# Patient Record
Sex: MALE | Race: BLACK OR AFRICAN AMERICAN | NOT HISPANIC OR LATINO | Employment: OTHER | URBAN - METROPOLITAN AREA
[De-identification: names, ages, dates, MRNs, and addresses within clinical notes are randomized per-mention and may not be internally consistent; named-entity substitution may affect disease eponyms.]

---

## 2023-07-17 ENCOUNTER — HOSPITAL ENCOUNTER (OUTPATIENT)
Dept: DATA CONVERSION | Facility: HOSPITAL | Age: 58
End: 2023-07-17
Attending: PODIATRIST | Admitting: PODIATRIST
Payer: COMMERCIAL

## 2023-07-17 DIAGNOSIS — M89.372 HYPERTROPHY OF BONE, LEFT ANKLE AND FOOT: ICD-10-CM

## 2023-07-17 DIAGNOSIS — E87.1 HYPO-OSMOLALITY AND HYPONATREMIA: ICD-10-CM

## 2023-07-17 DIAGNOSIS — F17.210 NICOTINE DEPENDENCE, CIGARETTES, UNCOMPLICATED: ICD-10-CM

## 2023-07-17 DIAGNOSIS — M20.42 OTHER HAMMER TOE(S) (ACQUIRED), LEFT FOOT: ICD-10-CM

## 2023-07-17 DIAGNOSIS — J43.9 EMPHYSEMA, UNSPECIFIED (MULTI): ICD-10-CM

## 2023-07-17 DIAGNOSIS — M20.5X2 OTHER DEFORMITIES OF TOE(S) (ACQUIRED), LEFT FOOT: ICD-10-CM

## 2023-09-17 PROBLEM — K09.1: Status: ACTIVE | Noted: 2023-09-17

## 2023-09-17 PROBLEM — Z04.9 CONDITION NOT FOUND: Status: ACTIVE | Noted: 2023-09-17

## 2023-09-17 PROBLEM — J98.01 BRONCHOSPASM, ACUTE: Status: ACTIVE | Noted: 2023-09-17

## 2023-09-17 PROBLEM — L02.215 ABSCESS OF PERINEUM: Status: ACTIVE | Noted: 2023-09-17

## 2023-09-17 PROBLEM — K60.3 ANAL FISTULA: Status: ACTIVE | Noted: 2023-09-17

## 2023-09-17 PROBLEM — M54.2 CHRONIC NECK PAIN: Status: ACTIVE | Noted: 2023-09-17

## 2023-09-17 PROBLEM — H04.123 DRY EYE SYNDROME OF BOTH LACRIMAL GLANDS: Status: ACTIVE | Noted: 2023-09-17

## 2023-09-17 PROBLEM — H52.13 MYOPIA OF BOTH EYES: Status: ACTIVE | Noted: 2023-09-17

## 2023-09-17 PROBLEM — N49.2 SCROTAL ABSCESS: Status: ACTIVE | Noted: 2023-09-17

## 2023-09-17 PROBLEM — F05 DELIRIUM OF MIXED ORIGIN: Status: ACTIVE | Noted: 2023-09-17

## 2023-09-17 PROBLEM — R06.2 WHEEZING: Status: ACTIVE | Noted: 2023-09-17

## 2023-09-17 PROBLEM — E87.20 METABOLIC ACIDOSIS: Status: ACTIVE | Noted: 2023-09-17

## 2023-09-17 PROBLEM — F41.9 ANXIETY: Status: ACTIVE | Noted: 2023-09-17

## 2023-09-17 PROBLEM — R07.9 CHEST PAIN: Status: ACTIVE | Noted: 2023-09-17

## 2023-09-17 PROBLEM — R33.9 RETENTION OF URINE: Status: ACTIVE | Noted: 2023-09-17

## 2023-09-17 PROBLEM — R11.10 REGURGITATION OF FOOD: Status: ACTIVE | Noted: 2023-09-17

## 2023-09-17 PROBLEM — R06.02 SHORTNESS OF BREATH: Status: ACTIVE | Noted: 2019-11-06

## 2023-09-17 PROBLEM — Z97.8 ENDOTRACHEAL TUBE PRESENT: Status: ACTIVE | Noted: 2023-09-17

## 2023-09-17 PROBLEM — F17.200 CHAIN SMOKER: Status: ACTIVE | Noted: 2019-11-06

## 2023-09-17 PROBLEM — N17.9 ACUTE KIDNEY INJURY (CMS-HCC): Status: ACTIVE | Noted: 2023-09-17

## 2023-09-17 PROBLEM — J18.9 CAP (COMMUNITY ACQUIRED PNEUMONIA): Status: ACTIVE | Noted: 2023-09-17

## 2023-09-17 PROBLEM — T80.1XXA IV INFILTRATE: Status: ACTIVE | Noted: 2023-09-17

## 2023-09-17 PROBLEM — R12 HEART BURN: Status: ACTIVE | Noted: 2023-09-17

## 2023-09-17 PROBLEM — J45.909 ASTHMA (HHS-HCC): Status: ACTIVE | Noted: 2023-09-17

## 2023-09-17 PROBLEM — K61.1 PERIRECTAL ABSCESS: Status: ACTIVE | Noted: 2023-09-17

## 2023-09-17 PROBLEM — T50.901A POISONING BY DRUG OR MEDICINAL SUBSTANCE: Status: ACTIVE | Noted: 2023-09-17

## 2023-09-17 PROBLEM — K60.30 ANAL FISTULA: Status: ACTIVE | Noted: 2023-09-17

## 2023-09-17 PROBLEM — J47.9 BRONCHIECTASIS (MULTI): Status: ACTIVE | Noted: 2023-09-17

## 2023-09-17 PROBLEM — G89.29 CHRONIC NECK PAIN: Status: ACTIVE | Noted: 2023-09-17

## 2023-09-17 PROBLEM — J44.1 COPD EXACERBATION (MULTI): Status: ACTIVE | Noted: 2023-09-17

## 2023-09-17 PROBLEM — D3A.8 NEUROENDOCRINE TUMOR (CMS-HCC): Status: ACTIVE | Noted: 2023-09-17

## 2023-09-17 PROBLEM — H52.4 MYOPIA OF BOTH EYES WITH REGULAR ASTIGMATISM AND PRESBYOPIA: Status: ACTIVE | Noted: 2023-09-17

## 2023-09-17 PROBLEM — H04.209 EYE TEARING: Status: ACTIVE | Noted: 2023-09-17

## 2023-09-17 PROBLEM — H52.4 BILATERAL PRESBYOPIA: Status: ACTIVE | Noted: 2023-09-17

## 2023-09-17 PROBLEM — A41.9 SEPSIS (MULTI): Status: ACTIVE | Noted: 2023-09-17

## 2023-09-17 PROBLEM — H52.223 MYOPIA OF BOTH EYES WITH REGULAR ASTIGMATISM AND PRESBYOPIA: Status: ACTIVE | Noted: 2023-09-17

## 2023-09-17 PROBLEM — A04.8 HELICOBACTER PYLORI (H. PYLORI) INFECTION: Status: ACTIVE | Noted: 2023-09-17

## 2023-09-17 PROBLEM — G93.41 ACUTE METABOLIC ENCEPHALOPATHY: Status: ACTIVE | Noted: 2023-09-17

## 2023-09-17 PROBLEM — N52.9 ERECTILE DYSFUNCTION: Status: ACTIVE | Noted: 2023-09-17

## 2023-09-17 PROBLEM — H43.399 VITREOUS FLOATERS: Status: ACTIVE | Noted: 2023-09-17

## 2023-09-17 PROBLEM — M27.40 CYST OF JAW: Status: ACTIVE | Noted: 2023-09-17

## 2023-09-17 PROBLEM — R56.9 SEIZURES (MULTI): Status: ACTIVE | Noted: 2023-09-17

## 2023-09-17 PROBLEM — J18.9 CAP (COMMUNITY ACQUIRED PNEUMONIA): Status: RESOLVED | Noted: 2023-09-17 | Resolved: 2023-09-17

## 2023-09-17 PROBLEM — J96.01 ACUTE HYPOXEMIC RESPIRATORY FAILURE (MULTI): Status: ACTIVE | Noted: 2023-09-17

## 2023-09-17 PROBLEM — H02.889 MEIBOMIAN GLAND DYSFUNCTION (MGD): Status: ACTIVE | Noted: 2023-09-17
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
## 2023-09-17 PROBLEM — R93.89 ABNORMAL CT OF THE CHEST: Status: ACTIVE | Noted: 2023-09-17

## 2023-09-17 PROBLEM — R07.89 CHEST TIGHTNESS: Status: ACTIVE | Noted: 2023-09-17

## 2023-09-17 RX ORDER — LIDOCAINE 50 MG/G
OINTMENT TOPICAL 4 TIMES DAILY
Status: ON HOLD | COMMUNITY
End: 2023-10-06 | Stop reason: ALTCHOICE

## 2023-09-17 RX ORDER — FERROUS SULFATE 325(65) MG
65 TABLET ORAL
COMMUNITY
End: 2023-10-07 | Stop reason: HOSPADM

## 2023-09-17 RX ORDER — MONTELUKAST SODIUM 10 MG/1
10 TABLET ORAL DAILY
Status: ON HOLD | COMMUNITY
End: 2023-10-07 | Stop reason: SDUPTHER

## 2023-09-17 RX ORDER — CLINDAMYCIN PHOSPHATE 10 UG/ML
LOTION TOPICAL 2 TIMES DAILY
COMMUNITY
End: 2023-10-07 | Stop reason: HOSPADM

## 2023-09-17 RX ORDER — DIVALPROEX SODIUM 250 MG/1
1 TABLET, DELAYED RELEASE ORAL 2 TIMES DAILY
COMMUNITY
End: 2023-10-07 | Stop reason: HOSPADM

## 2023-09-17 RX ORDER — HYDROXYZINE HYDROCHLORIDE 25 MG/1
1 TABLET, FILM COATED ORAL NIGHTLY
Status: ON HOLD | COMMUNITY
End: 2023-10-06 | Stop reason: ALTCHOICE

## 2023-09-17 RX ORDER — UMECLIDINIUM 62.5 UG/1
1 AEROSOL, POWDER ORAL DAILY
Status: ON HOLD | COMMUNITY
Start: 2020-12-09 | End: 2023-10-07 | Stop reason: SDUPTHER

## 2023-09-17 RX ORDER — SILDENAFIL 50 MG/1
50 TABLET, FILM COATED ORAL
Status: ON HOLD | COMMUNITY
End: 2023-10-06 | Stop reason: ALTCHOICE

## 2023-09-17 RX ORDER — ALBUTEROL SULFATE 0.83 MG/ML
SOLUTION RESPIRATORY (INHALATION) 4 TIMES DAILY
Status: ON HOLD | COMMUNITY
End: 2023-10-07 | Stop reason: SDUPTHER

## 2023-09-17 RX ORDER — FLUTICASONE PROPIONATE AND SALMETEROL 500; 50 UG/1; UG/1
2 POWDER RESPIRATORY (INHALATION) 2 TIMES DAILY
Status: ON HOLD | COMMUNITY
Start: 2023-06-27 | End: 2023-10-06 | Stop reason: ALTCHOICE

## 2023-09-17 RX ORDER — EPINEPHRINE 0.3 MG/.3ML
0.3 INJECTION SUBCUTANEOUS
COMMUNITY
End: 2024-01-22

## 2023-09-17 RX ORDER — ATORVASTATIN CALCIUM 10 MG/1
10 TABLET, FILM COATED ORAL DAILY
Status: ON HOLD | COMMUNITY
End: 2023-10-07 | Stop reason: SDUPTHER

## 2023-09-17 RX ORDER — PANTOPRAZOLE SODIUM 40 MG/1
40 TABLET, DELAYED RELEASE ORAL DAILY
Status: ON HOLD | COMMUNITY
End: 2023-10-07 | Stop reason: SDUPTHER

## 2023-09-17 RX ORDER — BENZOYL PEROXIDE 100 MG/ML
LIQUID TOPICAL
Status: ON HOLD | COMMUNITY
End: 2023-10-07 | Stop reason: SDUPTHER

## 2023-09-17 RX ORDER — TRIAMCINOLONE ACETONIDE 1 MG/G
CREAM TOPICAL DAILY
COMMUNITY
Start: 2023-06-27 | End: 2023-10-07 | Stop reason: HOSPADM

## 2023-09-17 RX ORDER — TOPIRAMATE 25 MG/1
50 TABLET ORAL DAILY
COMMUNITY
End: 2023-10-07 | Stop reason: HOSPADM

## 2023-09-17 RX ORDER — AZITHROMYCIN 250 MG/1
250 TABLET, FILM COATED ORAL
Status: ON HOLD | COMMUNITY
Start: 2023-05-11 | End: 2023-10-05 | Stop reason: ALTCHOICE

## 2023-09-17 RX ORDER — FEEDER CONTAINER WITH PUMP SET
EACH MISCELLANEOUS DAILY
Status: ON HOLD | COMMUNITY
End: 2023-10-07 | Stop reason: SDUPTHER

## 2023-09-17 RX ORDER — TAMSULOSIN HYDROCHLORIDE 0.4 MG/1
0.4 CAPSULE ORAL DAILY
Status: ON HOLD | COMMUNITY
End: 2023-10-07 | Stop reason: SDUPTHER

## 2023-09-17 RX ORDER — CHOLESTYRAMINE 4 G/9G
POWDER, FOR SUSPENSION ORAL 2 TIMES DAILY
Status: ON HOLD | COMMUNITY
Start: 2021-01-13 | End: 2023-10-06 | Stop reason: ALTCHOICE

## 2023-09-17 RX ORDER — FINASTERIDE 5 MG/1
1 TABLET, FILM COATED ORAL DAILY
Status: ON HOLD | COMMUNITY
End: 2023-10-06 | Stop reason: ALTCHOICE

## 2023-09-17 RX ORDER — ROPINIROLE 0.5 MG/1
0.5 TABLET, FILM COATED ORAL DAILY
Status: ON HOLD | COMMUNITY
End: 2023-10-07 | Stop reason: SDUPTHER

## 2023-09-17 RX ORDER — MIRTAZAPINE 15 MG/1
15 TABLET, FILM COATED ORAL NIGHTLY
COMMUNITY
End: 2023-10-07 | Stop reason: HOSPADM

## 2023-09-17 RX ORDER — SODIUM CHLORIDE FOR INHALATION 3 %
4 VIAL, NEBULIZER (ML) INHALATION 2 TIMES DAILY
Status: ON HOLD | COMMUNITY
End: 2023-10-07 | Stop reason: SDUPTHER

## 2023-09-17 RX ORDER — CYCLOBENZAPRINE HCL 10 MG
10 TABLET ORAL 2 TIMES DAILY PRN
Status: ON HOLD | COMMUNITY
End: 2023-10-07 | Stop reason: SDUPTHER

## 2023-09-17 RX ORDER — LOPERAMIDE HYDROCHLORIDE 2 MG/1
2 CAPSULE ORAL 4 TIMES DAILY PRN
Status: ON HOLD | COMMUNITY
End: 2023-10-07 | Stop reason: SDUPTHER

## 2023-09-17 RX ORDER — ASPIRIN 81 MG
TABLET, DELAYED RELEASE (ENTERIC COATED) ORAL
Status: ON HOLD | COMMUNITY
Start: 2021-10-25 | End: 2023-10-07 | Stop reason: SDUPTHER

## 2023-09-17 RX ORDER — FLUTICASONE PROPIONATE AND SALMETEROL 250; 50 UG/1; UG/1
1 POWDER RESPIRATORY (INHALATION) EVERY 12 HOURS
COMMUNITY
Start: 2021-10-08 | End: 2023-10-07 | Stop reason: HOSPADM

## 2023-09-17 RX ORDER — GUAIFENESIN AND PSEUDOEPHEDRINE HYDROCHLORIDE 600; 60 MG/1; MG/1
TABLET, EXTENDED RELEASE ORAL
COMMUNITY
Start: 2023-05-11 | End: 2023-10-07 | Stop reason: HOSPADM

## 2023-09-17 RX ORDER — IPRATROPIUM BROMIDE AND ALBUTEROL SULFATE 2.5; .5 MG/3ML; MG/3ML
3 SOLUTION RESPIRATORY (INHALATION) EVERY 4 HOURS PRN
Status: ON HOLD | COMMUNITY
Start: 2020-12-09 | End: 2023-10-06 | Stop reason: ALTCHOICE

## 2023-09-17 RX ORDER — DOCUSATE SODIUM 100 MG/1
100 CAPSULE, LIQUID FILLED ORAL
COMMUNITY
End: 2023-10-07 | Stop reason: HOSPADM

## 2023-09-17 RX ORDER — AMOXICILLIN AND CLAVULANATE POTASSIUM 875; 125 MG/1; MG/1
1 TABLET, FILM COATED ORAL 2 TIMES DAILY
Status: ON HOLD | COMMUNITY
Start: 2023-05-11 | End: 2023-10-06 | Stop reason: ALTCHOICE

## 2023-09-17 RX ORDER — LEVETIRACETAM 1000 MG/1
1000 TABLET ORAL
Status: ON HOLD | COMMUNITY
End: 2023-10-07 | Stop reason: SDUPTHER

## 2023-09-17 RX ORDER — TADALAFIL 20 MG/1
20 TABLET ORAL DAILY PRN
Status: ON HOLD | COMMUNITY
End: 2023-10-06 | Stop reason: ALTCHOICE

## 2023-09-17 RX ORDER — PREDNISONE 20 MG/1
40 TABLET ORAL DAILY
Status: ON HOLD | COMMUNITY
Start: 2023-05-11 | End: 2023-10-06 | Stop reason: ALTCHOICE

## 2023-09-17 RX ORDER — DICLOFENAC SODIUM 10 MG/G
GEL TOPICAL 4 TIMES DAILY
Status: ON HOLD | COMMUNITY
Start: 2023-06-21 | End: 2023-10-07 | Stop reason: SDUPTHER

## 2023-09-17 RX ORDER — MULTIVIT-MIN/IRON FUM/FOLIC AC 7.5 MG-4
1 TABLET ORAL
Status: ON HOLD | COMMUNITY
Start: 2023-06-27 | End: 2023-10-06 | Stop reason: SDUPTHER

## 2023-09-17 RX ORDER — ASCORBIC ACID 500 MG
500 TABLET ORAL
Status: ON HOLD | COMMUNITY
Start: 2022-04-29 | End: 2023-10-07 | Stop reason: SDUPTHER

## 2023-09-17 RX ORDER — ALBUTEROL SULFATE 90 UG/1
2 AEROSOL, METERED RESPIRATORY (INHALATION) EVERY 6 HOURS PRN
COMMUNITY
End: 2023-10-07 | Stop reason: HOSPADM

## 2023-09-17 RX ORDER — GABAPENTIN 600 MG/1
600 TABLET ORAL 3 TIMES DAILY
Status: ON HOLD | COMMUNITY
End: 2023-10-07 | Stop reason: SDUPTHER

## 2023-09-30 NOTE — H&P
History & Physical Reviewed:   I have reviewed the History and Physical dated:  17-Jul-2023   History and Physical reviewed and relevant findings noted. Patient examined to review pertinent physical  findings.: No significant changes   Home Medications Reviewed: no changes noted   Allergies Reviewed: no changes noted       ERAS (Enhanced Recovery After Surgery):  ·  ERAS Patient: no     Consent:   COVID-19 Consent:  ·  COVID-19 Risk Consent Surgeon has reviewed key risks related to the risk of macy COVID-19 and if they contract COVID-19 what the risks are.     Attestation:   Note Completion:  I am a:  Resident/Fellow   Attending Attestation I saw and evaluated the patient.  I personally obtained the key and critical portions of the history and physical exam or was physically present for key and  critical portions performed by the resident/fellow. I reviewed the resident/fellow?s documentation and discussed the patient with the resident/fellow.  I agree with the resident/fellow?s medical decision making as documented in the note.     I personally evaluated the patient on 17-Jul-2023         Electronic Signatures:  Ami Ham (RENEE (Resident))  (Signed 17-Jul-2023 07:21)   Authored: History & Physical Reviewed, ERAS, Consent,  Note Completion  Ehredt, Duane (DP)  (Signed 17-Jul-2023 08:31)   Authored: Note Completion   Co-Signer: History & Physical Reviewed, ERAS, Consent, Note Completion      Last Updated: 17-Jul-2023 08:31 by Ehredt, Duane (ROULA)

## 2023-10-02 NOTE — OP NOTE
PROCEDURE DETAILS    Preoperative Diagnosis:  Hypertrophy of bone, left ankle and foot, M89.372  Other hammer toe(s) (acquired), left foot, M20.42    Postoperative Diagnosis:  Hypertrophy of bone, left ankle and foot, M89.372  Other hammer toe(s) (acquired), left foot, M20.42    Surgeon: Ehredt, Duane  Resident/Fellow/Other Assistant: Ami Ham    Procedure:  1.  Ostectomy of left 5th metatarsal  2. LEFT 5th TOE DEROTATIONAL ARTHROPLASTY/ LEFT 5th METATARSAL PHALANGEAL JOINT OSTECTOMY WITH MINI C-ARM    Anesthesia: No anesthesiologist associated with this case  Estimated Blood Loss: 5cc  Findings: Consistent with diagnosis.  Hypertrophic bone noted to the left fifth metatarsal head as well as an adductovarus fifth digit left foot.  Specimens(s) Collected: no,     Implants: None  Tourniquet Times: 225 mmHg for 25 minutes.  It should be noted deflated prior to wound closure and intraoperative hemostasis was achieved.  Patient Returned To/Condition: PACU in stable condition.        Operative Report:   Indications: Mr. Leigh is a pleasant 58-year-old male well-known to my practice for painful left fifth toe deformity and hammertoe deformity.  He has actually  had surgery in the past for this including arthroplasty and osteotomy of the left fifth metatarsal.  However since then he has developed significant hypertrophic bone formation about the osteotomy site resulting in secondary deformity and pain to the  plantar left foot.  Also has residual adductovarus position of the left fifth toe and has failed multiple conservative therapies for this.  Underwent full surgery consultation as an outpatient well aware of the risks of possible loss of limb or life elected  to proceed with the case without any outside influence.    Procedure: Patient brought the operating room placed in the operating table supine position left lower extremity was scrubbed prepped draped usual sterile fashion prophylactic antibiotics  were administered.  Was directed to the left foot fifth metatarsal  head area where a made an incision laterally at the junction of the dorsal plantar skin the hypertrophic bone was palpated in this area incision was approximately 5 cm in length taken down and full-thickness in nature to the periosteum.  Subperiosteal  subcapsular dissection was performed was clearly able to see the bony prominence and used a power saw to resect the plantar prominence completely.  This was then extirpated from the foot used a egg bur to smooth remodeled the cortical surface at the lateral  aspect of the fifth metatarsal.  Multiple radiographic images confirmed adequate resection of the area.  I then flushed out to smooth course amounts normal sterile saline the deep fascial periosteal layer was closed over the bone with 2-0 Vicryl running  locking fashion.  Subcutaneous tissue was closed with 2-0 Vicryl simple buried fashion skin was closed with 3-0 Prolene simple interrupted fashion.    Next attention was directed to the left fifth toe where an adductovarus digit was noted.  I drew out a 3 1 elliptical incision and edema to the  rotational position's was taken down full-thickness the wedge of skin was excised deepened the incision to the periosteum transected extensor tendon performed a full subperiosteal subcapsular dissection exposing the head of the proximal phalanx.  We then  used a power saw to resect the head of the proximal phalanx.  Foot this was removed and the deformity was reduced noted adequate closure tension.  Then flushed out with copious amounts normal sterile saline and closed the extensor tendons and periosteal  layer with 2-0 Vicryl in a simple buried fashion.  The skin was then closed in a derotational attitude with 3-0 Prolene in a simple operative fashion.  The foot was then cleansed and patted dry bacitracin ointment and Adaptic were placed on incision sites  with dry sterile dressing Coban and Ace wrap for  edema management.  He tolerated procedure and anesthesia well apparent satisfactory condition was understood the PACU with vital signs stable and vas status intact all 5 digits of left foot monitor prior  to discharge.  Note Recipients:   Ehredt, Duane, DPM - [phone] [preferred]  Lizy Arango MD - [phone] [Preferred]                          Electronic Signatures:  Ehredt, Duane (RENEE)  (Signed 17-Jul-2023 10:11)   Authored: Post-Operative Note, Chart Review, Note Completion      Last Updated: 17-Jul-2023 10:11 by Ehredt, Duane (RENEE)

## 2023-10-04 NOTE — H&P (VIEW-ONLY)
Diagnoses/Problems  Assessed    · Anal fistula (565.1) (K60.3)    Patient Discussion/Summary    History of complex and recurrent perianal fistulae.  Presents today with complaints of anal pain and drainage. Also with fecal incontinence.  Will schedule for EUA as first step.      History of Present Illness  Servando Leigh is a 58M with history of recurrent perianal abscess and fistula s/p multiple procedures. Last evaluated by SUMAYA Fisher on 6/12/22 with mild perianal excoriation. Here today with perianal discomfort and drainage. Also complains of fecal incontinence.    Procedure History:   4/20/21- I&D   11/2/2020- EUA with fistulotomy   8/12/2020- EUA and right-rided fistulotomy  5/21/2020 - EUA and seton placement  1/10/2020 - EUA, I & D       Active Problems  Problems    · Abnormal CT of the chest (793.2) (R93.89)   · Anal fistula (565.1) (K60.3)   · Anxiety (300.00) (F41.9)   · Bilateral presbyopia (367.4) (H52.4)   · Bronchiectasis (494.0) (J47.9)   · CAP (community acquired pneumonia) (486) (J18.9)   · Chest tightness (786.59) (R07.89)   · Chronic neck pain (723.1,338.29) (M54.2,G89.29)   · COPD (chronic obstructive pulmonary disease) (496) (J44.9)   · Cough (786.2) (R05.9)   · Diarrhea (787.91) (R19.7)   · Dry eye syndrome of both lacrimal glands (375.15) (H04.123)   · Encounter for incision and drainage procedure (V72.85) (Z76.89)   · Erectile dysfunction (607.84) (N52.9)   · Eye tearing (375.20) (H04.209)   · Heart burn (787.1) (R12)   · Helicobacter pylori (H. pylori) infection (041.86) (A04.8)   · Meibomian gland dysfunction (MGD) (374.89) (H02.889)   · Myopia of both eyes with astigmatism and presbyopia (367.1,367.20,367.4)  (H52.13,H52.203,H52.4)   · Myopia of both eyes with regular astigmatism (367.1,367.21) (H52.13,H52.223)   · Myopia, bilateral (367.1) (H52.13)   · Nausea with vomiting (787.01) (R11.2)   · Neuroendocrine tumor (209.60) (D3A.8)   · Pain at surgical incision (782.0) (L76.82)    · Perirectal abscess (566) (K61.1)   · Post-op pain (338.18) (G89.18)   · Prostate cancer screening (V76.44) (Z12.5)   · Regurgitation of food (787.03) (R11.10)   · Scrotal abscess (608.4) (N49.2)   · Seizures (780.39) (R56.9)   · Vitreous floaters (379.24) (H43.399)   · Vomiting (787.03) (R11.10)   · Wheezing (786.07) (R06.2)    Past Medical History  Problems    · History of depression (V11.8) (Z86.59)   · History of esophageal reflux (V12.79) (Z87.19)   · History of pulmonary emphysema (492.8) (J43.9)   · History of seizure disorder (V12.49) (Z86.69)   · 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 (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,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.4)  (I73.9)    Surgical History  Problems    · History of Neck Surgery   · History of Perirectal abscess incision and drainage   · History of Shoulder replacement    Family History  Son    · Family history of diabetes mellitus (V18.0) (Z83.3)  Grandfather    · Family history of glaucoma (V19.11) (Z83.511)    Social History  Problems    · Current every day smoker (305.1) (F17.200)   · 4 cig/day , smoking for over 40 yrs   · Marijuana   · smokes once a month   · No caffeine use   · Occasional alcohol use   · 2 days/week ( 6 pack of beer each time)    Allergies  Medication    · Coconut Flavor LIQD   Recorded By: Saúl Oneill; 1/28/2020 9:48:54 AM   · Shellfish-derived Products   Recorded By: Saúl Oneill; 1/28/2020 9:48:54 AM  NonMedication    · Shellfish   Anaphylaxis;; Updated By: Lisa Craven; 6/14/2022 1:46:05 PM   · Coconut   Swelling; Updated By: Lisa Craven; 6/14/2022 1:46:02 PM    Current Meds    Medication Name Instruction   Albuterol Sulfate (2.5 MG/3ML) 0.083% Inhalation Nebulization Solution USE 1 UNIT DOSE IN NEBULIZER 4 TIMES DAILY   Albuterol Sulfate  (90 Base) MCG/ACT Inhalation Aerosol Solution Inhale two (2) puffs by mouth every 4-6 hours as needed   Atorvastatin Calcium 10 MG Oral Tablet 1 tablet daily   Benzoyl Peroxide Wash 10 % External Liquid  USE WASH ONCE DAILY AS DIRECTED.   Cholestyramine 4 GM/DOSE Oral Powder MIX 1 SCOOP IN LIQUID AND DRINK TWICE DAILY.   Clindamycin Phosphate 1 % External Lotion APPLY SPARINGLY TO AFFECTED AREA(S) TWICE DAILY   Colace 100 MG Oral Capsule TAKE 1 CAPSULE Daily prn   Diclofenac Sodium 1 % External Gel    Ensure Oral Liquid 2 - 3 CANS DAILY   EPINEPHrine 0.3 MG/0.3ML Injection Solution Auto-injector INJECT 0.3ML INTRAMUSCULARLY AS DIRECTED.   Fiber Therapy 500 MG Oral Tablet Take 2 tablets with full glass of water daily.   Flexeril 10 MG TABS    Fluticasone-Salmeterol 250-50 MCG/ACT Inhalation Aerosol Powder Breath Activated INHALE 1 BY MOUTH BY MOUTH EVERY 12 HOURS --RINSE AND SPIT AFTER EACH USE---   Gabapentin 600 MG Oral Tablet 1 tablet 3 times daily.   Incruse Ellipta 62.5 MCG/ACT Inhalation Aerosol Powder Breath Activated INHALE 1 PUFF DAILY.   Lidocaine 5 % External Ointment Apply to affected area up to four times daily.   Loperamide HCl - 2 MG Oral Capsule TAKE 2 CAPSULES AT 1ST DIARRHEAL BOWEL MOVEMENT, THEN TAKE 1 CAPSULE AT EACH ADDITIONAL BOWEL MOVEMENT.  MAXIMUM 8 CAPSULES IN 24 HOURS.   Mirtazapine 15 MG Oral Tablet Disintegrating    Montelukast Sodium 10 MG Oral Tablet TAKE 1 TABLET AT BEDTIME.   Mucinex D  MG Oral Tablet Extended Release 12 Hour    Multi Vitamin Daily TABS    Nebusal 3 % Inhalation Nebulization Solution USE AS DIRECTED.   Pantoprazole Sodium 40 MG Oral Tablet Delayed Release TAKE 1 TABLET BY MOUTH EACH DAY 30 MINUTES BEFORE BREAKFAST   rOPINIRole HCl - 0.5 MG Oral Tablet Take 1 tablet daily   Sodium Chloride 3 % Inhalation Nebulization Solution INHALE 1 UNIT DOSE VIA NEBULIZER TWICE DAILY *USE ALBUTEROL NEBULIZER BEFORE*   Tadalafil 20 MG Oral Tablet TAKE 1 TABLET 1 HOUR BEFORE ACTIVITY AS NEEDED.   Tamsulosin HCl - 0.4 MG Oral Capsule 1 capsule at bedtime   Viagra 50 MG Oral Tablet    Vitamin C 500 MG Oral Tablet TAKE 1 TABLET DAILY.     Vitals  Vital Signs    Recorded: [id number]  01:21PM   Heart Rate 102   Systolic 100   Diastolic 65   Height 5 ft 8 in   Weight 125 lb 9 oz   BMI Calculated 19.09 kg/m2   BSA Calculated 1.68     Physical Exam     Constitutional - General appearance: In no acute distress, well appearing and well nourished.   Eyes Sclerae: Anicteric   Neck - Exam: Appearance of the neck was normal. No neck masses observed. Thyroid Examination: Not enlarged and there were no palpable nodules.   Pulmonary - Respiratory effort: Normal respiration. Auscultation of lungs: Clear to auscultation.   Cardiovascular - Auscultation of heart: Normal rate and rhythm, no murmurs. Carotid arteries exam: Pulse normal with no bruits. Examination of Abdominal Aorta: abdominal aorta was normal. Examination of Pedal Pulses: pedal pulses were normal. Examination of extremities for edema and/or varicosities: No peripheral edema.   Abdomen and Pelvis - Abdomen: Non-tender, no abdominal masses. Liver and spleen: No hepatosplenomegaly. Examination for hernias: Hernial orifices intact. perineum: no obvious abscess or draining fistula. perianal skin is excoriated.   Lymphatic - No lymphadenopathy .   Neurologic - Cranial Nerve Exam: Non-focal. Grossly intact. Reflexes: Normal.   Psychiatric - Orientation to person, place, and time: Normal. Mood and affect: Normal.      Signatures   Electronically signed by : Cesar Brooks MD; Sep 26 2023  4:17PM EST (Author)

## 2023-10-05 ENCOUNTER — HOSPITAL ENCOUNTER (INPATIENT)
Facility: HOSPITAL | Age: 58
LOS: 2 days | Discharge: HOME | DRG: 178 | End: 2023-10-07
Attending: COLON & RECTAL SURGERY | Admitting: STUDENT IN AN ORGANIZED HEALTH CARE EDUCATION/TRAINING PROGRAM
Payer: COMMERCIAL

## 2023-10-05 ENCOUNTER — APPOINTMENT (OUTPATIENT)
Dept: RADIOLOGY | Facility: HOSPITAL | Age: 58
DRG: 178 | End: 2023-10-05
Payer: COMMERCIAL

## 2023-10-05 ENCOUNTER — ANESTHESIA (OUTPATIENT)
Dept: OPERATING ROOM | Facility: HOSPITAL | Age: 58
DRG: 178 | End: 2023-10-05
Payer: COMMERCIAL

## 2023-10-05 ENCOUNTER — ANESTHESIA EVENT (OUTPATIENT)
Dept: OPERATING ROOM | Facility: HOSPITAL | Age: 58
DRG: 178 | End: 2023-10-05
Payer: COMMERCIAL

## 2023-10-05 DIAGNOSIS — M54.2 CHRONIC NECK PAIN: ICD-10-CM

## 2023-10-05 DIAGNOSIS — R11.10 REGURGITATION OF FOOD: ICD-10-CM

## 2023-10-05 DIAGNOSIS — R29.6 RECURRENT FALLS: Chronic | ICD-10-CM

## 2023-10-05 DIAGNOSIS — F05 DELIRIUM OF MIXED ORIGIN: ICD-10-CM

## 2023-10-05 DIAGNOSIS — K61.1 PERIRECTAL ABSCESS: ICD-10-CM

## 2023-10-05 DIAGNOSIS — R41.0 CONFUSION WITH NON-FOCAL NEURO EXAM: Chronic | ICD-10-CM

## 2023-10-05 DIAGNOSIS — N52.9 ERECTILE DYSFUNCTION, UNSPECIFIED ERECTILE DYSFUNCTION TYPE: ICD-10-CM

## 2023-10-05 DIAGNOSIS — K60.3 ANAL FISTULA: Primary | ICD-10-CM

## 2023-10-05 DIAGNOSIS — J69.0 ASPIRATION PNEUMONITIS (MULTI): ICD-10-CM

## 2023-10-05 DIAGNOSIS — G89.29 CHRONIC NECK PAIN: ICD-10-CM

## 2023-10-05 DIAGNOSIS — R07.89 CHEST TIGHTNESS: ICD-10-CM

## 2023-10-05 DIAGNOSIS — R56.9 SEIZURES (MULTI): ICD-10-CM

## 2023-10-05 LAB
ALBUMIN SERPL BCP-MCNC: 3.8 G/DL (ref 3.4–5)
ALP SERPL-CCNC: 108 U/L (ref 33–120)
ALT SERPL W P-5'-P-CCNC: 10 U/L (ref 10–52)
ANION GAP SERPL CALC-SCNC: 18 MMOL/L (ref 10–20)
APTT PPP: 34 SECONDS (ref 27–38)
AST SERPL W P-5'-P-CCNC: 25 U/L (ref 9–39)
BASOPHILS # BLD MANUAL: 0 X10*3/UL (ref 0–0.1)
BASOPHILS NFR BLD MANUAL: 0 %
BILIRUB SERPL-MCNC: 0.2 MG/DL (ref 0–1.2)
BUN SERPL-MCNC: 10 MG/DL (ref 6–23)
CALCIUM SERPL-MCNC: 8.7 MG/DL (ref 8.6–10.6)
CHLORIDE SERPL-SCNC: 102 MMOL/L (ref 98–107)
CO2 SERPL-SCNC: 23 MMOL/L (ref 21–32)
CREAT SERPL-MCNC: 0.92 MG/DL (ref 0.5–1.3)
EOSINOPHIL # BLD MANUAL: 0 X10*3/UL (ref 0–0.7)
EOSINOPHIL NFR BLD MANUAL: 0 %
ERYTHROCYTE [DISTWIDTH] IN BLOOD BY AUTOMATED COUNT: 14.3 % (ref 11.5–14.5)
ETHANOL SERPL-MCNC: 180 MG/DL
GFR SERPL CREATININE-BSD FRML MDRD: >90 ML/MIN/1.73M*2
GLUCOSE SERPL-MCNC: 58 MG/DL (ref 74–99)
HCT VFR BLD AUTO: 42 % (ref 41–52)
HGB BLD-MCNC: 13.9 G/DL (ref 13.5–17.5)
IMM GRANULOCYTES # BLD AUTO: 0.05 X10*3/UL (ref 0–0.7)
IMM GRANULOCYTES NFR BLD AUTO: 0.6 % (ref 0–0.9)
INR PPP: 1 (ref 0.9–1.1)
LYMPHOCYTES # BLD MANUAL: 0.69 X10*3/UL (ref 1.2–4.8)
LYMPHOCYTES NFR BLD MANUAL: 7.8 %
MAGNESIUM SERPL-MCNC: 1.86 MG/DL (ref 1.6–2.4)
MCH RBC QN AUTO: 30.2 PG (ref 26–34)
MCHC RBC AUTO-ENTMCNC: 33.1 G/DL (ref 32–36)
MCV RBC AUTO: 91 FL (ref 80–100)
MONOCYTES # BLD MANUAL: 0 X10*3/UL (ref 0.1–1)
MONOCYTES NFR BLD MANUAL: 0 %
NEUTS SEG # BLD MANUAL: 8.11 X10*3/UL (ref 1.2–7)
NEUTS SEG NFR BLD MANUAL: 92.2 %
NRBC BLD-RTO: 0 /100 WBCS (ref 0–0)
PLATELET # BLD AUTO: 561 X10*3/UL (ref 150–450)
PMV BLD AUTO: 8.7 FL (ref 7.5–11.5)
POTASSIUM SERPL-SCNC: 4.6 MMOL/L (ref 3.5–5.3)
PROT SERPL-MCNC: 7.8 G/DL (ref 6.4–8.2)
PROTHROMBIN TIME: 10.7 SECONDS (ref 9.8–12.8)
RBC # BLD AUTO: 4.6 X10*6/UL (ref 4.5–5.9)
RBC MORPH BLD: ABNORMAL
SODIUM SERPL-SCNC: 138 MMOL/L (ref 136–145)
TOTAL CELLS COUNTED BLD: 115
WBC # BLD AUTO: 8.8 X10*3/UL (ref 4.4–11.3)

## 2023-10-05 PROCEDURE — 2500000004 HC RX 250 GENERAL PHARMACY W/ HCPCS (ALT 636 FOR OP/ED): Performed by: COLON & RECTAL SURGERY

## 2023-10-05 PROCEDURE — 7100000002 HC RECOVERY ROOM TIME - EACH INCREMENTAL 1 MINUTE: Performed by: COLON & RECTAL SURGERY

## 2023-10-05 PROCEDURE — 82077 ASSAY SPEC XCP UR&BREATH IA: CPT

## 2023-10-05 PROCEDURE — 1100000001 HC PRIVATE ROOM DAILY

## 2023-10-05 PROCEDURE — 2500000005 HC RX 250 GENERAL PHARMACY W/O HCPCS: Performed by: ANESTHESIOLOGY

## 2023-10-05 PROCEDURE — 2500000005 HC RX 250 GENERAL PHARMACY W/O HCPCS

## 2023-10-05 PROCEDURE — 3600000008 HC OR TIME - EACH INCREMENTAL 1 MINUTE - PROCEDURE LEVEL THREE: Performed by: COLON & RECTAL SURGERY

## 2023-10-05 PROCEDURE — 3600000003 HC OR TIME - INITIAL BASE CHARGE - PROCEDURE LEVEL THREE: Performed by: COLON & RECTAL SURGERY

## 2023-10-05 PROCEDURE — 2500000002 HC RX 250 W HCPCS SELF ADMINISTERED DRUGS (ALT 637 FOR MEDICARE OP, ALT 636 FOR OP/ED)

## 2023-10-05 PROCEDURE — 84439 ASSAY OF FREE THYROXINE: CPT

## 2023-10-05 PROCEDURE — 3700000002 HC GENERAL ANESTHESIA TIME - EACH INCREMENTAL 1 MINUTE: Performed by: COLON & RECTAL SURGERY

## 2023-10-05 PROCEDURE — 2720000007 HC OR 272 NO HCPCS: Performed by: COLON & RECTAL SURGERY

## 2023-10-05 PROCEDURE — 71045 X-RAY EXAM CHEST 1 VIEW: CPT | Mod: FY

## 2023-10-05 PROCEDURE — 85027 COMPLETE CBC AUTOMATED: CPT | Performed by: STUDENT IN AN ORGANIZED HEALTH CARE EDUCATION/TRAINING PROGRAM

## 2023-10-05 PROCEDURE — 99222 1ST HOSP IP/OBS MODERATE 55: CPT

## 2023-10-05 PROCEDURE — S4991 NICOTINE PATCH NONLEGEND: HCPCS

## 2023-10-05 PROCEDURE — 71045 X-RAY EXAM CHEST 1 VIEW: CPT | Mod: FOREIGN READ | Performed by: RADIOLOGY

## 2023-10-05 PROCEDURE — 85730 THROMBOPLASTIN TIME PARTIAL: CPT | Performed by: STUDENT IN AN ORGANIZED HEALTH CARE EDUCATION/TRAINING PROGRAM

## 2023-10-05 PROCEDURE — 80053 COMPREHEN METABOLIC PANEL: CPT | Performed by: STUDENT IN AN ORGANIZED HEALTH CARE EDUCATION/TRAINING PROGRAM

## 2023-10-05 PROCEDURE — 2500000001 HC RX 250 WO HCPCS SELF ADMINISTERED DRUGS (ALT 637 FOR MEDICARE OP)

## 2023-10-05 PROCEDURE — 2500000004 HC RX 250 GENERAL PHARMACY W/ HCPCS (ALT 636 FOR OP/ED): Performed by: ANESTHESIOLOGY

## 2023-10-05 PROCEDURE — 0DJD8ZZ INSPECTION OF LOWER INTESTINAL TRACT, VIA NATURAL OR ARTIFICIAL OPENING ENDOSCOPIC: ICD-10-PCS | Performed by: COLON & RECTAL SURGERY

## 2023-10-05 PROCEDURE — A4217 STERILE WATER/SALINE, 500 ML: HCPCS | Performed by: COLON & RECTAL SURGERY

## 2023-10-05 PROCEDURE — A45990 PR SURG DIAGNOSTIC EXAM, ANORECTAL: Performed by: ANESTHESIOLOGY

## 2023-10-05 PROCEDURE — 7100000001 HC RECOVERY ROOM TIME - INITIAL BASE CHARGE: Performed by: COLON & RECTAL SURGERY

## 2023-10-05 PROCEDURE — 36415 COLL VENOUS BLD VENIPUNCTURE: CPT | Performed by: STUDENT IN AN ORGANIZED HEALTH CARE EDUCATION/TRAINING PROGRAM

## 2023-10-05 PROCEDURE — 2500000002 HC RX 250 W HCPCS SELF ADMINISTERED DRUGS (ALT 637 FOR MEDICARE OP, ALT 636 FOR OP/ED): Performed by: ANESTHESIOLOGY

## 2023-10-05 PROCEDURE — 94640 AIRWAY INHALATION TREATMENT: CPT

## 2023-10-05 PROCEDURE — 87205 SMEAR GRAM STAIN: CPT | Performed by: STUDENT IN AN ORGANIZED HEALTH CARE EDUCATION/TRAINING PROGRAM

## 2023-10-05 PROCEDURE — 83735 ASSAY OF MAGNESIUM: CPT

## 2023-10-05 PROCEDURE — 82746 ASSAY OF FOLIC ACID SERUM: CPT

## 2023-10-05 PROCEDURE — 3700000001 HC GENERAL ANESTHESIA TIME - INITIAL BASE CHARGE: Performed by: COLON & RECTAL SURGERY

## 2023-10-05 PROCEDURE — 82607 VITAMIN B-12: CPT

## 2023-10-05 PROCEDURE — 2500000004 HC RX 250 GENERAL PHARMACY W/ HCPCS (ALT 636 FOR OP/ED)

## 2023-10-05 PROCEDURE — 2500000005 HC RX 250 GENERAL PHARMACY W/O HCPCS: Performed by: NURSE ANESTHETIST, CERTIFIED REGISTERED

## 2023-10-05 PROCEDURE — 82306 VITAMIN D 25 HYDROXY: CPT

## 2023-10-05 PROCEDURE — 2500000004 HC RX 250 GENERAL PHARMACY W/ HCPCS (ALT 636 FOR OP/ED): Performed by: NURSE ANESTHETIST, CERTIFIED REGISTERED

## 2023-10-05 PROCEDURE — 45990 SURG DX EXAM ANORECTAL: CPT | Performed by: COLON & RECTAL SURGERY

## 2023-10-05 PROCEDURE — 85007 BL SMEAR W/DIFF WBC COUNT: CPT | Performed by: STUDENT IN AN ORGANIZED HEALTH CARE EDUCATION/TRAINING PROGRAM

## 2023-10-05 PROCEDURE — 2580000001 HC RX 258 IV SOLUTIONS: Performed by: NURSE ANESTHETIST, CERTIFIED REGISTERED

## 2023-10-05 PROCEDURE — 84443 ASSAY THYROID STIM HORMONE: CPT

## 2023-10-05 PROCEDURE — A45990 PR SURG DIAGNOSTIC EXAM, ANORECTAL: Performed by: NURSE ANESTHETIST, CERTIFIED REGISTERED

## 2023-10-05 RX ORDER — IBUPROFEN 200 MG
1 TABLET ORAL DAILY
Status: DISCONTINUED | OUTPATIENT
Start: 2023-10-05 | End: 2023-10-07 | Stop reason: HOSPADM

## 2023-10-05 RX ORDER — LORAZEPAM 0.5 MG/1
0.5 TABLET ORAL EVERY 2 HOUR PRN
Status: DISCONTINUED | OUTPATIENT
Start: 2023-10-05 | End: 2023-10-07 | Stop reason: HOSPADM

## 2023-10-05 RX ORDER — ATORVASTATIN CALCIUM 10 MG/1
10 TABLET, FILM COATED ORAL DAILY
Status: DISCONTINUED | OUTPATIENT
Start: 2023-10-05 | End: 2023-10-07 | Stop reason: HOSPADM

## 2023-10-05 RX ORDER — FOLIC ACID 0.4 MG
0.4 TABLET ORAL DAILY
Status: DISCONTINUED | OUTPATIENT
Start: 2023-10-05 | End: 2023-10-07 | Stop reason: HOSPADM

## 2023-10-05 RX ORDER — LOPERAMIDE HYDROCHLORIDE 2 MG/1
2 CAPSULE ORAL 4 TIMES DAILY PRN
Status: DISCONTINUED | OUTPATIENT
Start: 2023-10-05 | End: 2023-10-07 | Stop reason: HOSPADM

## 2023-10-05 RX ORDER — ROPINIROLE 0.5 MG/1
0.5 TABLET, FILM COATED ORAL DAILY
Status: DISCONTINUED | OUTPATIENT
Start: 2023-10-05 | End: 2023-10-07 | Stop reason: HOSPADM

## 2023-10-05 RX ORDER — PHENYLEPHRINE HCL IN 0.9% NACL 0.4MG/10ML
SYRINGE (ML) INTRAVENOUS AS NEEDED
Status: DISCONTINUED | OUTPATIENT
Start: 2023-10-05 | End: 2023-10-05

## 2023-10-05 RX ORDER — TADALAFIL 20 MG/1
20 TABLET ORAL DAILY PRN
Status: DISCONTINUED | OUTPATIENT
Start: 2023-10-05 | End: 2023-10-05

## 2023-10-05 RX ORDER — POLYETHYLENE GLYCOL 3350 17 G/17G
17 POWDER, FOR SOLUTION ORAL DAILY
Status: DISCONTINUED | OUTPATIENT
Start: 2023-10-05 | End: 2023-10-07 | Stop reason: HOSPADM

## 2023-10-05 RX ORDER — ACETAMINOPHEN 325 MG/1
650 TABLET ORAL EVERY 4 HOURS PRN
Status: DISCONTINUED | OUTPATIENT
Start: 2023-10-05 | End: 2023-10-05 | Stop reason: HOSPADM

## 2023-10-05 RX ORDER — GABAPENTIN 300 MG/1
600 CAPSULE ORAL 3 TIMES DAILY
Status: DISCONTINUED | OUTPATIENT
Start: 2023-10-05 | End: 2023-10-07 | Stop reason: HOSPADM

## 2023-10-05 RX ORDER — PREDNISONE 10 MG/1
40 TABLET ORAL DAILY
Status: DISCONTINUED | OUTPATIENT
Start: 2023-10-05 | End: 2023-10-07 | Stop reason: HOSPADM

## 2023-10-05 RX ORDER — MONTELUKAST SODIUM 10 MG/1
10 TABLET ORAL DAILY
Status: DISCONTINUED | OUTPATIENT
Start: 2023-10-05 | End: 2023-10-07 | Stop reason: HOSPADM

## 2023-10-05 RX ORDER — HYDROMORPHONE HYDROCHLORIDE 1 MG/ML
0.2 INJECTION, SOLUTION INTRAMUSCULAR; INTRAVENOUS; SUBCUTANEOUS EVERY 5 MIN PRN
Status: DISCONTINUED | OUTPATIENT
Start: 2023-10-05 | End: 2023-10-05 | Stop reason: HOSPADM

## 2023-10-05 RX ORDER — BUPIVACAINE HYDROCHLORIDE AND EPINEPHRINE 2.5; 5 MG/ML; UG/ML
INJECTION, SOLUTION EPIDURAL; INFILTRATION; INTRACAUDAL; PERINEURAL
Status: DISPENSED
Start: 2023-10-05 | End: 2023-10-05

## 2023-10-05 RX ORDER — LIDOCAINE HYDROCHLORIDE 10 MG/ML
0.1 INJECTION, SOLUTION EPIDURAL; INFILTRATION; INTRACAUDAL; PERINEURAL ONCE
Status: DISCONTINUED | OUTPATIENT
Start: 2023-10-05 | End: 2023-10-05 | Stop reason: HOSPADM

## 2023-10-05 RX ORDER — SODIUM CHLORIDE FOR INHALATION 3 %
4 VIAL, NEBULIZER (ML) INHALATION 2 TIMES DAILY
Status: DISCONTINUED | OUTPATIENT
Start: 2023-10-05 | End: 2023-10-07 | Stop reason: HOSPADM

## 2023-10-05 RX ORDER — LANOLIN ALCOHOL/MO/W.PET/CERES
100 CREAM (GRAM) TOPICAL DAILY
Status: DISCONTINUED | OUTPATIENT
Start: 2023-10-05 | End: 2023-10-07 | Stop reason: HOSPADM

## 2023-10-05 RX ORDER — ACETAMINOPHEN 325 MG/1
650 TABLET ORAL EVERY 6 HOURS PRN
Status: DISCONTINUED | OUTPATIENT
Start: 2023-10-05 | End: 2023-10-07 | Stop reason: HOSPADM

## 2023-10-05 RX ORDER — FINASTERIDE 5 MG/1
5 TABLET, FILM COATED ORAL DAILY
Status: DISCONTINUED | OUTPATIENT
Start: 2023-10-05 | End: 2023-10-07 | Stop reason: HOSPADM

## 2023-10-05 RX ORDER — PANTOPRAZOLE SODIUM 40 MG/1
40 TABLET, DELAYED RELEASE ORAL DAILY
Status: DISCONTINUED | OUTPATIENT
Start: 2023-10-05 | End: 2023-10-07 | Stop reason: HOSPADM

## 2023-10-05 RX ORDER — MAGNESIUM SULFATE HEPTAHYDRATE 40 MG/ML
2 INJECTION, SOLUTION INTRAVENOUS ONCE
Status: COMPLETED | OUTPATIENT
Start: 2023-10-05 | End: 2023-10-06

## 2023-10-05 RX ORDER — MIRTAZAPINE 15 MG/1
15 TABLET, FILM COATED ORAL NIGHTLY
Status: DISCONTINUED | OUTPATIENT
Start: 2023-10-05 | End: 2023-10-07 | Stop reason: HOSPADM

## 2023-10-05 RX ORDER — ONDANSETRON HYDROCHLORIDE 2 MG/ML
4 INJECTION, SOLUTION INTRAVENOUS ONCE AS NEEDED
Status: COMPLETED | OUTPATIENT
Start: 2023-10-05 | End: 2023-10-05

## 2023-10-05 RX ORDER — ESMOLOL HYDROCHLORIDE 10 MG/ML
INJECTION INTRAVENOUS AS NEEDED
Status: DISCONTINUED | OUTPATIENT
Start: 2023-10-05 | End: 2023-10-05

## 2023-10-05 RX ORDER — LORAZEPAM 1 MG/1
1 TABLET ORAL EVERY 2 HOUR PRN
Status: DISCONTINUED | OUTPATIENT
Start: 2023-10-05 | End: 2023-10-07 | Stop reason: HOSPADM

## 2023-10-05 RX ORDER — LEVETIRACETAM 500 MG/1
1000 TABLET ORAL DAILY
Status: DISCONTINUED | OUTPATIENT
Start: 2023-10-05 | End: 2023-10-05

## 2023-10-05 RX ORDER — ENOXAPARIN SODIUM 100 MG/ML
40 INJECTION SUBCUTANEOUS ONCE
Status: COMPLETED | OUTPATIENT
Start: 2023-10-05 | End: 2023-10-06

## 2023-10-05 RX ORDER — SUCCINYLCHOLINE CHLORIDE 20 MG/ML
INJECTION INTRAMUSCULAR; INTRAVENOUS AS NEEDED
Status: DISCONTINUED | OUTPATIENT
Start: 2023-10-05 | End: 2023-10-05

## 2023-10-05 RX ORDER — SODIUM CHLORIDE 0.9 G/100ML
IRRIGANT IRRIGATION AS NEEDED
Status: DISCONTINUED | OUTPATIENT
Start: 2023-10-05 | End: 2023-10-05 | Stop reason: HOSPADM

## 2023-10-05 RX ORDER — SODIUM CHLORIDE 9 MG/ML
10 INJECTION, SOLUTION INTRAVENOUS CONTINUOUS
Status: CANCELLED | OUTPATIENT
Start: 2023-10-05

## 2023-10-05 RX ORDER — FENTANYL CITRATE 50 UG/ML
INJECTION, SOLUTION INTRAMUSCULAR; INTRAVENOUS AS NEEDED
Status: DISCONTINUED | OUTPATIENT
Start: 2023-10-05 | End: 2023-10-05

## 2023-10-05 RX ORDER — MIDAZOLAM HYDROCHLORIDE 1 MG/ML
INJECTION, SOLUTION INTRAMUSCULAR; INTRAVENOUS AS NEEDED
Status: DISCONTINUED | OUTPATIENT
Start: 2023-10-05 | End: 2023-10-05

## 2023-10-05 RX ORDER — SODIUM CHLORIDE, SODIUM LACTATE, POTASSIUM CHLORIDE, CALCIUM CHLORIDE 600; 310; 30; 20 MG/100ML; MG/100ML; MG/100ML; MG/100ML
100 INJECTION, SOLUTION INTRAVENOUS CONTINUOUS
Status: DISCONTINUED | OUTPATIENT
Start: 2023-10-05 | End: 2023-10-05 | Stop reason: HOSPADM

## 2023-10-05 RX ORDER — IPRATROPIUM BROMIDE AND ALBUTEROL SULFATE 2.5; .5 MG/3ML; MG/3ML
3 SOLUTION RESPIRATORY (INHALATION) EVERY 4 HOURS PRN
Status: DISCONTINUED | OUTPATIENT
Start: 2023-10-05 | End: 2023-10-06

## 2023-10-05 RX ORDER — WATER 1 ML/ML
IRRIGANT IRRIGATION AS NEEDED
Status: DISCONTINUED | OUTPATIENT
Start: 2023-10-05 | End: 2023-10-05 | Stop reason: HOSPADM

## 2023-10-05 RX ORDER — OXYCODONE HYDROCHLORIDE 5 MG/1
5 TABLET ORAL EVERY 4 HOURS PRN
Status: DISCONTINUED | OUTPATIENT
Start: 2023-10-05 | End: 2023-10-05 | Stop reason: HOSPADM

## 2023-10-05 RX ORDER — ALBUTEROL SULFATE 0.83 MG/ML
2.5 SOLUTION RESPIRATORY (INHALATION) EVERY 6 HOURS PRN
Status: DISCONTINUED | OUTPATIENT
Start: 2023-10-05 | End: 2023-10-07 | Stop reason: HOSPADM

## 2023-10-05 RX ORDER — CYCLOBENZAPRINE HCL 10 MG
10 TABLET ORAL 2 TIMES DAILY PRN
Status: DISCONTINUED | OUTPATIENT
Start: 2023-10-05 | End: 2023-10-07 | Stop reason: HOSPADM

## 2023-10-05 RX ORDER — LIDOCAINE HYDROCHLORIDE 20 MG/ML
INJECTION, SOLUTION INFILTRATION; PERINEURAL AS NEEDED
Status: DISCONTINUED | OUTPATIENT
Start: 2023-10-05 | End: 2023-10-05

## 2023-10-05 RX ORDER — PROPOFOL 10 MG/ML
INJECTION, EMULSION INTRAVENOUS AS NEEDED
Status: DISCONTINUED | OUTPATIENT
Start: 2023-10-05 | End: 2023-10-05

## 2023-10-05 RX ORDER — TAMSULOSIN HYDROCHLORIDE 0.4 MG/1
0.4 CAPSULE ORAL DAILY
Status: DISCONTINUED | OUTPATIENT
Start: 2023-10-05 | End: 2023-10-07 | Stop reason: HOSPADM

## 2023-10-05 RX ORDER — HYDROXYZINE HYDROCHLORIDE 25 MG/1
25 TABLET, FILM COATED ORAL NIGHTLY
Status: DISCONTINUED | OUTPATIENT
Start: 2023-10-05 | End: 2023-10-07 | Stop reason: HOSPADM

## 2023-10-05 RX ORDER — ESMOLOL HYDROCHLORIDE 10 MG/ML
INJECTION, SOLUTION INTRAVENOUS CONTINUOUS PRN
Status: DISCONTINUED | OUTPATIENT
Start: 2023-10-05 | End: 2023-10-05

## 2023-10-05 RX ORDER — FLUTICASONE FUROATE AND VILANTEROL 100; 25 UG/1; UG/1
1 POWDER RESPIRATORY (INHALATION)
Status: DISCONTINUED | OUTPATIENT
Start: 2023-10-05 | End: 2023-10-07 | Stop reason: HOSPADM

## 2023-10-05 RX ORDER — HYDROGEN PEROXIDE 3 %
SOLUTION, NON-ORAL MISCELLANEOUS
Status: DISPENSED
Start: 2023-10-05 | End: 2023-10-05

## 2023-10-05 RX ORDER — ONDANSETRON HYDROCHLORIDE 2 MG/ML
INJECTION, SOLUTION INTRAVENOUS AS NEEDED
Status: DISCONTINUED | OUTPATIENT
Start: 2023-10-05 | End: 2023-10-05

## 2023-10-05 RX ORDER — LEVOFLOXACIN 5 MG/ML
750 INJECTION, SOLUTION INTRAVENOUS EVERY 24 HOURS
Status: DISCONTINUED | OUTPATIENT
Start: 2023-10-05 | End: 2023-10-06

## 2023-10-05 RX ORDER — ALBUTEROL SULFATE 0.83 MG/ML
2.5 SOLUTION RESPIRATORY (INHALATION) ONCE AS NEEDED
Status: COMPLETED | OUTPATIENT
Start: 2023-10-05 | End: 2023-10-05

## 2023-10-05 RX ORDER — TRIAMCINOLONE ACETONIDE 1 MG/G
CREAM TOPICAL DAILY
Status: DISCONTINUED | OUTPATIENT
Start: 2023-10-05 | End: 2023-10-07 | Stop reason: HOSPADM

## 2023-10-05 RX ADMIN — SODIUM CHLORIDE 30 MG/ML INHALATION SOLUTION 4 ML: 30 SOLUTION INHALANT at 20:13

## 2023-10-05 RX ADMIN — Medication: at 10:31

## 2023-10-05 RX ADMIN — ACETAMINOPHEN 650 MG: 325 TABLET, FILM COATED ORAL at 20:39

## 2023-10-05 RX ADMIN — HYDROXYZINE HYDROCHLORIDE 25 MG: 25 TABLET, FILM COATED ORAL at 20:40

## 2023-10-05 RX ADMIN — DEXAMETHASONE SODIUM PHOSPHATE 8 MG: 4 INJECTION, SOLUTION INTRAMUSCULAR; INTRAVENOUS at 09:32

## 2023-10-05 RX ADMIN — GABAPENTIN 600 MG: 300 CAPSULE ORAL at 17:35

## 2023-10-05 RX ADMIN — SODIUM CHLORIDE, SODIUM LACTATE, POTASSIUM CHLORIDE, AND CALCIUM CHLORIDE: 600; 310; 30; 20 INJECTION, SOLUTION INTRAVENOUS at 09:21

## 2023-10-05 RX ADMIN — SUCCINYLCHOLINE CHLORIDE 70 MG: 20 INJECTION, SOLUTION INTRAMUSCULAR; INTRAVENOUS at 09:29

## 2023-10-05 RX ADMIN — THIAMINE HCL TAB 100 MG 100 MG: 100 TAB at 17:37

## 2023-10-05 RX ADMIN — TAMSULOSIN HYDROCHLORIDE 0.4 MG: 0.4 CAPSULE ORAL at 17:36

## 2023-10-05 RX ADMIN — LIDOCAINE HYDROCHLORIDE 100 MG: 20 INJECTION, SOLUTION INFILTRATION; PERINEURAL at 09:29

## 2023-10-05 RX ADMIN — PREDNISONE 40 MG: 20 TABLET ORAL at 17:35

## 2023-10-05 RX ADMIN — Medication 80 MCG: at 09:44

## 2023-10-05 RX ADMIN — PROPOFOL 200 MG: 10 INJECTION, EMULSION INTRAVENOUS at 09:29

## 2023-10-05 RX ADMIN — ROPINIROLE 0.5 MG: 0.5 TABLET, FILM COATED ORAL at 20:43

## 2023-10-05 RX ADMIN — FENTANYL CITRATE 100 MCG: 50 INJECTION, SOLUTION INTRAMUSCULAR; INTRAVENOUS at 09:29

## 2023-10-05 RX ADMIN — LEVETIRACETAM 1000 MG: 500 TABLET, FILM COATED ORAL at 17:35

## 2023-10-05 RX ADMIN — MONTELUKAST 10 MG: 10 TABLET, FILM COATED ORAL at 17:37

## 2023-10-05 RX ADMIN — Medication: at 10:00

## 2023-10-05 RX ADMIN — Medication 1 PATCH: at 17:35

## 2023-10-05 RX ADMIN — ALBUTEROL SULFATE 2.5 MG: 2.5 SOLUTION RESPIRATORY (INHALATION) at 11:00

## 2023-10-05 RX ADMIN — ESMOLOL HYDROCHLORIDE 10 MG: 10 INJECTION, SOLUTION INTRAVENOUS at 09:34

## 2023-10-05 RX ADMIN — LEVOFLOXACIN 750 MG: 750 INJECTION, SOLUTION INTRAVENOUS at 18:44

## 2023-10-05 RX ADMIN — FINASTERIDE 5 MG: 5 TABLET, FILM COATED ORAL at 17:37

## 2023-10-05 RX ADMIN — MIDAZOLAM 1 MG: 1 INJECTION INTRAMUSCULAR; INTRAVENOUS at 09:18

## 2023-10-05 RX ADMIN — ATORVASTATIN CALCIUM 10 MG: 10 TABLET, FILM COATED ORAL at 20:43

## 2023-10-05 RX ADMIN — ONDANSETRON 4 MG: 2 INJECTION INTRAMUSCULAR; INTRAVENOUS at 14:37

## 2023-10-05 RX ADMIN — CYCLOBENZAPRINE 10 MG: 10 TABLET, FILM COATED ORAL at 20:34

## 2023-10-05 RX ADMIN — PANTOPRAZOLE SODIUM 40 MG: 40 TABLET, DELAYED RELEASE ORAL at 17:37

## 2023-10-05 RX ADMIN — MIRTAZAPINE 15 MG: 15 TABLET, FILM COATED ORAL at 20:39

## 2023-10-05 RX ADMIN — ONDANSETRON 4 MG: 2 INJECTION INTRAMUSCULAR; INTRAVENOUS at 09:32

## 2023-10-05 RX ADMIN — GABAPENTIN 600 MG: 300 CAPSULE ORAL at 20:39

## 2023-10-05 SDOH — SOCIAL STABILITY: SOCIAL INSECURITY: WERE YOU ABLE TO COMPLETE ALL THE BEHAVIORAL HEALTH SCREENINGS?: YES

## 2023-10-05 SDOH — SOCIAL STABILITY: SOCIAL INSECURITY: HAS ANYONE EVER THREATENED TO HURT YOUR FAMILY OR YOUR PETS?: NO

## 2023-10-05 SDOH — SOCIAL STABILITY: SOCIAL INSECURITY: HAVE YOU HAD THOUGHTS OF HARMING ANYONE ELSE?: NO

## 2023-10-05 SDOH — SOCIAL STABILITY: SOCIAL INSECURITY: DOES ANYONE TRY TO KEEP YOU FROM HAVING/CONTACTING OTHER FRIENDS OR DOING THINGS OUTSIDE YOUR HOME?: NO

## 2023-10-05 SDOH — SOCIAL STABILITY: SOCIAL INSECURITY: ARE THERE ANY APPARENT SIGNS OF INJURIES/BEHAVIORS THAT COULD BE RELATED TO ABUSE/NEGLECT?: NO

## 2023-10-05 SDOH — SOCIAL STABILITY: SOCIAL INSECURITY: DO YOU FEEL ANYONE HAS EXPLOITED OR TAKEN ADVANTAGE OF YOU FINANCIALLY OR OF YOUR PERSONAL PROPERTY?: NO

## 2023-10-05 SDOH — SOCIAL STABILITY: SOCIAL INSECURITY: ARE YOU OR HAVE YOU BEEN THREATENED OR ABUSED PHYSICALLY, EMOTIONALLY, OR SEXUALLY BY ANYONE?: NO

## 2023-10-05 SDOH — HEALTH STABILITY: MENTAL HEALTH: CURRENT SMOKER: 1

## 2023-10-05 SDOH — SOCIAL STABILITY: SOCIAL INSECURITY: ABUSE: ADULT

## 2023-10-05 SDOH — SOCIAL STABILITY: SOCIAL INSECURITY: DO YOU FEEL UNSAFE GOING BACK TO THE PLACE WHERE YOU ARE LIVING?: NO

## 2023-10-05 ASSESSMENT — PAIN SCALES - GENERAL
PAINLEVEL_OUTOF10: 0 - NO PAIN
PAINLEVEL_OUTOF10: 0 - NO PAIN
PAINLEVEL_OUTOF10: 6
PAINLEVEL_OUTOF10: 0 - NO PAIN
PAINLEVEL_OUTOF10: 5 - MODERATE PAIN
PAINLEVEL_OUTOF10: 0 - NO PAIN

## 2023-10-05 ASSESSMENT — PAIN - FUNCTIONAL ASSESSMENT
PAIN_FUNCTIONAL_ASSESSMENT: 0-10

## 2023-10-05 ASSESSMENT — ENCOUNTER SYMPTOMS
DIARRHEA: 1
FEVER: 0
MUSCULOSKELETAL NEGATIVE: 1
CHILLS: 0
SHORTNESS OF BREATH: 1
PSYCHIATRIC NEGATIVE: 1
CARDIOVASCULAR NEGATIVE: 1
RECTAL PAIN: 1
EYES NEGATIVE: 1
LIGHT-HEADEDNESS: 1
RHINORRHEA: 1
COUGH: 1
DIZZINESS: 1
NAUSEA: 0

## 2023-10-05 ASSESSMENT — COGNITIVE AND FUNCTIONAL STATUS - GENERAL
MOBILITY SCORE: 22
WALKING IN HOSPITAL ROOM: A LITTLE
CLIMB 3 TO 5 STEPS WITH RAILING: A LITTLE
PATIENT BASELINE BEDBOUND: NO
DAILY ACTIVITIY SCORE: 24

## 2023-10-05 ASSESSMENT — LIFESTYLE VARIABLES
ORIENTATION AND CLOUDING OF SENSORIUM: ORIENTED AND CAN DO SERIAL ADDITIONS
ANXIETY: NO ANXIETY, AT EASE
TOTAL SCORE: 4
HOW OFTEN DURING THE LAST YEAR HAVE YOU BEEN UNABLE TO REMEMBER WHAT HAPPENED THE NIGHT BEFORE BECAUSE YOU HAD BEEN DRINKING: NEVER
TOTAL SCORE: 1
HOW OFTEN DO YOU HAVE 6 OR MORE DRINKS ON ONE OCCASION: WEEKLY
NAUSEA AND VOMITING: NO NAUSEA AND NO VOMITING
PAROXYSMAL SWEATS: NO SWEAT VISIBLE
HEADACHE, FULLNESS IN HEAD: NOT PRESENT
HOW OFTEN DO YOU HAVE A DRINK CONTAINING ALCOHOL: 4 OR MORE TIMES A WEEK
TREMOR: 2
TACTILE DISTURBANCES: VERY MILD ITCHING, PINS AND NEEDLES, BURNING OR NUMBNESS
AGITATION: NORMAL ACTIVITY
ANXIETY: NO ANXIETY, AT EASE
HOW OFTEN DURING THE LAST YEAR HAVE YOU NEEDED AN ALCOHOLIC DRINK FIRST THING IN THE MORNING TO GET YOURSELF GOING AFTER A NIGHT OF HEAVY DRINKING: LESS THAN MONTHLY
HOW MANY STANDARD DRINKS CONTAINING ALCOHOL DO YOU HAVE ON A TYPICAL DAY: 3 OR 4
AUDIT-C TOTAL SCORE: 8
AUDIT TOTAL SCORE: 12
SKIP TO QUESTIONS 9-10: 0
AUDITORY DISTURBANCES: NOT PRESENT
HAVE YOU OR SOMEONE ELSE BEEN INJURED AS A RESULT OF YOUR DRINKING: NO
AGITATION: NORMAL ACTIVITY
TREMOR: NO TREMOR
VISUAL DISTURBANCES: NOT PRESENT
HOW OFTEN DURING THE LAST YEAR HAVE YOU FOUND THAT YOU WERE NOT ABLE TO STOP DRINKING ONCE YOU HAD STARTED: WEEKLY
HOW OFTEN DURING THE LAST YEAR HAVE YOU FAILED TO DO WHAT WAS NORMALLY EXPECTED FROM YOU BECAUSE OF DRINKING: NEVER
SUBSTANCE_ABUSE_PAST_12_MONTHS: YES
PAROXYSMAL SWEATS: NO SWEAT VISIBLE
TACTILE DISTURBANCES: VERY MILD ITCHING, PINS AND NEEDLES, BURNING OR NUMBNESS
VISUAL DISTURBANCES: NOT PRESENT
HOW OFTEN DURING THE LAST YEAR HAVE YOU HAD A FEELING OF GUILT OR REMORSE AFTER DRINKING: NEVER
PRESCIPTION_ABUSE_PAST_12_MONTHS: NO
AUDIT-C TOTAL SCORE: 8
AUDITORY DISTURBANCES: NOT PRESENT
NAUSEA AND VOMITING: NO NAUSEA AND NO VOMITING
ORIENTATION AND CLOUDING OF SENSORIUM: ORIENTED AND CAN DO SERIAL ADDITIONS
HAS A RELATIVE, FRIEND, DOCTOR, OR ANOTHER HEALTH PROFESSIONAL EXPRESSED CONCERN ABOUT YOUR DRINKING OR SUGGESTED YOU CUT DOWN: NO
HEADACHE, FULLNESS IN HEAD: VERY MILD

## 2023-10-05 ASSESSMENT — ACTIVITIES OF DAILY LIVING (ADL)
HEARING - RIGHT EAR: FUNCTIONAL
WALKS IN HOME: INDEPENDENT
ASSISTIVE_DEVICE: WALKER;CANE;SHOWER CHAIR
HEARING - LEFT EAR: FUNCTIONAL
JUDGMENT_ADEQUATE_SAFELY_COMPLETE_DAILY_ACTIVITIES: YES
TOILETING: INDEPENDENT
GROOMING: INDEPENDENT
ADEQUATE_TO_COMPLETE_ADL: YES
FEEDING YOURSELF: INDEPENDENT
DRESSING YOURSELF: INDEPENDENT
PATIENT'S MEMORY ADEQUATE TO SAFELY COMPLETE DAILY ACTIVITIES?: YES
BATHING: INDEPENDENT

## 2023-10-05 ASSESSMENT — COLUMBIA-SUICIDE SEVERITY RATING SCALE - C-SSRS
6. HAVE YOU EVER DONE ANYTHING, STARTED TO DO ANYTHING, OR PREPARED TO DO ANYTHING TO END YOUR LIFE?: NO
2. HAVE YOU ACTUALLY HAD ANY THOUGHTS OF KILLING YOURSELF?: NO
1. IN THE PAST MONTH, HAVE YOU WISHED YOU WERE DEAD OR WISHED YOU COULD GO TO SLEEP AND NOT WAKE UP?: NO

## 2023-10-05 ASSESSMENT — PATIENT HEALTH QUESTIONNAIRE - PHQ9
2. FEELING DOWN, DEPRESSED OR HOPELESS: NEARLY EVERY DAY
SUM OF ALL RESPONSES TO PHQ9 QUESTIONS 1 & 2: 4
1. LITTLE INTEREST OR PLEASURE IN DOING THINGS: SEVERAL DAYS

## 2023-10-05 NOTE — CONSULTS
"Reason For Consult  Hypoxemia, overnight observation    History Of Present Illness  Servando Leigh is a 58 y.o. 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 He presented for outpatient EUA with colorectal surgery for evaluation of anal fistula and possible seton placement. Upon extubation, patient developed worsening cough, wheezing, and shortness of breath. Received albuterol treatment and placed on 2 L nasal cannula, satting 90-95%. Anesthesia concerned about patient's respiratory status and requesting overnight observation.     Medicine consulted for evaluation of hypoxemia and possible inpatient admission to medicine.     Patient seen and evaluated in PACU. To me, patient reports that he smoked 4 cigarettes and drank 3 beers prior to his procedure today. He has a history of chest congestion/tightness and chronic cough productive of yellow sputum that worsens as the weather gets colder. Has DICKINSON and cannot walk more than 20 feet without becoming short of breath. Reports compliance with his inhalers and using his percussion vest twice a day (morning and evening). Does not feel his cough or shortness of breath has gotten worse recently. Reports checking his oxygen levels at home every day with home SpO2 readings of 87-98%. States he is unable to lie flat to sleep and sleeps almost sitting upright. Also reporting fatigue, dizziness and lightheadedness upon standing, has to sit on the edge of his bed for a while prior to standing.     As patient just came out of anesthesia, called patient's wife for collateral history. She reports patient \"has had a cold for the past month\". Has been using his as needed albuterol inhaler more frequently (3-4x " daily). She thought he might have gotten COVID, but is unsure as patient never got tested. Son was also recently sick with a cold. Patient does endorse subjective fever and chills. Wife also reports that patient has noticed some blood in his stool for the past week. No prior hx of bloody stools, though per pulmonology note from 8/28, patient states he has dark stools. However, does have hx of a rectal polyp (positive for neuroendocrine tumor, grade 1) seen on 8/2020 colonoscopy. Flex sig from Feb 2021 did not show any reoccurrence of NE lesions. Work up for carcinoid lesions and MRE 6/2021 were all normal.     Last seen in pulmonology clinic on 8/28/2023. At that time, noted to have significant wheezing/rhonchi on exam, with pulse ox of 88% on room air at rest (80% when he first sat down). Patient noted to be ill appearing. Symptoms of nausea and lightheadedness, dark stools and vomiting. Patient encouraged to go to the emergency room and transport was called. However, patient never presented to the ED.    Past Medical History  COPD  Bronchiectasis   Epilepsy on depakote  Alcoholic hepatitis steatosis   CAD   GERD  Chronic nausea/vomiting (per patient, every morning)  Chronic diarrhea, suspect IBS-D  Rectal polyp (positive for neuroendocrine tumor, grade 1)   Recurrent perianal abscesses and fistulas s/p multiple procedure.     Surgical History  Neck surgery (01/25/2017); Other surgical history (07/13/2020); Other surgical history (01/07/2020); Total shoulder arthroplasty (Right); XR foot; and Cervical fusion.     Social History  Tobacco use : up to 1/2 pack per day   ETOH use: ~4 beers daily  Drug use: marijuana daily    Family History  Family History   Problem Relation Name Age of Onset    Diabetes Son      Glaucoma Other Grandfather         Allergies  Shellfish containing products, Coconut, Penicillin g, and Shellfish derived    Review of Systems  As per HPI    Current Outpatient Medications   Medication  Instructions    albuterol 2.5 mg /3 mL (0.083 %) nebulizer solution nebulization, 4 times daily    albuterol 90 mcg/actuation inhaler 2 puffs, inhalation, Every 6 hours PRN    amoxicillin-pot clavulanate (Augmentin) 875-125 mg tablet 1 tablet, oral, 2 times daily    ascorbic acid (VITAMIN C) 500 mg, oral    atorvastatin (LIPITOR) 10 mg, oral, Daily    benzoyl peroxide (Benzac AC) 10 % external wash Topical, Daily RT    cholestyramine (Questran) 4 gram packet oral, 2 times daily    clindamycin (Cleocin T) 1 % lotion Topical, 2 times daily    cyclobenzaprine (FLEXERIL) 10 mg, oral, 2 times daily PRN    diclofenac sodium (Voltaren) 1 % gel gel Topical, 4 times daily    divalproex (Depakote) 250 mg EC tablet 1 tablet, oral, 2 times daily    docusate sodium (COLACE) 100 mg, oral    EPINEPHrine (EPIPEN) 0.3 mg, intramuscular, As Directed    ferrous sulfate 325 (65 Fe) MG tablet 65 mg of iron, oral    finasteride (Proscar) 5 mg tablet 1 tablet, oral, Daily    fluticasone propion-salmeteroL (Advair Diskus) 250-50 mcg/dose diskus inhaler 1 puff, inhalation, Every 12 hours    fluticasone propion-salmeteroL (Advair Diskus) 500-50 mcg/dose diskus inhaler 2 puffs, inhalation, 2 times daily    food supplemt, lactose-reduced (Ensure Original) 0.04-1.05 gram-kcal/mL liquid oral, Daily, 2-3 cans    gabapentin (NEURONTIN) 600 mg, oral, 3 times daily    hydrOXYzine HCL (Atarax) 25 mg tablet 1 tablet, oral, Nightly    ipratropium-albuteroL (Duo-Neb) 0.5-2.5 mg/3 mL nebulizer solution 3 mL, nebulization, Every 4 hours PRN    levETIRAcetam (KEPPRA) 1,000 mg, oral    lidocaine (Xylocaine) 5 % ointment Topical, 4 times daily    loperamide (IMODIUM) 2 mg, oral, 4 times daily PRN    methylcellulose, laxative, 500 mg tablet 2 tablets, oral, Daily    mirtazapine (REMERON) 15 mg, oral, Nightly    montelukast (SINGULAIR) 10 mg, oral, Daily    Mucinex D  mg 12 hr tablet oral    multivit-min-iron fum-folic ac 7.5 mg iron-400 mcg tablet 1  "tablet, oral    multivitamin with minerals (Thera-M) tablet oral    pantoprazole (PROTONIX) 40 mg, oral, Daily    predniSONE (DELTASONE) 40 mg, oral, Daily    rOPINIRole (REQUIP) 0.5 mg, oral, Daily    sildenafil (VIAGRA) 50 mg, oral    sodium chloride 3 % nebulizer solution 4 mL, nebulization, 2 times daily    tadalafil (CIALIS) 20 mg, oral, Daily PRN    tamsulosin (FLOMAX) 0.4 mg, oral, Daily    topiramate (TOPAMAX) 50 mg, oral, Daily    triamcinolone (Kenalog) 0.1 % cream Topical, Daily, Apply to affected area once daily    umeclidinium (Incruse Ellipta) 62.5 mcg/actuation inhalation 1 puff, inhalation, Daily        Physical Exam  Constitutional: Awake and alert.  No acute distress. Patient appears stated age. Patient appears thin.   Head and face:  Normocephalic, atraumatic. Hearing is grossly intact. Moist mucus membranes.   Eyes: Sclera non-icteric and eye lids are without obvious rash or drooping. EOMI  Neck:  Tracheal position is midline.   Pulmonary: Diffuse rhonchi bilaterally. No gasping or shortness of breath noted. Normal respiratory movements without use of accessory muscles.   Cardiovascular: RRR. Normal S1 and S2. No peripheral edema present.   Skin: No rashes or open lesions/ulcers identified on skin.  Psychiatric: Mood and affect are normal.    Last Recorded Vitals  Blood pressure 118/79, pulse 82, temperature 36.4 °C (97.5 °F), temperature source Temporal, resp. rate 10, height 1.727 m (5' 8\"), weight 52.8 kg (116 lb 6.5 oz), SpO2 95 %.  On 1.5-2L nasal cannula.     Relevant Results  CXR 10/5/2023 with new R upper lobe infiltrate compared to prior from 7/11/2023.     Prior testing   PFTs: 1/13/21: FEV1/FVC 0.51/ FEV1 1.38 (45%)/ FVC 3.13 (82%)/ TLC 8.19 (139%)/ DLCO 60% - RV/%    Echo: 3/18/21: The left ventricular systolic function is normal with a 60% estimated ejection fraction. RVSP within normal limits. Small patent foramen ovale. Atrial septal aneurysm present. RA/ RV normal size and " function      CT chest 5/11/2023, compare to 1/13/2021  - diffuse bronchial wall thickening and multifocal bronchiectatic changes.   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 Exam neg for anal fistulas or fluctuance. Upon extubation, patient developed worsening wheezing, SOB, cough, and hypoxemia with new oxygen requirement. Placed on 2 L N/C satting 90-95%. Anesthesia with concern regarding patient's respiratory status and requesting observation overnight. CXR obtained showing new R upper lobe infiltrate compared to prior from 7/11/2023.    Medicine consulted for evaluation of hypoxemia and possible inpatient admission to medicine.     #Right upper lobe infiltrate  #hx of COPD and bronchiectasis   New right upper lobe infiltrate seen on CXR, not visualized in most recent CXR from 7/11/2023.   With recent sick contacts (son) and cold symptoms for the past month, strong suspicion for pneumonia. Prior sputum cx in 1/2021 was positive for pseudomonas. Also consider COPD exacerbation iso respiratory infection and recent anesthesia or possible underlying malignant process given patient's extensive smoking hx. Will initiate work up with basic labs.  - CBC w/ diff  - RFP + Mg   - LFTs   - Sputum culture  - Blood cultures     #dark stools  #hx of chronic diarrhea  #hx of anemia, suspect of chronic  disease  Per wife, patient noticed blood in his stool for the past week. No prior hx of melena or hematochezia. Patient with history rectal polyp (positive for neuroendocrine tumor, grade 1) found on 8/2020 colonoscopy. Flex sig on Feb 2021 without reoccurrence of NE lesions. Last seen in GI clinic 12/12/2022. Per note, has has extensive workup to assess for organic cause of diarrhea, which has been unrevealing. Suspect diarrhea 2/2 IBS-D and placed on imodium. Patient on iron supplementation for hx of anemia which can also cause dark stools.  - CBC as above.  - CTM     #Lightheadedness/dizziness   Patient reports symptoms of orthostatic hypotension along with frequent falls/near falls. Also with hx of seizures on Depakote.   - consider orthostatic vitals and checking depakote levels.     #chronic alcohol use   To me, patient reports drinking 3 beers today.   - monitor for symptoms of withdrawal   - consider need for CIWAs   - thiamine, folate, multivitamin supplementation     Plan to admit to [facility] for further evaluation.   Discussed with attending physician Dr. Coleman.     Sal Garber MD  Anesthesiology, PGY1   Medicine Consults pager: [phone]

## 2023-10-05 NOTE — BRIEF OP NOTE
dDate: 10/5/2023  OR Location: [facility] OR    Name: Servando Leigh : 1965, Age: 58 y.o., MRN: [id number], Sex: male    Diagnosis  Pre-op Diagnosis     * Anal fistula [K60.3] Post-op Diagnosis     * Anal fistula [K60.3]     Procedures  EUA    Surgeons      * Cesar Brooks - Primary    Resident/Fellow/Other Assistant:  Remington Peraza    Procedure Summary  Anesthesia: General  ASA: III  Anesthesia Staff: Anesthesiologist: Belkis Flood MD  CRNA: RYAN Marie-CRNA  Estimated Blood Loss: 0mL  Intra-op Medications:   Medication Name Total Dose   sodium chloride 0.9 % irrigation solution 1,000 mL   sterile water irrigation solution 500 mL              Anesthesia Record               Intraprocedure I/O Totals          Intake    Esmolol Drip 0.00 mL    The total shown is the total volume documented since Anesthesia Start was filed.    Total Intake 0 mL          Specimen: No specimens collected     Staff:   Circulator: Veronica Salgado RN; Anastasiya Lino RN  Scrub Person: Pineda Mehta; Dione Robbins RN          Findings: No identifiable fistulas or fluctuance to drain     Complications:  None; patient tolerated the procedure well.     Disposition: PACU - hemodynamically stable.  Condition: stable  Specimens Collected: No specimens collected  Attending Attestation: I was present and scrubbed for the entire procedure.    Cesar Brooks  Phone Number: [phone]

## 2023-10-05 NOTE — ANESTHESIA PROCEDURE NOTES
Airway  Date/Time: 10/5/2023 9:33 AM  Urgency: elective    Difficult airway    Staffing  Performed: SRNA   Authorized by: Belkis Flood MD    Performed by: RYAN Marie-CRNA  Patient location during procedure: OR    Indications and Patient Condition  Indications for airway management: anesthesia  Sedation level: deep  Preoxygenated: yes  Patient position: sniffing  Mask difficulty assessment: 0 - not attempted    Final Airway Details  Final airway type: endotracheal airway      Successful airway: ETT  Cuffed: yes   Successful intubation technique: video laryngoscopy  Facilitating devices/methods: cricoid pressure  Endotracheal tube insertion site: oral  ETT size (mm): 7.0  Cormack-Lehane Classification: grade I - full view of glottis  Placement verified by: chest auscultation and capnometry   Measured from: lips  ETT to lips (cm): 23  Number of attempts at approach: 1

## 2023-10-05 NOTE — ANESTHESIA POSTPROCEDURE EVALUATION
Patient: Servando Leigh    Procedure Summary       Date: 10/05/23 Room / Location: [facility] OR 06 / [facility] OR    Anesthesia Start: 0836 Anesthesia Stop: 1041    Procedure: EUA Diagnosis:       Anal fistula      (Anal fistula [K60.3])    Surgeons: Cesar Brooks MD Responsible Provider: Belkis Flood MD    Anesthesia Type: general ASA Status: 3            Anesthesia Type: general    Vitals Value Taken Time   /72 10/05/23 1500   Temp 36.7 °C (98.1 °F) 10/05/23 1500   Pulse 86 10/05/23 1530   Resp 12 10/05/23 1530   SpO2 97 % 10/05/23 1530   Vitals shown include unvalidated device data.    Anesthesia Post Evaluation    Patient location during evaluation: PACU  Patient participation: complete - patient participated  Level of consciousness: awake and alert  Pain management: adequate  Cardiovascular status: acceptable  Respiratory status: nasal cannula  Hydration status: acceptable  Comments: Patient h/o COPD with wheezing preop improved with albuterol.  Postop- b/l rhales noted. CXR notable for infiltrates. Internal medicine has been consulted for further evaluation and care postop.  Patient required GA and at risk for respiratory issues given underlying disease. VSS.        No notable events documented.

## 2023-10-05 NOTE — HOSPITAL COURSE
Servando Leigh is a 58 y.o. year-old male who presented to [facility] on 10/5/2023 for elective exam under anesthesia  with Dr. Brooks . Patient tolerated procedure appropriately, but had difficulty being weaned to extubate following procedure. Patient was successfully extubated in OR and transferred to PACU for treatment. Internal medicine service was subsequently consulted for evaluation and management of his respiratory status. Postoperative course was complicated by respiratory distress, requiring prolonged intubation and supplemental O2 . He was admitted to general internal medicine. By 10/7 his respiratory status had improved to his baseline. He was discharged home with treatment for possible aspiration pneumonia with clindamycin (due to penicillin allergy) and short course of steroids. On 10/7, patient was found to be tolerating baseline of poor PO, ambulating near baseline with adequate pain control, voiding spontaneously, and with appropriate bowel function. Patient was subsequently deemed appropriate for discharge to home.

## 2023-10-05 NOTE — H&P
History Of Present Illness  Servando Leigh is a 58 y.o. male with PMHx of COPD (GOLD stage 3 on triple therapy), current tobacco use, asthma, bronchiectasis, erectile dysfunction, chronic neck pain, GERD, PVD, seizure d/o, chronic diarrhea, and recurrent perirectal abscess who was scheduled to undergo outpatient EUA on 10/5 for recurrence of anal pain and fecal incontinence. Per discussion with colorectal surgery, prior to procedure, was reported to have some wheezing, which resolved with albuterol. Patient was successfully intubated and surgical exam was completed (no fistula noted, no intervention performed). Patient was noted to have difficulty weaning from the vent and was again wheezing upon extubation. Given additional albuterol treatment. Stat CXR ordered showing patchy infiltrate in RUL concerning for possible aspiration pneumonia.     On examination, patient endorses baseline SOB but denies any recent worsening of breathing or cough. O2 saturation of 99% on 2L. Endorsing some post-op pain and mild nausea. Denies fever/chills, chest pain, abdominal pain, and nausea.     Patient unable to provide much of medical history, seems to change answers when asked the same questions by different providers. Appears to have been receiving care for multiple different providers previously ([facility], [facility], [facility]).     Patient does admit to daily alcohol use and states that his last drink was last night. Current smoker with 22 pack year history, currently smoking about 4 cigarettes a day. States that he has been taking seizure medication, however, review of outpatient notes with most updated med list shows now AEDs listed.    Patient reports having had significant respiratory problems in the past. Notes significant SOB at baseline, which is most notable on exertion. Endorses history of chronic cough, as well as dizziness/lightheadedness upon standing up. Reports that he has been using his albuterol inhaler 4 times a day.  Currently following with pulmonary medicine (APRN: Adenike Upton), was last seen in clinic 8/28/23. Most recent PFTs on record from 1/2021 showing FEV/FVC1 of 45%. Per review of outpatient pulmonology note, patient is currently taking incruse 1 inhalation daily, fluticasone/salmeterol (advair) 250/50 1 inhalation twice daily, duonebs (albuterol/ ipratropium) nebulizers q4-6h PRN, and montelukast 10 mg daily.     Per further review of outpatient records, patient has prior history of Pseuodomonas pneumonia, for which he was treated with a 7 day course of ciprofloxacin in 2/2021. Additional prior concern for possible MAC infection, though AFBs negative x6 (12/2021). Most recent CT chest on record from May 2023 showing “diffuse bronchial wall thickening and multifocal bronchiectatic changes changes through bilateral lungs,” as well as upper lung predominant emphysema. Patient appears to have had immunoglobulin levels checked in 2021, which were elevated, but no additional bronchiectasis work-up on file.    Per discussion with patient's wife, patient has seemed to have had a cold over the past month, which started when their son came home with a cold. She reports that he tends to get “pneumonia” about 3 times a year. She reports that he normally drinks about 3 beers a day, and had his last drink yesterday evening.      Past Medical History  He has a past medical history of Brain tumor (benign) (CMS/HCC), CKD (chronic kidney disease), Dermatitis, GERD (gastroesophageal reflux disease), Incontinence of feces, Myalgia, Peripheral vascular disease, unspecified (CMS/HCC), Personal history of other diseases of the digestive system, Personal history of other diseases of the nervous system and sense organs, Personal history of other diseases of the respiratory system, Personal history of other mental and behavioral disorders, Seizure (CMS/HCC), Unsteady gait when walking, and Vertigo.    Surgical History  He has a past  surgical history that includes Neck surgery (01/25/2017); Other surgical history (07/13/2020); Other surgical history (01/07/2020); Total shoulder arthroplasty (Right); XR foot; and Cervical fusion.     Social History  -Current every day smoker- 4 cig/day , smoking for over 40 yrs  -Marijuana: smokes once a month  -Alcohol: 3 beers/day per wife  -Not currently working, lives with wife and son      Family History  Family History   Problem Relation Name Age of Onset    Diabetes Son      Glaucoma Other Grandfather         Allergies  Shellfish containing products, Coconut, Penicillin g, and Shellfish derived    Review of Systems   Constitutional:  Negative for chills and fever.   HENT:  Positive for congestion and rhinorrhea.    Eyes: Negative.    Respiratory:  Positive for cough and shortness of breath.    Cardiovascular: Negative.    Gastrointestinal:  Positive for diarrhea and rectal pain. Negative for nausea.   Genitourinary: Negative.    Musculoskeletal: Negative.    Skin: Negative.    Neurological:  Positive for dizziness and light-headedness.   Psychiatric/Behavioral: Negative.          Physical Exam  Constitutional:       General: He is not in acute distress.     Appearance: Normal appearance.      Comments: Thin-appearing   HENT:      Head: Normocephalic and atraumatic.      Nose: Nose normal.      Mouth/Throat:      Mouth: Mucous membranes are moist.      Pharynx: No posterior oropharyngeal erythema.   Eyes:      General: No scleral icterus.     Extraocular Movements: Extraocular movements intact.      Conjunctiva/sclera: Conjunctivae normal.      Pupils: Pupils are equal, round, and reactive to light.   Cardiovascular:      Rate and Rhythm: Normal rate and regular rhythm.      Pulses: Normal pulses.      Heart sounds: Normal heart sounds.   Pulmonary:      Effort: Pulmonary effort is normal.      Breath sounds: Wheezing present.      Comments: Significant b/l rhonchi appreciated  Abdominal:      General:  "Abdomen is flat. There is no distension.      Palpations: Abdomen is soft.      Tenderness: There is no abdominal tenderness.   Musculoskeletal:         General: Normal range of motion.      Cervical back: Normal range of motion and neck supple.      Right lower leg: No edema.      Left lower leg: No edema.   Skin:     General: Skin is warm and dry.      Findings: No rash.      Comments: Small, scabbed, non-erythematous wound noticed on lateral left foot.    Neurological:      General: No focal deficit present.      Mental Status: He is alert and oriented to person, place, and time.   Psychiatric:         Mood and Affect: Mood normal.     Last Recorded Vitals  Blood pressure 142/90, pulse 91, temperature 36.4 °C (97.5 °F), resp. rate 18, height 1.727 m (5' 8\"), weight 52.8 kg (116 lb 6.5 oz), SpO2 99 %.    Relevant Results  Results for orders placed or performed during the hospital encounter of 10/05/23 (from the past 24 hour(s))   CBC and Auto Differential   Result Value Ref Range    WBC 8.8 4.4 - 11.3 x10*3/uL    nRBC 0.0 0.0 - 0.0 /100 WBCs    RBC 4.60 4.50 - 5.90 x10*6/uL    Hemoglobin 13.9 13.5 - 17.5 g/dL    Hematocrit 42.0 41.0 - 52.0 %    MCV 91 80 - 100 fL    MCH 30.2 26.0 - 34.0 pg    MCHC 33.1 32.0 - 36.0 g/dL    RDW 14.3 11.5 - 14.5 %    Platelets 561 (H) 150 - 450 x10*3/uL    MPV 8.7 7.5 - 11.5 fL    Neutrophils %      Immature Granulocytes %, Automated      Lymphocytes %      Monocytes %      Eosinophils %      Basophils %      Neutrophils Absolute      Lymphocytes Absolute      Monocytes Absolute      Eosinophils Absolute      Basophils Absolute     Comprehensive metabolic panel   Result Value Ref Range    Glucose 58 (L) 74 - 99 mg/dL    Sodium 138 136 - 145 mmol/L    Potassium 4.6 3.5 - 5.3 mmol/L    Chloride 102 98 - 107 mmol/L    Bicarbonate 23 21 - 32 mmol/L    Anion Gap 18 10 - 20 mmol/L    Urea Nitrogen 10 6 - 23 mg/dL    Creatinine 0.92 0.50 - 1.30 mg/dL    eGFR >90 >60 mL/min/1.73m*2    " Calcium 8.7 8.6 - 10.6 mg/dL    Albumin 3.8 3.4 - 5.0 g/dL    Alkaline Phosphatase 108 33 - 120 U/L    Total Protein 7.8 6.4 - 8.2 g/dL    AST 25 9 - 39 U/L    Bilirubin, Total 0.2 0.0 - 1.2 mg/dL    ALT 10 10 - 52 U/L   Coagulation Screen   Result Value Ref Range    Protime 10.7 9.8 - 12.8 seconds    INR 1.0 0.9 - 1.1    aPTT 34 27 - 38 seconds     Imaging:   I reviewed the following imaging and agree with the formal interpretation below:    XR Chest 1 View  Result Date: 10/5/2023  STUDY: Chest Radiograph;  10/05/2023, 11:05AM INDICATION: COPD, bilateral rhonchi and wheezing. COMPARISON: 07/11/2023, 05/11/2023 XR chest ACCESSION NUMBER(S): [id number] ORDERING CLINICIAN: MADDY HEARD TECHNIQUE:  Frontal chest was obtained at 11:05 hours. FINDINGS: CARDIOMEDIASTINAL SILHOUETTE: Cardiomediastinal silhouette is normal in size and configuration.  LUNGS: There is a patchy infiltrate in the right upper lobe.  ABDOMEN: No remarkable upper abdominal findings.  BONES: No acute osseous changes.    IMPRESSION: Right upper lobe infiltrate. Signed by Dakota Whalen MD     Assessment/Plan   Principal Problem:    Karina Leigh is a 59 yo M with a PMHx COPD (GOLD stage 3 on triple therapy), current tobacco use, asthma, bronchiectasis, prior Pseudomonas pneumonia, erectile dysfunction, chronic neck pain, GERD, PVD, seizure d/o, chronic diarrhea, and recurrent perirectal abscess admitted 10/5 following difficulty weaning from intubation during outpatient procedure. CXR obtained showing new RUL infiltrate concerning for possible aspiration pneumonia vs CAP.    Challenging to obtain full history as patient poor historian, however, seems SOB and cough are close to baseline. CXR concerning for aspiration in the context of recent intubation and patient's reported alcohol use.    #Pneumonia, suspected aspiration vs. CAP  #New RUL infiltrate  #History of prior Pseudomonas pneumonia (1/2021)  :: Sputum culture history:  1/29/21 - candida parapsilosis, AFB negative x 3, + pseudomonas; 4/16/21 - AFB negative x 3, fungal culture negative, respiratory culture negative    Plan:  -Will obtain sputum cultures  -Per discussion with pharmacy, will start levofloxacin for combined CAP/anaerobe/Pseudomonas coverage (EKG 10/5 with Qtc of 480)    #COPD GOLD stage III  #Asthma  #Wheezing  #Bronchiectasis   ::Most recent PFTs 1/2021: FEV1/FVC 45%  -Continue home triple therapy and montelukast  -Will start 5 day course of prednisone 40 mg for wheezing  -Patient seems to have some component of underlying pulmonary disease that seems out of proportion with smoking history alone. Previously shown to have normal immunoglobulins (2021), but no documentation of alpha 1 anti-trypsin testing or additional bronchiectasis work-up. May consider consulting pulmonary team at some point during this admission    #Heavy alcohol use  -Reportedly drinking 3 beers per day  -Last drink yesterday evening  -No current symptoms of withdrawal. Will order CIWA protocol     #Reported seizure history  -Patient notes prior history of seizure and states that he has been on AEDs; however, review of most recent outpatient notes shows no AEDs listed on the patient's home medication list  -Mixed history regarding patient's recent seizure activity-- wife reports possible brief episode about a month ago, which the patient did not have worked up  -Seems to have most recently been taking depakoate; will obtain valproic acid levels  -Will consult neuro in the morning for further recs    #GERD  -Continue home pantoprazole    #Tobacco use  -Nicotine patches ordered    #Chronic diarrhea  -Continue home loperamide    #Recurrent perirectal abscess   -Tylenol PRN for pain    F: PRN  E: PRN  N: regular  GI: home pantoprazole  DVT ppx: pLov    Abx: levofloxacin  Lines/access: pIV     Code status: FULL (confirmed on admission)  NOK: Wife Lisha ([phone])           Tanisha Woo,  MD
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
## 2023-10-05 NOTE — OP NOTE
EUA Operative Note     Date: 10/5/2023  OR Location: [facility] OR    Name: Servando Leigh, : 1965, Age: 58 y.o., MRN: [id number], Sex: male    Diagnosis  Pre-op Diagnosis     * Anal fistula [K60.3] Post-op Diagnosis     * Anal fistula [K60.3]     Procedures  EUA  77721 - [place] PLACEMENT SETON      Surgeons      * Cesar Brooks - Primary    Resident/Fellow/Other Assistant:  No surgical staff documented.    Procedure Summary  Anesthesia: General  ASA: III  Anesthesia Staff: Anesthesiologist: Belkis Flood MD  CRNA: RYAN Marie-CRNA  Estimated Blood Loss: 0 mL  Intra-op Medications:   Medication Name Total Dose   sodium chloride 0.9 % irrigation solution 1,000 mL   sterile water irrigation solution 500 mL              Anesthesia Record               Intraprocedure I/O Totals          Intake    Esmolol Drip 0.00 mL    The total shown is the total volume documented since Anesthesia Start was filed.    Total Intake 0 mL          Specimen: No specimens collected     Staff:   Circulator: Veronica Salgado RN; Anastasiya Lino RN  Scrub Person: Pineda Mehta; Dione Robbins RN         Drains and/or Catheters: * None in log *    Tourniquet Times:         Implants:     Findings: fistulotomy scar in right posterior position, no fistula or abscess found    Indications: Servando Leigh is an 58 y.o. male who is having surgery for Anal fistula [K60.3]. History of prior perianal fistulas and fistulotomy. Presented to clinic with complaint of anal drainage and pain. Could not tolerated exam in office.    The patient was seen in the preoperative area. The risks, benefits, complications, treatment options, non-operative alternatives, expected recovery and outcomes were discussed with the patient. The possibilities of reaction to medication, pulmonary aspiration, injury to surrounding structures, bleeding, recurrent infection, the need for additional procedures, failure to diagnose a condition, and creating a complication  requiring transfusion or operation were discussed with the patient. The patient concurred with the proposed plan, giving informed consent.  The site of surgery was properly noted/marked if necessary per policy. The patient has been actively warmed in preoperative area. Preoperative antibiotics are not indicated. Venous thrombosis prophylaxis are not indicated.    Procedure Details: Patient was brought to the operating room and placed on the operating table in the supine position.  After the induction of general endotracheal anesthesia the skin of the perineum was prepped and draped in usual sterile fashion.  Surgical timeout was performed.  The perineum was inspected.  There was a fistulotomy scar in the right posterior position.  There were no external fistulas identified and no evidence of abscess presenting on the perineum.  Digital examination of the anal canal was normal.  [place]-F[place]ar retractors were used to perform a anoscopy.  There was no internal fistula identified and no evidence of abscess.  The patient tolerated by procedure was taken to recovery room in stable condition.  Complications:  None; patient tolerated the procedure well.    Disposition: PACU - hemodynamically stable.  Condition: stable         Additional Details: none    Attending Attestation:     Cesar Brooks  Phone Number: [phone]

## 2023-10-05 NOTE — ANESTHESIA PROCEDURE NOTES
Peripheral IV  Date/Time: 10/5/2023 9:15 AM      Placement  Needle size: 20 G  Laterality: left  Location: hand  Local anesthetic: injectable  Site prep: alcohol  Technique: anatomical landmarks  Attempts: 1

## 2023-10-06 ENCOUNTER — APPOINTMENT (OUTPATIENT)
Dept: RADIOLOGY | Facility: HOSPITAL | Age: 58
DRG: 178 | End: 2023-10-06
Payer: COMMERCIAL

## 2023-10-06 PROBLEM — R29.6 RECURRENT FALLS: Chronic | Status: ACTIVE | Noted: 2023-10-06

## 2023-10-06 PROBLEM — J69.0 ASPIRATION PNEUMONITIS (MULTI): Status: ACTIVE | Noted: 2023-10-06

## 2023-10-06 PROBLEM — R41.0 CONFUSION WITH NON-FOCAL NEURO EXAM: Chronic | Status: ACTIVE | Noted: 2023-10-06

## 2023-10-06 LAB
25(OH)D3 SERPL-MCNC: 28 NG/ML (ref 30–100)
AMPHETAMINES UR QL SCN: ABNORMAL
BARBITURATES UR QL SCN: ABNORMAL
BASOPHILS # BLD AUTO: 0.03 X10*3/UL (ref 0–0.1)
BASOPHILS NFR BLD AUTO: 0.2 %
BENZODIAZ UR QL SCN: ABNORMAL
BZE UR QL SCN: ABNORMAL
CANNABINOIDS UR QL SCN: ABNORMAL
EOSINOPHIL # BLD AUTO: 0 X10*3/UL (ref 0–0.7)
EOSINOPHIL NFR BLD AUTO: 0 %
ERYTHROCYTE [DISTWIDTH] IN BLOOD BY AUTOMATED COUNT: 13.9 % (ref 11.5–14.5)
FENTANYL+NORFENTANYL UR QL SCN: ABNORMAL
FOLATE SERPL-MCNC: 21.5 NG/ML
HCT VFR BLD AUTO: 39.2 % (ref 41–52)
HGB BLD-MCNC: 12.9 G/DL (ref 13.5–17.5)
IMM GRANULOCYTES # BLD AUTO: 0.07 X10*3/UL (ref 0–0.7)
IMM GRANULOCYTES NFR BLD AUTO: 0.5 % (ref 0–0.9)
LYMPHOCYTES # BLD AUTO: 1.63 X10*3/UL (ref 1.2–4.8)
LYMPHOCYTES NFR BLD AUTO: 11.4 %
MCH RBC QN AUTO: 30.3 PG (ref 26–34)
MCHC RBC AUTO-ENTMCNC: 32.9 G/DL (ref 32–36)
MCV RBC AUTO: 92 FL (ref 80–100)
MONOCYTES # BLD AUTO: 1.31 X10*3/UL (ref 0.1–1)
MONOCYTES NFR BLD AUTO: 9.1 %
NEUTROPHILS # BLD AUTO: 11.29 X10*3/UL (ref 1.2–7.7)
NEUTROPHILS NFR BLD AUTO: 78.8 %
NRBC BLD-RTO: 0 /100 WBCS (ref 0–0)
OPIATES UR QL SCN: ABNORMAL
OXYCODONE+OXYMORPHONE UR QL SCN: ABNORMAL
PCP UR QL SCN: ABNORMAL
PLATELET # BLD AUTO: 475 X10*3/UL (ref 150–450)
PMV BLD AUTO: 9 FL (ref 7.5–11.5)
RBC # BLD AUTO: 4.26 X10*6/UL (ref 4.5–5.9)
T PALLIDUM AB SER QL: NONREACTIVE
T4 FREE SERPL-MCNC: 0.73 NG/DL (ref 0.78–1.48)
TSH SERPL-ACNC: 0.27 MIU/L (ref 0.44–3.98)
VIT B12 SERPL-MCNC: 1041 PG/ML (ref 211–911)
WBC # BLD AUTO: 14.3 X10*3/UL (ref 4.4–11.3)

## 2023-10-06 PROCEDURE — S4991 NICOTINE PATCH NONLEGEND: HCPCS

## 2023-10-06 PROCEDURE — 96372 THER/PROPH/DIAG INJ SC/IM: CPT

## 2023-10-06 PROCEDURE — 36415 COLL VENOUS BLD VENIPUNCTURE: CPT

## 2023-10-06 PROCEDURE — 85025 COMPLETE CBC W/AUTO DIFF WBC: CPT

## 2023-10-06 PROCEDURE — 94760 N-INVAS EAR/PLS OXIMETRY 1: CPT

## 2023-10-06 PROCEDURE — 70450 CT HEAD/BRAIN W/O DYE: CPT | Mod: MG

## 2023-10-06 PROCEDURE — 2500000004 HC RX 250 GENERAL PHARMACY W/ HCPCS (ALT 636 FOR OP/ED)

## 2023-10-06 PROCEDURE — 2500000002 HC RX 250 W HCPCS SELF ADMINISTERED DRUGS (ALT 637 FOR MEDICARE OP, ALT 636 FOR OP/ED)

## 2023-10-06 PROCEDURE — 97161 PT EVAL LOW COMPLEX 20 MIN: CPT | Mod: GP

## 2023-10-06 PROCEDURE — 94640 AIRWAY INHALATION TREATMENT: CPT

## 2023-10-06 PROCEDURE — 97165 OT EVAL LOW COMPLEX 30 MIN: CPT | Mod: GO

## 2023-10-06 PROCEDURE — 2500000001 HC RX 250 WO HCPCS SELF ADMINISTERED DRUGS (ALT 637 FOR MEDICARE OP)

## 2023-10-06 PROCEDURE — 99254 IP/OBS CNSLTJ NEW/EST MOD 60: CPT | Performed by: CASE MANAGER/CARE COORDINATOR

## 2023-10-06 PROCEDURE — 80307 DRUG TEST PRSMV CHEM ANLYZR: CPT

## 2023-10-06 PROCEDURE — G1004 CDSM NDSC: HCPCS

## 2023-10-06 PROCEDURE — 1100000001 HC PRIVATE ROOM DAILY

## 2023-10-06 PROCEDURE — 99232 SBSQ HOSP IP/OBS MODERATE 35: CPT

## 2023-10-06 PROCEDURE — 86780 TREPONEMA PALLIDUM: CPT

## 2023-10-06 PROCEDURE — 2500000005 HC RX 250 GENERAL PHARMACY W/O HCPCS

## 2023-10-06 PROCEDURE — 70450 CT HEAD/BRAIN W/O DYE: CPT | Performed by: RADIOLOGY

## 2023-10-06 RX ORDER — DICLOFENAC SODIUM 10 MG/G
4 GEL TOPICAL 4 TIMES DAILY
Status: DISCONTINUED | OUTPATIENT
Start: 2023-10-06 | End: 2023-10-07 | Stop reason: HOSPADM

## 2023-10-06 RX ORDER — CHOLECALCIFEROL (VITAMIN D3) 25 MCG
25 TABLET ORAL DAILY
Status: DISCONTINUED | OUTPATIENT
Start: 2023-10-06 | End: 2023-10-07 | Stop reason: HOSPADM

## 2023-10-06 RX ORDER — MULTIVIT-MIN/IRON FUM/FOLIC AC 7.5 MG-4
1 TABLET ORAL DAILY
Status: DISCONTINUED | OUTPATIENT
Start: 2023-10-06 | End: 2023-10-07 | Stop reason: HOSPADM

## 2023-10-06 RX ORDER — ENOXAPARIN SODIUM 100 MG/ML
40 INJECTION SUBCUTANEOUS DAILY
Status: DISCONTINUED | OUTPATIENT
Start: 2023-10-06 | End: 2023-10-07 | Stop reason: HOSPADM

## 2023-10-06 RX ORDER — LEVETIRACETAM 500 MG/1
500 TABLET ORAL 2 TIMES DAILY
Status: DISCONTINUED | OUTPATIENT
Start: 2023-10-06 | End: 2023-10-07 | Stop reason: HOSPADM

## 2023-10-06 RX ORDER — GUAIFENESIN, PSEUDOEPHEDRINE HYDROCHLORIDE 600; 60 MG/1; MG/1
1 TABLET, EXTENDED RELEASE ORAL 2 TIMES DAILY
Status: DISCONTINUED | OUTPATIENT
Start: 2023-10-06 | End: 2023-10-07

## 2023-10-06 RX ORDER — IPRATROPIUM BROMIDE AND ALBUTEROL SULFATE 2.5; .5 MG/3ML; MG/3ML
3 SOLUTION RESPIRATORY (INHALATION)
Status: DISCONTINUED | OUTPATIENT
Start: 2023-10-06 | End: 2023-10-07 | Stop reason: HOSPADM

## 2023-10-06 RX ADMIN — CYCLOBENZAPRINE 10 MG: 10 TABLET, FILM COATED ORAL at 20:39

## 2023-10-06 RX ADMIN — SODIUM CHLORIDE 30 MG/ML INHALATION SOLUTION 4 ML: 30 SOLUTION INHALANT at 21:10

## 2023-10-06 RX ADMIN — HYDROXYZINE HYDROCHLORIDE 25 MG: 25 TABLET, FILM COATED ORAL at 20:38

## 2023-10-06 RX ADMIN — GABAPENTIN 600 MG: 300 CAPSULE ORAL at 20:38

## 2023-10-06 RX ADMIN — PANTOPRAZOLE SODIUM 40 MG: 40 TABLET, DELAYED RELEASE ORAL at 08:37

## 2023-10-06 RX ADMIN — Medication 25 MCG: at 14:58

## 2023-10-06 RX ADMIN — ACETAMINOPHEN 650 MG: 325 TABLET, FILM COATED ORAL at 20:39

## 2023-10-06 RX ADMIN — MAGNESIUM SULFATE HEPTAHYDRATE 2 G: 40 INJECTION, SOLUTION INTRAVENOUS at 01:32

## 2023-10-06 RX ADMIN — TAMSULOSIN HYDROCHLORIDE 0.4 MG: 0.4 CAPSULE ORAL at 08:37

## 2023-10-06 RX ADMIN — FINASTERIDE 5 MG: 5 TABLET, FILM COATED ORAL at 08:39

## 2023-10-06 RX ADMIN — IPRATROPIUM BROMIDE AND ALBUTEROL SULFATE 3 ML: .5; 3 SOLUTION RESPIRATORY (INHALATION) at 21:10

## 2023-10-06 RX ADMIN — FOLIC ACID TAB 400 MCG 0.4 MG: 400 TAB at 08:39

## 2023-10-06 RX ADMIN — PREDNISONE 40 MG: 20 TABLET ORAL at 10:35

## 2023-10-06 RX ADMIN — TIOTROPIUM BROMIDE INHALATION SPRAY 2 PUFF: 3.12 SPRAY, METERED RESPIRATORY (INHALATION) at 09:26

## 2023-10-06 RX ADMIN — GABAPENTIN 600 MG: 300 CAPSULE ORAL at 08:36

## 2023-10-06 RX ADMIN — FLUTICASONE FUROATE AND VILANTEROL TRIFENATATE 1 PUFF: 100; 25 POWDER RESPIRATORY (INHALATION) at 09:26

## 2023-10-06 RX ADMIN — POLYETHYLENE GLYCOL 3350 17 G: 17 POWDER, FOR SOLUTION ORAL at 08:37

## 2023-10-06 RX ADMIN — ROPINIROLE 0.5 MG: 0.5 TABLET, FILM COATED ORAL at 08:45

## 2023-10-06 RX ADMIN — SODIUM CHLORIDE 30 MG/ML INHALATION SOLUTION 4 ML: 30 SOLUTION INHALANT at 09:28

## 2023-10-06 RX ADMIN — ATORVASTATIN CALCIUM 10 MG: 10 TABLET, FILM COATED ORAL at 08:40

## 2023-10-06 RX ADMIN — LEVETIRACETAM 500 MG: 500 TABLET, FILM COATED ORAL at 20:37

## 2023-10-06 RX ADMIN — DICLOFENAC SODIUM TOPICAL GEL, 1% 1 APPLICATION: 10 GEL TOPICAL at 20:41

## 2023-10-06 RX ADMIN — IPRATROPIUM BROMIDE AND ALBUTEROL SULFATE 3 ML: .5; 3 SOLUTION RESPIRATORY (INHALATION) at 16:10

## 2023-10-06 RX ADMIN — IPRATROPIUM BROMIDE AND ALBUTEROL SULFATE 3 ML: .5; 3 SOLUTION RESPIRATORY (INHALATION) at 13:36

## 2023-10-06 RX ADMIN — MONTELUKAST 10 MG: 10 TABLET, FILM COATED ORAL at 08:36

## 2023-10-06 RX ADMIN — ENOXAPARIN SODIUM 40 MG: 40 INJECTION SUBCUTANEOUS at 01:31

## 2023-10-06 RX ADMIN — TRIAMCINOLONE ACETONIDE: 1 CREAM TOPICAL at 08:46

## 2023-10-06 RX ADMIN — GABAPENTIN 600 MG: 300 CAPSULE ORAL at 14:58

## 2023-10-06 RX ADMIN — Medication 1 TABLET: at 14:58

## 2023-10-06 RX ADMIN — MIRTAZAPINE 15 MG: 15 TABLET, FILM COATED ORAL at 20:38

## 2023-10-06 RX ADMIN — Medication 1 PATCH: at 08:38

## 2023-10-06 RX ADMIN — GUAIFENESIN, PSEUDOEPHEDRINE HYDROCHLORIDE 1 TABLET: 600; 60 TABLET ORAL at 21:00

## 2023-10-06 RX ADMIN — ENOXAPARIN SODIUM 40 MG: 40 INJECTION SUBCUTANEOUS at 20:37

## 2023-10-06 RX ADMIN — THIAMINE HCL TAB 100 MG 100 MG: 100 TAB at 08:36

## 2023-10-06 ASSESSMENT — PAIN - FUNCTIONAL ASSESSMENT
PAIN_FUNCTIONAL_ASSESSMENT: 0-10

## 2023-10-06 ASSESSMENT — LIFESTYLE VARIABLES
PAROXYSMAL SWEATS: NO SWEAT VISIBLE
AUDITORY DISTURBANCES: NOT PRESENT
ANXIETY: NO ANXIETY, AT EASE
NAUSEA AND VOMITING: NO NAUSEA AND NO VOMITING
PAROXYSMAL SWEATS: NO SWEAT VISIBLE
TOTAL SCORE: 0
TOTAL SCORE: 0
ORIENTATION AND CLOUDING OF SENSORIUM: ORIENTED AND CAN DO SERIAL ADDITIONS
ANXIETY: NO ANXIETY, AT EASE
AGITATION: NORMAL ACTIVITY
ANXIETY: NO ANXIETY, AT EASE
TOTAL SCORE: 0
TREMOR: NO TREMOR
HEADACHE, FULLNESS IN HEAD: NOT PRESENT
ANXIETY: NO ANXIETY, AT EASE
PAROXYSMAL SWEATS: NO SWEAT VISIBLE
ORIENTATION AND CLOUDING OF SENSORIUM: ORIENTED AND CAN DO SERIAL ADDITIONS
NAUSEA AND VOMITING: NO NAUSEA AND NO VOMITING
AGITATION: NORMAL ACTIVITY
NAUSEA AND VOMITING: NO NAUSEA AND NO VOMITING
ORIENTATION AND CLOUDING OF SENSORIUM: ORIENTED AND CAN DO SERIAL ADDITIONS
VISUAL DISTURBANCES: NOT PRESENT
PAROXYSMAL SWEATS: NO SWEAT VISIBLE
AGITATION: NORMAL ACTIVITY
PAROXYSMAL SWEATS: NO SWEAT VISIBLE
ANXIETY: NO ANXIETY, AT EASE
VISUAL DISTURBANCES: NOT PRESENT
AUDITORY DISTURBANCES: NOT PRESENT
NAUSEA AND VOMITING: NO NAUSEA AND NO VOMITING
ORIENTATION AND CLOUDING OF SENSORIUM: ORIENTED AND CAN DO SERIAL ADDITIONS
TOTAL SCORE: 0
ORIENTATION AND CLOUDING OF SENSORIUM: ORIENTED AND CAN DO SERIAL ADDITIONS
TREMOR: NO TREMOR
HEADACHE, FULLNESS IN HEAD: NOT PRESENT
HEADACHE, FULLNESS IN HEAD: NOT PRESENT
AUDITORY DISTURBANCES: NOT PRESENT
AUDITORY DISTURBANCES: NOT PRESENT
NAUSEA AND VOMITING: NO NAUSEA AND NO VOMITING
AUDITORY DISTURBANCES: NOT PRESENT
HEADACHE, FULLNESS IN HEAD: NOT PRESENT
NAUSEA AND VOMITING: NO NAUSEA AND NO VOMITING
VISUAL DISTURBANCES: NOT PRESENT
AUDITORY DISTURBANCES: NOT PRESENT
VISUAL DISTURBANCES: NOT PRESENT
ANXIETY: NO ANXIETY, AT EASE
ORIENTATION AND CLOUDING OF SENSORIUM: ORIENTED AND CAN DO SERIAL ADDITIONS
PAROXYSMAL SWEATS: NO SWEAT VISIBLE
ORIENTATION AND CLOUDING OF SENSORIUM: ORIENTED AND CAN DO SERIAL ADDITIONS
AUDITORY DISTURBANCES: NOT PRESENT
TOTAL SCORE: 0
AGITATION: NORMAL ACTIVITY
HEADACHE, FULLNESS IN HEAD: NOT PRESENT
NAUSEA AND VOMITING: NO NAUSEA AND NO VOMITING
TOTAL SCORE: 0
HEADACHE, FULLNESS IN HEAD: NOT PRESENT
TREMOR: NOT VISIBLE, BUT CAN BE FELT FINGERTIP TO FINGERTIP
ORIENTATION AND CLOUDING OF SENSORIUM: ORIENTED AND CAN DO SERIAL ADDITIONS
VISUAL DISTURBANCES: NOT PRESENT
AUDITORY DISTURBANCES: NOT PRESENT
TREMOR: NO TREMOR
NAUSEA AND VOMITING: NO NAUSEA AND NO VOMITING
TREMOR: NO TREMOR
HEADACHE, FULLNESS IN HEAD: NOT PRESENT
HEADACHE, FULLNESS IN HEAD: NOT PRESENT
TOTAL SCORE: 0
TREMOR: NO TREMOR
PAROXYSMAL SWEATS: NO SWEAT VISIBLE
VISUAL DISTURBANCES: NOT PRESENT
AGITATION: NORMAL ACTIVITY
TREMOR: NO TREMOR
TREMOR: NO TREMOR
TOTAL SCORE: 1
VISUAL DISTURBANCES: NOT PRESENT
PAROXYSMAL SWEATS: NO SWEAT VISIBLE
VISUAL DISTURBANCES: NOT PRESENT
ANXIETY: NO ANXIETY, AT EASE
ANXIETY: NO ANXIETY, AT EASE
AGITATION: NORMAL ACTIVITY

## 2023-10-06 ASSESSMENT — COGNITIVE AND FUNCTIONAL STATUS - GENERAL
CLIMB 3 TO 5 STEPS WITH RAILING: A LITTLE
DAILY ACTIVITIY SCORE: 24
TOILETING: A LITTLE
MOVING TO AND FROM BED TO CHAIR: A LITTLE
MOBILITY SCORE: 20
WALKING IN HOSPITAL ROOM: A LITTLE
MOBILITY SCORE: 24
DAILY ACTIVITIY SCORE: 23
STANDING UP FROM CHAIR USING ARMS: A LITTLE

## 2023-10-06 ASSESSMENT — PAIN SCALES - GENERAL
PAINLEVEL_OUTOF10: 7
PAINLEVEL_OUTOF10: 8
PAINLEVEL_OUTOF10: 0 - NO PAIN

## 2023-10-06 ASSESSMENT — PAIN DESCRIPTION - DESCRIPTORS: DESCRIPTORS: NAGGING;PINS AND NEEDLES

## 2023-10-06 ASSESSMENT — ACTIVITIES OF DAILY LIVING (ADL)
BATHING_ASSISTANCE: MODIFIED INDEPENDENT (DEVICE)
ADL_ASSISTANCE: INDEPENDENT

## 2023-10-06 NOTE — PROGRESS NOTES
10/06/23 1135   Discharge Planning   Living Arrangements Spouse/significant other;Children  (Home with wife and 7 yo son.)   Support Systems Spouse/significant other   Assistance Needed Pt requested prescription for Boost or Ensure.   Type of Residence Private residence   Who is requesting discharge planning? Patient   Home or Post Acute Services None   Patient expects to be discharged to: Home   Does the patient need discharge transport arranged? No  (Pt's wife will provide.)     Assessment Note:  Met with pt and introduced myself as care coordinator and member of the Care Transitions team for discharge planning.  Pt's primary support person is his wife Lisha. Pt feels safe at home.  Pt ambulates with a cane, wife assists as needed.  Pt currently sleeps in a chair d/t previous neck surgeries.  Pt's [facility].  Pt's address, phone number and contact information was verified.  Pt requested a prescription for Boost or Ensure (had in the past), [facility] team resident notified. Pt does not have any other questions/concerns at this time.     Previous Home Care: None  DME: Cane, neck brace, left boot.  Pharmacy: [facility] at [address]    Transitional Care Coordination Progress Note:  Patient was discussed during interdisciplinary rounds.  Team members present: medical team, and TCC.  Plan per medical team: Pt admitted with anal pain and fecal incontinence, and difficulty arousing after a procedure.  Plan to consult neurology.    Payer: [facility]  Status: Inpatient  Discharge disposition: Pt will discharge home with family. Pt requested prescription for Boost or Ensure, team aware.  Potential barriers: None  ADOD: 10/8  Care coordinator will continue to follow for discharge planning needs.  Arabella Carmona MSN, RN-BC  Transitional Care Coordinator (TCC)  [phone]

## 2023-10-06 NOTE — CARE PLAN
The patient's goals for the shift include  PT WILL REMAIN SAFE AND FREE OF ALL INJURY       The clinical goals for the shift include                                          Pt will decrease oxygen use  Pt will pain management

## 2023-10-06 NOTE — CARE PLAN
Problem: Balance  Goal: Tinetti score > 24 to indicate low falls risk   Outcome: Progressing     Problem: Mobility  Goal: STG - Patient will ambulate 200' modif indep with LRAD  Outcome: Progressing     Problem: Transfers  Goal: STG - Patient will perform bed mobility indep  Outcome: Progressing  Goal: STG - Patient will transfer sit to and from stand indep with LRAD  Outcome: Progressing
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
## 2023-10-06 NOTE — CONSULTS
"History Of Present Illness  Mr. Servando Leigh is a 58-year-old man with history of COPD (GOLD stage 3 on triple therapy), current tobacco use, asthma, bronchiectasis, prior Pseudomonas pneumonia, erectile dysfunction, chronic neck pain, GERD, PVD, reported seizure d/o, chronic diarrhea, and recurrent perirectal abscess  presenting after difficulty of extubation after outpatient procedure and new found right upper lobe aspiration pneumonitis. There's limited neurologic history for Mr. Leigh documented in our seeable electronic medical record and per my search cannot find history of being seen by epilepsy or neurology in the past however does have an upcoming appointment with neuromuscular neurology on October 18, 2023. However, at baseline appears to have heavy alcohol use, and , some degree of memory impairment with recurrent falls. Primary team obtained CT head which showed No acute intracranial hemorrhage or mass effect.     Epilepsy Risk Factors:   Previous EEG results:   11/12/2019: This vEEG is consistent with a moderate to severe diffuse encephalopathy. No epileptiform discharges or lateralizing signs were seen.    Per discussion with patient:   He states he has had \"seizures for a long time\" and his in unable to tell me what they look like or how often they occur and encourages me to call his wife Lisha at [phone] for more information. He says he used to be treated by a Dr. Blum at [place] with Keppra however has not been seen in several years nor has he taken Keppra in several years.     Per discussion with Wife:  He has his seizures approx 1x/1-2 months during which he tells her he feels light-headed and that he might pass out then lays down on the floor in preparation for a seizure. He they has \"whole body shaking\" (no additional semiology could be described by wife) for approx 2-10 minutes, resolving spontaneously. Afterward he remains confused for several minutes and has no memory of the " event. OF NOTE: ALL OF THESE ARE EVENTS OCCUR WHEN HE TRIES TO STOP DRINKING OR HAS NOT HAD ANYTHING TO DRINK (ALCOHOL). She says she also occasionally sees him staring off or wondering around the house/yard confused and acting oddly.     Past Medical History  He has a past medical history of Brain tumor (benign) (CMS/HCC), CKD (chronic kidney disease), Dermatitis, GERD (gastroesophageal reflux disease), Incontinence of feces, Myalgia, Peripheral vascular disease, unspecified (CMS/HCC), Personal history of other diseases of the digestive system, Personal history of other diseases of the nervous system and sense organs, Personal history of other diseases of the respiratory system, Personal history of other mental and behavioral disorders, Seizure (CMS/HCC), Unsteady gait when walking, and Vertigo.     Surgical History  He has a past surgical history that includes Neck surgery (01/25/2017); Other surgical history (07/13/2020); Other surgical history (01/07/2020); Total shoulder arthroplasty (Right); XR foot; and Cervical fusion.     Social History  -Current every day smoker- 4 cig/day , smoking for over 40 yrs  -Marijuana: smokes once a month  -Alcohol: at least 6 beers/day per wife (3x 24 oz)   -Not currently working, lives with wife and son        Family History  Family History          Family History   Problem Relation Name Age of Onset    Diabetes Son        Glaucoma Other Grandfather              Allergies  Shellfish containing products, Coconut, Penicillin g, and Shellfish derived    Past Medical History  Past Medical History:   Diagnosis Date    Brain tumor (benign) (CMS/HCC)     CKD (chronic kidney disease)     Dermatitis     GERD (gastroesophageal reflux disease)     Incontinence of feces     Myalgia     Peripheral vascular disease, unspecified (CMS/HCC)     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 stopped his depakote. no script. new md per patient    Unsteady gait when walking     Vertigo      Surgical History  Past Surgical History:   Procedure Laterality Date    CERVICAL FUSION      FOOT      NECK SURGERY  01/25/2017    Neck Surgery    OTHER SURGICAL HISTORY  07/13/2020    Perirectal abscess incision and drainage    OTHER SURGICAL HISTORY  01/07/2020    Shoulder replacement    TOTAL SHOULDER ARTHROPLASTY Right     right     Social History  Social History     Tobacco Use    Smoking status: Every Day     Packs/day: .25     Types: Cigarettes    Smokeless tobacco: Never   Substance Use Topics    Alcohol use: Not Currently    Drug use: Not Currently     Allergies  Shellfish containing products, Coconut, Penicillin g, and Shellfish derived  Medications Prior to Admission   Medication Sig Dispense Refill Last Dose    albuterol 2.5 mg /3 mL (0.083 %) nebulizer solution Take by nebulization 4 times a day.   10/4/2023    albuterol 90 mcg/actuation inhaler Inhale 2 puffs every 6 hours if needed.   10/4/2023    atorvastatin (Lipitor) 10 mg tablet Take 1 tablet (10 mg) by mouth once daily.   10/5/2023    benzoyl peroxide (Benzac AC) 10 % external wash Apply topically once daily.   10/4/2023    clindamycin (Cleocin T) 1 % lotion Apply topically 2 times a day.   10/4/2023    cyclobenzaprine (Flexeril) 10 mg tablet Take 1 tablet (10 mg) by mouth 2 times a day as needed (pain).   10/4/2023    diclofenac sodium (Voltaren) 1 % gel gel Apply topically 4 times a day.   Past Week    divalproex (Depakote) 250 mg EC tablet Take 1 tablet (250 mg) by mouth 2 times a day.   Past Week     docusate sodium (Colace) 100 mg capsule Take 1 capsule (100 mg) by mouth.   10/4/2023    ferrous sulfate 325 (65 Fe) MG tablet Take 1 tablet (65 mg of iron) by mouth.   10/4/2023    fluticasone propion-salmeteroL (Advair Diskus) 250-50 mcg/dose diskus inhaler Inhale 1 puff every 12 hours.   10/5/2023    food supplemt, lactose-reduced (Ensure Original) 0.04-1.05 gram-kcal/mL liquid Take by mouth once daily. 2-3 cans   10/4/2023    gabapentin (Neurontin) 600 mg tablet Take 1 tablet (600 mg) by mouth 3 times a day.   10/4/2023    levETIRAcetam (Keppra) 1,000 mg tablet Take 1 tablet (1,000 mg) by mouth.   10/4/2023 at 2000    loperamide (Imodium) 2 mg capsule Take 1 capsule (2 mg) by mouth 4 times a day as needed.   10/4/2023    mirtazapine (Remeron) 15 mg tablet Take 1 tablet (15 mg) by mouth once daily at bedtime.   10/4/2023    montelukast (Singulair) 10 mg tablet Take 1 tablet (10 mg) by mouth once daily.   10/4/2023    Mucinex D  mg 12 hr tablet Take by mouth.   10/5/2023    multivitamin with minerals (Thera-M) tablet Take by mouth.   10/4/2023    pantoprazole (ProtoNix) 40 mg EC tablet Take 1 tablet (40 mg) by mouth once daily.   10/4/2023    rOPINIRole (Requip) 0.5 mg tablet Take 1 tablet (0.5 mg) by mouth once daily.   10/5/2023    tamsulosin (Flomax) 0.4 mg 24 hr capsule Take 1 capsule (0.4 mg) by mouth once daily.   10/4/2023    triamcinolone (Kenalog) 0.1 % cream Apply topically once daily. Apply to affected area once daily   10/4/2023    umeclidinium (Incruse Ellipta) 62.5 mcg/actuation inhalation Inhale 1 puff (62.5 mcg) once daily.   10/5/2023    [DISCONTINUED] amoxicillin-pot clavulanate (Augmentin) 875-125 mg tablet Take 1 tablet (875 mg) by mouth 2 times a day.   10/4/2023    ascorbic acid (Vitamin C) 500 mg tablet Take 1 tablet (500 mg) by mouth.   Not Taking    EPINEPHrine 0.3 mg/0.3 mL injection syringe Inject 0.3 mL (0.3 mg) into the shoulder, thigh, or buttocks. As Directed   Unknown     "sodium chloride 3 % nebulizer solution Take 4 mL by nebulization 2 times a day.       topiramate (Topamax) 25 mg tablet Take 2 tablets (50 mg) by mouth once daily.   Not Taking       Review of Systems  Neurological Exam  Physical Exam  Neurological Exam:  MENTAL STATUS:  General appearance:  Orientation:  Language: Expression, repetition, naming, comprehension intact  Follows complex commands across midline  Thought processes: Logical, organized    CRANIAL NERVES:  - II:  Visual fields intact to confrontation bilaterally tested individually and together  - III, IV, VI: ALVARO, EOMI to pursuit without nystagmus  - V: V1-V3 sensation intact bilaterally but with slow saccades  - VII: Face muscles symmetric with smile and eye closure  - VIII: Intact to finger rub bilaterally  - IX, X: Palate elevated symmetrically bilaterally, no hoarseness  - XI: 5/5 strength on shoulder shrugging bilaterally  - XII: Tongue midline without atrophy or fasciculation    MOTOR: Tone and bulk normal in all extremities    STRENGTH: R L  Deltoid  4 4  Biceps  4 4  Triceps  4 4    4 4  Hip flexion      4 4  Quadriceps 4 4  Hamstrings 4 4  DorsiFlex 4          4  PlantarFlex 4 54    REFLEXES:       R  L  Biceps   2+ 2+  Triceps   2+ 2+    Patellar   3+ 3+  Achilles  2+ 2+  Plantar  Down Down    COORDINATION: Intact on finger to nose bl, intact on heel to shin bl, JENNY intact bl    SENSORY: States he has decreased sensation in left arm/forearm circumferencially with relative sparing of the ulnar distribution. Also circumferential left leg compared to right. (However difficult to determine given mental status)    PROPRIOCEPTION:  Intact in toes and fingers bilaterally  GAIT: not attempted given significant generalized weakness.    Last Recorded Vitals  Blood pressure 123/80, pulse 92, temperature 37.4 °C (99.3 °F), resp. rate 16, height 1.727 m (5' 8\"), weight 52.8 kg (116 lb 6.5 oz), SpO2 98 %.    Relevant Results       Scheduled " medications  atorvastatin, 10 mg, oral, Daily  benzoyl peroxide, 1 Application, Topical, Nightly  cholecalciferol, 25 mcg, oral, Daily  diclofenac sodium, 4 g, Topical, 4x daily  enoxaparin, 40 mg, subcutaneous, Daily  finasteride, 5 mg, oral, Daily  influenza, 0.5 mL, intramuscular, During hospitalization  fluticasone furoate-vilanteroL, 1 puff, inhalation, Daily  folic acid, 0.4 mg, oral, Daily  gabapentin, 600 mg, oral, TID  hydrOXYzine HCL, 25 mg, oral, Nightly  ipratropium-albuteroL, 3 mL, nebulization, q4h  mirtazapine, 15 mg, oral, Nightly  montelukast, 10 mg, oral, Daily  multivitamin with minerals, 1 tablet, oral, Daily  nicotine, 1 patch, transdermal, Daily  pantoprazole, 40 mg, oral, Daily  pneumococcal conjugate, 0.5 mL, intramuscular, During hospitalization  polyethylene glycol, 17 g, oral, Daily  predniSONE, 40 mg, oral, Daily  pseudoephedrine-guaifenesin, 1 tablet, oral, BID  rOPINIRole, 0.5 mg, oral, Daily  sodium chloride, 4 mL, nebulization, BID  tamsulosin, 0.4 mg, oral, Daily  thiamine, 100 mg, oral, Daily  tiotropium, 2 puff, inhalation, Daily  triamcinolone, , Topical, Daily      Continuous medications     PRN medications  PRN medications: acetaminophen, albuterol, cyclobenzaprine, loperamide, LORazepam **OR** LORazepam **OR** LORazepam    Results for orders placed or performed during the hospital encounter of 10/05/23 (from the past 24 hour(s))   Respiratory Culture/Smear    Specimen: SPUTUM; Fluid   Result Value Ref Range    Respiratory Culture/Smear Culture in progress     Gram Stain       Gram stain indicates specimen consists of lower respiratory tract secretions.    Gram Stain No predominant organism    CBC and Auto Differential   Result Value Ref Range    WBC 14.3 (H) 4.4 - 11.3 x10*3/uL    nRBC 0.0 0.0 - 0.0 /100 WBCs    RBC 4.26 (L) 4.50 - 5.90 x10*6/uL    Hemoglobin 12.9 (L) 13.5 - 17.5 g/dL    Hematocrit 39.2 (L) 41.0 - 52.0 %    MCV 92 80 - 100 fL    MCH 30.3 26.0 - 34.0 pg    MCHC  32.9 32.0 - 36.0 g/dL    RDW 13.9 11.5 - 14.5 %    Platelets 475 (H) 150 - 450 x10*3/uL    MPV 9.0 7.5 - 11.5 fL    Neutrophils % 78.8 40.0 - 80.0 %    Immature Granulocytes %, Automated 0.5 0.0 - 0.9 %    Lymphocytes % 11.4 13.0 - 44.0 %    Monocytes % 9.1 2.0 - 10.0 %    Eosinophils % 0.0 0.0 - 6.0 %    Basophils % 0.2 0.0 - 2.0 %    Neutrophils Absolute 11.29 (H) 1.20 - 7.70 x10*3/uL    Immature Granulocytes Absolute, Automated 0.07 0.00 - 0.70 x10*3/uL    Lymphocytes Absolute 1.63 1.20 - 4.80 x10*3/uL    Monocytes Absolute 1.31 (H) 0.10 - 1.00 x10*3/uL    Eosinophils Absolute 0.00 0.00 - 0.70 x10*3/uL    Basophils Absolute 0.03 0.00 - 0.10 x10*3/uL       I have personally reviewed the following imaging results CT head wo IV contrast    \Assessment/Plan     Mr. Servando Leigh is a 58-year-old man with history of COPD (GOLD stage 3 on triple therapy), with significant alcohol use disorder and history of apparent alcohol withdrawal seizures. Epilepsy team is consulted for c/f seizures and possible need to treat. Primary team obtained CT head which showed No acute intracranial hemorrhage or mass effect. Per history obtained from wife, pt appears to be having near monthly acute symptomatic seizures in the setting of alcohol withdrawal as well as likely episodes of delirium.     Impression:   Acute symptomatic generalized seizures in the setting of alcohol withdrawal.     Classification of the Paroxysmal Episodes: Epileptic  Episodes semiology: Aura (lightheadedness)->Generalized convulsions  Frequency: 1x/1-2 months  History of Status Epilepticus: unknown  Localization: Generalized  Etiology: alcohol withdrawal  Co-morbidities: alcoholism    Recommendations:   1) Start keppra 500mg BID (reportedly well-tolerated)  2) Recommend outpatient follow-up with neurology (pt appears to have appt with neurology upcoming 10/18)   3) Recommend active tx with ETOH cessation and thiamine.     Will see with epilepsy team tomorrow  AM    Keith Ovalle DO     Seth Hudson, DO

## 2023-10-06 NOTE — PROGRESS NOTES
"Physical Therapy    Physical Therapy Evaluation    Patient Name: Servando Leigh  MRN: [id number]  Today's Date: 10/6/2023   Time Calculation  Start Time: 1116  Stop Time: 1126  Time Calculation (min): 10 min    Assessment/Plan   PT Assessment  PT Assessment Results: Decreased strength, Decreased endurance, Impaired balance, Decreased mobility  Rehab Prognosis: Excellent  Medical Staff Made Aware: Yes  End of Session Communication: Bedside nurse  End of Session Patient Position: Bed, 3 rail up  IP OR SWING BED PT PLAN  Inpatient or Swing Bed: Inpatient  PT Plan  Treatment/Interventions: Bed mobility, Transfer training, Gait training, Stair training, Balance training, Strengthening, Endurance training, Range of motion, Therapeutic exercise, Therapeutic activity  PT Plan: Skilled PT  PT Frequency: 3 times per week  PT Discharge Recommendations: Low intensity level of continued care  Equipment Recommended upon Discharge:  (pt requesting a FWW)      Subjective   General Visit Information:  General  Reason for Referral: SOB  Past Medical History Relevant to Rehab: COPD (FEV1/FVC of 45% 1/2021), bronchiectasis, epilepsy on depakote, chronic daily alcohol use, hepatitis steatosis, CAD, rectal polyp (positive for neuroendocrine tumor, grade 1), GERD, chronic n/v/diarrhea, 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  Prior to Session Communication: Bedside nurse  Patient Position Received: Bed, 3 rail up  Home Living:  Home Living  Type of Home: House  Lives With: Spouse  Home Adaptive Equipment: Cane (reports it is \"not a good cane\")  Home Layout: One level  Home Access: Level entry, No concerns  Prior Level of Function:  Prior Function Per Pt/Caregiver Report  Level of [place]: Independent with homemaking with ambulation, Independent with ADLs and functional transfers  Receives Help From:  (wife able to assist if needed, does work)  ADL Assistance: Independent  Homemaking Assistance: " Independent  Ambulatory Assistance: Independent (pt reports using cane)  Precautions:  Precautions  Medical Precautions: Fall precautions  Precautions Comment:  (pt reports he has a neck brace at home from past neck surgeries but not with him, he reports his kid was playing with it and dog chewed it and requesting a new one.. instructed to contact )  Vital Signs:  Vital Signs  Heart Rate: 92  SpO2: 94 % (after ambulation)    Objective   Pain:  Pain Assessment  Pain Assessment: 0-10  Pain Score: 7  Pain Type: Chronic pain  Pain Location:  (L foot)  Cognition:  Cognition  Overall Cognitive Status: Within Functional Limits  Orientation Level: Oriented X4    General Assessments:      Sensation  Light Touch:  (pt reports neuropathy in bilateral feet and hands)    Static Sitting Balance  Static Sitting-Level of Assistance: Independent    Static Standing Balance  Static Standing-Level of Assistance: Close supervision  Functional Assessments:  Bed Mobility  Bed Mobility: Yes  Bed Mobility 1  Bed Mobility 1: Supine to sitting, Sitting to supine  Level of Assistance 1: Distant supervision    Transfers  Transfer: Yes  Transfer 1  Transfer From 1: Sit to  Transfer to 1: Stand  Transfer Device 1:  (no AD)  Transfer Level of Assistance 1: Contact guard    Ambulation/Gait Training  Ambulation/Gait Training Performed: Yes  Ambulation/Gait Training 1  Surface 1: Level tile  Device 1: No device  Assistance 1: Contact guard  Quality of Gait 1:  (slightly narrow MAI, decreased malachi)  Comments/Distance (ft) 1: 75'  Extremity/Trunk Assessments:  RLE   RLE : Within Functional Limits  LLE   LLE : Within Functional Limits  Outcome Measures:  [facility] Basic Mobility  Turning from your back to your side while in a flat bed without using bedrails: None  Moving from lying on your back to sitting on the side of a flat bed without using bedrails: None  Moving to and from bed to chair (including a wheelchair): A little  Standing up from a chair  using your arms (e.g. wheelchair or bedside chair): A little  To walk in hospital room: A little  Climbing 3-5 steps with railing: A little  Basic Mobility - Total Score: 20    Encounter Problems       Encounter Problems (Active)       Balance       Tinetti score > 24 to indicate low falls risk  (Progressing)       Start:  10/06/23    Expected End:  10/20/23               Mobility       STG - Patient will ambulate 200' modif indep with LRAD (Progressing)       Start:  10/06/23    Expected End:  10/20/23               Transfers       STG - Patient will perform bed mobility indep (Progressing)       Start:  10/06/23    Expected End:  10/20/23            STG - Patient will transfer sit to and from stand indep with LRAD (Progressing)       Start:  10/06/23    Expected End:  10/20/23                   Education Documentation  Precautions, taught by Heydi Orellana PT at 10/6/2023 11:43 AM.  Learner: Patient  Readiness: Acceptance  Method: Explanation  Response: Verbalizes Understanding  Comment: [facility] pt on safety, role of PT in acute setting, dc planning    Mobility Training, taught by Heydi Orellana PT at 10/6/2023 11:43 AM.  Learner: Patient  Readiness: Acceptance  Method: Explanation  Response: Verbalizes Understanding  Comment: [facility] pt on safety, role of PT in acute setting, dc planning    Education Comments  No comments found.

## 2023-10-06 NOTE — PROGRESS NOTES
Servando Leigh is a 58 y.o. male on day 1 of admission presenting with Aspiration pneumonitis (CMS/HCC).    Subjective   NAEO. Patient reports that his breathing at baseline, now on RA.     Endorses feeling confused this morning, states that he does not remember anything from the day before. On further discussion, notes that he has had ongoing memory problems for the past year and has had multiple falls at home recently.     Denies current fever/chills, nausea/vomiting, or change in cough, however, patient changes answers multiple times with different providers.     Objective     Physical Exam  Constitutional:       General: He is not in acute distress.  HENT:      Head: Normocephalic and atraumatic.      Nose: Nose normal.      Mouth/Throat:      Mouth: Mucous membranes are moist.      Pharynx: No posterior oropharyngeal erythema.   Eyes:      General: No scleral icterus.     Extraocular Movements: Extraocular movements intact.      Conjunctiva/sclera: Conjunctivae normal.   Cardiovascular:      Rate and Rhythm: Normal rate and regular rhythm.      Pulses: Normal pulses.      Heart sounds: Normal heart sounds.   Pulmonary:      Effort: No respiratory distress.      Comments: B/l wheezing appreciated. Interval improvement in airflow and b/l rhonchi compared to yesterday's exam  Abdominal:      General: Abdomen is flat. There is no distension.      Palpations: Abdomen is soft.      Comments: Mild epigastric tenderness   Musculoskeletal:         General: Normal range of motion.      Cervical back: Normal range of motion and neck supple.      Right lower leg: No edema.      Left lower leg: No edema.   Skin:     General: Skin is warm and dry.      Findings: No rash.   Neurological:      Mental Status: He is alert.      Comments: Slightly decreased sensation noted in left foot, reportedly chronic issue associated with prior surgery in July of this year. No focal deficit appreciated otherwise.     Oriented to year and  "self. Cognition and memory appear to be impaired, patient less lucid this morning compared to yesterday evening.    Psychiatric:         Cognition and Memory: Cognition is impaired. Memory is impaired.     Scheduled medications  atorvastatin, 10 mg, oral, Daily  benzoyl peroxide, 1 Application, Topical, Nightly  enoxaparin, 40 mg, subcutaneous, Daily  finasteride, 5 mg, oral, Daily  influenza, 0.5 mL, intramuscular, During hospitalization  fluticasone furoate-vilanteroL, 1 puff, inhalation, Daily  folic acid, 0.4 mg, oral, Daily  gabapentin, 600 mg, oral, TID  hydrOXYzine HCL, 25 mg, oral, Nightly  ipratropium-albuteroL, 3 mL, nebulization, q4h  mirtazapine, 15 mg, oral, Nightly  montelukast, 10 mg, oral, Daily  nicotine, 1 patch, transdermal, Daily  pantoprazole, 40 mg, oral, Daily  pneumococcal conjugate, 0.5 mL, intramuscular, During hospitalization  polyethylene glycol, 17 g, oral, Daily  predniSONE, 40 mg, oral, Daily  rOPINIRole, 0.5 mg, oral, Daily  sodium chloride, 4 mL, nebulization, BID  tamsulosin, 0.4 mg, oral, Daily  thiamine, 100 mg, oral, Daily  tiotropium, 2 puff, inhalation, Daily  triamcinolone, , Topical, Daily         PRN medications  PRN medications: acetaminophen, albuterol, cyclobenzaprine, loperamide, LORazepam **OR** LORazepam **OR** LORazepam    Last Recorded Vitals  Blood pressure 123/88, pulse 93, temperature 37.4 °C (99.3 °F), resp. rate 16, height 1.727 m (5' 8\"), weight 52.8 kg (116 lb 6.5 oz), SpO2 100 %.  Intake/Output last 3 Shifts:  I/O last 3 completed shifts:  In: 150 (2.8 mL/kg) [IV Piggyback:150]  Out: 450 (8.5 mL/kg) [Urine:450 (0.2 mL/kg/hr)]  Weight: 52.8 kg     Relevant Results  Results for orders placed or performed during the hospital encounter of 10/05/23 (from the past 24 hour(s))   CBC and Auto Differential   Result Value Ref Range    WBC 8.8 4.4 - 11.3 x10*3/uL    nRBC 0.0 0.0 - 0.0 /100 WBCs    RBC 4.60 4.50 - 5.90 x10*6/uL    Hemoglobin 13.9 13.5 - 17.5 g/dL    " Hematocrit 42.0 41.0 - 52.0 %    MCV 91 80 - 100 fL    MCH 30.2 26.0 - 34.0 pg    MCHC 33.1 32.0 - 36.0 g/dL    RDW 14.3 11.5 - 14.5 %    Platelets 561 (H) 150 - 450 x10*3/uL    MPV 8.7 7.5 - 11.5 fL    Immature Granulocytes %, Automated 0.6 0.0 - 0.9 %    Immature Granulocytes Absolute, Automated 0.05 0.00 - 0.70 x10*3/uL   Comprehensive metabolic panel   Result Value Ref Range    Glucose 58 (L) 74 - 99 mg/dL    Sodium 138 136 - 145 mmol/L    Potassium 4.6 3.5 - 5.3 mmol/L    Chloride 102 98 - 107 mmol/L    Bicarbonate 23 21 - 32 mmol/L    Anion Gap 18 10 - 20 mmol/L    Urea Nitrogen 10 6 - 23 mg/dL    Creatinine 0.92 0.50 - 1.30 mg/dL    eGFR >90 >60 mL/min/1.73m*2    Calcium 8.7 8.6 - 10.6 mg/dL    Albumin 3.8 3.4 - 5.0 g/dL    Alkaline Phosphatase 108 33 - 120 U/L    Total Protein 7.8 6.4 - 8.2 g/dL    AST 25 9 - 39 U/L    Bilirubin, Total 0.2 0.0 - 1.2 mg/dL    ALT 10 10 - 52 U/L   Coagulation Screen   Result Value Ref Range    Protime 10.7 9.8 - 12.8 seconds    INR 1.0 0.9 - 1.1    aPTT 34 27 - 38 seconds   Ethanol   Result Value Ref Range    Alcohol 180 (H) <=10 mg/dL   Magnesium   Result Value Ref Range    Magnesium 1.86 1.60 - 2.40 mg/dL   Manual Differential   Result Value Ref Range    Neutrophils %, Manual 92.2 40.0 - 80.0 %    Lymphocytes %, Manual 7.8 13.0 - 44.0 %    Monocytes %, Manual 0.0 2.0 - 10.0 %    Eosinophils %, Manual 0.0 0.0 - 6.0 %    Basophils %, Manual 0.0 0.0 - 2.0 %    Seg Neutrophils Absolute, Manual 8.11 (H) 1.20 - 7.00 x10*3/uL    Lymphocytes Absolute, Manual 0.69 (L) 1.20 - 4.80 x10*3/uL    Monocytes Absolute, Manual 0.00 (L) 0.10 - 1.00 x10*3/uL    Eosinophils Absolute, Manual 0.00 0.00 - 0.70 x10*3/uL    Basophils Absolute, Manual 0.00 0.00 - 0.10 x10*3/uL    Total Cells Counted 115     RBC Morphology No significant RBC morphology present    Respiratory Culture/Smear    Specimen: SPUTUM; Fluid   Result Value Ref Range    Respiratory Culture/Smear Culture in progress     Gram Stain        Gram stain indicates specimen consists of lower respiratory tract secretions.    Gram Stain No predominant organism      No new imaging to review        Assessment/Plan   Principal Problem:    Aspiration pneumonitis (CMS/HCC)  Active Problems:    Anal fistula    Confusion with non-focal neuro exam    Recurrent falls    Servando Leigh is a 57 yo M with a PMHx COPD (GOLD stage 3 on triple therapy), current tobacco use, asthma, bronchiectasis, prior Pseudomonas pneumonia, erectile dysfunction, chronic neck pain, GERD, PVD, reported seizure d/o, chronic diarrhea, and recurrent perirectal abscess admitted 10/5 following difficulty weaning from intubation during outpatient procedure. CXR obtained showing new RUL infiltrate concerning for possible aspiration pneumonitis.    Updates 10/6:  -Patient's afebrile and WBC count WNL. Challenging to obtain accurate ROS as patient changes answers with different providers, however, seems to be at respiratory baseline.   -At this point, overall low suspicion for infectious process, instead suspect aspiration pneumonitis in context of recent intubation and heavy alcohol use. Will discontinue Abx at this time, low threshold to resume if new signs of infection  -On RA, remains wheezy on exam. Will order pulmonary toilet and RT consult and continue scheduled Duonebs  -PT/OT consulted given reported history of recent falls  -Regarding patient's memory complaints, concern for possible Wernicke encephalopathy in context of heavy alcohol use. Blood alcohol levels 180 post-op yesterday, suggesting patient likely consumed alcohol morning of procedure  -Will obtain CT head non-con, urine tox, and encephalopathy labs   -Unable to verify patient's reported anti-epileptic use. Most recent outpatient notes do not include any AEDs.  Attempted to call pharmacy that patient and wife named, but patient had only ever filled ibuprofen and oxycodone at that location. Wife unsure of patient's med  list.  -Given questionable seizure history and memory problems, will likely consult neurology pending CT head results      #New RUL infiltrate, likely 2/2 aspiration pneumonitis  #History of prior Pseudomonas pneumonia (1/2021)  #COPD GOLD stage III, possible exacerbation in setting of pneumonitis  #Asthma  :: Sputum culture history: 1/29/21 - candida parapsilosis, AFB negative x 3, + pseudomonas; 4/16/21 - AFB negative x 3, fungal culture negative, respiratory culture negative  -WBC 8.8, patient afebrile, appears to be at baseline in terms of respiratory status  -Overall low suspicion for infectious process, will discontinue Abx at this time and continue to monitor  -Continue home triple therapy, montelukast  -Incentive spirometry, aggressive pulmonary toilette, RT consult ordered  -Continue 5 day course prednisone (10/5-10/9)    #Altered mental status, concern for possible Wernicke encephalopathy  #Memory impairment  #Acute alcohol intoxication  #Heavy alcohol use  #Recurrent falls  -Blood alcohol level 180 post-op 10/5  -Concern for possible Wernicke encephalopathy vs subdural hemorrhage in context of recurrent falls and memory issues  -Will obtain CT head non-con  -Continue CIWA protocol, fall precautions  -B12, folate, RPR, TSH/T4, and vitamin D levels  -Urine tox  -Will consult neuro pending CT head results    #Reported seizure history  -Patient notes prior history of seizure and states that he has been on AEDs; however, review of most recent outpatient notes shows no AEDs listed on the patient's home medication list  -Mixed history regarding patient's recent seizure activity-- wife reports possible brief episode about a month ago, which the patient did not have worked up  -Review of outside recs from [facility] shows that patient had EEG in 2019 for similar complaints, with no evidence of epileptic activity at that time  -Unable to verify meds with pharmacy listed by both patient and wife-- patient had  only ever filled ibuprofen and oxycodone at that location  -Patient has recently transitioned care from [facility] to . Appears to have new patient outpatient with  neurology on 10/18  -Will consult neuro for further recs     #GERD  -Continue home pantoprazole     #Tobacco use  -Nicotine patches ordered     #Chronic diarrhea  -Continue home loperamide     #Recurrent perirectal abscess   -Tylenol PRN for pain     F: PRN  E: PRN  N: regular  GI: home pantoprazole  DVT ppx: pLov     Abx: levofloxacin  Lines/access: pIV     Dispo: pending PT/OT recs, safe home-going plan     Code status: FULL (confirmed on admission)  NOK: Wife Lisha ([phone])           Tanisha Woo MD

## 2023-10-06 NOTE — PROGRESS NOTES
Pharmacy Medication History Review    Servando Leigh is a 58 y.o. male admitted for Aspiration pneumonitis (CMS/formerly [facility]). Pharmacy reviewed the patient's [id number] medications and allergies for accuracy.    The list below reflectives the updated PTA list. Please review each medication in order reconciliation for additional clarification and justification.  Medications Prior to Admission   Medication Sig Dispense Refill Last Dose    albuterol 2.5 mg /3 mL (0.083 %) nebulizer solution Take by nebulization 4 times a day.   10/4/2023    albuterol 90 mcg/actuation inhaler Inhale 2 puffs every 6 hours if needed.   10/4/2023    atorvastatin (Lipitor) 10 mg tablet Take 1 tablet (10 mg) by mouth once daily.   10/5/2023    benzoyl peroxide (Benzac AC) 10 % external wash Apply topically once daily.   10/4/2023    clindamycin (Cleocin T) 1 % lotion Apply topically 2 times a day.   10/4/2023    cyclobenzaprine (Flexeril) 10 mg tablet Take 1 tablet (10 mg) by mouth 2 times a day as needed (pain).   10/4/2023    diclofenac sodium (Voltaren) 1 % gel gel Apply topically 4 times a day.   Past Week    divalproex (Depakote) 250 mg EC tablet Take 1 tablet (250 mg) by mouth 2 times a day.   Past Week    docusate sodium (Colace) 100 mg capsule Take 1 capsule (100 mg) by mouth.   10/4/2023    ferrous sulfate 325 (65 Fe) MG tablet Take 1 tablet (65 mg of iron) by mouth.   10/4/2023    fluticasone propion-salmeteroL (Advair Diskus) 250-50 mcg/dose diskus inhaler Inhale 1 puff every 12 hours.   10/5/2023    food supplemt, lactose-reduced (Ensure Original) 0.04-1.05 gram-kcal/mL liquid Take by mouth once daily. 2-3 cans   10/4/2023    gabapentin (Neurontin) 600 mg tablet Take 1 tablet (600 mg) by mouth 3 times a day.   10/4/2023    levETIRAcetam (Keppra) 1,000 mg tablet Take 1 tablet (1,000 mg) by mouth.   10/4/2023 at 2000    loperamide (Imodium) 2 mg capsule Take 1 capsule (2 mg) by mouth 4 times a day as needed.   10/4/2023     mirtazapine (Remeron) 15 mg tablet Take 1 tablet (15 mg) by mouth once daily at bedtime.   10/4/2023    montelukast (Singulair) 10 mg tablet Take 1 tablet (10 mg) by mouth once daily.   10/4/2023    Mucinex D  mg 12 hr tablet Take by mouth.   10/5/2023    multivitamin with minerals (Thera-M) tablet Take by mouth.   10/4/2023    pantoprazole (ProtoNix) 40 mg EC tablet Take 1 tablet (40 mg) by mouth once daily.   10/4/2023    rOPINIRole (Requip) 0.5 mg tablet Take 1 tablet (0.5 mg) by mouth once daily.   10/5/2023    tamsulosin (Flomax) 0.4 mg 24 hr capsule Take 1 capsule (0.4 mg) by mouth once daily.   10/4/2023    triamcinolone (Kenalog) 0.1 % cream Apply topically once daily. Apply to affected area once daily   10/4/2023    umeclidinium (Incruse Ellipta) 62.5 mcg/actuation inhalation Inhale 1 puff (62.5 mcg) once daily.   10/5/2023    [DISCONTINUED] amoxicillin-pot clavulanate (Augmentin) 875-125 mg tablet Take 1 tablet (875 mg) by mouth 2 times a day.   10/4/2023    ascorbic acid (Vitamin C) 500 mg tablet Take 1 tablet (500 mg) by mouth.   Not Taking    EPINEPHrine 0.3 mg/0.3 mL injection syringe Inject 0.3 mL (0.3 mg) into the shoulder, thigh, or buttocks. As Directed   Unknown    sodium chloride 3 % nebulizer solution Take 4 mL by nebulization 2 times a day.       topiramate (Topamax) 25 mg tablet Take 2 tablets (50 mg) by mouth once daily.   Not Taking        The list below reflectives the updated allergy list. Please review each documented allergy for additional clarification and justification.  Allergies  Reviewed by Demetrice Lofton RN on 10/5/2023        Severity Reactions Comments    Shellfish Containing Products High Anaphylaxis, Unknown     Coconut Not Specified Swelling     Penicillin G Not Specified Swelling     Shellfish Derived Not Specified Unknown             Below are additional concerns with the patient's PTA list.  Review of out patient fill history, OARRS, and patient interview. Patient is a  modest historian, had a list of prescription medications at bedside including name and strength-frequency obtained from out patient fill history  Patient reports no recent use of antiepileptic drugs, had care at [facility] before they closed    Olaf Balderrama, YaaD

## 2023-10-07 VITALS
SYSTOLIC BLOOD PRESSURE: 117 MMHG | RESPIRATION RATE: 17 BRPM | OXYGEN SATURATION: 100 % | HEART RATE: 98 BPM | HEIGHT: 68 IN | BODY MASS INDEX: 17.64 KG/M2 | TEMPERATURE: 99 F | WEIGHT: 116.4 LBS | DIASTOLIC BLOOD PRESSURE: 76 MMHG

## 2023-10-07 LAB
ALBUMIN SERPL BCP-MCNC: 3.5 G/DL (ref 3.4–5)
ANION GAP SERPL CALC-SCNC: 14 MMOL/L (ref 10–20)
BACTERIA SPEC RESP CULT: NORMAL
BASOPHILS # BLD AUTO: 0.04 X10*3/UL (ref 0–0.1)
BASOPHILS NFR BLD AUTO: 0.4 %
BUN SERPL-MCNC: 7 MG/DL (ref 6–23)
CALCIUM SERPL-MCNC: 9 MG/DL (ref 8.6–10.6)
CHLORIDE SERPL-SCNC: 105 MMOL/L (ref 98–107)
CO2 SERPL-SCNC: 25 MMOL/L (ref 21–32)
CREAT SERPL-MCNC: 0.89 MG/DL (ref 0.5–1.3)
EOSINOPHIL # BLD AUTO: 0.01 X10*3/UL (ref 0–0.7)
EOSINOPHIL NFR BLD AUTO: 0.1 %
ERYTHROCYTE [DISTWIDTH] IN BLOOD BY AUTOMATED COUNT: 14 % (ref 11.5–14.5)
GFR SERPL CREATININE-BSD FRML MDRD: >90 ML/MIN/1.73M*2
GLUCOSE SERPL-MCNC: 101 MG/DL (ref 74–99)
GRAM STN SPEC: NORMAL
GRAM STN SPEC: NORMAL
HCT VFR BLD AUTO: 37.3 % (ref 41–52)
HGB BLD-MCNC: 12.1 G/DL (ref 13.5–17.5)
IMM GRANULOCYTES # BLD AUTO: 0.04 X10*3/UL (ref 0–0.7)
IMM GRANULOCYTES NFR BLD AUTO: 0.4 % (ref 0–0.9)
LYMPHOCYTES # BLD AUTO: 2.66 X10*3/UL (ref 1.2–4.8)
LYMPHOCYTES NFR BLD AUTO: 24.3 %
MAGNESIUM SERPL-MCNC: 1.71 MG/DL (ref 1.6–2.4)
MCH RBC QN AUTO: 29.9 PG (ref 26–34)
MCHC RBC AUTO-ENTMCNC: 32.4 G/DL (ref 32–36)
MCV RBC AUTO: 92 FL (ref 80–100)
MONOCYTES # BLD AUTO: 0.76 X10*3/UL (ref 0.1–1)
MONOCYTES NFR BLD AUTO: 6.9 %
NEUTROPHILS # BLD AUTO: 7.45 X10*3/UL (ref 1.2–7.7)
NEUTROPHILS NFR BLD AUTO: 67.9 %
NRBC BLD-RTO: 0 /100 WBCS (ref 0–0)
PHOSPHATE SERPL-MCNC: 3 MG/DL (ref 2.5–4.9)
PLATELET # BLD AUTO: 383 X10*3/UL (ref 150–450)
PMV BLD AUTO: 9.1 FL (ref 7.5–11.5)
POTASSIUM SERPL-SCNC: 3.3 MMOL/L (ref 3.5–5.3)
RBC # BLD AUTO: 4.05 X10*6/UL (ref 4.5–5.9)
SODIUM SERPL-SCNC: 141 MMOL/L (ref 136–145)
WBC # BLD AUTO: 11 X10*3/UL (ref 4.4–11.3)

## 2023-10-07 PROCEDURE — 83735 ASSAY OF MAGNESIUM: CPT

## 2023-10-07 PROCEDURE — 2500000001 HC RX 250 WO HCPCS SELF ADMINISTERED DRUGS (ALT 637 FOR MEDICARE OP)

## 2023-10-07 PROCEDURE — 94640 AIRWAY INHALATION TREATMENT: CPT

## 2023-10-07 PROCEDURE — 85025 COMPLETE CBC W/AUTO DIFF WBC: CPT

## 2023-10-07 PROCEDURE — 36415 COLL VENOUS BLD VENIPUNCTURE: CPT

## 2023-10-07 PROCEDURE — 2500000004 HC RX 250 GENERAL PHARMACY W/ HCPCS (ALT 636 FOR OP/ED)

## 2023-10-07 PROCEDURE — 80069 RENAL FUNCTION PANEL: CPT

## 2023-10-07 PROCEDURE — S4991 NICOTINE PATCH NONLEGEND: HCPCS

## 2023-10-07 PROCEDURE — 2500000005 HC RX 250 GENERAL PHARMACY W/O HCPCS

## 2023-10-07 PROCEDURE — 2500000002 HC RX 250 W HCPCS SELF ADMINISTERED DRUGS (ALT 637 FOR MEDICARE OP, ALT 636 FOR OP/ED)

## 2023-10-07 RX ORDER — IBUPROFEN 200 MG
1 TABLET ORAL DAILY
Qty: 30 PATCH | Refills: 0 | Status: SHIPPED | OUTPATIENT
Start: 2023-10-08 | End: 2024-02-27 | Stop reason: SDUPTHER

## 2023-10-07 RX ORDER — CYCLOBENZAPRINE HCL 10 MG
10 TABLET ORAL 2 TIMES DAILY PRN
Qty: 60 TABLET | Refills: 0 | Status: SHIPPED | OUTPATIENT
Start: 2023-10-07 | End: 2024-02-06 | Stop reason: WASHOUT

## 2023-10-07 RX ORDER — GABAPENTIN 600 MG/1
600 TABLET ORAL 3 TIMES DAILY
Qty: 90 TABLET | Refills: 0 | Status: SHIPPED | OUTPATIENT
Start: 2023-10-07 | End: 2024-01-22

## 2023-10-07 RX ORDER — SODIUM CHLORIDE FOR INHALATION 3 %
4 VIAL, NEBULIZER (ML) INHALATION 2 TIMES DAILY
Qty: 240 ML | Refills: 0 | Status: SHIPPED | OUTPATIENT
Start: 2023-10-07 | End: 2023-11-06

## 2023-10-07 RX ORDER — UMECLIDINIUM 62.5 UG/1
1 AEROSOL, POWDER ORAL DAILY
Qty: 7 EACH | Refills: 0 | Status: SHIPPED | OUTPATIENT
Start: 2023-10-07 | End: 2023-10-23 | Stop reason: ALTCHOICE

## 2023-10-07 RX ORDER — ASCORBIC ACID 500 MG
500 TABLET ORAL DAILY
Qty: 30 TABLET | Refills: 0 | Status: SHIPPED | OUTPATIENT
Start: 2023-10-07 | End: 2023-11-06

## 2023-10-07 RX ORDER — ATORVASTATIN CALCIUM 10 MG/1
20 TABLET, FILM COATED ORAL DAILY
Qty: 60 TABLET | Refills: 0 | Status: SHIPPED | OUTPATIENT
Start: 2023-10-07 | End: 2024-01-22

## 2023-10-07 RX ORDER — ROPINIROLE 0.5 MG/1
0.5 TABLET, FILM COATED ORAL DAILY
Qty: 30 TABLET | Refills: 0 | Status: SHIPPED | OUTPATIENT
Start: 2023-10-07 | End: 2024-01-22

## 2023-10-07 RX ORDER — FEEDER CONTAINER WITH PUMP SET
3 EACH MISCELLANEOUS DAILY
Qty: 21330 ML | Refills: 0 | Status: SHIPPED | OUTPATIENT
Start: 2023-10-07 | End: 2023-11-06 | Stop reason: SDUPTHER

## 2023-10-07 RX ORDER — BENZOYL PEROXIDE 100 MG/ML
LIQUID TOPICAL
Qty: 142 G | Refills: 0 | Status: SHIPPED | OUTPATIENT
Start: 2023-10-07 | End: 2023-11-06

## 2023-10-07 RX ORDER — CLINDAMYCIN HYDROCHLORIDE 300 MG/1
300 CAPSULE ORAL 3 TIMES DAILY
Status: DISCONTINUED | OUTPATIENT
Start: 2023-10-07 | End: 2023-10-07 | Stop reason: HOSPADM

## 2023-10-07 RX ORDER — LOPERAMIDE HYDROCHLORIDE 2 MG/1
2 CAPSULE ORAL 4 TIMES DAILY PRN
Qty: 30 CAPSULE | Refills: 0 | Status: SHIPPED | OUTPATIENT
Start: 2023-10-07 | End: 2024-02-27 | Stop reason: SDUPTHER

## 2023-10-07 RX ORDER — PSEUDOEPHEDRINE HCL 30 MG
60 TABLET ORAL 2 TIMES DAILY
Status: DISCONTINUED | OUTPATIENT
Start: 2023-10-07 | End: 2023-10-07 | Stop reason: HOSPADM

## 2023-10-07 RX ORDER — ACETAMINOPHEN 325 MG/1
650 TABLET ORAL EVERY 6 HOURS PRN
Qty: 30 TABLET | Refills: 0 | Status: SHIPPED | OUTPATIENT
Start: 2023-10-07 | End: 2023-11-06

## 2023-10-07 RX ORDER — CHOLECALCIFEROL (VITAMIN D3) 25 MCG
25 TABLET ORAL DAILY
Qty: 30 TABLET | Refills: 0 | Status: SHIPPED | OUTPATIENT
Start: 2023-10-08 | End: 2023-11-07

## 2023-10-07 RX ORDER — LANOLIN ALCOHOL/MO/W.PET/CERES
100 CREAM (GRAM) TOPICAL DAILY
Qty: 30 TABLET | Refills: 0 | Status: SHIPPED | OUTPATIENT
Start: 2023-10-08 | End: 2023-11-07

## 2023-10-07 RX ORDER — CLINDAMYCIN HYDROCHLORIDE 300 MG/1
300 CAPSULE ORAL 3 TIMES DAILY
Qty: 21 CAPSULE | Refills: 0 | Status: SHIPPED | OUTPATIENT
Start: 2023-10-07 | End: 2023-10-18 | Stop reason: ALTCHOICE

## 2023-10-07 RX ORDER — MONTELUKAST SODIUM 10 MG/1
10 TABLET ORAL DAILY
Qty: 30 TABLET | Refills: 0 | Status: SHIPPED | OUTPATIENT
Start: 2023-10-07 | End: 2023-10-23 | Stop reason: SDUPTHER

## 2023-10-07 RX ORDER — ASPIRIN 81 MG
1 TABLET, DELAYED RELEASE (ENTERIC COATED) ORAL DAILY
Qty: 30 TABLET | Refills: 0 | Status: SHIPPED | OUTPATIENT
Start: 2023-10-07 | End: 2023-11-06

## 2023-10-07 RX ORDER — ALBUTEROL SULFATE 0.83 MG/ML
2.5 SOLUTION RESPIRATORY (INHALATION) EVERY 4 HOURS PRN
Qty: 180 ML | Refills: 0 | Status: SHIPPED | OUTPATIENT
Start: 2023-10-07 | End: 2023-10-23 | Stop reason: SDUPTHER

## 2023-10-07 RX ORDER — PREDNISONE 20 MG/1
40 TABLET ORAL DAILY
Qty: 4 TABLET | Refills: 0 | Status: SHIPPED | OUTPATIENT
Start: 2023-10-08 | End: 2023-10-10

## 2023-10-07 RX ORDER — PANTOPRAZOLE SODIUM 40 MG/1
40 TABLET, DELAYED RELEASE ORAL DAILY
Qty: 30 TABLET | Refills: 0 | Status: SHIPPED | OUTPATIENT
Start: 2023-10-07 | End: 2024-02-06 | Stop reason: WASHOUT

## 2023-10-07 RX ORDER — GUAIFENESIN 600 MG/1
600 TABLET, EXTENDED RELEASE ORAL 2 TIMES DAILY
Status: DISCONTINUED | OUTPATIENT
Start: 2023-10-07 | End: 2023-10-07 | Stop reason: HOSPADM

## 2023-10-07 RX ORDER — FLUTICASONE FUROATE AND VILANTEROL 100; 25 UG/1; UG/1
1 POWDER RESPIRATORY (INHALATION)
Qty: 28 EACH | Refills: 0 | Status: SHIPPED | OUTPATIENT
Start: 2023-10-08 | End: 2023-10-23 | Stop reason: ALTCHOICE

## 2023-10-07 RX ORDER — TAMSULOSIN HYDROCHLORIDE 0.4 MG/1
0.4 CAPSULE ORAL DAILY
Qty: 30 CAPSULE | Refills: 0 | Status: SHIPPED | OUTPATIENT
Start: 2023-10-07 | End: 2023-11-06

## 2023-10-07 RX ORDER — FOLIC ACID 0.4 MG
0.4 TABLET ORAL DAILY
Qty: 30 TABLET | Refills: 0 | Status: SHIPPED | OUTPATIENT
Start: 2023-10-08 | End: 2023-11-07

## 2023-10-07 RX ORDER — DICLOFENAC SODIUM 10 MG/G
4 GEL TOPICAL 4 TIMES DAILY PRN
Qty: 150 G | Refills: 0 | Status: SHIPPED | OUTPATIENT
Start: 2023-10-07 | End: 2024-02-27 | Stop reason: SDUPTHER

## 2023-10-07 RX ORDER — LEVETIRACETAM 500 MG/1
500 TABLET ORAL 2 TIMES DAILY
Qty: 60 TABLET | Refills: 0 | Status: SHIPPED | OUTPATIENT
Start: 2023-10-07 | End: 2024-01-22

## 2023-10-07 RX ADMIN — IPRATROPIUM BROMIDE AND ALBUTEROL SULFATE 3 ML: .5; 3 SOLUTION RESPIRATORY (INHALATION) at 00:26

## 2023-10-07 RX ADMIN — GUAIFENESIN 600 MG: 600 TABLET ORAL at 08:23

## 2023-10-07 RX ADMIN — IPRATROPIUM BROMIDE AND ALBUTEROL SULFATE 3 ML: .5; 3 SOLUTION RESPIRATORY (INHALATION) at 00:27

## 2023-10-07 RX ADMIN — FINASTERIDE 5 MG: 5 TABLET, FILM COATED ORAL at 08:24

## 2023-10-07 RX ADMIN — IPRATROPIUM BROMIDE AND ALBUTEROL SULFATE 3 ML: .5; 3 SOLUTION RESPIRATORY (INHALATION) at 15:25

## 2023-10-07 RX ADMIN — DICLOFENAC SODIUM TOPICAL GEL, 1% 1 APPLICATION: 10 GEL TOPICAL at 08:23

## 2023-10-07 RX ADMIN — TRIAMCINOLONE ACETONIDE 1 APPLICATION: 1 CREAM TOPICAL at 08:27

## 2023-10-07 RX ADMIN — Medication 1 PATCH: at 08:23

## 2023-10-07 RX ADMIN — POLYETHYLENE GLYCOL 3350 17 G: 17 POWDER, FOR SOLUTION ORAL at 08:23

## 2023-10-07 RX ADMIN — Medication 1 TABLET: at 08:24

## 2023-10-07 RX ADMIN — FLUTICASONE FUROATE AND VILANTEROL TRIFENATATE 1 PUFF: 100; 25 POWDER RESPIRATORY (INHALATION) at 07:48

## 2023-10-07 RX ADMIN — ATORVASTATIN CALCIUM 10 MG: 10 TABLET, FILM COATED ORAL at 08:26

## 2023-10-07 RX ADMIN — Medication 25 MCG: at 08:23

## 2023-10-07 RX ADMIN — THIAMINE HCL TAB 100 MG 100 MG: 100 TAB at 08:24

## 2023-10-07 RX ADMIN — CLINDAMYCIN HYDROCHLORIDE 300 MG: 300 CAPSULE ORAL at 15:59

## 2023-10-07 RX ADMIN — SODIUM CHLORIDE 30 MG/ML INHALATION SOLUTION 15 ML: 30 SOLUTION INHALANT at 07:48

## 2023-10-07 RX ADMIN — FOLIC ACID TAB 400 MCG 0.4 MG: 400 TAB at 08:25

## 2023-10-07 RX ADMIN — GABAPENTIN 600 MG: 300 CAPSULE ORAL at 08:23

## 2023-10-07 RX ADMIN — PREDNISONE 40 MG: 20 TABLET ORAL at 08:24

## 2023-10-07 RX ADMIN — ACETAMINOPHEN 650 MG: 325 TABLET, FILM COATED ORAL at 10:20

## 2023-10-07 RX ADMIN — MONTELUKAST 10 MG: 10 TABLET, FILM COATED ORAL at 08:24

## 2023-10-07 RX ADMIN — DICLOFENAC SODIUM TOPICAL GEL, 1% 1 APPLICATION: 10 GEL TOPICAL at 14:30

## 2023-10-07 RX ADMIN — LEVETIRACETAM 500 MG: 500 TABLET, FILM COATED ORAL at 08:24

## 2023-10-07 RX ADMIN — TAMSULOSIN HYDROCHLORIDE 0.4 MG: 0.4 CAPSULE ORAL at 08:24

## 2023-10-07 RX ADMIN — TIOTROPIUM BROMIDE INHALATION SPRAY 2 PUFF: 3.12 SPRAY, METERED RESPIRATORY (INHALATION) at 07:45

## 2023-10-07 RX ADMIN — DICLOFENAC SODIUM TOPICAL GEL, 1% 1 APPLICATION: 10 GEL TOPICAL at 16:00

## 2023-10-07 RX ADMIN — IPRATROPIUM BROMIDE AND ALBUTEROL SULFATE 3 ML: .5; 3 SOLUTION RESPIRATORY (INHALATION) at 07:45

## 2023-10-07 RX ADMIN — CYCLOBENZAPRINE 10 MG: 10 TABLET, FILM COATED ORAL at 14:31

## 2023-10-07 RX ADMIN — IPRATROPIUM BROMIDE AND ALBUTEROL SULFATE 3 ML: .5; 3 SOLUTION RESPIRATORY (INHALATION) at 12:15

## 2023-10-07 RX ADMIN — GABAPENTIN 600 MG: 300 CAPSULE ORAL at 14:30

## 2023-10-07 RX ADMIN — ROPINIROLE 0.5 MG: 0.5 TABLET, FILM COATED ORAL at 08:26

## 2023-10-07 RX ADMIN — PANTOPRAZOLE SODIUM 40 MG: 40 TABLET, DELAYED RELEASE ORAL at 08:24

## 2023-10-07 ASSESSMENT — LIFESTYLE VARIABLES
NAUSEA AND VOMITING: NO NAUSEA AND NO VOMITING
NAUSEA AND VOMITING: NO NAUSEA AND NO VOMITING
PAROXYSMAL SWEATS: NO SWEAT VISIBLE
HEADACHE, FULLNESS IN HEAD: NOT PRESENT
TREMOR: NO TREMOR
AUDITORY DISTURBANCES: NOT PRESENT
AGITATION: NORMAL ACTIVITY
ORIENTATION AND CLOUDING OF SENSORIUM: ORIENTED AND CAN DO SERIAL ADDITIONS
HEADACHE, FULLNESS IN HEAD: NOT PRESENT
ANXIETY: NO ANXIETY, AT EASE
VISUAL DISTURBANCES: NOT PRESENT
PAROXYSMAL SWEATS: NO SWEAT VISIBLE
VISUAL DISTURBANCES: NOT PRESENT
TREMOR: NO TREMOR
AGITATION: NORMAL ACTIVITY
ORIENTATION AND CLOUDING OF SENSORIUM: ORIENTED AND CAN DO SERIAL ADDITIONS
ANXIETY: NO ANXIETY, AT EASE
TOTAL SCORE: 0
ANXIETY: NO ANXIETY, AT EASE
ORIENTATION AND CLOUDING OF SENSORIUM: ORIENTED AND CAN DO SERIAL ADDITIONS
AUDITORY DISTURBANCES: NOT PRESENT
VISUAL DISTURBANCES: NOT PRESENT
TOTAL SCORE: 0
AUDITORY DISTURBANCES: NOT PRESENT
VISUAL DISTURBANCES: NOT PRESENT
TREMOR: NO TREMOR
NAUSEA AND VOMITING: NO NAUSEA AND NO VOMITING
TOTAL SCORE: 0
NAUSEA AND VOMITING: NO NAUSEA AND NO VOMITING
HEADACHE, FULLNESS IN HEAD: NOT PRESENT
ORIENTATION AND CLOUDING OF SENSORIUM: ORIENTED AND CAN DO SERIAL ADDITIONS
ANXIETY: NO ANXIETY, AT EASE
TREMOR: NO TREMOR
TOTAL SCORE: 0
AGITATION: NORMAL ACTIVITY
PAROXYSMAL SWEATS: NO SWEAT VISIBLE
AUDITORY DISTURBANCES: NOT PRESENT
AGITATION: NORMAL ACTIVITY
HEADACHE, FULLNESS IN HEAD: NOT PRESENT
PAROXYSMAL SWEATS: NO SWEAT VISIBLE

## 2023-10-07 NOTE — DISCHARGE SUMMARY
Discharge Diagnosis  Aspiration pneumonitis (CMS/HCC)    Issues Requiring Follow-Up  - Establish primary care with  (previously was at [facility])  - Concerns for erectile dysfunction, encouraged to discuss outpatient  - Follow up with neurology for memory decline, started on folic acid and thiamine. Also with history of seizures, discharged on Keppra.   - Patient endorses 30 pound wt loss, no clear malignancy, has markedly decreased appetite. Discharged on ensures     Test Results Pending At Discharge  Pending Labs       Order Current Status    Magnesium Collected (10/06/23 0200)    Renal function panel Collected (10/06/23 0200)    Valproic acid level, total Collected (10/06/23 0200)    Extra Tubes In process    SST TOP In process            Hospital Course  Servando Leigh is a 58 y.o. year-old male who presented to [facility] on [facility]/5/[facility] for elective exam under anesthesia  with Dr. Brooks. Patient tolerated procedure appropriately, but had difficulty being weaned to extubate following procedure. Patient was successfully extubated in OR and transferred to PACU for treatment. Internal medicine service was subsequently consulted for evaluation and management of his respiratory status. Postoperative course was complicated by respiratory distress, requiring prolonged intubation and supplemental O2. He was admitted to general internal medicine. By 10/7 his respiratory status had improved to his baseline. He was discharged home with treatment for possible aspiration pneumonia with clindamycin (due to penicillin allergy) and short course of steroids. On 10/7, patient was found to be tolerating baseline of poor PO, ambulating near baseline with adequate pain control, voiding spontaneously, and with appropriate bowel function. Patient was subsequently deemed appropriate for discharge to home. His mental status gradually improved, was found to have low Vitamin D, TSH of 0.27 with free T4 of 0.73, + for alcohol, cannabis  on drug screen. CT head was normal.     Pertinent Physical Exam At Time of Discharge  Physical Exam  General: awake, alert, sitting upright in bed  CV: RRR, no murmurs  Lungs: diffuse coarse breath sounds bilaterally  Abd: Soft, nontender, nondistended  Ext: warm, well perfused  Neuro: answering questions appropriately, keeps track of appointments and medications in notebook at bedside. Patient has concerns about his own memory    Home Medications     Medication List      START taking these medications     acetaminophen 325 mg tablet; Commonly known as: Tylenol; Take 2 tablets   (650 mg) by mouth every 6 hours if needed for mild pain (1 - 3).   cholecalciferol 25 MCG (1000 UT) tablet; Commonly known as: Vitamin D-3;   Take 1 tablet (25 mcg) by mouth once daily. Do not start before October 8, 2023.; Start taking on: October 8, 2023   clindamycin 300 mg capsule; Commonly known as: Cleocin; Take 1 capsule   (300 mg) by mouth 3 times a day for 21 doses.   fluticasone furoate-vilanteroL 100-25 mcg/dose inhaler; Commonly known   as: Breo Ellipta; Inhale 1 puff once daily. Do not start before October 8, 2023.; Start taking on: October 8, 2023; Replaces: fluticasone   propion-salmeteroL 500-50 mcg/dose diskus inhaler   folic acid 400 mcg tablet; Commonly known as: Folvite; Take 1 tablet   (0.4 mg) by mouth once daily. Do not start before October 8, 2023.; Start   taking on: October 8, 2023   nicotine 14 mg/24 hr patch; Commonly known as: Nicoderm CQ; Place 1   patch over 24 hours on the skin once daily. Do not start before October 8, 2023.; Start taking on: October 8, 2023   predniSONE 20 mg tablet; Commonly known as: Deltasone; Take 2 tablets   (40 mg) by mouth once daily for 2 doses. Do not start before October 8, 2023.; Start taking on: October 8, 2023   thiamine 100 mg tablet; Commonly known as: Vitamin B-1; Take 1 tablet   (100 mg) by mouth once daily. Do not start before October 8, 2023.; Start   taking on:  October 8, 2023     CHANGE how you take these medications     albuterol 2.5 mg /3 mL (0.083 %) nebulizer solution; Take 3 mL (2.5 mg)   by nebulization every 4 hours if needed for wheezing.; What changed: how   much to take, when to take this, reasons to take this, Another medication   with the same name was removed. Continue taking this medication, and   follow the directions you see here.   ascorbic acid 500 mg tablet; Commonly known as: Vitamin C; Take 1 tablet   (500 mg) by mouth once daily.; What changed: when to take this   atorvastatin 10 mg tablet; Commonly known as: Lipitor; Take 2 tablets   (20 mg) by mouth once daily.; What changed: how much to take   diclofenac sodium 1 % gel gel; Commonly known as: Voltaren; Apply 1   Application topically 4 times a day as needed (joint pain).; What changed:   how much to take, when to take this, reasons to take this   Ensure Original 0.04-1.05 gram-kcal/mL liquid; Generic drug: food   supplemt, lactose-reduced; Take 3 bottles by mouth once daily. 2-3 cans;   What changed: how much to take   levETIRAcetam 500 mg tablet; Commonly known as: Keppra; Take 1 tablet   (500 mg) by mouth 2 times a day.; What changed: medication strength, how   much to take, when to take this   loperamide 2 mg capsule; Commonly known as: Imodium; Take 1 capsule (2   mg) by mouth 4 times a day as needed for diarrhea.; What changed: reasons   to take this   Thera-M tablet; Generic drug: multivitamin with minerals; Take 1 tablet   by mouth once daily.; What changed: how much to take, when to take this     CONTINUE taking these medications     benzoyl peroxide 10 % external wash; Commonly known as: Benzac AC; Apply   topically once daily.   cyclobenzaprine 10 mg tablet; Commonly known as: Flexeril; Take 1 tablet   (10 mg) by mouth 2 times a day as needed (pain).   EPINEPHrine 0.3 mg/0.3 mL injection syringe; Commonly known as: Epipen   gabapentin 600 mg tablet; Commonly known as: Neurontin; Take 1  tablet   (600 mg) by mouth 3 times a day.   Incruse Ellipta 62.5 mcg/actuation inhalation; Generic drug:   umeclidinium; Inhale 1 puff (62.5 mcg) once daily.   montelukast 10 mg tablet; Commonly known as: Singulair; Take 1 tablet   (10 mg) by mouth once daily.   pantoprazole 40 mg EC tablet; Commonly known as: ProtoNix; Take 1 tablet   (40 mg) by mouth once daily.   rOPINIRole 0.5 mg tablet; Commonly known as: Requip; Take 1 tablet (0.5   mg) by mouth once daily.   sodium chloride 3 % nebulizer solution; Take 4 mL by nebulization 2   times a day.   tamsulosin 0.4 mg 24 hr capsule; Commonly known as: Flomax; Take 1   capsule (0.4 mg) by mouth once daily.     STOP taking these medications     amoxicillin-pot clavulanate 875-125 mg tablet; Commonly known as:   Augmentin   clindamycin 1 % lotion; Commonly known as: Cleocin T   Colace 100 mg capsule; Generic drug: docusate sodium   divalproex 250 mg EC tablet; Commonly known as: Depakote   ferrous sulfate 325 (65 Fe) MG tablet   fluticasone propion-salmeteroL 250-50 mcg/dose diskus inhaler; Commonly   known as: Advair Diskus   fluticasone propion-salmeteroL 500-50 mcg/dose diskus inhaler; Commonly   known as: Advair Diskus; Replaced by: fluticasone furoate-vilanteroL   100-25 mcg/dose inhaler   mirtazapine 15 mg tablet; Commonly known as: Remeron   Mucinex D  mg 12 hr tablet; Generic drug:   pseudoephedrine-guaifenesin   topiramate 25 mg tablet; Commonly known as: Topamax   triamcinolone 0.1 % cream; Commonly known as: Kenalog       Outpatient Follow-Up  Future Appointments   Date Time Provider Department [place]   10/18/2023  8:00 AM Federico Rosas MD [id number] Academic   10/18/2023 10:20 AM Dayton Wells MD [id number] Academic   10/23/2023  3:40 PM Adenike Upton APRN-CNP [id number] Academic   10/27/2023 11:30 AM RYAN Hoyt-CNP [id number] Academic   10/31/2023  2:40 PM Cesar Brooks MD [id number] Academic   11/6/2023  3:20 PM  Jay Simms MD [id number] Academic     Patient was seen and discussed with attending Dr. Dedrick Solomon MD

## 2023-10-07 NOTE — SIGNIFICANT EVENT
Updated Post-Rounds Final Recommendations:    Impression:   Acute symptomatic generalized seizures in the setting of alcohol withdrawal.      Classification of the Paroxysmal Episodes: Epileptic  Episodes semiology: Aura (lightheadedness)->Generalized convulsions  Frequency: 1x/1-2 months  History of Status Epilepticus: unknown  Localization: Generalized  Etiology: alcohol withdrawal  Co-morbidities: alcoholism     Recommendations:   1) Start keppra 500mg BID (reportedly well-tolerated) however can make 1000mg extended release daily if possible.  2) Recommend outpatient follow-up with neurology (pt appears to have appt with neurology upcoming 10/18)   3) Recommend active tx with ETOH cessation and thiamine.   4) Patient was instructed to not to drive, not to use power tools or operate heavy machinery, and should not be on ladders.  The patient should use the shower and not the bath. Likewise, they should refrain from any activity which could result in injury to themselves or others if they had a seizure or lost consciousness. These restrictions should continue until instructed by a doctor to do otherwise. The patient was informed that these restrictions would be documented in the medical record.   5) Epilepsy will signoff.      Keith Ovalle DO   PGY-4 Epilepsy Team Sr.

## 2023-10-07 NOTE — CARE PLAN
The patient's goals for the shift include      The clinical goals for the shift include pain management

## 2023-10-07 NOTE — CARE PLAN
Problem: Pain  Goal: My pain/discomfort is manageable  10/7/2023 0227 by Gill Gruber, RN  Outcome: Progressing  10/7/2023 0226 by Gill Gruber, RN  Outcome: Progressing   The patient's goals for the shift include      The clinical goals for the shift include pain management

## 2023-10-07 NOTE — DISCHARGE INSTRUCTIONS
Dear Mr. Leigh,    You were admitted to  because you had difficulty breathing after your exam under anesthesia for your perirectal fistula. We are treating you for pneumonia and COPD exacerbation, giving you a new seizure medicine, and giving you new vitamins for your memory.    It is important you follow up with your Primary Care Doctor in 1 week; we've placed a request for this appointment. If you don't get a call about this, you should call the office and ask for a follow up. Please bring your discharge instructions to your appointment.  You have an appointment for a neurologist on October 18.    We made the following changes to your medications:   Increase atorvastatin to 2 tablets a day  Start taking Keppra (leviteracetam) 500mg twice a day for seizures  Clindamycin 3 times a day for a week for pneumonia  Prednisone for 2 more days  Vitamins for memory: Folic Acid, thiamine    It was a pleasure caring for you,     Care Team

## 2023-10-07 NOTE — NURSING NOTE
DC order placed by MD. RN to discharge pt to home. Pt denies CP/SOB/distress. RN discontinued pt Ivs. Pt wife anticipating pt at front of hospital to take pt to home. Transport called.

## 2023-10-12 ENCOUNTER — TELEPHONE (OUTPATIENT)
Dept: PRIMARY CARE | Facility: CLINIC | Age: 58
End: 2023-10-12
Payer: COMMERCIAL

## 2023-10-12 NOTE — TELEPHONE ENCOUNTER
Copied from CRM #[id number]. Topic: Information Request - Trying to reach PCP  >> Oct 11, 2023 10:40 AM Neetu MARCIAL wrote:  Pt trying to contact PCP regarding his medicine, he can't get access to it until he speaks with Dr. Thompson Said please call back at [phone] asap

## 2023-10-18 ENCOUNTER — OFFICE VISIT (OUTPATIENT)
Dept: GASTROENTEROLOGY | Facility: HOSPITAL | Age: 58
End: 2023-10-18
Payer: COMMERCIAL

## 2023-10-18 ENCOUNTER — LAB (OUTPATIENT)
Dept: LAB | Facility: LAB | Age: 58
End: 2023-10-18
Payer: COMMERCIAL

## 2023-10-18 ENCOUNTER — OFFICE VISIT (OUTPATIENT)
Dept: NEUROLOGY | Facility: HOSPITAL | Age: 58
End: 2023-10-18
Payer: COMMERCIAL

## 2023-10-18 VITALS
HEIGHT: 68 IN | TEMPERATURE: 97.7 F | RESPIRATION RATE: 18 BRPM | HEART RATE: 83 BPM | DIASTOLIC BLOOD PRESSURE: 81 MMHG | WEIGHT: 128 LBS | SYSTOLIC BLOOD PRESSURE: 111 MMHG | BODY MASS INDEX: 19.4 KG/M2

## 2023-10-18 VITALS
SYSTOLIC BLOOD PRESSURE: 126 MMHG | RESPIRATION RATE: 18 BRPM | HEART RATE: 76 BPM | OXYGEN SATURATION: 100 % | HEIGHT: 68 IN | TEMPERATURE: 96.5 F | BODY MASS INDEX: 19.4 KG/M2 | DIASTOLIC BLOOD PRESSURE: 83 MMHG | WEIGHT: 128 LBS

## 2023-10-18 DIAGNOSIS — K58.0 IRRITABLE BOWEL SYNDROME WITH DIARRHEA: ICD-10-CM

## 2023-10-18 DIAGNOSIS — R56.9 SEIZURES (MULTI): ICD-10-CM

## 2023-10-18 DIAGNOSIS — E11.40 NEUROPATHY DUE TO TYPE 2 DIABETES MELLITUS (MULTI): ICD-10-CM

## 2023-10-18 DIAGNOSIS — E46 MALNUTRITION COMPROMISING BODILY FUNCTION (MULTI): Primary | ICD-10-CM

## 2023-10-18 DIAGNOSIS — R20.2 PARESTHESIAS: Primary | ICD-10-CM

## 2023-10-18 DIAGNOSIS — R11.2 NAUSEA AND VOMITING, UNSPECIFIED VOMITING TYPE: ICD-10-CM

## 2023-10-18 DIAGNOSIS — R20.2 PARESTHESIAS: ICD-10-CM

## 2023-10-18 LAB
EST. AVERAGE GLUCOSE BLD GHB EST-MCNC: 103 MG/DL
HBA1C MFR BLD: 5.2 %
PROT SERPL-MCNC: 6.7 G/DL (ref 6.4–8.2)

## 2023-10-18 PROCEDURE — 83036 HEMOGLOBIN GLYCOSYLATED A1C: CPT

## 2023-10-18 PROCEDURE — 99215 OFFICE O/P EST HI 40 MIN: CPT | Mod: GC,25 | Performed by: PSYCHIATRY & NEUROLOGY

## 2023-10-18 PROCEDURE — 3079F DIAST BP 80-89 MM HG: CPT | Performed by: PSYCHIATRY & NEUROLOGY

## 2023-10-18 PROCEDURE — 3044F HG A1C LEVEL LT 7.0%: CPT | Performed by: PSYCHIATRY & NEUROLOGY

## 2023-10-18 PROCEDURE — 36415 COLL VENOUS BLD VENIPUNCTURE: CPT

## 2023-10-18 PROCEDURE — 84207 ASSAY OF VITAMIN B-6: CPT

## 2023-10-18 PROCEDURE — 99214 OFFICE O/P EST MOD 30 MIN: CPT | Mod: 25 | Performed by: INTERNAL MEDICINE

## 2023-10-18 PROCEDURE — 86334 IMMUNOFIX E-PHORESIS SERUM: CPT

## 2023-10-18 PROCEDURE — 83921 ORGANIC ACID SINGLE QUANT: CPT

## 2023-10-18 PROCEDURE — 3074F SYST BP LT 130 MM HG: CPT | Performed by: PSYCHIATRY & NEUROLOGY

## 2023-10-18 PROCEDURE — 84165 PROTEIN E-PHORESIS SERUM: CPT | Performed by: PSYCHIATRY & NEUROLOGY

## 2023-10-18 PROCEDURE — 86320 SERUM IMMUNOELECTROPHORESIS: CPT | Performed by: PSYCHIATRY & NEUROLOGY

## 2023-10-18 PROCEDURE — 84155 ASSAY OF PROTEIN SERUM: CPT

## 2023-10-18 PROCEDURE — 99215 OFFICE O/P EST HI 40 MIN: CPT | Performed by: PSYCHIATRY & NEUROLOGY

## 2023-10-18 PROCEDURE — 99214 OFFICE O/P EST MOD 30 MIN: CPT | Performed by: INTERNAL MEDICINE

## 2023-10-18 PROCEDURE — 84165 PROTEIN E-PHORESIS SERUM: CPT

## 2023-10-18 RX ORDER — FLUTICASONE PROPIONATE AND SALMETEROL 500; 50 UG/1; UG/1
POWDER RESPIRATORY (INHALATION)
COMMUNITY
End: 2023-10-23 | Stop reason: SDUPTHER

## 2023-10-18 RX ORDER — BISMUTH SUBSALICYLATE 262 MG/1
524 TABLET ORAL
COMMUNITY
Start: 2019-08-15 | End: 2024-01-22 | Stop reason: WASHOUT

## 2023-10-18 RX ORDER — AMMONIUM LACTATE 12 G/100G
CREAM TOPICAL
COMMUNITY
Start: 2023-10-12

## 2023-10-18 RX ORDER — IBUPROFEN 800 MG/1
TABLET ORAL
COMMUNITY
Start: 2023-07-27 | End: 2024-05-17 | Stop reason: WASHOUT

## 2023-10-18 RX ORDER — OXYCODONE AND ACETAMINOPHEN 5; 325 MG/1; MG/1
1 TABLET ORAL EVERY 6 HOURS
COMMUNITY
Start: 2023-07-17 | End: 2024-01-22 | Stop reason: WASHOUT

## 2023-10-18 SDOH — ECONOMIC STABILITY: FOOD INSECURITY: WITHIN THE PAST 12 MONTHS, THE FOOD YOU BOUGHT JUST DIDN'T LAST AND YOU DIDN'T HAVE MONEY TO GET MORE.: NEVER TRUE

## 2023-10-18 SDOH — ECONOMIC STABILITY: FOOD INSECURITY: WITHIN THE PAST 12 MONTHS, YOU WORRIED THAT YOUR FOOD WOULD RUN OUT BEFORE YOU GOT MONEY TO BUY MORE.: NEVER TRUE

## 2023-10-18 ASSESSMENT — ENCOUNTER SYMPTOMS
ROS GI COMMENTS: AS ABOVE
MUSCULOSKELETAL NEGATIVE: 1
ALLERGIC/IMMUNOLOGIC NEGATIVE: 1
HEMATOLOGIC/LYMPHATIC NEGATIVE: 1
EYES NEGATIVE: 1
ENDOCRINE NEGATIVE: 1
DEPRESSION: 0
COUGH: 1
APPETITE CHANGE: 1
LOSS OF SENSATION IN FEET: 1
PSYCHIATRIC NEGATIVE: 1
SHORTNESS OF BREATH: 1
LOSS OF SENSATION IN FEET: 0
CARDIOVASCULAR NEGATIVE: 1
OCCASIONAL FEELINGS OF UNSTEADINESS: 1
NEUROLOGICAL NEGATIVE: 1
CHOKING: 1
OCCASIONAL FEELINGS OF UNSTEADINESS: 0

## 2023-10-18 ASSESSMENT — PATIENT HEALTH QUESTIONNAIRE - PHQ9
1. LITTLE INTEREST OR PLEASURE IN DOING THINGS: NOT AT ALL
SUM OF ALL RESPONSES TO PHQ9 QUESTIONS 1 AND 2: 0
1. LITTLE INTEREST OR PLEASURE IN DOING THINGS: SEVERAL DAYS
2. FEELING DOWN, DEPRESSED OR HOPELESS: NOT AT ALL
SUM OF ALL RESPONSES TO PHQ9 QUESTIONS 1 AND 2: 2
10. IF YOU CHECKED OFF ANY PROBLEMS, HOW DIFFICULT HAVE THESE PROBLEMS MADE IT FOR YOU TO DO YOUR WORK, TAKE CARE OF THINGS AT HOME, OR GET ALONG WITH OTHER PEOPLE: VERY DIFFICULT
2. FEELING DOWN, DEPRESSED OR HOPELESS: SEVERAL DAYS

## 2023-10-18 ASSESSMENT — PAIN SCALES - GENERAL
PAINLEVEL: 7
PAINLEVEL: 0-NO PAIN

## 2023-10-18 ASSESSMENT — COLUMBIA-SUICIDE SEVERITY RATING SCALE - C-SSRS
1. IN THE PAST MONTH, HAVE YOU WISHED YOU WERE DEAD OR WISHED YOU COULD GO TO SLEEP AND NOT WAKE UP?: NO
6. HAVE YOU EVER DONE ANYTHING, STARTED TO DO ANYTHING, OR PREPARED TO DO ANYTHING TO END YOUR LIFE?: NO
6. HAVE YOU EVER DONE ANYTHING, STARTED TO DO ANYTHING, OR PREPARED TO DO ANYTHING TO END YOUR LIFE?: NO
2. HAVE YOU ACTUALLY HAD ANY THOUGHTS OF KILLING YOURSELF?: NO
2. HAVE YOU ACTUALLY HAD ANY THOUGHTS OF KILLING YOURSELF?: NO
1. IN THE PAST MONTH, HAVE YOU WISHED YOU WERE DEAD OR WISHED YOU COULD GO TO SLEEP AND NOT WAKE UP?: NO

## 2023-10-18 NOTE — PATIENT INSTRUCTIONS
Thank you for coming in today.  I would like you to increase your immodium to 2 pills twice daily.  You should continue to take the ensure.  I would continue to eat small frequent meals.  I will arrange follow up with Gayle Palomares in 4 months        Ways to Help Prevent Falls at Home    Quick Tips   ? Ask for help if you need it. Most people want to help!   ? Get up slowly after sitting or laying down   ? Wear a medical alert device or keep cell phone in your pocket   ? Use night lights, especially areas near a bathroom   ? Keep the items you use often within reach on a small stool or end table   ? Use an assistive device such as walker or cane, as directed by provider/physical therapy   ? Use a non-slip mat and grab bars in your bathroom. Look for home health sections for best options     Other Areas to Focus On   ? Exercise and nutrition: Regular exercise or taking a falls prevention class are great ways improve strength and balance. Don’t forget to stay hydrated and bring a snack!   ? Medicine side effects: Some medicines can make you sleepy or dizzy, which could cause a fall. Ask your healthcare provider about the side effects your medicines could cause. Be sure to let them know if you take any vitamins or supplements as well.   ? Tripping hazards: Remove items you could trip on, such as loose mats, rugs, cords, and clutter. Wear closed toe shoes with rubber soles.   ? Health and wellness: Get regular checkups with your healthcare provider, plus routine vision and hearing screenings. Talk with your healthcare provider about:   o Your medicines and the possible side effects - bring them in a bag if that is easier!   o Problems with balance or feeling dizzy   o Ways to promote bone health, such as Vitamin D and calcium supplements   o Questions or concerns about falling     *Ask your healthcare team if you have questions     [facility], 2022
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
## 2023-10-18 NOTE — PROGRESS NOTES
"Gastroenterology Clinic Consult Note    Reason For Consult  Diarrhea     History Of Present Illness  Servando Leigh is a 58 y.o. male with a past medical history of COPD, seizures, chronic perianal and scrotal abscesses here for follow up of diarrhea and nausea/vomiting.    The patient has been followed by Gayle Palomares physicians assistant in the GI department.  She has been seeing him over the past 3 to 4 years.  Prior to that he was seen at [facility].  He has had chronic symptoms of diarrhea and has had quite an extensive evaluation for this.  This has included multiple CT scans colonoscopies as well as an MR enterography which did not reveal any evidence of inflammation or inflammatory bowel disease.  Stool has been sent for fecal elastase and blood for gastrin level and VIP level.  These have been normal.  He notes chronic nausea/vomiting and describes \"gagging\" with coughing eating.  He has undergone upper endoscopy multiple times without any etiology seen in this also had a normal gastric emptying scan.  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  He has had a history of H. pylori and has been treated as well.  He has been managed on antidiarrheal agents with Imodium and has been taking cholestyramine as needed.  He has been on high doses of proton pump inhibitors as well.  He was last seen over a year ago and his symptoms were stable at that time.  He has had some recurrence of perianal drainage and pain and has followed up with colorectal surgery.  His last colonoscopy was in 2020 with a follow up flex sig in 2021 and a 5 year follow up was recommended.      The patient notes he was recently evaluated by colorectal surgery and has followup scheduled.  He notes multiple bowel movements daily up to 4-5 with semi formed stools.  He is not taking the cholestyramine due to insurance reasons.  He is taking " the immodium only once daily and notes prior to recent admission he was having multiple loose stools.  His weight has remained stable mainly due to taking ensure with meals as her struggles to eat due to a decreased appettite.  He continues to smoke.       Past Medical History  He has a past medical history of Brain tumor (benign) (CMS/[facility]), CKD (chronic kidney disease), Dermatitis, GERD (gastroesophageal reflux disease), Incontinence of feces, Myalgia, Peripheral vascular disease, unspecified (CMS/[facility]), Personal history of other diseases of the digestive system, Personal history of other diseases of the nervous system and sense organs, Personal history of other diseases of the respiratory system, Personal history of other mental and behavioral disorders, Seizure (CMS/[facility]), Unsteady gait when walking, and Vertigo.    Surgical History  He has a past surgical history that includes Neck surgery (01/25/2017); Other surgical history (07/13/2020); Other surgical history (01/07/2020); Total shoulder arthroplasty (Right); XR foot; and Cervical fusion.     Social History  He reports that he has been smoking cigarettes. He has been smoking an average of .25 packs per day. He has never used smokeless tobacco. He reports that he does not currently use alcohol. He reports that he does not currently use drugs.    Family History  Family History   Problem Relation Name Age of Onset    Diabetes Son      Glaucoma Other Grandfather         Allergies  Shellfish containing products, Coconut, Penicillin g, and Shellfish derived    Home Medications  (Not in a hospital admission)      Review of Systems   Review of Systems   Constitutional:  Positive for appetite change.   HENT: Negative.     Eyes: Negative.    Respiratory:  Positive for cough, choking and shortness of breath.    Cardiovascular: Negative.    Gastrointestinal:         As above   Endocrine: Negative.    Genitourinary: Negative.    Musculoskeletal: Negative.    Skin: Negative.  "   Allergic/Immunologic: Negative.    Neurological: Negative.    Hematological: Negative.    Psychiatric/Behavioral: Negative.           Physical Exam  Physical Exam  Constitutional:       Comments: Thin and ill appearing   HENT:      Head: Normocephalic and atraumatic.   Eyes:      Extraocular Movements: Extraocular movements intact.      Conjunctiva/sclera: Conjunctivae normal.      Pupils: Pupils are equal, round, and reactive to light.   Cardiovascular:      Rate and Rhythm: Normal rate and regular rhythm.   Pulmonary:      Breath sounds: Wheezing and rhonchi present.   Abdominal:      General: Abdomen is flat. Bowel sounds are normal.      Palpations: Abdomen is soft.      Tenderness: There is abdominal tenderness.   Skin:     General: Skin is warm and dry.   Neurological:      General: No focal deficit present.      Mental Status: Mental status is at baseline.   Psychiatric:         Mood and Affect: Mood normal.           Last Recorded Vitals  There were no vitals taken for this visit.  Visit Vitals  /83   Pulse 76   Temp 35.8 °C (96.5 °F)   Resp 18   Ht 1.727 m (5' 8\")   Wt 58.1 kg (128 lb)   SpO2 100%   BMI 19.46 kg/m²   Smoking Status Every Day   BSA 1.67 m²        Relevant Results    Current Outpatient Medications:     acetaminophen (Tylenol) 325 mg tablet, Take 2 tablets (650 mg) by mouth every 6 hours if needed for mild pain (1 - 3)., Disp: 30 tablet, Rfl: 0    albuterol 2.5 mg /3 mL (0.083 %) nebulizer solution, Take 3 mL (2.5 mg) by nebulization every 4 hours if needed for wheezing., Disp: 180 mL, Rfl: 0    ascorbic acid (Vitamin C) 500 mg tablet, Take 1 tablet (500 mg) by mouth once daily., Disp: 30 tablet, Rfl: 0    atorvastatin (Lipitor) 10 mg tablet, Take 2 tablets (20 mg) by mouth once daily., Disp: 60 tablet, Rfl: 0    benzoyl peroxide (Benzac AC) 10 % external wash, Apply topically once daily., Disp: 142 g, Rfl: 0    cholecalciferol (Vitamin D-3) 25 MCG (1000 UT) tablet, Take 1 tablet (25 " mcg) by mouth once daily. Do not start before October 8, 2023., Disp: 30 tablet, Rfl: 0    cyclobenzaprine (Flexeril) 10 mg tablet, Take 1 tablet (10 mg) by mouth 2 times a day as needed (pain)., Disp: 60 tablet, Rfl: 0    diclofenac sodium (Voltaren) 1 % gel gel, Apply 1 Application topically 4 times a day as needed (joint pain)., Disp: 150 g, Rfl: 0    EPINEPHrine 0.3 mg/0.3 mL injection syringe, Inject 0.3 mL (0.3 mg) into the shoulder, thigh, or buttocks. As Directed, Disp: , Rfl:     fluticasone furoate-vilanteroL (Breo Ellipta) 100-25 mcg/dose inhaler, Inhale 1 puff once daily. Do not start before October 8, 2023., Disp: 28 each, Rfl: 0    folic acid (Folvite) 400 mcg tablet, Take 1 tablet (0.4 mg) by mouth once daily. Do not start before October 8, 2023., Disp: 30 tablet, Rfl: 0    food supplemt, lactose-reduced (Ensure Original) 0.04-1.05 gram-kcal/mL liquid, Take 3 bottles by mouth once daily. 2-3 cans, Disp: 95470 mL, Rfl: 0    gabapentin (Neurontin) 600 mg tablet, Take 1 tablet (600 mg) by mouth 3 times a day., Disp: 90 tablet, Rfl: 0    levETIRAcetam (Keppra) 500 mg tablet, Take 1 tablet (500 mg) by mouth 2 times a day., Disp: 60 tablet, Rfl: 0    loperamide (Imodium) 2 mg capsule, Take 1 capsule (2 mg) by mouth 4 times a day as needed for diarrhea., Disp: 30 capsule, Rfl: 0    montelukast (Singulair) 10 mg tablet, Take 1 tablet (10 mg) by mouth once daily., Disp: 30 tablet, Rfl: 0    multivitamin with minerals (Thera-M) tablet, Take 1 tablet by mouth once daily., Disp: 30 tablet, Rfl: 0    nicotine (Nicoderm CQ) 14 mg/24 hr patch, Place 1 patch over 24 hours on the skin once daily. Do not start before October 8, 2023., Disp: 30 patch, Rfl: 0    pantoprazole (ProtoNix) 40 mg EC tablet, Take 1 tablet (40 mg) by mouth once daily., Disp: 30 tablet, Rfl: 0    rOPINIRole (Requip) 0.5 mg tablet, Take 1 tablet (0.5 mg) by mouth once daily., Disp: 30 tablet, Rfl: 0    sodium chloride 3 % nebulizer solution, Take  4 mL by nebulization 2 times a day., Disp: 240 mL, Rfl: 0    tamsulosin (Flomax) 0.4 mg 24 hr capsule, Take 1 capsule (0.4 mg) by mouth once daily., Disp: 30 capsule, Rfl: 0    thiamine (Vitamin B-1) 100 mg tablet, Take 1 tablet (100 mg) by mouth once daily. Do not start before October 8, 2023., Disp: 30 tablet, Rfl: 0    umeclidinium (Incruse Ellipta) 62.5 mcg/actuation inhalation, Inhale 1 puff (62.5 mcg) once daily., Disp: 7 each, Rfl: 0     Lab Results   Component Value Date    WBC 11.0 10/07/2023    HGB 12.1 (L) 10/07/2023    HCT 37.3 (L) 10/07/2023    MCV 92 10/07/2023     10/07/2023     Lab Results   Component Value Date    GLUCOSE 101 (H) 10/07/2023    CALCIUM 9.0 10/07/2023     10/07/2023    K 3.3 (L) 10/07/2023    CO2 25 10/07/2023     10/07/2023    BUN 7 10/07/2023    CREATININE 0.89 10/07/2023     Lab Results   Component Value Date    ALT 10 10/05/2023    AST 25 10/05/2023    ALKPHOS 108 10/05/2023    BILITOT 0.2 10/05/2023       Imaging:          ASSESSMENT/PLAN:    Problem List Items Addressed This Visit    None  Visit Diagnoses       Malnutrition compromising bodily function (CMS/[facility])    -  Primary    Relevant Orders    Oral nutritional supplements    Follow Up In Gastroenterology    Irritable bowel syndrome with diarrhea        Nausea and vomiting, unspecified vomiting type              Chronic diarrhea. Extensive evaluation has been unremarkable.  Presently symptoms appear somewhat worse.  Will increase immodium to BID.  Can add cholestyramine if needed.  Anorexia/malnutrition.  Likely multifactorial.  Continue with ensure supplements. Weight is stable.  COPD and multiple medical problems    Will arrange follow up with Gayle Palomares in 4 months    I spent 35 minutes in the professional and overall care of this patient.    Dayton Wells MD

## 2023-10-22 LAB — METHYLMALONATE SERPL-SCNC: 0.15 UMOL/L (ref 0–0.4)

## 2023-10-23 ENCOUNTER — OFFICE VISIT (OUTPATIENT)
Dept: PULMONOLOGY | Facility: HOSPITAL | Age: 58
End: 2023-10-23
Payer: COMMERCIAL

## 2023-10-23 ENCOUNTER — APPOINTMENT (OUTPATIENT)
Dept: PULMONOLOGY | Facility: HOSPITAL | Age: 58
End: 2023-10-23
Payer: COMMERCIAL

## 2023-10-23 VITALS
BODY MASS INDEX: 19.16 KG/M2 | SYSTOLIC BLOOD PRESSURE: 97 MMHG | TEMPERATURE: 97.3 F | WEIGHT: 126 LBS | DIASTOLIC BLOOD PRESSURE: 60 MMHG | HEART RATE: 100 BPM

## 2023-10-23 DIAGNOSIS — J47.9 BRONCHIECTASIS WITHOUT COMPLICATION (MULTI): Primary | ICD-10-CM

## 2023-10-23 DIAGNOSIS — J45.909 ASTHMA, UNSPECIFIED ASTHMA SEVERITY, UNSPECIFIED WHETHER COMPLICATED, UNSPECIFIED WHETHER PERSISTENT (HHS-HCC): ICD-10-CM

## 2023-10-23 DIAGNOSIS — J69.0 ASPIRATION PNEUMONITIS (MULTI): ICD-10-CM

## 2023-10-23 LAB
ALBUMIN: 3.7 G/DL (ref 3.4–5)
ALPHA 1 GLOBULIN: 0.3 G/DL (ref 0.2–0.6)
ALPHA 2 GLOBULIN: 0.6 G/DL (ref 0.4–1.1)
BETA GLOBULIN: 0.9 G/DL (ref 0.5–1.2)
GAMMA GLOBULIN: 1.2 G/DL (ref 0.5–1.4)
IMMUNOFIXATION COMMENT: NORMAL
PATH REVIEW - SERUM IMMUNOFIXATION: NORMAL
PATH REVIEW-SERUM PROTEIN ELECTROPHORESIS: NORMAL
PROTEIN ELECTROPHORESIS COMMENT: NORMAL

## 2023-10-23 PROCEDURE — 99215 OFFICE O/P EST HI 40 MIN: CPT | Performed by: NURSE PRACTITIONER

## 2023-10-23 PROCEDURE — 87206 SMEAR FLUORESCENT/ACID STAI: CPT | Performed by: NURSE PRACTITIONER

## 2023-10-23 RX ORDER — FLUTICASONE PROPIONATE AND SALMETEROL 500; 50 UG/1; UG/1
1 POWDER RESPIRATORY (INHALATION)
Qty: 3 EACH | Refills: 3 | Status: SHIPPED | OUTPATIENT
Start: 2023-10-23 | End: 2023-11-01 | Stop reason: CLARIF

## 2023-10-23 RX ORDER — ALBUTEROL SULFATE 0.83 MG/ML
2.5 SOLUTION RESPIRATORY (INHALATION) EVERY 4 HOURS PRN
Qty: 180 ML | Refills: 0 | Status: SHIPPED | OUTPATIENT
Start: 2023-10-23 | End: 2023-10-29

## 2023-10-23 RX ORDER — SODIUM CHLORIDE FOR INHALATION 3 %
4 VIAL, NEBULIZER (ML) INHALATION 3 TIMES DAILY
COMMUNITY
End: 2024-02-27 | Stop reason: SDUPTHER

## 2023-10-23 RX ORDER — IBUPROFEN 200 MG
TABLET ORAL
Qty: 14 PATCH | Refills: 0 | Status: SHIPPED | OUTPATIENT
Start: 2023-10-23 | End: 2023-11-06 | Stop reason: SDUPTHER

## 2023-10-23 RX ORDER — ALBUTEROL SULFATE 90 UG/1
2 AEROSOL, METERED RESPIRATORY (INHALATION) EVERY 4 HOURS PRN
Qty: 8 G | Refills: 11 | Status: SHIPPED | OUTPATIENT
Start: 2023-10-23 | End: 2024-01-22

## 2023-10-23 RX ORDER — MONTELUKAST SODIUM 10 MG/1
10 TABLET ORAL DAILY
Qty: 90 TABLET | Refills: 3 | Status: SHIPPED | OUTPATIENT
Start: 2023-10-23 | End: 2023-11-06 | Stop reason: ALTCHOICE

## 2023-10-23 ASSESSMENT — PATIENT HEALTH QUESTIONNAIRE - PHQ9
1. LITTLE INTEREST OR PLEASURE IN DOING THINGS: NOT AT ALL
SUM OF ALL RESPONSES TO PHQ9 QUESTIONS 1 AND 2: 0
2. FEELING DOWN, DEPRESSED OR HOPELESS: NOT AT ALL

## 2023-10-23 ASSESSMENT — PAIN SCALES - GENERAL: PAINLEVEL: 7

## 2023-10-23 NOTE — PROGRESS NOTES
Patient: Servando Leigh    [id number]  : 1965 -- AGE 58 y.o.    Provider: RYAN Venegas-CNP     Location [facility]   Service Date: 10/23/2023              [facility]  Follow up visit note      HISTORY OF PRESENT ILLNESS     PCP: Dr. Simms - new PCP through here - has not seen yet   Neurology: Dr. Blum - [place]    Dermatology- Dr. Marcus Alvarado - [facility]   Colorectal surgery - Dr. Brooks -    GI: Gayle MÁRQUEZ -    Spine surgery: Keith Salmeron - [facility]       HISTORY OF PRESENT ILLNESS   Mr. Leigh is a 58 year old AAM (current smoker ~22 pack year) here for follow up for COPD/asthma, chest pain, allergic rhinitis. Last seen in clinic via virtual visit on 23.       Last visit I had recommended he go to ED, but he had transport take him to go home instead.     He was under anesthesia to evaluate his rectal abscess and he developed respiratory distress after. He then was admitted. He was discharged home on prednisone and antibiotics.  He states he has been feeling better. He has been using his  advair 500 twice daily. He stopped his incruse. He did have a care coordinator that I had arranged previously. He has been using his nebulizer twice daily - and vest at night.  He has nebusal that he does twice daily as well.  He states his cough is productive with - now yellow/ green (he had reported previously it was blue). He has been wheezing intermittently. He has DICKINSON with walking long distances. He feels his breathing is doing a bit better. He denies any SOB at rest. He has a little runny nose with the weather changing. He statess he has some chest pressure in the morning -- states he will cough up mucous. He states at times to bring up the mucous he is gagging. He feels his GERD is mostly under good control with his pantoprazole - depends on what he eats.  He has been smoking 7-10 cigarettes per day. He is motivated to try and  quit.     ACT today 13      8/28/23:  Since last visit he states he has not been doing well. He states today he does not feel well -- vomiting and light headed. He states he has had chills the last few days. He has been using his vest - once a week. He just recently had surgery on his foot. He has been vomiting a lot - he is not sure if he let GI know this. He states he vomits in the morning. He thinks he is still taking pantoprazole - states he gets confused on all his medications. He has a productive cough with ? blue sputum. He states he has been having black stools. He has been using his nebulizers twice daily - albuterol and then NaCl. He has been using his advair twice daily. He does not have incruse anymore. He has wheezing. He has DICKINSON with walking short distances. He has SOB at rest. He states his chest is tight. He has nasal congestion - no runny nose. He has been having fever/ chills - states frequently. He has to changes his T shirt every night from sweating.      12/13/21: Since last visit he feels his breathing has been better. He has been using his incruse and advair - states at times he has to take several puffs and ends up running out before the end of the month -- feeling better after resuming advair and after cipro course. He has coughing episode in the morning and is able to bring it up. He states he does not vomit, but at times he does gag. He has difficulty bringing it up at times. He has wheezing all the time. He has DICKINSON, but it is better than it was. he states he has to stop and catch his breath with walking. He has been using his nebulizer BID with airway clearance which helps him bring up mucous. He has been running out of his rescue inhaler - 4-5 x per day. He at times will have SOB at rest. He feels his allergies are doing well on montelukast. He was started on new medications for his diarrhea which has been helpful. He has tingling in his fingers/toes - he had an appt with his  neurologist, but missed it related to a miscommunication. he has been having less diarrhea with medications from GI.   CAT score 3, 5,5, 3, 3, 2, 5, 3 = 29     10/8/21: Since last visit he has not been doing well. He feels his breathing is not good - he throws up every morning   He has been wearing diaper related to loose stools and incontinence. He has had poor PO intake related to nausea and diarrhea. He has had worsening DICKINSON and feels he can barely walk. He has been using his incruse daily, albuterol HFA 4-5x per day, and nebulizer BID. He no longer has his advair -- had not been covered so dulera had been sent in, but he never received. He has been using his airway clearance a 1-2 x per day. He has had worsening weakness - likely related to his diarrhea. He has a productive cough in the morning and ends up gagging and vomiting. His sputum is yellow/clear and he feels there is more mucous. He has a sore throat he describes as a burning - this has been going on since his spine surgery earlier this year. He states he was going to do his laundry, but kept getting up and feeling lightheaded. He has had worsening wheezing and SOB at rest. He has been taking his montlukast daily, but has been noticing some worsening post nasal drip. He has noticed some post nasal drip. He had significant heartburn and hiccups a few days ago - he states he is taking his pantoprazole 40mg TID and checked his pill bottle to confirm this. He states he has an upcoming EGD - he thought this was a swallow test. He started to have left sided chest - states it comes and goes and rates it a 5/10. He describes it as pressure.      6/21/21: Since last visit he has been doing well. He has been using the flutter daily and it helps bring up mucous. He has not been throwing up too much, but he is still having diarrhea. He had his back surgery since out last visit. He was put on antibiotics and steroids with improvement in his breathing. He has been  using his nebulizers 3-4x per day. He has not been using his proair rescue as it has been helpful for him. He still has a productive cough with white/yellow sputum. He has wheezing at DICKINSON which are at his baseline. He has been using his incruse daily, but when we discussed he his advair he does not have it at home currently. He does not like the proair as much as he liked his ventolin - will try and reorder ventolin. He feels his allergies are under good control on singulair daily. He is happy he is no longer vomiting daily. He states his bowels have been irregular related to previous diarrhea and recent pain meds from surgery. He is only doing the cholestyramine daily - to increase to twice daily per GI. He has some MSK chest pain, but he feels this is related to his surgery   CAT today 29 5/21/21 : Since last visit he feels he has his good days and bad days. He has been using his incruse daily, advair twice daily. He was recently prescribed proair - he feels ventolin has worked better for him in the past. He has a productive cough with yellowish sputum. His wheezing and DICKINSON are at his baseline. He has been using his nebulizers BID and rescue inhaler 4x a day. He has been vomiting every morning despite taking pantoprazole daily. The chest pains resolved after treatment of his pseudomonas pneumonia. He is still smoking - he has patches and gum - he tried them and they weren't helpful. He has not used them both together. He is still having significant diarrhea - he has been drinking the cholestyramine as needed and it has been helpful He has significant back pain - has back surgery planned with Dr. Salmeron at [facility].   CAT today 29      3/30/21: Since last visit he is not feeling any better. He completed the 7 day course of cipro, but did not notice a significant improvement. He is using incruse daily, advair BID, and he was given an albuterol HFA (not proair or ventolin). He has not used it yet. He is  using his proair until he runs out - 5-6x a day. He has a device at home - unclear if airway clearance or incentive spirometer. He has not been using it because he was not told to. He does his nebulizers 2 in the morning and then 2 in the evening (warren). He still has a productive cough with clear/yellow sputum. He has intermittent wheezing, but its been worse recently with the weather changing. He continues to have DICKINSON which is at his baseline. He denies any SOB at rest. He feels his allergies under good control with montelukast. He is on pantoprazole for acid reflux, but has continued symptoms and has been taking his tums daily for this. He states he will have episodes of acid reflux significant enough the will be retching/vomiting most mornings . He is still having CP that is bothersome and he feels its getting worse. He states its intermittent and on the left side of his chest. He rates it as a 3 out of 10 and describes it as an ache. It is not reproducible and does not radiate. He does not feel it is correlated with his acid reflux. He has had neck pains and feels that the brace/rods in his neck may have shifted. He thinks his neck/arm is worse and he has an appointment upcoming with neurology later today. He has been having increased weakness, night sweats/chills. He has had difficulty with getting up and moving around related to significant fatigue in addition to weakness.He has diarrhea. He is not leaving the house much because he is concerned about having accidents. He has been wearing briefs when he leaves the house. He has been drinking a lot of water and a lot of ensures. He was actually told he is drinking too many ensures and they are concerned this is contributing to his lack of appetite.      See old notes for previous HPIs     He is up to date with his flu vaccine      Comorbidities:  - HTN   - seizures  - COPD / emphysema  - allergies - shellfish   - pneumonia - admitted to [facility] several  years ago   - Fatty liver   - GERD   - perianal abscess and fistula - s/p 3 procedures   - neck surgery - states titanium plate in place     SH:  smokin- current 6-10 cigarettes per day ~ 23 pack years  drinkin beers per week - previous heavy drinking - 1 gallon of liquor per day. Last heavy drinking in   illicit drug use: occasional marijuana      Occupation: He has been out of work since 2017 - now on disability. Previously worked in construction, demolition, farming, foundry work, and metals manufacturing      Family History:  Sister - asthma  Mother - asthma,emphysema, blood clots, COPD        Testing:      PFTs: 21: FEV1/FVC 0.51/ FEV1 1.38 (45%)/ FVC 3.13 (82%)/ TLC 8.19 (139%)/ DLCO 60% - RV/%      6MWT: 21 - 335m () 100 -> 98% on RA - SHELLEY 5      FENO 21 - 5ppb      Echo: 3/18/21: The left ventricular systolic function is normal with a 60% estimated ejection fraction. RVSP within normal limits. Small patent foramen ovale. Atrial septal aneurysm present. RA/ RV normal size and function      CT chest:   - 19 - Multifocal area of GGO bilaterally with interstitial interseptal thickening. There is associated bronchiectasis in the RLL. Paraseptal emphysematous changes predominantly affecting the bilateral upper lobes in subpleural location as well as right apical lower lobe. Small bilateral pleural effusions with compression atelectasis.   - 21 - Areas of cystic bronchiectasis, bronchial wall thickening and mucous plugging. Areas of scarring are also identified in bilateral upper lobes which might be sequela of prior infectious process. Upper lung predominant mild to moderate centrilobular and paraseptal emphysema. Subcentimeter pulmonary nodules in the right upper lobe measuring  up to 3 mm. Unchanged prominent subcentimeter mediastinal lymph nodes, which could possibly reactive in nature. Mild coronary artery calcifications.     EKG 20 - NSR     Sputum  cultures   - 1/29/21 - candida parapsilosis, AFB negative x 3, + pseudomonas   - 4/16/21 - AFB negative x 3, fungal culture negative, respiratory culture negative     ALLERGIES AND MEDICATIONS     ALLERGIES  Allergies   Allergen Reactions    Shellfish Containing Products Anaphylaxis and Unknown    Shellfish Derived Unknown and Anaphylaxis    Coconut Swelling    Other Other    Penicillin G Swelling    Cat Dander Other     itching    House Dust Other     Watery eyes.       MEDICATIONS  Current Outpatient Medications   Medication Sig Dispense Refill    acetaminophen (Tylenol) 325 mg tablet Take 2 tablets (650 mg) by mouth every 6 hours if needed for mild pain (1 - 3). 30 tablet 0    albuterol 2.5 mg /3 mL (0.083 %) nebulizer solution Take 3 mL (2.5 mg) by nebulization every 4 hours if needed for wheezing. 180 mL 0    ammonium lactate (Amlactin) 12 % cream Apply 1 application twice a day by topical route.      ascorbic acid (Vitamin C) 500 mg tablet Take 1 tablet (500 mg) by mouth once daily. 30 tablet 0    atorvastatin (Lipitor) 10 mg tablet Take 2 tablets (20 mg) by mouth once daily. 60 tablet 0    benzoyl peroxide (Benzac AC) 10 % external wash Apply topically once daily. 142 g 0    bismuth subsalicylate (Pepto Bismol) 262 mg chewable tablet Chew 2 tablets (524 mg).      cyclobenzaprine (Flexeril) 10 mg tablet Take 1 tablet (10 mg) by mouth 2 times a day as needed (pain). 60 tablet 0    diclofenac sodium (Voltaren) 1 % gel gel Apply 1 Application topically 4 times a day as needed (joint pain). 150 g 0    EPINEPHrine 0.3 mg/0.3 mL injection syringe Inject 0.3 mL (0.3 mg) into the muscle. As Directed      fluticasone propion-salmeteroL (Advair Diskus) 500-50 mcg/dose diskus inhaler Inhale 2 times a day.      folic acid (Folvite) 400 mcg tablet Take 1 tablet (0.4 mg) by mouth once daily. Do not start before October 8, 2023. 30 tablet 0    food supplemt, lactose-reduced (Ensure Original) 0.04-1.05 gram-kcal/mL liquid  Take 3 bottles by mouth once daily. 2-3 cans 67936 mL 0    gabapentin (Neurontin) 600 mg tablet Take 1 tablet (600 mg) by mouth 3 times a day. 90 tablet 0    levETIRAcetam (Keppra) 500 mg tablet Take 1 tablet (500 mg) by mouth 2 times a day. 60 tablet 0    montelukast (Singulair) 10 mg tablet Take 1 tablet (10 mg) by mouth once daily. 30 tablet 0    multivitamin with minerals (Thera-M) tablet Take 1 tablet by mouth once daily. 30 tablet 0    nicotine (Nicoderm CQ) 14 mg/24 hr patch Place 1 patch over 24 hours on the skin once daily. Do not start before October 8, 2023. 30 patch 0    pantoprazole (ProtoNix) 40 mg EC tablet Take 1 tablet (40 mg) by mouth once daily. 30 tablet 0    rOPINIRole (Requip) 0.5 mg tablet Take 1 tablet (0.5 mg) by mouth once daily. 30 tablet 0    sodium chloride 3 % nebulizer solution Take 4 mL by nebulization 2 times a day. 240 mL 0    tamsulosin (Flomax) 0.4 mg 24 hr capsule Take 1 capsule (0.4 mg) by mouth once daily. 30 capsule 0    umeclidinium (Incruse Ellipta) 62.5 mcg/actuation inhalation Inhale 1 puff (62.5 mcg) once daily. 7 each 0    cholecalciferol (Vitamin D-3) 25 MCG (1000 UT) tablet Take 1 tablet (25 mcg) by mouth once daily. Do not start before October 8, 2023. (Patient not taking: Reported on 10/23/2023) 30 tablet 0    fluticasone furoate-vilanteroL (Breo Ellipta) 100-25 mcg/dose inhaler Inhale 1 puff once daily. Do not start before October 8, 2023. (Patient not taking: Reported on 10/23/2023) 28 each 0    ibuprofen 800 mg tablet TAKE 1 TABLET 3 TIMES A DAY BY ORAL ROUTE.      loperamide (Imodium) 2 mg capsule Take 1 capsule (2 mg) by mouth 4 times a day as needed for diarrhea. (Patient not taking: Reported on 10/23/2023) 30 capsule 0    oxyCODONE-acetaminophen (Percocet) 5-325 mg tablet Take 1 tablet by mouth every 6 hours.      thiamine (Vitamin B-1) 100 mg tablet Take 1 tablet (100 mg) by mouth once daily. Do not start before October 8, 2023. (Patient not taking: Reported  on 10/23/2023) 30 tablet 0     No current facility-administered medications for this visit.         PAST HISTORY     PAST MEDICAL HISTORY  - HTN   - seizures  - COPD / emphysema  - allergies - shellfish   - pneumonia - admitted to [facility] several years ago   - Fatty liver   - GERD   - perianal abscess and fistula - s/p 3 procedures   - neck surgery - states titanium plate in place        PAST SURGICAL HISTORY  Past Surgical History:   Procedure Laterality Date    CERVICAL FUSION      FOOT      NECK SURGERY  2017    Neck Surgery    OTHER SURGICAL HISTORY  2020    Perirectal abscess incision and drainage    OTHER SURGICAL HISTORY  2020    Shoulder replacement    TOTAL SHOULDER ARTHROPLASTY Right     right       IMMUNIZATION HISTORY  Immunization History   Administered Date(s) Administered    Flu vaccine (IIV4), preservative free *Check age/dose* 10/14/2014, 2018, 2020    Influenza, seasonal, injectable 2015, 02/15/2017, 2017    Pfizer Purple Cap SARS-CoV-2 2021, 2021    Tdap vaccine, age 7 year and older (BOOSTRIX) 2015       SOCIAL HISTORY  smokin- current 6-10 cigarettes per day ~ 23 pack years  drinkin beers per week - previous heavy drinking - 1 gallon of liquor per day. Last heavy drinking in   illicit drug use: occasional marijuana        OCCUPATIONAL/ENVIRONMENTAL HISTORY  Occupation: He has been out of work since 2017 - now on disability. Previously worked in construction, demolition, farming, foundry work, and metals manufacturing        FAMILY HISTORY  Family History   Problem Relation Name Age of Onset    Diabetes Son      Glaucoma Other Grandfather          RESULTS/DATA     Pulmonary Function Test Results       PFTs: 21: FEV1/FVC 0.51/ FEV1 1.38 (45%)/ FVC 3.13 (82%)/ TLC 8.19 (139%)/ DLCO 60% - RV/%      6MWT: 21 - 335m () 100 -> 98% on RA - SHELLEY 5      FENO 21 - 5ppb     Chest Radiograph     XR chest  1 view 10/05/2023    Narrative  STUDY:  Chest Radiograph;  10/05/2023, 11:05AM  INDICATION:  COPD, bilateral rhonchi and wheezing.  COMPARISON:  07/11/2023, 05/11/2023 XR chest  ACCESSION NUMBER(S):  [id number]  ORDERING CLINICIAN:  MADDY HEARD  TECHNIQUE:  Frontal chest was obtained at 11:05 hours.  FINDINGS:  CARDIOMEDIASTINAL SILHOUETTE:  Cardiomediastinal silhouette is normal in size and configuration.    LUNGS:  There is a patchy infiltrate in the right upper lobe.    ABDOMEN:  No remarkable upper abdominal findings.    BONES:  No acute osseous changes.    Impression  Right upper lobe infiltrate.  Signed by Dakota Whalen MD      Chest CT Scan   CT chest:   - 11/7/19 - Multifocal area of GGO bilaterally with interstitial interseptal thickening. There is associated bronchiectasis in the RLL. Paraseptal emphysematous changes predominantly affecting the bilateral upper lobes in subpleural location as well as right apical lower lobe. Small bilateral pleural effusions with compression atelectasis.   - 1/13/21 - Areas of cystic bronchiectasis, bronchial wall thickening and mucous plugging. Areas of scarring are also identified in bilateral upper lobes which might be sequela of prior infectious process. Upper lung predominant mild to moderate centrilobular and paraseptal emphysema. Subcentimeter pulmonary nodules in the right upper lobe measuring  up to 3 mm. Unchanged prominent subcentimeter mediastinal lymph nodes, which could possibly reactive in nature. Mild coronary artery calcifications.    Echocardiogram     Echo: 3/18/21: The left ventricular systolic function is normal with a 60% estimated ejection fraction. RVSP within normal limits. Small patent foramen ovale. Atrial septal aneurysm present. RA/ RV normal size and function      EKG 12/9/20 - NSR     Sputum cultures   - 1/29/21 - candida parapsilosis, AFB negative x 3, + pseudomonas   - 4/16/21 - AFB negative x 3, fungal culture negative, respiratory  culture negative       REVIEW OF SYSTEMS     REVIEW OF SYSTEMS  Review of Systems   Constitutional:  Positive for chills and fatigue. Negative for fever.   HENT:  Negative for congestion, postnasal drip, rhinorrhea, sinus pressure and voice change.    Eyes:  Negative for pain and visual disturbance.   Respiratory:  Positive for cough and shortness of breath.    Cardiovascular:  Positive for chest pain and leg swelling. Negative for palpitations.   Gastrointestinal:  Positive for abdominal pain, diarrhea and nausea. Negative for vomiting.   Endocrine: Negative for cold intolerance and heat intolerance.   Musculoskeletal:  Positive for back pain. Negative for arthralgias, joint swelling and myalgias.   Skin:  Negative for rash.   Neurological:  Negative for dizziness, weakness, numbness and headaches.   Psychiatric/Behavioral:  Negative for agitation. The patient is nervous/anxious.         Depression         PHYSICAL EXAM     VITAL SIGNS: BP 97/60   Pulse 100   Temp 36.3 °C (97.3 °F)   Wt 57.2 kg (126 lb)   BMI 19.16 kg/m²      CURRENT WEIGHT: [unfilled]  BMI: [unfilled]  PREVIOUS WEIGHTS:  Wt Readings from Last 3 Encounters:   10/23/23 57.2 kg (126 lb)   10/18/23 58.1 kg (128 lb)   10/18/23 58.1 kg (128 lb)       Physical Exam  Vitals reviewed.   Constitutional:       General: He is not in acute distress.     Appearance: Normal appearance. He is not ill-appearing or toxic-appearing.   HENT:      Head: Normocephalic.      Nose: No rhinorrhea.   Cardiovascular:      Rate and Rhythm: Normal rate and regular rhythm.      Heart sounds: Normal heart sounds.   Pulmonary:      Effort: No respiratory distress.      Breath sounds: No stridor. Rhonchi present.   Abdominal:      General: Abdomen is flat.   Musculoskeletal:         General: No swelling. Normal range of motion.   Skin:     General: Skin is warm and dry.      Nails: There is no clubbing.   Neurological:      General: No focal deficit present.      Mental Status: He  is alert.   Psychiatric:         Mood and Affect: Mood normal.         Behavior: Behavior normal.         Judgment: Judgment normal.         ASSESSMENT/PLAN     1. COPD: states he was diagnosed 10+ years ago. Currently on triple inhaler therapy. Significant wheezing/rhonchi on exam today CT chest with concern for possible MAC infection - AFBs negative x 6. Sputum cultures positive for pseudomonas. Treated with 7-day Cipro course in 2/2021 with some improvement in sputum, but with resolution of CP. He has had worsening cough that causes him to vomit and CP restarted. Repeat cultures negative. Pt ill appearing in clinic last visit--> Sent down to the ED from clinic, but patient went home instead. Seems better today.   - continue fluticasone/salmeterol (advair) 500/50 1 inhalation twice daily - rinsing mouth out afterwards   - continue albuterol (ventolin) 2 puffs or albuterol nebulizers every 4-6 hours as needed--will send in ventolin since this works better for him than the proair   - continue montelukast (singulair) 10mg daily at bedtime   - airway clearance --> Use your albuterol --> then nebusal solution ->  then your vest 2-3 x per day      2. Chest pain: intermittent chest soreness   - will check for atypical infection -- had chest soreness in the past when he had pseudomonas infection      3. Smoking cessation:   - > 5 minutes smoking cessation counseling   - ordered nicotine patches -- [facility]     4. Vomiting/poor appetite/ bowel issues: has had poor appetite since Thanksgiving. Issues with constipation/diarrhea. He continues to have a poor appetite. Previous vomiting episodes had been resolving - unclear if related to productive cough/pseudomonas infection vs GERD. He is currently taking 40mg pantoprazole.   - continue drinking ensure supplements   - continue to follow with Gayle Palomares with GI      5. Health maintenance: multiple providers through [facility], , and [facility] -- discussed  consolidating this if possible.  - upcoming appt to establish care

## 2023-10-23 NOTE — PATIENT INSTRUCTIONS
1. COPD: states he was diagnosed 10+ years ago. Currently on triple inhaler therapy. Significant wheezing/rhonchi on exam today CT chest with concern for possible MAC infection - AFBs negative x 6. Sputum cultures positive for pseudomonas. Treated with 7-day Cipro course in 2/2021 with some improvement in sputum, but with resolution of CP. He has had worsening cough that causes him to vomit and CP restarted. Repeat cultures negative. Pt ill appearing in clinic today --> Sent down to the ED from clinic   - continue fluticasone/salmeterol (advair) 500/50 1 inhalation twice daily - rinsing mouth out afterwards   - continue albuterol (ventolin) 2 puffs or albuterol nebulizers every 4-6 hours as needed--will send in ventolin since this works better for him than the proair   - continue montelukast (singulair) 10mg daily at bedtime   - airway clearance --> Use your albuterol --> then nebusal solution ->  then your vest 2-3 x per day      2. Chest pain: intermittent chest soreness   - will check for atypical infection -- had chest soreness in the past when he had pseudomonas infection      3. Vomiting/poor appetite/ bowel issues: has had poor appetite since Thanksgiving. Issues with constipation/diarrhea. He continues to have a poor appetite. Previous vomiting episodes had been resolving - unclear if related to productive cough/pseudomonas infection vs GERD. He is currently taking 40mg pantoprazole.   - continue drinking ensure supplements   - continue to follow with Gayle Palomares with GI      5. Health maintenance: multiple providers through [place], , and [facility] -- discussed consolidating this if possible.  - upcoming appt to establish care      Thank you for visiting the Pulmonary clinic today!   Return to clinic 2 months or sooner if needed   Adenike Upton CNP  My office number is [phone]  Peyton is my  and Aleena is my nurse.   Radiology scheduling [phone]   Appointment scheduling [phone]

## 2023-10-24 ENCOUNTER — APPOINTMENT (OUTPATIENT)
Dept: SURGERY | Facility: CLINIC | Age: 58
End: 2023-10-24
Payer: COMMERCIAL

## 2023-10-24 LAB — PYRIDOXAL PHOS SERPL-SCNC: 56.5 NMOL/L (ref 20–125)

## 2023-10-25 DIAGNOSIS — J69.0 ASPIRATION PNEUMONITIS (MULTI): ICD-10-CM

## 2023-10-26 ENCOUNTER — TELEPHONE (OUTPATIENT)
Dept: GASTROENTEROLOGY | Facility: HOSPITAL | Age: 58
End: 2023-10-26

## 2023-10-26 DIAGNOSIS — J47.9 BRONCHIECTASIS, UNCOMPLICATED (MULTI): Primary | ICD-10-CM

## 2023-10-26 PROCEDURE — 87116 MYCOBACTERIA CULTURE: CPT | Performed by: NURSE PRACTITIONER

## 2023-10-26 PROCEDURE — 87102 FUNGUS ISOLATION CULTURE: CPT | Performed by: NURSE PRACTITIONER

## 2023-10-26 PROCEDURE — 87205 SMEAR GRAM STAIN: CPT | Performed by: NURSE PRACTITIONER

## 2023-10-27 ENCOUNTER — APPOINTMENT (OUTPATIENT)
Dept: UROLOGY | Facility: HOSPITAL | Age: 58
End: 2023-10-27
Payer: COMMERCIAL

## 2023-10-27 ENCOUNTER — LAB (OUTPATIENT)
Dept: LAB | Facility: LAB | Age: 58
End: 2023-10-27
Payer: COMMERCIAL

## 2023-10-29 RX ORDER — ALBUTEROL SULFATE 0.83 MG/ML
SOLUTION RESPIRATORY (INHALATION)
Qty: 180 ML | Refills: 10 | Status: SHIPPED | OUTPATIENT
Start: 2023-10-29 | End: 2024-01-22

## 2023-10-30 LAB
BACTERIA SPEC RESP CULT: NORMAL
GRAM STN SPEC: NORMAL
GRAM STN SPEC: NORMAL

## 2023-10-31 DIAGNOSIS — E46 MALNUTRITION COMPROMISING BODILY FUNCTION (MULTI): Primary | ICD-10-CM

## 2023-11-01 DIAGNOSIS — J44.1 COPD EXACERBATION (MULTI): Primary | ICD-10-CM

## 2023-11-02 RX ORDER — FLUTICASONE PROPIONATE AND SALMETEROL 50; 500 UG/1; UG/1
1 POWDER RESPIRATORY (INHALATION)
Qty: 1 EACH | Refills: 5 | Status: SHIPPED | OUTPATIENT
Start: 2023-11-02 | End: 2024-01-11 | Stop reason: CLARIF

## 2023-11-06 ENCOUNTER — OFFICE VISIT (OUTPATIENT)
Dept: PRIMARY CARE | Facility: CLINIC | Age: 58
End: 2023-11-06
Payer: COMMERCIAL

## 2023-11-06 ENCOUNTER — HOSPITAL ENCOUNTER (OUTPATIENT)
Dept: RADIOLOGY | Facility: HOSPITAL | Age: 58
Discharge: HOME | End: 2023-11-06
Payer: COMMERCIAL

## 2023-11-06 VITALS
WEIGHT: 118.2 LBS | DIASTOLIC BLOOD PRESSURE: 88 MMHG | BODY MASS INDEX: 17.91 KG/M2 | HEIGHT: 68 IN | OXYGEN SATURATION: 96 % | SYSTOLIC BLOOD PRESSURE: 136 MMHG | TEMPERATURE: 98 F | HEART RATE: 94 BPM

## 2023-11-06 DIAGNOSIS — F10.10 ALCOHOL ABUSE: Chronic | ICD-10-CM

## 2023-11-06 DIAGNOSIS — J45.909 ASTHMA, UNSPECIFIED ASTHMA SEVERITY, UNSPECIFIED WHETHER COMPLICATED, UNSPECIFIED WHETHER PERSISTENT (HHS-HCC): ICD-10-CM

## 2023-11-06 DIAGNOSIS — J47.9 BRONCHIECTASIS WITHOUT COMPLICATION (MULTI): ICD-10-CM

## 2023-11-06 DIAGNOSIS — R20.2 PARESTHESIAS: ICD-10-CM

## 2023-11-06 DIAGNOSIS — F05 DELIRIUM OF MIXED ORIGIN: ICD-10-CM

## 2023-11-06 DIAGNOSIS — F17.200 CHAIN SMOKER: Primary | ICD-10-CM

## 2023-11-06 PROCEDURE — G0009 ADMIN PNEUMOCOCCAL VACCINE: HCPCS | Performed by: FAMILY MEDICINE

## 2023-11-06 PROCEDURE — 72141 MRI NECK SPINE W/O DYE: CPT | Performed by: RADIOLOGY

## 2023-11-06 PROCEDURE — 99406 BEHAV CHNG SMOKING 3-10 MIN: CPT | Performed by: FAMILY MEDICINE

## 2023-11-06 PROCEDURE — 90677 PCV20 VACCINE IM: CPT | Performed by: FAMILY MEDICINE

## 2023-11-06 PROCEDURE — 72141 MRI NECK SPINE W/O DYE: CPT

## 2023-11-06 PROCEDURE — 90686 IIV4 VACC NO PRSV 0.5 ML IM: CPT | Performed by: FAMILY MEDICINE

## 2023-11-06 PROCEDURE — 99204 OFFICE O/P NEW MOD 45 MIN: CPT | Performed by: FAMILY MEDICINE

## 2023-11-06 PROCEDURE — G0008 ADMIN INFLUENZA VIRUS VAC: HCPCS | Performed by: FAMILY MEDICINE

## 2023-11-06 RX ORDER — IBUPROFEN 200 MG
TABLET ORAL
Qty: 14 PATCH | Refills: 0 | Status: SHIPPED | OUTPATIENT
Start: 2023-11-06 | End: 2023-11-30

## 2023-11-06 RX ORDER — FEEDER CONTAINER WITH PUMP SET
3 EACH MISCELLANEOUS DAILY
Qty: 21330 ML | Refills: 0 | Status: SHIPPED | OUTPATIENT
Start: 2023-11-06

## 2023-11-06 RX ORDER — TIOTROPIUM BROMIDE 18 UG/1
1 CAPSULE ORAL; RESPIRATORY (INHALATION)
Qty: 30 CAPSULE | Refills: 5 | Status: SHIPPED | OUTPATIENT
Start: 2023-11-06 | End: 2024-02-27 | Stop reason: SDUPTHER

## 2023-11-06 ASSESSMENT — ENCOUNTER SYMPTOMS: DEPRESSION: 1

## 2023-11-06 ASSESSMENT — PAIN SCALES - GENERAL: PAINLEVEL: 6

## 2023-11-06 NOTE — PROGRESS NOTES
"  Subjective   Chief complaint: Servando Leigh is a 58 y.o. male who presents for Establish Care and Med Refill.    HPI:  Here to establish care. Have multiple health issues. Diagnosed with COPD for which he is on inhalers and follows with Pulmonary.  Today he reports that he needs ensure as he is not eating enough and has been using ensure 3 times/day.  He reports chronic diarrhea and has difficulty with Incontinence, seen by GI and takes imodium regularly.  Currently smokes 4-5 cig/day. He drinks beer every weekend, 3 drinks at a time.      Ros:  Patient denies  Shortness of breath , chest pain  ++diarrhea  No fever or chills  No dysuria  No hearing or vision changes  No skin lesions.    Objective   /88 (BP Location: Left arm, Patient Position: Sitting)   Pulse 94   Temp 36.7 °C (98 °F) (Temporal)   Ht 1.727 m (5' 8\")   Wt 53.6 kg (118 lb 3.2 oz)   SpO2 96%   BMI 17.97 kg/m²       General appearance: alert and oriented, in no acute distress  Eyes: conjunctivae/corneas clear. PERRL,   Neck: no adenopathy, supple  Lungs:wheezing bilaterally  Heart: regular rate and rhythm, S1, S2 normal, no murmur, click, rub or gallop  Abdomen: soft, non-tender; bowel sounds normal; no masses, no organomegaly  Neurologic: Motor and sensory Grossly normal        I have reviewed and reconciled the medication list with the patient today.   Current Outpatient Medications:     acetaminophen (Tylenol) 325 mg tablet, Take 2 tablets (650 mg) by mouth every 6 hours if needed for mild pain (1 - 3)., Disp: 30 tablet, Rfl: 0    Advair Diskus 500-50 mcg/dose diskus inhaler, Inhale 1 puff 2 times a day. Rinse mouth with water after use to reduce aftertaste and incidence of candidiasis. Do not swallow., Disp: 1 each, Rfl: 5    albuterol 2.5 mg /3 mL (0.083 %) nebulizer solution, INHALE 1 VIAL VIA NEBULIZER EVERY 4 HOURS IF NEEDED FOR WHEEZING, Disp: 180 mL, Rfl: 10    ammonium lactate (Amlactin) 12 % cream, Apply 1 application twice a " day by topical route., Disp: , Rfl:     ascorbic acid (Vitamin C) 500 mg tablet, Take 1 tablet (500 mg) by mouth once daily., Disp: 30 tablet, Rfl: 0    atorvastatin (Lipitor) 10 mg tablet, Take 2 tablets (20 mg) by mouth once daily., Disp: 60 tablet, Rfl: 0    benzoyl peroxide (Benzac AC) 10 % external wash, Apply topically once daily., Disp: 142 g, Rfl: 0    bismuth subsalicylate (Pepto Bismol) 262 mg chewable tablet, Chew 2 tablets (524 mg)., Disp: , Rfl:     cholecalciferol (Vitamin D-3) 25 MCG (1000 UT) tablet, Take 1 tablet (25 mcg) by mouth once daily. Do not start before October 8, 2023. (Patient not taking: Reported on 10/23/2023), Disp: 30 tablet, Rfl: 0    cyclobenzaprine (Flexeril) 10 mg tablet, Take 1 tablet (10 mg) by mouth 2 times a day as needed (pain)., Disp: 60 tablet, Rfl: 0    diclofenac sodium (Voltaren) 1 % gel gel, Apply 1 Application topically 4 times a day as needed (joint pain)., Disp: 150 g, Rfl: 0    EPINEPHrine 0.3 mg/0.3 mL injection syringe, Inject 0.3 mL (0.3 mg) into the muscle. As Directed, Disp: , Rfl:     folic acid (Folvite) 400 mcg tablet, Take 1 tablet (0.4 mg) by mouth once daily. Do not start before October 8, 2023., Disp: 30 tablet, Rfl: 0    food supplemt, lactose-reduced (Ensure Original) 0.04-1.05 gram-kcal/mL liquid, Take 3 bottles by mouth once daily. 2-3 cans, Disp: 28567 mL, Rfl: 0    gabapentin (Neurontin) 600 mg tablet, Take 1 tablet (600 mg) by mouth 3 times a day., Disp: 90 tablet, Rfl: 0    ibuprofen 800 mg tablet, TAKE 1 TABLET 3 TIMES A DAY BY ORAL ROUTE., Disp: , Rfl:     levETIRAcetam (Keppra) 500 mg tablet, Take 1 tablet (500 mg) by mouth 2 times a day., Disp: 60 tablet, Rfl: 0    loperamide (Imodium) 2 mg capsule, Take 1 capsule (2 mg) by mouth 4 times a day as needed for diarrhea. (Patient not taking: Reported on 10/23/2023), Disp: 30 capsule, Rfl: 0    montelukast (Singulair) 10 mg tablet, Take 1 tablet (10 mg) by mouth once daily., Disp: 90 tablet, Rfl:  "3    multivitamin with minerals (Thera-M) tablet, Take 1 tablet by mouth once daily., Disp: 30 tablet, Rfl: 0    nicotine (Nicoderm CQ) 14 mg/24 hr patch, Place 1 patch over 24 hours on the skin once daily. Do not start before October 8, 2023., Disp: 30 patch, Rfl: 0    nicotine (Nicoderm CQ) 14 mg/24 hr patch, 1 patch daily for 2 weeks after completion of Step 1 (21 mg patches), Disp: 14 patch, Rfl: 0    oxyCODONE-acetaminophen (Percocet) 5-325 mg tablet, Take 1 tablet by mouth every 6 hours., Disp: , Rfl:     pantoprazole (ProtoNix) 40 mg EC tablet, Take 1 tablet (40 mg) by mouth once daily., Disp: 30 tablet, Rfl: 0    rOPINIRole (Requip) 0.5 mg tablet, Take 1 tablet (0.5 mg) by mouth once daily., Disp: 30 tablet, Rfl: 0    sodium chloride 3 % nebulizer solution, Take 4 mL by nebulization 2 times a day., Disp: 240 mL, Rfl: 0    sodium chloride 3 % nebulizer solution, Take 4 mL by nebulization 3 times a day., Disp: , Rfl:     tamsulosin (Flomax) 0.4 mg 24 hr capsule, Take 1 capsule (0.4 mg) by mouth once daily., Disp: 30 capsule, Rfl: 0    thiamine (Vitamin B-1) 100 mg tablet, Take 1 tablet (100 mg) by mouth once daily. Do not start before October 8, 2023. (Patient not taking: Reported on 10/23/2023), Disp: 30 tablet, Rfl: 0    Ventolin HFA 90 mcg/actuation inhaler, Inhale 2 puffs every 4 hours if needed for wheezing or shortness of breath., Disp: 8 g, Rfl: 11     Imaging:  No results found.     Labs reviewed:    Lab Results   Component Value Date    WBC 11.0 10/07/2023    HGB 12.1 (L) 10/07/2023    HCT 37.3 (L) 10/07/2023     10/07/2023    TRIG 155 (H) 11/12/2019    ALT 10 10/05/2023    AST 25 10/05/2023     10/07/2023    K 3.3 (L) 10/07/2023     10/07/2023    CREATININE 0.89 10/07/2023    BUN 7 10/07/2023    CO2 25 10/07/2023    TSH 0.27 (L) 10/05/2023    INR 1.0 10/05/2023    HGBA1C 5.2 10/18/2023     No results found for: \"CHOL\"  No results found for: \"HDL\"  No results found for: " "\"LDLCALC\"  Lab Results   Component Value Date    TRIG 155 (H) 11/12/2019     No components found for: \"CHOLHDL\"      Assessment/Plan   Diagnoses and all orders for this visit:  Chain smoker: counseling given  Bronchiectasis without complication (CMS/HCC)  -     nicotine (Nicoderm CQ) 14 mg/24 hr patch; 1 patch daily for 2 weeks after completion of Step 1 (21 mg patches)  -     tiotropium (Spiriva) 18 mcg inhalation capsule; Place 1 capsule (18 mcg) into inhaler and inhale once daily.  Asthma, unspecified asthma severity, unspecified whether complicated, unspecified whether persistent  -     nicotine (Nicoderm CQ) 14 mg/24 hr patch; 1 patch daily for 2 weeks after completion of Step 1 (21 mg patches)  Malnutrition  -     food supplemt, lactose-reduced (Ensure Original) 0.04-1.05 gram-kcal/mL liquid; Take 3 bottles by mouth once daily. 2-3 cans  Alcohol abuse  -     Referral to Psychology; Future  Other orders  -     Flu vaccine (IIV4) age 6 months and greater, preservative free  -     Pneumococcal conjugate vaccine, 20-valent (PREVNAR 20)    Diagnosis and Management discussed with the patient.  Patient agreeable with plan.  Patient advised to Return to clinic with new or unresolved symptoms.  Jay Said MD    This note was partially generated using the Dragon Voice Recognition System and there may be some incorrect words or wording, spelling and /or spelling errors, or punctuation errors that were not corrected prior to committing the note to the medical record.      "

## 2023-11-13 ASSESSMENT — ENCOUNTER SYMPTOMS
ABDOMINAL PAIN: 1
DIARRHEA: 1
WEAKNESS: 0
SINUS PRESSURE: 0
ARTHRALGIAS: 0
JOINT SWELLING: 0
EYE PAIN: 0
MYALGIAS: 0
COUGH: 1
BACK PAIN: 1
HEADACHES: 0
VOICE CHANGE: 0
PALPITATIONS: 0
FATIGUE: 1
RHINORRHEA: 0
NAUSEA: 1
DIZZINESS: 0
VOMITING: 0
NUMBNESS: 0
FEVER: 0
AGITATION: 0
CHILLS: 1
SHORTNESS OF BREATH: 1
NERVOUS/ANXIOUS: 1

## 2023-11-14 ENCOUNTER — TELEPHONE (OUTPATIENT)
Dept: PRIMARY CARE | Facility: CLINIC | Age: 58
End: 2023-11-14

## 2023-11-14 NOTE — TELEPHONE ENCOUNTER
Microbiology is calling to discuss some critical results, the sample was positive for acid bacilli.  Former PT of Dr. Simms

## 2023-11-15 ENCOUNTER — APPOINTMENT (OUTPATIENT)
Dept: PULMONOLOGY | Facility: HOSPITAL | Age: 58
End: 2023-11-15
Payer: COMMERCIAL

## 2023-11-22 LAB
FUNGUS SPEC CULT: ABNORMAL
FUNGUS SPEC CULT: ABNORMAL
FUNGUS SPEC FUNGUS STN: ABNORMAL
LABORATORY COMMENT REPORT: ABNORMAL

## 2023-11-24 ENCOUNTER — APPOINTMENT (OUTPATIENT)
Dept: UROLOGY | Facility: HOSPITAL | Age: 58
End: 2023-11-24
Payer: COMMERCIAL

## 2023-11-29 DIAGNOSIS — J47.9 BRONCHIECTASIS WITHOUT COMPLICATION (MULTI): ICD-10-CM

## 2023-11-29 DIAGNOSIS — J45.909 ASTHMA, UNSPECIFIED ASTHMA SEVERITY, UNSPECIFIED WHETHER COMPLICATED, UNSPECIFIED WHETHER PERSISTENT (HHS-HCC): ICD-10-CM

## 2023-11-30 RX ORDER — IBUPROFEN 200 MG
TABLET ORAL
Qty: 14 PATCH | Refills: 10 | Status: SHIPPED | OUTPATIENT
Start: 2023-11-30 | End: 2024-02-29 | Stop reason: WASHOUT

## 2023-12-07 LAB
ACID FAST STN SPEC: ABNORMAL
MYCOBACTERIUM SPEC CULT: ABNORMAL

## 2023-12-15 ENCOUNTER — APPOINTMENT (OUTPATIENT)
Dept: UROLOGY | Facility: HOSPITAL | Age: 58
End: 2023-12-15
Payer: COMMERCIAL

## 2024-01-05 ENCOUNTER — APPOINTMENT (OUTPATIENT)
Dept: UROLOGY | Facility: HOSPITAL | Age: 59
End: 2024-01-05
Payer: COMMERCIAL

## 2024-01-05 ENCOUNTER — APPOINTMENT (OUTPATIENT)
Dept: NEUROLOGY | Facility: HOSPITAL | Age: 59
End: 2024-01-05
Payer: COMMERCIAL

## 2024-01-11 DIAGNOSIS — J44.1 COPD EXACERBATION (MULTI): Primary | ICD-10-CM

## 2024-01-11 RX ORDER — FLUTICASONE PROPIONATE AND SALMETEROL 500; 50 UG/1; UG/1
1 POWDER RESPIRATORY (INHALATION)
Qty: 60 EACH | Refills: 5 | Status: SHIPPED | OUTPATIENT
Start: 2024-01-11 | End: 2024-01-22

## 2024-01-17 DIAGNOSIS — R41.0 CONFUSION WITH NON-FOCAL NEURO EXAM: Chronic | ICD-10-CM

## 2024-01-17 DIAGNOSIS — R33.9 RETENTION OF URINE: ICD-10-CM

## 2024-01-17 DIAGNOSIS — G89.29 CHRONIC NECK PAIN: ICD-10-CM

## 2024-01-17 DIAGNOSIS — J69.0 ASPIRATION PNEUMONITIS (MULTI): ICD-10-CM

## 2024-01-17 DIAGNOSIS — R07.89 CHEST TIGHTNESS: ICD-10-CM

## 2024-01-17 DIAGNOSIS — J44.1 COPD EXACERBATION (MULTI): ICD-10-CM

## 2024-01-17 DIAGNOSIS — J45.909 ASTHMA, UNSPECIFIED ASTHMA SEVERITY, UNSPECIFIED WHETHER COMPLICATED, UNSPECIFIED WHETHER PERSISTENT (HHS-HCC): Primary | ICD-10-CM

## 2024-01-17 DIAGNOSIS — M54.2 CHRONIC NECK PAIN: ICD-10-CM

## 2024-01-17 DIAGNOSIS — K61.1 PERIRECTAL ABSCESS: ICD-10-CM

## 2024-01-17 DIAGNOSIS — R56.9 SEIZURES (MULTI): ICD-10-CM

## 2024-01-22 ENCOUNTER — DOCUMENTATION (OUTPATIENT)
Dept: PRIMARY CARE | Facility: CLINIC | Age: 59
End: 2024-01-22
Payer: COMMERCIAL

## 2024-01-22 ENCOUNTER — OFFICE VISIT (OUTPATIENT)
Dept: GASTROENTEROLOGY | Facility: HOSPITAL | Age: 59
End: 2024-01-22
Payer: COMMERCIAL

## 2024-01-22 VITALS
SYSTOLIC BLOOD PRESSURE: 121 MMHG | WEIGHT: 111 LBS | DIASTOLIC BLOOD PRESSURE: 64 MMHG | RESPIRATION RATE: 18 BRPM | BODY MASS INDEX: 16.82 KG/M2 | OXYGEN SATURATION: 95 % | HEIGHT: 68 IN | TEMPERATURE: 97 F | HEART RATE: 109 BPM

## 2024-01-22 DIAGNOSIS — R11.2 NAUSEA AND VOMITING, UNSPECIFIED VOMITING TYPE: ICD-10-CM

## 2024-01-22 DIAGNOSIS — E46 MALNUTRITION COMPROMISING BODILY FUNCTION (MULTI): ICD-10-CM

## 2024-01-22 DIAGNOSIS — K58.0 IRRITABLE BOWEL SYNDROME WITH DIARRHEA: Primary | ICD-10-CM

## 2024-01-22 PROCEDURE — 99214 OFFICE O/P EST MOD 30 MIN: CPT | Performed by: PHYSICIAN ASSISTANT

## 2024-01-22 RX ORDER — MIRTAZAPINE 15 MG/1
TABLET, FILM COATED ORAL
Refills: 0 | OUTPATIENT
Start: 2024-01-22

## 2024-01-22 RX ORDER — ROPINIROLE 0.5 MG/1
0.5 TABLET, FILM COATED ORAL 3 TIMES DAILY
Qty: 90 TABLET | Refills: 3 | Status: SHIPPED | OUTPATIENT
Start: 2024-01-22 | End: 2024-02-27 | Stop reason: SDUPTHER

## 2024-01-22 RX ORDER — ALBUTEROL SULFATE 90 UG/1
2 AEROSOL, METERED RESPIRATORY (INHALATION) EVERY 4 HOURS PRN
Qty: 18 G | Refills: 1 | Status: SHIPPED | OUTPATIENT
Start: 2024-01-22 | End: 2024-02-27 | Stop reason: SDUPTHER

## 2024-01-22 RX ORDER — FLUTICASONE FUROATE AND VILANTEROL 100; 25 UG/1; UG/1
POWDER RESPIRATORY (INHALATION)
Refills: 0 | OUTPATIENT
Start: 2024-01-22

## 2024-01-22 RX ORDER — LOPERAMIDE HYDROCHLORIDE 2 MG/1
CAPSULE ORAL
Refills: 0 | OUTPATIENT
Start: 2024-01-22

## 2024-01-22 RX ORDER — EPINEPHRINE 0.3 MG/.3ML
1 INJECTION SUBCUTANEOUS ONCE AS NEEDED
Qty: 2 EACH | Refills: 0 | Status: SHIPPED | OUTPATIENT
Start: 2024-01-22 | End: 2024-02-27 | Stop reason: SDUPTHER

## 2024-01-22 RX ORDER — TRIAMCINOLONE ACETONIDE 1 MG/G
CREAM TOPICAL
Refills: 0 | OUTPATIENT
Start: 2024-01-22

## 2024-01-22 RX ORDER — ATORVASTATIN CALCIUM 10 MG/1
10 TABLET, FILM COATED ORAL NIGHTLY
Qty: 90 TABLET | Refills: 3 | Status: SHIPPED | OUTPATIENT
Start: 2024-01-22 | End: 2024-02-27 | Stop reason: SDUPTHER

## 2024-01-22 RX ORDER — MONTELUKAST SODIUM 10 MG/1
10 TABLET ORAL NIGHTLY
Qty: 30 TABLET | Refills: 3 | Status: SHIPPED | OUTPATIENT
Start: 2024-01-22 | End: 2024-02-27 | Stop reason: SDUPTHER

## 2024-01-22 RX ORDER — FLUTICASONE PROPIONATE AND SALMETEROL 500; 50 UG/1; UG/1
1 POWDER RESPIRATORY (INHALATION)
Qty: 60 EACH | Refills: 3 | Status: SHIPPED | OUTPATIENT
Start: 2024-01-22 | End: 2024-02-27 | Stop reason: SDUPTHER

## 2024-01-22 RX ORDER — UMECLIDINIUM 62.5 UG/1
AEROSOL, POWDER ORAL
Refills: 0 | OUTPATIENT
Start: 2024-01-22

## 2024-01-22 RX ORDER — LEVETIRACETAM 500 MG/1
500 TABLET ORAL 2 TIMES DAILY
Qty: 60 TABLET | Refills: 11 | Status: SHIPPED | OUTPATIENT
Start: 2024-01-22 | End: 2024-02-27 | Stop reason: SDUPTHER

## 2024-01-22 RX ORDER — FLUTICASONE PROPIONATE 50 MCG
SPRAY, SUSPENSION (ML) NASAL
Refills: 0 | OUTPATIENT
Start: 2024-01-22

## 2024-01-22 RX ORDER — TAMSULOSIN HYDROCHLORIDE 0.4 MG/1
0.4 CAPSULE ORAL DAILY
Qty: 90 CAPSULE | Refills: 3 | Status: SHIPPED | OUTPATIENT
Start: 2024-01-22 | End: 2024-02-27 | Stop reason: SDUPTHER

## 2024-01-22 RX ORDER — GABAPENTIN 600 MG/1
600 TABLET ORAL DAILY
Qty: 30 TABLET | Refills: 2 | Status: SHIPPED | OUTPATIENT
Start: 2024-01-22 | End: 2024-02-27 | Stop reason: SDUPTHER

## 2024-01-22 RX ORDER — ALBUTEROL SULFATE 0.83 MG/ML
2.5 SOLUTION RESPIRATORY (INHALATION) EVERY 6 HOURS PRN
Qty: 30 ML | Refills: 1 | Status: SHIPPED | OUTPATIENT
Start: 2024-01-22 | End: 2024-02-27 | Stop reason: SDUPTHER

## 2024-01-22 RX ORDER — IBUPROFEN 200 MG
TABLET ORAL
Refills: 0 | OUTPATIENT
Start: 2024-01-22

## 2024-01-22 RX ORDER — CYCLOBENZAPRINE HCL 10 MG
TABLET ORAL
Refills: 0 | OUTPATIENT
Start: 2024-01-22

## 2024-01-22 SDOH — ECONOMIC STABILITY: FOOD INSECURITY: WITHIN THE PAST 12 MONTHS, YOU WORRIED THAT YOUR FOOD WOULD RUN OUT BEFORE YOU GOT MONEY TO BUY MORE.: NEVER TRUE

## 2024-01-22 SDOH — ECONOMIC STABILITY: FOOD INSECURITY: WITHIN THE PAST 12 MONTHS, THE FOOD YOU BOUGHT JUST DIDN'T LAST AND YOU DIDN'T HAVE MONEY TO GET MORE.: NEVER TRUE

## 2024-01-22 ASSESSMENT — ENCOUNTER SYMPTOMS
DEPRESSION: 0
FATIGUE: 1
EYE REDNESS: 0
NAUSEA: 1
AGITATION: 0
JOINT SWELLING: 0
DYSPHORIC MOOD: 0
SORE THROAT: 0
OCCASIONAL FEELINGS OF UNSTEADINESS: 0
ABDOMINAL PAIN: 0
DIARRHEA: 1
WHEEZING: 1
DYSURIA: 0
BLOOD IN STOOL: 0
TROUBLE SWALLOWING: 0
LOSS OF SENSATION IN FEET: 0
CONSTIPATION: 0
DIZZINESS: 0
HEADACHES: 0
CONFUSION: 0
POLYPHAGIA: 0

## 2024-01-22 ASSESSMENT — COLUMBIA-SUICIDE SEVERITY RATING SCALE - C-SSRS
1. IN THE PAST MONTH, HAVE YOU WISHED YOU WERE DEAD OR WISHED YOU COULD GO TO SLEEP AND NOT WAKE UP?: NO
6. HAVE YOU EVER DONE ANYTHING, STARTED TO DO ANYTHING, OR PREPARED TO DO ANYTHING TO END YOUR LIFE?: NO
2. HAVE YOU ACTUALLY HAD ANY THOUGHTS OF KILLING YOURSELF?: NO

## 2024-01-22 ASSESSMENT — PATIENT HEALTH QUESTIONNAIRE - PHQ9
2. FEELING DOWN, DEPRESSED OR HOPELESS: NOT AT ALL
1. LITTLE INTEREST OR PLEASURE IN DOING THINGS: NOT AT ALL
SUM OF ALL RESPONSES TO PHQ9 QUESTIONS 1 AND 2: 0

## 2024-01-22 ASSESSMENT — PAIN SCALES - GENERAL: PAINLEVEL: 0-NO PAIN

## 2024-01-22 NOTE — PATIENT INSTRUCTIONS
Continue your Immodium dose.  Will restart Metamucil.  Take twice a day.  Schedule follow up with me in 2 months. At that visit,we can refer you to a Dietician

## 2024-01-22 NOTE — PROGRESS NOTES
"Subjective   Patient ID: Servando Leigh is a 58 y.o. male who presents for follow up. Last OV with me was back in Dec 2022.    HPI  Mr. Leigh is a 56 y/o AAM with h/o COPD, seizure d/o ( on Keppra - last seizure was back in 2019), h/o perirectal abscess s/p EUA with seton placed  by colorectal surgery, who was seen here initially on July 2020 secondary to chronic abd pain and diarrhea, with loss of appetite and gradual weight loss.      Pt states that he has chronic diarrhea and abd pain for up to 10 yrs, when he first saw me. He has underwent EGD and Colonoscopy several times at [facility]. Pt does not recall being diagnosed with colitis ( IBD or microscopic ). He has taken Immodium and Pepto bismol with some minimal relief.     He was having about 6-8 episodes of watery/loose/formed BMs. He has some periumbilical abd pain (cramping) with these episodes, and intermittent episodes of hematochezia, and nocturnal awakenings.   His appetite was low and his weight \"goes up and down\".     Pt has h/o scrotal and perirectal abscess that was diagnosed back in Nov 2019 after he \"passed out\" and ever since then, he has been having problems with this rectal abscess.     Pt tested for C.diff back in 12/2019 and it was negative.      We treated him for H.pylori infection with the triple therapy ( pt was positve for H.pylori from a 2017 EGD done at [facility], and he does not recall being treated ) and he completed them at end of July 2020.  His H.pylori breath test was negative on Oct 2020.     He underwent a colonoscopy on 8/27/20 and it revealed:  Impression: - A single yellow seton and evidence of a recent    fistulotomy, found on perianal exam.    - Moderate amount of semi-liquid stool in the entire    examined colon.    - The examined portion of the terminal ileum was normal.    - One diminutive polyp in the distal rectum, removed with    a cold biopsy forceps. Resected and retrieved.    - The colon " "examination was otherwise normal.    - Scar in the distal rectum.    - Moderate non-bleeding external and internal hemorrhoids.    - Random biopsies were taken with a cold forceps for    histology in the rectum, in the sigmoid colon, in the    descending colon, in the transverse colon, in the right    colon and in the terminal ileum.     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 All the random colon biopsies were negative for any pathology.     We then obtained a CT abd/p on Oct 2020 to assess for any lesions and it was negative for any lesions or mets.  He did have a rectal abscess that colorectal knew about. He did undergo another fistulotomy on Nov 2020.   Our plan was then to repeat a flex sigmoidoscopy in Feb 2021 to assess distal rectum for any re occurrence of NE tumor.      Back in 2023, he was still c/o persistent diarrhea \"most of the time\" and  vomiting episodes in the morning for the past few months.  Pt admits to vomiting up \"liquid\" emesis, not necessarily any food. He admits to smoking marijuana chronically (1-2 blunts/day) and that he does snack late at night. His appetite has decreased and he does drink Ensure or Boost to keep his nutrition maintained. His weight was stable at that time.      On a previous visit, we gave him cholestyramine which significantly improved his diarrhea.    He underwent a Flex sigmoid on 2/11/21 secondary to NE lesion in rectum.  Findings of this exam revealed decreased sphincter tone, scar in rectum at 10cm (biopsy pending), scars in distal rectum and moderate non bleeding internal and external hemorrhoids. He will due for repeat colonoscopy in 5 yrs (2025- since last full colonoscopy was on 8/2020).     Back in May 2021, pt stated that his diarrhea was worsening and therefore we did a carcinoid w/u including labs and MRE on June 2021 that all revealed normal studies.     Since he was c/o persistent n/v, pt did undergo an EGD on June 2021 (with  " "[facility]) and it was unrevealing for cause of sx.. He recently underwent another EGD on Nov 2021 (with Dr. Scott) and only a small HH was found and biopsies of stomach and duodenum were negative.      On a visit back in Dec 2021, pt informed me he was taking Pantoprazole 3x/day and the Cholestyramine daily (he forgot why he stopped taking it BID).  I informed him to only take the PPI daily, or he may experience side effects of this medication (such as worsen his diarrhea).      We did order a GES and it was completed on Jan 2022 and it was a normal study.     Back in  11/2022, he was seen as a telephone visit ( he was scheduled as a virtual appt, but is having technical difficulties with his phone). Pt just wanted to make sure he was on the \"proper meds\" for us. Since [facility] closed, he has to obtain a different PCP.  At that time, he felt that his diarrhea was stable and under control with use of immodium and cholestyramine (when needed). He still takes Pantoprazole 40 mg daily (for his chronic nausea). He also started wearing an AFFLO vest (which vibrates his chest and breaks down the mucus) for 30 minutes a day and he claims that this has helped with his nausea significantly.      His last OV with me was on 12/2022. He wanted advice on his bowel regimen. He takes Immodium (2 caps daily) secondary to his chronic diarrhea, but then he is noticing that it \"blocks him up\".   He also feels that his nausea has returned, but admits that he is \"bringing up mucus\". He is in the process of collecting sputum sample for pulmonary.    On his last visit with me, we then had him decrease his Immodium dose to 1 cap daily and added fiber supplementation to his daily routine. I also mentioned follow up with Colorectal surgery, since he had some intermittent incontinence.     Pt is back today for follow up. His diarrhea seems to have returned but he admits that he ran out of both fiber and cholestyramine.  He would like to " "restart fiber first to see if this would be enough to bulk up and slow down his BMs.   He is still taking his Pantoprazole 40 mg daily and his nausea is about the same.  He admits that he only wears his AFFLO vest weekly.  He does have a follow up with Pulmonary soon.        Pt has lost weight, approx 9-10 lbs in 1 year, but pt admits that he is not eating because of poor appetite and just drinks 2-3 Ensures a day.    Review of Systems   Constitutional:  Positive for fatigue (chronic).   HENT:  Negative for sore throat and trouble swallowing.    Eyes:  Negative for redness.   Respiratory:  Positive for wheezing (chronic).    Cardiovascular:  Negative for chest pain and leg swelling.   Gastrointestinal:  Positive for diarrhea (chronic,but also non compliant with certain meds) and nausea (chronic). Negative for abdominal pain, blood in stool and constipation.   Endocrine: Negative for polyphagia.   Genitourinary:  Negative for dysuria.   Musculoskeletal:  Negative for joint swelling.   Skin:  Negative for rash.   Neurological:  Negative for dizziness and headaches.   Psychiatric/Behavioral:  Negative for agitation, confusion and dysphoric mood.          Objective   Visit Vitals  /64   Pulse 109   Temp 36.1 °C (97 °F)   Resp 18   Ht 1.727 m (5' 8\")   Wt 50.3 kg (111 lb)   SpO2 95%   BMI 16.88 kg/m²   Smoking Status Every Day   BSA 1.55 m²        Physical Exam  Vitals reviewed.   Constitutional:       General: He is not in acute distress.     Appearance: He is not ill-appearing.   HENT:      Head: Normocephalic and atraumatic.   Eyes:      General: No scleral icterus.  Cardiovascular:      Rate and Rhythm: Normal rate and regular rhythm.      Heart sounds: Normal heart sounds.   Pulmonary:      Effort: Pulmonary effort is normal.      Breath sounds: Wheezing and rhonchi present.   Abdominal:      General: There is no distension.      Palpations: Abdomen is soft.      Tenderness: There is no abdominal tenderness. "   Musculoskeletal:         General: No swelling or deformity.      Cervical back: Neck supple.   Neurological:      General: No focal deficit present.      Mental Status: He is alert. Mental status is at baseline.   Psychiatric:         Mood and Affect: Mood normal.         Behavior: Behavior normal.           Assessment/Plan     1) Chronic diarrhea and h/o scrotal and perirectal abscess with h/o of EUA with seton placement, as well as multiple I and Ds. - Pt had extensive w/u to assess for organic cause of diarrhea but it has been unrevealing and it is likely he has IBS-D.  s/p Colonoscopy on 8/2020 revealing normal colon biopsies, but a small rectal polyp (positive for neuroendocrine, grade 1) was found. CT abd/p from Oct 2020 did not show any other concerning lesions. Flex sigmoid from Feb 2021 did not show any reoccurence of NE lesions.   We also worked him up for carcinoid lesion via labs ( including gastrin, urine 5HIAA) and MRE on June 2021 and all studies were normal. We also checked pancreatic elastase to r/o EPI and it was normal.     We will restart fiber supplementation to BID dosing. If no significant relief, pt to resume use of Cholestyramine as needed, with his Immodium.     Pt will be due for his next screening colonoscopy in 2025. Last full colonoscopy was on 2020.     2) Chronic n/v -   Pt does have COPD and his bronchiectasis may be contributing factor and may be the reason for his episodes.   Encouraged pt to use Afflovest more regularly and consistently. Pt to continue f/u with pulmonary medicine.  Pt had 2 EGDs back in 2021 that was unrevealing except for a small HH and some erythema in antrum. Pt can continue daily use of Pantoprazole 40 mg daily for now.      3) h/o H.pylori infection -  completed triple therapy regimen (Amoxil, clarithromycin, and PPI) at end of July 2020.  His H.pylori breath test was negative on Oct 2020.      4) Anorexia/malnutrition -  Likely multifactorial. Continue  with Ensure supplementation.  Consider dietician consult to ensure adequate caloric/protein diet.    Follow up in 2 months.

## 2024-01-22 NOTE — PROGRESS NOTES
Received pharmacy request for refill of 19 medications.  This patient was last seen in November 2023 to establish care with Dr. Thompson Said.  He has left the program.  I reviewed the primary care and pulmonology notes, and determined that half of these medications have either not been reconciled or no longer appropriate.  I refilled antiseizure medications, currently listed and verified asthma/COPD medications by pulmonology, as well as medications for his chronic issues.  Many of the other medications such as skin creams and muscle relaxers were not renewed.  He has a follow-up appointment scheduled with me on 2/6/2024.

## 2024-01-31 ENCOUNTER — APPOINTMENT (OUTPATIENT)
Dept: NEUROLOGY | Facility: HOSPITAL | Age: 59
End: 2024-01-31
Payer: COMMERCIAL

## 2024-02-02 ENCOUNTER — OFFICE VISIT (OUTPATIENT)
Dept: NEUROLOGY | Facility: HOSPITAL | Age: 59
End: 2024-02-02
Payer: COMMERCIAL

## 2024-02-02 ENCOUNTER — TELEMEDICINE (OUTPATIENT)
Dept: PULMONOLOGY | Facility: HOSPITAL | Age: 59
End: 2024-02-02
Payer: COMMERCIAL

## 2024-02-02 ENCOUNTER — APPOINTMENT (OUTPATIENT)
Dept: PRIMARY CARE | Facility: CLINIC | Age: 59
End: 2024-02-02
Payer: COMMERCIAL

## 2024-02-02 ENCOUNTER — HOSPITAL ENCOUNTER (OUTPATIENT)
Dept: NEUROLOGY | Facility: HOSPITAL | Age: 59
Discharge: HOME | End: 2024-02-02
Payer: COMMERCIAL

## 2024-02-02 DIAGNOSIS — J47.9 BRONCHIECTASIS WITHOUT COMPLICATION (MULTI): Primary | ICD-10-CM

## 2024-02-02 DIAGNOSIS — J45.909 ASTHMA, UNSPECIFIED ASTHMA SEVERITY, UNSPECIFIED WHETHER COMPLICATED, UNSPECIFIED WHETHER PERSISTENT (HHS-HCC): ICD-10-CM

## 2024-02-02 DIAGNOSIS — M47.12 CERVICAL SPONDYLOSIS WITH MYELOPATHY: ICD-10-CM

## 2024-02-02 DIAGNOSIS — G62.9 SMALL FIBER NEUROPATHY: Primary | ICD-10-CM

## 2024-02-02 DIAGNOSIS — J69.0 ASPIRATION PNEUMONITIS (MULTI): ICD-10-CM

## 2024-02-02 DIAGNOSIS — R20.2 PARESTHESIAS: ICD-10-CM

## 2024-02-02 PROCEDURE — 99214 OFFICE O/P EST MOD 30 MIN: CPT | Performed by: PSYCHIATRY & NEUROLOGY

## 2024-02-02 PROCEDURE — 99214 OFFICE O/P EST MOD 30 MIN: CPT | Mod: 27 | Performed by: PSYCHIATRY & NEUROLOGY

## 2024-02-02 PROCEDURE — 95923 AUTONOMIC NRV SYST FUNJ TEST: CPT | Performed by: PSYCHIATRY & NEUROLOGY

## 2024-02-02 PROCEDURE — 99215 OFFICE O/P EST HI 40 MIN: CPT | Performed by: NURSE PRACTITIONER

## 2024-02-02 PROCEDURE — 99215 OFFICE O/P EST HI 40 MIN: CPT | Mod: 95 | Performed by: NURSE PRACTITIONER

## 2024-02-02 ASSESSMENT — ENCOUNTER SYMPTOMS
ABDOMINAL PAIN: 0
NAUSEA: 0
RHINORRHEA: 0
MYALGIAS: 0
HEADACHES: 1
FATIGUE: 1
CHILLS: 1
BACK PAIN: 0
ARTHRALGIAS: 0
DIZZINESS: 0
JOINT SWELLING: 0
NUMBNESS: 0
AGITATION: 0
WEAKNESS: 0
VOICE CHANGE: 0
SINUS PRESSURE: 0
COUGH: 1
FEVER: 0
VOMITING: 0
DIARRHEA: 1
NERVOUS/ANXIOUS: 0
PALPITATIONS: 0
EYE PAIN: 0

## 2024-02-02 NOTE — PATIENT INSTRUCTIONS
1. COPD: states he was diagnosed 10+ years ago. Currently on triple inhaler therapy. Significant wheezing/rhonchi on exam today CT chest with concern for possible MAC infection - AFBs negative x 6. Sputum cultures positive for pseudomonas. Treated with 7-day Cipro course in 2/2021 with some improvement in sputum, but with resolution of CP. He has had worsening cough that causes him to vomit and CP restarted. Repeat cultures negative. Pt ill appearing in clinic last visit--> Sent down to the ED from clinic, but patient went home instead. Seems better today.   - continue fluticasone/salmeterol (advair) 500/50 1 inhalation twice daily - rinsing mouth out afterwards   - continue albuterol (ventolin) 2 puffs or albuterol nebulizers every 4-6 hours as needed--will send in ventolin since this works better for him than the proair   - continue montelukast (singulair) 10mg daily at bedtime   - airway clearance --> Use your albuterol --> then nebusal solution ->  then your vest 2-3 x per day   - will discuss if he would benefit from speaking to pharmacy to help with medications   - will repeat sputum cultures   - will get CT chest      2. Chest pain: intermittent chest soreness   - will check for atypical infection -- had chest soreness in the past when he had pseudomonas infection -- repeat sputum cultures      3. Smoking cessation:   - > 5 minutes smoking cessation counseling   - ordered nicotine patches -- [facility] to [facility]     4. Vomiting/poor appetite/ bowel issues: has had poor appetite since Thanksgiving. Issues with constipation/diarrhea. He continues to have a poor appetite. Previous vomiting episodes had been resolving - unclear if related to productive cough/pseudomonas infection vs GERD. He is currently taking 40mg pantoprazole.   - continue drinking ensure supplements   - continue to follow with Gayle Palomares with GI   - I am going to clarify how to take your GI meds and let you know      5. Health maintenance:  multiple providers through [facility], , and [facility] -- discussed consolidating this if possible.  - upcoming appt to establish care    Thank you for visiting the Pulmonary clinic today!   Return to clinic- will make follow up appt with Dr. Daija Upton CNP  My office -  [phone]- Peyton is my  and Aleena is my nurse.   Radiology scheduling [phone]   Appointment scheduling [phone]   Pulmonary function testing - [phone]

## 2024-02-02 NOTE — PROGRESS NOTES
Date of Service: 2/2/2024  Patient: Servando Leigh  MRN: [id number]      History of Present Illness:    Mr. Leigh is a 58 y.o. male who returns today for follow-up regarding numbness in the legs.  He was evaluated on 10/18/2023.    He has had no change in his symptoms.  He reports about a 20-year history of numbness in both feet and hands that did not respond to cervical spine fusion.  began in his feet approximately 20 years ago, and later on involved his bilateral hands.  They are moderately numb, and the paresthesias do bother him, particularly in his feet as the tingling is uncomfortable when he is walking.      He has a history of alcoholism for many years and epilepsy on multiple anticonvulsants.  He has been on gabapentin for pain control.  He also takes multivitamins.  He has no known history of diabetes. In addition, he has a 40-year smoking history and also uses marijuana.     He was started on gabapentin at some point 4 to 5 years ago, on 600 mg twice a day.  Not sure if it is truly helping, but he does take it.     Other relevant history includes longstanding history of alcohol use, he reports since age 10.  It is difficult for him to determine how much alcohol he uses on a daily basis, as he states it varies based on the time of day, his mood, other circumstances including holidays, the people around him.  He denies drinking any hard liquor, just beer.     Additional surgical history, includes surgery on the left fifth metatarsal for fracture, and right shoulder replacement.     He continues to take Keppra, Depakote and topiramate for his seizure disorder.  Otherwise, the past medical history, social history, and review of systems were reviewed. There are no significant changes.    Neuromuscular Exam:      His neuromuscular examination is pertinent for preserved and easily obtain reflexes throughout including the ankle jerks, loss of small fiber modalities including pinprick and touch but preservation of  proprioception vibration sense and normal gait.     Results:     I reviewed his blood work from last visit.  Hemoglobin A1c, methylmalonic acid, vitamin B6, and serum protein electrophoresis with immunofixation were normal.    I personally reviewed his quantitative sudomotor axonal reflex test done today.  This showed significant reduction of sweat output in the lower extremity consistent with a small fiber neuropathy.    I personally reviewed the images of his postoperative MRI of the cervical spine from 11/6/2023.  There is residual cervical spondylosis as well as myelomalacia involving the cervical spinal cord at C4-C5, more pronounced along the rightward aspect.  Postoperative change of C4-C6 anterior cervical discectomy and fusion with plate and screw components as well as anterior fusion at C3-C4.  There is disc bulge/endplate spurring at C3-C4.results in mild to moderate spinal canal stenosis with ventral cord flattening but no definite cord signal abnormality.    Diagnosis:     Problem List Items Addressed This Visit          Neurologic Problems    Small fiber neuropathy - Primary    Cervical spondylosis with myelopathy      Impression/Plan:     Servando Leigh is a 58 y.o. 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  His MRI of the cervical spine showed residual spondylosis as well as myelomalacia.  His quantitative sudomotor axonal reflex studies were abnormal and consistent with small fiber neuropathy.      I discussed this with him today in details and explained to him that he has evidence of both cervical spine abnormalities as well as small fiber neuropathy which could have resulted in his sensory disturbance.  I explained to him that the small fiber neuropathy is likely related to his alcohol abuse since there are no other causation detected.  I also suggested that he should follow-up with Dr. Joe in neurosurgery in regard to his residual cervical spondylosis.  He will  continue gabapentin 600 mg 3 times daily as well as a seizure medication.  I stressed to him the importance of following up with epilepsy clinic also.  He will return to see us as needed and will call for questions.      Federico Rosas M.D., F.A.C.P.   Director, [facility]   [facility]   [facility]   Professor of Neurology   [facility] [employer]       The total appointment time today was 30 minutes. Time included preparing to see the patient, obtaining the history, performing a medically necessary appropriate physical examination, counseling and educating the patient, referring and communicating with other providers, independently interpreting results  to the patient/family and documenting clinical information in the medical record.

## 2024-02-02 NOTE — PROGRESS NOTES
Patient: Servando Leigh    [id number]  : 1965 -- AGE 58 y.o.    Provider: RYAN Venegas-CNP     Location [facility]   Service Date: 2024              [facility]  Follow up visit note      HISTORY OF PRESENT ILLNESS     PCP: Dr. Simms - new PCP through here - has not seen yet   Neurology: Dr. Blum - [place]    Dermatology- Dr. Marcus Alvarado - [facility]   Colorectal surgery - Dr. Brooks -    GI: Gayle MÁRQUEZ -    Spine surgery: Keith Salmeron - [place]       HISTORY OF PRESENT ILLNESS   Mr. Leigh is a 58 year old AAM (current smoker ~22 pack year) here for follow up for COPD/asthma, chest pain, allergic rhinitis. Last seen in clinic via virtual visit on 10/23/23.     Since last visit he states he feels sick. He states he has had decreased appetite. He has shortness of breath. He states his other doctors have told him his breathing is not good when he sees them. He has been working on consolidating his medication list. I had referred him to a health coordinator - unclear why this was stopped. [facility] He states he uses his albuterol/ nebusal/ vest twice daily - sometimes in the middle of the day. He has advair 500 -but his insurance states it is not going to cover it anymore. There is a medicine coordinator through [facility] that can help with his medications. He has fluticasone/ vilanteraol (generic breo -100)- unclear if this is what is replacing his advair.     He has a productive cough with yellow mucous. He has a cold currently- having some slight chills related to that. He has noticed wheezing. He feels his  DICKINSON is worse. He is not able to walk up stairs. He feels sick - states he has autonomic testing today. He has a sore throat from sneezing and coughing. He states he feels he has been fighting a cold for a week. He feels overall he is feeling much better. He will notice SOB at rest. He  denies any CP. He has GERD symptoms - states he has been getting this a lot. His metamucil is not covered - he was going to pick it up OTC. He is taking his pantoprazole daily. He states he is taking imodium - 3 tabs 3x a day. He has not been taking the cholestyramine.      10/23/23 - Last visit I had recommended he go to ED, but he had transport take him to go home instead.   - He was under anesthesia to evaluate his rectal abscess and he developed respiratory distress after. He then was admitted. He was discharged home on prednisone and antibiotics.  He states he has been feeling better. He has been using his  advair 500 twice daily. He stopped his incruse. He did have a care coordinator that I had arranged previously. He has been using his nebulizer twice daily - and vest at night.  He has nebusal that he does twice daily as well.  He states his cough is productive with - now yellow/ green (he had reported previously it was blue). He has been wheezing intermittently. He has DICKINSON with walking long distances. He feels his breathing is doing a bit better. He denies any SOB at rest. He has a little runny nose with the weather changing. He statess he has some chest pressure in the morning -- states he will cough up mucous. He states at times to bring up the mucous he is gagging. He feels his GERD is mostly under good control with his pantoprazole - depends on what he eats.  He has been smoking 7-10 cigarettes per day. He is motivated to try and quit.   ACT today 13      8/28/23:  Since last visit he states he has not been doing well. He states today he does not feel well -- vomiting and light headed. He states he has had chills the last few days. He has been using his vest - once a week. He just recently had surgery on his foot. He has been vomiting a lot - he is not sure if he let GI know this. He states he vomits in the morning. He thinks he is still taking pantoprazole - states he gets confused on all his  medications. He has a productive cough with ? blue sputum. He states he has been having black stools. He has been using his nebulizers twice daily - albuterol and then NaCl. He has been using his advair twice daily. He does not have incruse anymore. He has wheezing. He has DICKINSON with walking short distances. He has SOB at rest. He states his chest is tight. He has nasal congestion - no runny nose. He has been having fever/ chills - states frequently. He has to changes his T shirt every night from sweating.      12/13/21: Since last visit he feels his breathing has been better. He has been using his incruse and advair - states at times he has to take several puffs and ends up running out before the end of the month -- feeling better after resuming advair and after cipro course. He has coughing episode in the morning and is able to bring it up. He states he does not vomit, but at times he does gag. He has difficulty bringing it up at times. He has wheezing all the time. He has DICKINSON, but it is better than it was. he states he has to stop and catch his breath with walking. He has been using his nebulizer BID with airway clearance which helps him bring up mucous. He has been running out of his rescue inhaler - 4-5 x per day. He at times will have SOB at rest. He feels his allergies are doing well on montelukast. He was started on new medications for his diarrhea which has been helpful. He has tingling in his fingers/toes - he had an appt with his neurologist, but missed it related to a miscommunication. he has been having less diarrhea with medications from GI.   CAT score 3, 5,5, 3, 3, 2, 5, 3 = 29     10/8/21: Since last visit he has not been doing well. He feels his breathing is not good - he throws up every morning   He has been wearing diaper related to loose stools and incontinence. He has had poor PO intake related to nausea and diarrhea. He has had worsening DICKINSON and feels he can barely walk. He has been using his  incruse daily, albuterol HFA 4-5x per day, and nebulizer BID. He no longer has his advair -- had not been covered so dulera had been sent in, but he never received. He has been using his airway clearance a 1-2 x per day. He has had worsening weakness - likely related to his diarrhea. He has a productive cough in the morning and ends up gagging and vomiting. His sputum is yellow/clear and he feels there is more mucous. He has a sore throat he describes as a burning - this has been going on since his spine surgery earlier this year. He states he was going to do his laundry, but kept getting up and feeling lightheaded. He has had worsening wheezing and SOB at rest. He has been taking his montlukast daily, but has been noticing some worsening post nasal drip. He has noticed some post nasal drip. He had significant heartburn and hiccups a few days ago - he states he is taking his pantoprazole 40mg TID and checked his pill bottle to confirm this. He states he has an upcoming EGD - he thought this was a swallow test. He started to have left sided chest - states it comes and goes and rates it a 5/10. He describes it as pressure.      6/21/21: Since last visit he has been doing well. He has been using the flutter daily and it helps bring up mucous. He has not been throwing up too much, but he is still having diarrhea. He had his back surgery since out last visit. He was put on antibiotics and steroids with improvement in his breathing. He has been using his nebulizers 3-4x per day. He has not been using his proair rescue as it has been helpful for him. He still has a productive cough with white/yellow sputum. He has wheezing at DICKINSON which are at his baseline. He has been using his incruse daily, but when we discussed he his advair he does not have it at home currently. He does not like the proair as much as he liked his ventolin - will try and reorder ventolin. He feels his allergies are under good control on singulair daily.  He is happy he is no longer vomiting daily. He states his bowels have been irregular related to previous diarrhea and recent pain meds from surgery. He is only doing the cholestyramine daily - to increase to twice daily per GI. He has some MSK chest pain, but he feels this is related to his surgery   CAT today 29 5/21/21 : Since last visit he feels he has his good days and bad days. He has been using his incruse daily, advair twice daily. He was recently prescribed proair - he feels ventolin has worked better for him in the past. He has a productive cough with yellowish sputum. His wheezing and DICKINSON are at his baseline. He has been using his nebulizers BID and rescue inhaler 4x a day. He has been vomiting every morning despite taking pantoprazole daily. The chest pains resolved after treatment of his pseudomonas pneumonia. He is still smoking - he has patches and gum - he tried them and they weren't helpful. He has not used them both together. He is still having significant diarrhea - he has been drinking the cholestyramine as needed and it has been helpful He has significant back pain - has back surgery planned with Dr. Salmeron at [facility].   CAT today 29      3/30/21: Since last visit he is not feeling any better. He completed the 7 day course of cipro, but did not notice a significant improvement. He is using incruse daily, advair BID, and he was given an albuterol HFA (not proair or ventolin). He has not used it yet. He is using his proair until he runs out - 5-6x a day. He has a device at home - unclear if airway clearance or incentive spirometer. He has not been using it because he was not told to. He does his nebulizers 2 in the morning and then 2 in the evening (warren). He still has a productive cough with clear/yellow sputum. He has intermittent wheezing, but its been worse recently with the weather changing. He continues to have DICKINSON which is at his baseline. He denies any SOB at rest. He feels  his allergies under good control with montelukast. He is on pantoprazole for acid reflux, but has continued symptoms and has been taking his tums daily for this. He states he will have episodes of acid reflux significant enough the will be retching/vomiting most mornings . He is still having CP that is bothersome and he feels its getting worse. He states its intermittent and on the left side of his chest. He rates it as a 3 out of 10 and describes it as an ache. It is not reproducible and does not radiate. He does not feel it is correlated with his acid reflux. He has had neck pains and feels that the brace/rods in his neck may have shifted. He thinks his neck/arm is worse and he has an appointment upcoming with neurology later today. He has been having increased weakness, night sweats/chills. He has had difficulty with getting up and moving around related to significant fatigue in addition to weakness.He has diarrhea. He is not leaving the house much because he is concerned about having accidents. He has been wearing briefs when he leaves the house. He has been drinking a lot of water and a lot of ensures. He was actually told he is drinking too many ensures and they are concerned this is contributing to his lack of appetite.      See old notes for previous HPIs            ALLERGIES AND MEDICATIONS     ALLERGIES  Allergies   Allergen Reactions    Shellfish Containing Products Anaphylaxis and Unknown    Shellfish Derived Unknown and Anaphylaxis    Coconut Swelling    Other Other    Penicillin G Swelling    Cat Dander Other     itching    House Dust Other     Watery eyes.       MEDICATIONS  Current Outpatient Medications   Medication Sig Dispense Refill    albuterol (Ventolin HFA) 90 mcg/actuation inhaler Inhale 2 puffs every 4 hours if needed for wheezing or shortness of breath. 18 g 1    albuterol 2.5 mg /3 mL (0.083 %) nebulizer solution Take 3 mL (2.5 mg) by nebulization every 6 hours if needed for wheezing. 30  mL 1    ammonium lactate (Amlactin) 12 % cream Apply 1 application twice a day by topical route.      atorvastatin (Lipitor) 10 mg tablet Take 1 tablet (10 mg) by mouth once daily at bedtime. 90 tablet 3    cyclobenzaprine (Flexeril) 10 mg tablet Take 1 tablet (10 mg) by mouth 2 times a day as needed (pain). 60 tablet 0    diclofenac sodium (Voltaren) 1 % gel gel Apply 1 Application topically 4 times a day as needed (joint pain). 150 g 0    EPINEPHrine 0.3 mg/0.3 mL injection syringe Inject 0.3 mL (0.3 mg) into the muscle 1 time if needed for anaphylaxis for up to 2 doses. 2 each 0    fluticasone propion-salmeteroL (Advair Diskus) 500-50 mcg/dose diskus inhaler Inhale 1 puff 2 times a day. 60 each 3    food supplemt, lactose-reduced (Ensure Original) 0.04-1.05 gram-kcal/mL liquid Take 3 bottles by mouth once daily. 2-3 cans 61785 mL 0    gabapentin (Neurontin) 600 mg tablet Take 1 tablet (600 mg) by mouth once daily. 30 tablet 2    ibuprofen 800 mg tablet TAKE 1 TABLET 3 TIMES A DAY BY ORAL ROUTE.      levETIRAcetam (Keppra) 500 mg tablet Take 1 tablet (500 mg) by mouth 2 times a day. 60 tablet 11    loperamide (Imodium) 2 mg capsule Take 1 capsule (2 mg) by mouth 4 times a day as needed for diarrhea. 30 capsule 0    montelukast (Singulair) 10 mg tablet Take 1 tablet (10 mg) by mouth once daily at bedtime. 30 tablet 3    nicotine (Nicoderm CQ) 14 mg/24 hr patch Place 1 patch over 24 hours on the skin once daily. Do not start before October 8, 2023. 30 patch 0    nicotine (Nicoderm CQ) 14 mg/24 hr patch APPLY 1 PATCH TO SKIN DAILY FOR 2 WEEKS AFTER COMPLETION OF STEP 1 (21MG PATCHES) 14 patch 10    pantoprazole (ProtoNix) 40 mg EC tablet Take 1 tablet (40 mg) by mouth once daily. 30 tablet 0    psyllium (Metamucil) powder Take 1 Dose (5.8 g) by mouth 2 times a day. 348 g 3    rOPINIRole (Requip) 0.5 mg tablet Take 1 tablet (0.5 mg) by mouth 3 times a day. 90 tablet 3    sodium chloride 3 % nebulizer solution Take 4  mL by nebulization 3 times a day.      tamsulosin (Flomax) 0.4 mg 24 hr capsule Take 1 capsule (0.4 mg) by mouth once daily. 90 capsule 3    tiotropium (Spiriva) 18 mcg inhalation capsule Place 1 capsule (18 mcg) into inhaler and inhale once daily. 30 capsule 5     No current facility-administered medications for this visit.         PAST HISTORY     PAST MEDICAL HISTORY  - HTN   - seizures  - COPD / emphysema/ bronchiectasis   - allergies - shellfish   - pneumonia - admitted to [facility] several years ago   - Fatty liver   - GERD   - perianal abscess and fistula - s/p 3 procedures   - neck surgery - states titanium plate in place        PAST SURGICAL HISTORY  Past Surgical History:   Procedure Laterality Date    CERVICAL FUSION      FOOT      NECK SURGERY  2017    Neck Surgery    OTHER SURGICAL HISTORY  2020    Perirectal abscess incision and drainage    OTHER SURGICAL HISTORY  2020    Shoulder replacement    TOTAL SHOULDER ARTHROPLASTY Right     right       IMMUNIZATION HISTORY  Immunization History   Administered Date(s) Administered    Flu vaccine (IIV4), preservative free *Check age/dose* 10/14/2014, 2018, 2020, 2023    Influenza, seasonal, injectable 2015, 02/15/2017, 2017    Pfizer Purple Cap SARS-CoV-2 2021, 2021    Pneumococcal conjugate vaccine, 20-valent (PREVNAR 20) 2023    Tdap vaccine, age 7 year and older (BOOSTRIX, ADACEL) 2015       SOCIAL HISTORY  smokin- current 6-10 cigarettes per day ~ 23 pack years  drinkin beers per week - previous heavy drinking - 1 gallon of liquor per day. Last heavy drinking in   illicit drug use: occasional marijuana        OCCUPATIONAL/ENVIRONMENTAL HISTORY     Occupation: He has been out of work since 2017 - now on disability. Previously worked in construction, demolition, farming, foundry work, and metals manufacturing       FAMILY HISTORY  Family History   Problem Relation Name Age of  Onset    Diabetes Son      Glaucoma Other Grandfather      Family History:  Sister - asthma  Mother - asthma,emphysema, blood clots, COPD    RESULTS/DATA     Pulmonary Function Test Results       PFTs: 1/13/21: FEV1/FVC 0.51/ FEV1 1.38 (45%)/ FVC 3.13 (82%)/ TLC 8.19 (139%)/ DLCO 60% - RV/%      6MWT: 1/13/21 - 335m () 100 -> 98% on RA - SHELLEY 5      FENO 1/13/21 - 5ppb     Chest Radiograph     XR chest 1 view 10/05/2023    Impression  Right upper lobe infiltrate.  Signed by Dakota Whalen MD      Chest CT Scan   CT chest:   - 11/7/19 - Multifocal area of GGO bilaterally with interstitial interseptal thickening. There is associated bronchiectasis in the RLL. Paraseptal emphysematous changes predominantly affecting the bilateral upper lobes in subpleural location as well as right apical lower lobe. Small bilateral pleural effusions with compression atelectasis.   - 1/13/21 - Areas of cystic bronchiectasis, bronchial wall thickening and mucous plugging. Areas of scarring are also identified in bilateral upper lobes which might be sequela of prior infectious process. Upper lung predominant mild to moderate centrilobular and paraseptal emphysema. Subcentimeter pulmonary nodules in the right upper lobe measuring  up to 3 mm. Unchanged prominent subcentimeter mediastinal lymph nodes, which could possibly reactive in nature. Mild coronary artery calcifications.  - 5/11/23 - Similar diffuse bronchial wall thickening and multifocal  bronchiectatic changes throughout bilateral lungs, when compared to  CT scan from 01/13/2021. Overall the degree of mucous plugging is  slightly improved from prior study.  2. No new focal airspace consolidation, pleural effusion or  pneumothorax. No new suspicious pulmonary nodules.  3. Similar background upper lung predominant emphysema.  4. Redemonstrated mild to moderate coronary artery calcifications.  Please note that, the present study is not tailored for evaluation of  coronary  arteries.    Echocardiogram     Echo: 3/18/21: The left ventricular systolic function is normal with a 60% estimated ejection fraction. RVSP within normal limits. Small patent foramen ovale. Atrial septal aneurysm present. RA/ RV normal size and function      EKG 12/9/20 - NSR     Sputum cultures   - 1/29/21 - candida parapsilosis, AFB negative x 3, + pseudomonas   - 4/16/21 - AFB negative x 3, fungal culture negative, respiratory culture negative       REVIEW OF SYSTEMS     REVIEW OF SYSTEMS  Review of Systems   Constitutional:  Positive for chills and fatigue. Negative for fever.   HENT:  Negative for congestion, postnasal drip, rhinorrhea, sinus pressure and voice change.    Eyes:  Negative for pain and visual disturbance.   Respiratory:  Positive for cough.    Cardiovascular:  Negative for chest pain, palpitations and leg swelling.   Gastrointestinal:  Positive for diarrhea. Negative for abdominal pain, nausea and vomiting.   Endocrine: Negative for cold intolerance and heat intolerance.   Musculoskeletal:  Negative for arthralgias, back pain, joint swelling and myalgias.   Skin:  Negative for rash.   Neurological:  Positive for headaches. Negative for dizziness, weakness and numbness.   Psychiatric/Behavioral:  Negative for agitation. The patient is not nervous/anxious.          PHYSICAL EXAM     VITAL SIGNS: There were no vitals taken for this visit.     CURRENT WEIGHT: [unfilled]  BMI: [unfilled]  PREVIOUS WEIGHTS:  Wt Readings from Last 3 Encounters:   01/22/24 50.3 kg (111 lb)   11/06/23 53.6 kg (118 lb 3.2 oz)   10/23/23 57.2 kg (126 lb)       Physical Exam- virtual visit today     ASSESSMENT/PLAN     1. COPD: states he was diagnosed 10+ years ago. Currently on triple inhaler therapy. Significant wheezing/rhonchi on exam today CT chest with concern for possible MAC infection - AFBs negative x 6. Sputum cultures positive for pseudomonas. Treated with 7-day Cipro course in 2/2021 with some improvement in sputum,  but with resolution of CP. He has had worsening cough that causes him to vomit and CP restarted. Repeat cultures negative. Pt ill appearing in clinic last visit--> Sent down to the ED from clinic, but patient went home instead. Seems better today.   - continue fluticasone/salmeterol (advair) 500/50 1 inhalation twice daily - rinsing mouth out afterwards   - continue albuterol (ventolin) 2 puffs or albuterol nebulizers every 4-6 hours as needed--will send in ventolin since this works better for him than the proair   - continue montelukast (singulair) 10mg daily at bedtime   - airway clearance --> Use your albuterol --> then nebusal solution ->  then your vest 2-3 x per day   - will discuss if he would benefit from speaking to pharmacy to help with medications   - will repeat sputum cultures   - will get CT chest      2. Chest pain: intermittent chest soreness   - will check for atypical infection -- had chest soreness in the past when he had pseudomonas infection -- repeat sputum cultures      3. Smoking cessation:   - > 5 minutes smoking cessation counseling   - ordered nicotine patches -- [facility] to [facility]     4. Vomiting/poor appetite/ bowel issues: has had poor appetite since Thanksgiving. Issues with constipation/diarrhea. He continues to have a poor appetite. Previous vomiting episodes had been resolving - unclear if related to productive cough/pseudomonas infection vs GERD. He is currently taking 40mg pantoprazole.   - continue drinking ensure supplements   - continue to follow with Gayle Palomares with GI   - I am going to clarify how to take your GI meds and let you know      5. Health maintenance: multiple providers through [facility], , and [facility] -- discussed consolidating this if possible.  - upcoming appt to establish care     Spoke with the patient on the phone for 23 minutes   Given complexity of care - going to reach out to get set back up with coordinator.   Plan for follow up with Dr. Choe

## 2024-02-05 DIAGNOSIS — R11.10 REGURGITATION OF FOOD: ICD-10-CM

## 2024-02-05 DIAGNOSIS — K58.0 IRRITABLE BOWEL SYNDROME WITH DIARRHEA: Primary | ICD-10-CM

## 2024-02-05 RX ORDER — PANTOPRAZOLE SODIUM 40 MG/1
40 TABLET, DELAYED RELEASE ORAL
Qty: 30 TABLET | Refills: 3 | Status: SHIPPED | OUTPATIENT
Start: 2024-02-05 | End: 2024-02-27 | Stop reason: SDUPTHER

## 2024-02-05 RX ORDER — CHOLESTYRAMINE 4 G/9G
4 POWDER, FOR SUSPENSION ORAL DAILY
Qty: 60 PACKET | Refills: 2 | Status: SHIPPED | OUTPATIENT
Start: 2024-02-05 | End: 2024-02-27 | Stop reason: SDUPTHER

## 2024-02-06 ENCOUNTER — OFFICE VISIT (OUTPATIENT)
Dept: PRIMARY CARE | Facility: CLINIC | Age: 59
End: 2024-02-06
Payer: COMMERCIAL

## 2024-02-06 ENCOUNTER — LAB (OUTPATIENT)
Dept: LAB | Facility: LAB | Age: 59
End: 2024-02-06
Payer: COMMERCIAL

## 2024-02-06 VITALS
DIASTOLIC BLOOD PRESSURE: 68 MMHG | BODY MASS INDEX: 17.43 KG/M2 | WEIGHT: 115 LBS | SYSTOLIC BLOOD PRESSURE: 103 MMHG | TEMPERATURE: 97.4 F | HEIGHT: 68 IN | HEART RATE: 88 BPM | OXYGEN SATURATION: 97 %

## 2024-02-06 DIAGNOSIS — J47.9 BRONCHIECTASIS WITHOUT COMPLICATION (MULTI): ICD-10-CM

## 2024-02-06 DIAGNOSIS — J44.1 COPD EXACERBATION (MULTI): Primary | ICD-10-CM

## 2024-02-06 DIAGNOSIS — L20.82 FLEXURAL ECZEMA: ICD-10-CM

## 2024-02-06 PROCEDURE — 87015 SPECIMEN INFECT AGNT CONCNTJ: CPT

## 2024-02-06 PROCEDURE — 87070 CULTURE OTHR SPECIMN AEROBIC: CPT

## 2024-02-06 PROCEDURE — 87206 SMEAR FLUORESCENT/ACID STAI: CPT

## 2024-02-06 PROCEDURE — 87116 MYCOBACTERIA CULTURE: CPT

## 2024-02-06 PROCEDURE — 87102 FUNGUS ISOLATION CULTURE: CPT

## 2024-02-06 PROCEDURE — 99214 OFFICE O/P EST MOD 30 MIN: CPT | Performed by: STUDENT IN AN ORGANIZED HEALTH CARE EDUCATION/TRAINING PROGRAM

## 2024-02-06 RX ORDER — THEOPHYLLINE 100 MG/1
TABLET, EXTENDED RELEASE ORAL
COMMUNITY
End: 2024-02-06 | Stop reason: ALTCHOICE

## 2024-02-06 RX ORDER — CLARITHROMYCIN 500 MG/1
TABLET, FILM COATED ORAL
COMMUNITY
Start: 2020-07-14 | End: 2024-02-29 | Stop reason: WASHOUT

## 2024-02-06 RX ORDER — MIRTAZAPINE 15 MG/1
TABLET, FILM COATED ORAL
COMMUNITY
Start: 2024-01-26 | End: 2024-02-27 | Stop reason: SDUPTHER

## 2024-02-06 RX ORDER — PREDNISONE 10 MG/1
20 TABLET ORAL DAILY
Qty: 10 TABLET | Refills: 0 | Status: SHIPPED | OUTPATIENT
Start: 2024-02-06 | End: 2024-02-27 | Stop reason: SDUPTHER

## 2024-02-06 RX ORDER — PSEUDOEPHEDRINE HCL 30 MG
60 TABLET ORAL EVERY 12 HOURS
COMMUNITY
Start: 2023-10-07 | End: 2024-02-06 | Stop reason: WASHOUT

## 2024-02-06 RX ORDER — DIPHENOXYLATE HYDROCHLORIDE AND ATROPINE SULFATE 2.5; .025 MG/1; MG/1
TABLET ORAL EVERY 12 HOURS
COMMUNITY
Start: 2020-07-13 | End: 2024-04-29 | Stop reason: WASHOUT

## 2024-02-06 RX ORDER — IPRATROPIUM BROMIDE 0.5 MG/2.5ML
SOLUTION RESPIRATORY (INHALATION)
COMMUNITY
Start: 2019-09-11 | End: 2024-02-27 | Stop reason: SDUPTHER

## 2024-02-06 RX ORDER — CIPROFLOXACIN 750 MG/1
TABLET, FILM COATED ORAL
COMMUNITY
Start: 2021-02-02 | End: 2024-04-29 | Stop reason: WASHOUT

## 2024-02-06 RX ORDER — MOMETASONE FUROATE 1 MG/G
OINTMENT TOPICAL DAILY
Qty: 15 G | Refills: 3 | Status: SHIPPED | OUTPATIENT
Start: 2024-02-06 | End: 2024-02-27 | Stop reason: SDUPTHER

## 2024-02-06 RX ORDER — OLANZAPINE 5 MG/1
TABLET ORAL
COMMUNITY
End: 2024-02-27 | Stop reason: SDUPTHER

## 2024-02-06 RX ORDER — TALC
POWDER (GRAM) TOPICAL
COMMUNITY
End: 2024-02-27 | Stop reason: SDUPTHER

## 2024-02-06 RX ORDER — UMECLIDINIUM 62.5 UG/1
AEROSOL, POWDER ORAL
COMMUNITY
End: 2024-02-06 | Stop reason: ALTCHOICE

## 2024-02-06 RX ORDER — BENZOYL PEROXIDE 100 MG/ML
LOTION TOPICAL
COMMUNITY
Start: 2017-12-07 | End: 2024-05-07 | Stop reason: SDUPTHER

## 2024-02-06 ASSESSMENT — ENCOUNTER SYMPTOMS
PALPITATIONS: 0
SHORTNESS OF BREATH: 1
WHEEZING: 1
CHEST TIGHTNESS: 0
COUGH: 1

## 2024-02-06 ASSESSMENT — PAIN SCALES - GENERAL: PAINLEVEL: 0-NO PAIN

## 2024-02-06 NOTE — PROGRESS NOTES
"Subjective   Patient ID: Servando Leigh is a 58 y.o. male who presents for Establish Care.    HPI  #medication management   - patient seen by Dr. Simms on 11/06/23 to establish care. He was referred to the clinic   -patient is out of benzoyl peroxide  - patient states that current anti-seizure management controls his epilepsy well   - COPD is \"acting up,\" patient is working with pulmonologist (last seen in 12/24); patient is on triple inhaler (advair, ventolin and montelukast)  - patient also seen by GI, recently ordered cholestyramine and pantoprazole for IBS with diarrhea     #pet   - patient needs paperwork to be able to have a pet where he lives   - patient currently has the pet at home and the building agency wants paperwork filled     #rash   - patient stated that it started 6 months ago   - it is itchy and painful   - it is found in his arms, back and upper thighs   - patient states that he has had \"eczema\" all of his life on and off  - previously on triamcinolone, but it does not work  - patient uses petroleum jelly in the winter and Vaseline in the summer     #smoking use disorder  - patient has been \"puffing\" after recent life stressors (brother getting \"locked up\" and sister with cancer)   - patient states that he is unable to stop smoking   - patient is anorexic and has low appetite     Review of Systems   Respiratory:  Positive for cough, shortness of breath and wheezing. Negative for chest tightness.    Cardiovascular:  Negative for chest pain, palpitations and leg swelling.       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 stopped his depakote. no script. new md per patient    Unsteady gait when walking     Vertigo      Past Surgical History:   Procedure Laterality Date    CERVICAL FUSION      FOOT      NECK SURGERY  01/25/2017    Neck Surgery    OTHER SURGICAL HISTORY  07/13/2020    Perirectal abscess incision and drainage    OTHER SURGICAL HISTORY  01/07/2020    Shoulder replacement    TOTAL SHOULDER ARTHROPLASTY Right     right     Social History     Tobacco Use    Smoking status: Every Day     Packs/day: .25     Types: Cigarettes    Smokeless tobacco: Never   Vaping Use    Vaping Use: Never used   Substance Use Topics    Alcohol use: Yes    Drug use: Yes     Types: Marijuana     Family History   Problem Relation Name Age of Onset    Diabetes Son      Glaucoma Other Grandfather      Allergies   Allergen Reactions    Coconut Swelling    Shellfish Containing Products Anaphylaxis and Unknown    Shellfish Derived Anaphylaxis and Unknown    Other Other    Penicillin G Swelling    Cat Dander Other     itching    House Dust Other     Watery eyes.     Current Outpatient Medications   Medication Instructions    albuterol (Ventolin HFA) 90 mcg/actuation inhaler 2 puffs, inhalation, Every 4 hours PRN    albuterol 2.5 mg, nebulization, Every 6 hours PRN    ammonium lactate (Amlactin) 12 % cream Apply 1 application twice a day by topical route.    atorvastatin (LIPITOR) 10 mg, oral, Nightly    benzoyl peroxide (Acne Medication) 10 % lotion lotion Acne Medication 10 10 % External Lotion Quantity: 30 Refills: 0 DO Start : 7-Dec-2017 Active    cholestyramine (Questran) 4 gram powder 4 g, oral, Daily,  Dissolve in 8 oz of liquid and drink before a meal. Pt to take in afternoon (separate from other meds). He may take an additional dose in evening if needed    ciprofloxacin (Cipro) 750 mg tablet oral    clarithromycin (Biaxin) 500 mg tablet oral    cyclobenzaprine (FLEXERIL) 10 mg, oral, 2 times daily PRN    diclofenac sodium (Voltaren) 1 % gel gel 1 Application, Topical, 4 times daily PRN    diphenoxylate-atropine (Lomotil) 2.5-0.025 mg tablet oral, Every 12 hours    EPINEPHrine (EPIPEN) 0.3 mg, intramuscular, Once as needed    fluticasone propion-salmeteroL (Advair Diskus) 500-50 mcg/dose diskus inhaler 1 puff, inhalation, 2 times daily RT    food supplemt, lactose-reduced (Ensure Original) 0.04-1.05 gram-kcal/mL liquid 3 bottles, oral, Daily, 2-3 cans    gabapentin (NEURONTIN) 600 mg, oral, Daily    ibuprofen 800 mg tablet TAKE 1 TABLET 3 TIMES A DAY BY ORAL ROUTE.    Incruse Ellipta 62.5 mcg/actuation inhalation INHALE 1 PUFF BY MOUTH IN THE MORNING    ipratropium (Atrovent) 0.02 % nebulizer solution inhalation    levETIRAcetam (KEPPRA) 500 mg, oral, 2 times daily    loperamide (IMODIUM) 2 mg, oral, 4 times daily PRN    melatonin 3 mg tablet oral    mirtazapine (Remeron) 15 mg tablet TAKE ONE TABLET (15 MG) BY MOUTH DAILY AT 9PM AT BEDTIME    montelukast (SINGULAIR) 10 mg, oral, Nightly    multivitamin with minerals tablet 1 tablet, oral, Daily RT    nicotine (Nicoderm CQ) 14 mg/24 hr patch 1 patch, transdermal, Daily    nicotine (Nicoderm CQ) 14 mg/24 hr patch APPLY 1 PATCH TO SKIN DAILY FOR 2 WEEKS AFTER COMPLETION OF STEP 1 (21MG PATCHES)    OLANZapine (ZyPREXA) 5 mg tablet oral    pantoprazole (PROTONIX) 40 mg, oral, Daily    pantoprazole (PROTONIX) 40 mg, oral, Daily before breakfast, Do not crush, chew, or split.    pseudoephedrine (SUDAFED) 60 mg, oral, Every 12 hours    psyllium (Metamucil) powder 5.8 g, oral, 2 times daily    rOPINIRole (REQUIP) 0.5 mg, oral, 3 times daily    sodium chloride 3 %  nebulizer solution 4 mL, nebulization, 3 times daily    tamsulosin (FLOMAX) 0.4 mg, oral, Daily    theophylline ER (Zeyad-Dur) 100 mg 12 hr tablet oral    tiotropium (SPIRIVA) 18 mcg, inhalation, Daily RT       Objective   Physical Exam  Constitutional:       General: He is not in acute distress.     Appearance: He is not ill-appearing.      Comments: Patient coughs intermittently    HENT:      Head: Normocephalic and atraumatic.      Nose: Nose normal.   Cardiovascular:      Comments: Hard to appreciate heart sounds given loud wheezing   Pulmonary:      Effort: Respiratory distress present.      Breath sounds: Wheezing and rales present.   Neurological:      General: No focal deficit present.      Mental Status: He is alert and oriented to person, place, and time.         Assessment/Plan   Servando Leigh is a 58 y.o. male who presents for the concerns below:    Problem List Items Addressed This Visit    None  #medication management   :: Patient's COPD, diarrhea and epilepsy managed by pulmonology, GI and neurology   - medication list reconciled based on previous notes     #pet   - Letter for emotional support pet given to patient   - Patient advised to send paperwork needed to be filled out     #COPD exacerbation  :: Wheezing is predominant in cardio and pulmonary auscultation   [ ] Prednisone 10 mg ordered for short course    #rash   :: Patient has previous history of eczema treated with triamcinolone and hydrocortisone cream   :: Given location, pruritic nature, and skin manifestation, atopic dermatitis is the most likely etiology of the rash   [ ] increased potency of topical steroid: Mometasone 0.1% ordered     #smoking use disorder  - we spoke to the patient about smoking cessation; patient has many stressors and is currently unwilling to further decrease use   - patient states he does not need refill on nicotine patches as he has them at home       This note was initiated by MS3 Jairo Hutchins.  I discussed the  patient with the medical student.  I then gathered integral parts of the history myself.  I also examined the patient myself.  I then edited this note appropriately.    Sigifredo Sinha MD  2/6/24

## 2024-02-06 NOTE — LETTER
To whom it may concern:    This letter is to certify that Servando Leigh is a patient under my care. He has a medical condition what requires the presence of a canine  at all times. Please make every reasonable accommodation so that he may have one in his dwelling.    Please fell free to contact me with any questions.    Sincerely,          Sigifredo Sinha MD  10:14 AM  02/06/24

## 2024-02-08 ENCOUNTER — OFFICE VISIT (OUTPATIENT)
Dept: UROLOGY | Facility: CLINIC | Age: 59
End: 2024-02-08
Payer: COMMERCIAL

## 2024-02-08 VITALS
WEIGHT: 116.9 LBS | BODY MASS INDEX: 17.77 KG/M2 | RESPIRATION RATE: 16 BRPM | SYSTOLIC BLOOD PRESSURE: 117 MMHG | HEART RATE: 88 BPM | TEMPERATURE: 97.3 F | DIASTOLIC BLOOD PRESSURE: 79 MMHG

## 2024-02-08 DIAGNOSIS — N52.8 OTHER MALE ERECTILE DYSFUNCTION: ICD-10-CM

## 2024-02-08 DIAGNOSIS — N40.1 BENIGN PROSTATIC HYPERPLASIA WITH URINARY OBSTRUCTION: Primary | ICD-10-CM

## 2024-02-08 DIAGNOSIS — N13.8 BENIGN PROSTATIC HYPERPLASIA WITH URINARY OBSTRUCTION: Primary | ICD-10-CM

## 2024-02-08 PROCEDURE — 99214 OFFICE O/P EST MOD 30 MIN: CPT | Performed by: PHYSICIAN ASSISTANT

## 2024-02-08 PROCEDURE — 99214 OFFICE O/P EST MOD 30 MIN: CPT | Mod: ZK | Performed by: PHYSICIAN ASSISTANT

## 2024-02-08 RX ORDER — TADALAFIL 20 MG/1
20 TABLET ORAL DAILY PRN
Qty: 30 TABLET | Refills: 11 | Status: SHIPPED | OUTPATIENT
Start: 2024-02-08 | End: 2024-02-27 | Stop reason: SDUPTHER

## 2024-02-08 ASSESSMENT — ENCOUNTER SYMPTOMS
NEUROLOGICAL NEGATIVE: 1
RESPIRATORY NEGATIVE: 1
CARDIOVASCULAR NEGATIVE: 1
MUSCULOSKELETAL NEGATIVE: 1
FREQUENCY: 1
EYES NEGATIVE: 1
HEMATOLOGIC/LYMPHATIC NEGATIVE: 1
CONSTITUTIONAL NEGATIVE: 1
ENDOCRINE NEGATIVE: 1
PSYCHIATRIC NEGATIVE: 1
GASTROINTESTINAL NEGATIVE: 1
ALLERGIC/IMMUNOLOGIC NEGATIVE: 1
DIFFICULTY URINATING: 1

## 2024-02-08 ASSESSMENT — PAIN SCALES - GENERAL: PAINLEVEL: 0-NO PAIN

## 2024-02-08 NOTE — ASSESSMENT & PLAN NOTE
The patient has been diagnosed with erectile dysfunction and cardiac assessment according to the [place] criteria allows the use of oral PDE-5 inhibitor. The patient understands that these medications are not initiators of erection and that he will still require sexual stimulation. If prescribed Sildenafil, he was advised to take the medication 30-60 minutes prior to sexual activity and that the efficacy of the medication is decreased following a high-fat meal.    The patient verbalized understanding that nitrates such as nitroglycerine, isosorbide mononitrate, isosorbide dinitrate, and any other nitrate preparations are absolutely contraindicated with the use of a PDE-5 inhibitor. The patient was advised to alert any medical personnel of PDE-5 inhibitor use should he seek urgent medical attention for any reason.    I explained the common adverse effects of therapy including, but not limited to headache, flushing, dizziness, rash, upset stomach, diarrhea, nasal congestion, abnormal vision, back pain, and myalgia. Less common side effects are lightheadedness or blood pressure drop upon standing. We discussed that patients have  after using medications in this class, and sometimes the exact cause of death was not able to be determined.    There is a very small risk of non-arteritic ischemic optic neuropathy, a rare cause of blindness that may be permanent while using medications in this class. Patient is instructed to immediately stop this medication and seek medical attention if he develops visual disturbances in one or both eyes.    We discussed that if he experiences erection lasting longer than four hours, he needs to seek immediate treatment at the emergency department as the longer he waits, the more damage that is done to the penile tissue.    The patient states that he is not withholding any information about his condition or the use of medications in order to receive a PDE-5 inhibitor class medication.  No barriers to learning were identified. After all of the patient's questions were satisfactorily answered, he expressed understanding of the risks of therapy and wishes to proceed.    Prescription of cialis 20 mg given

## 2024-02-08 NOTE — PROGRESS NOTES
Subjective   Patient ID: Servando Leigh is a 58 y.o. male who presents for No chief complaint on file..  HPI  Patient is a 58-year-old male kindly referred by Dr. Simms for evaluation and management of BPH and ED.    Patient reports worsening urinary symptoms over the last few years despite the use of Flomax 0.8 mg. He reports urinary frequency, urgency, urgency incontinence needing to use 2 depends a day, slow urinary stream, straining with urination, intermittency and hesitancy. He reports Nocturi x4He denies recurrent UTIs, gross hematuria or dysuria.     Patient reports shrinking of his penis and ED. He has not tried any treatment.  He states erections are weak and unable to penetrate with it. He admits to HTN, hyperlipidemia managed by his pcp he is a current smoker.  He denies diabetes.    Patient reports GI issues with vomiting and diarrhea. He is supposed to see a GI specialist soon.     Patient unable to give a urine sample       Review of Systems   Constitutional: Negative.    HENT: Negative.     Eyes: Negative.    Respiratory: Negative.     Cardiovascular: Negative.    Gastrointestinal: Negative.    Endocrine: Negative.    Genitourinary:  Positive for difficulty urinating, frequency and urgency.   Musculoskeletal: Negative.    Skin: Negative.    Allergic/Immunologic: Negative.    Neurological: Negative.    Hematological: Negative.    Psychiatric/Behavioral: Negative.           Objective   Physical Exam  Constitutional:       General: He is not in acute distress.     Appearance: Normal appearance.   HENT:      Head: Normocephalic and atraumatic.      Nose: Nose normal.      Mouth/Throat:      Mouth: Mucous membranes are moist.   Cardiovascular:      Rate and Rhythm: Normal rate.   Pulmonary:      Effort: Pulmonary effort is normal.   Abdominal:      General: Abdomen is flat.      Palpations: Abdomen is soft.   Musculoskeletal:      Cervical back: Normal range of motion.   Neurological:      Mental Status: He  is alert.         Assessment/Plan   Problem List Items Addressed This Visit             ICD-10-CM    Erectile dysfunction N52.9     The patient has been diagnosed with erectile dysfunction and cardiac assessment according to the [place] criteria allows the use of oral PDE-5 inhibitor. The patient understands that these medications are not initiators of erection and that he will still require sexual stimulation. If prescribed Sildenafil, he was advised to take the medication 30-60 minutes prior to sexual activity and that the efficacy of the medication is decreased following a high-fat meal.    The patient verbalized understanding that nitrates such as nitroglycerine, isosorbide mononitrate, isosorbide dinitrate, and any other nitrate preparations are absolutely contraindicated with the use of a PDE-5 inhibitor. The patient was advised to alert any medical personnel of PDE-5 inhibitor use should he seek urgent medical attention for any reason.    I explained the common adverse effects of therapy including, but not limited to headache, flushing, dizziness, rash, upset stomach, diarrhea, nasal congestion, abnormal vision, back pain, and myalgia. Less common side effects are lightheadedness or blood pressure drop upon standing. We discussed that patients have  after using medications in this class, and sometimes the exact cause of death was not able to be determined.    There is a very small risk of non-arteritic ischemic optic neuropathy, a rare cause of blindness that may be permanent while using medications in this class. Patient is instructed to immediately stop this medication and seek medical attention if he develops visual disturbances in one or both eyes.    We discussed that if he experiences erection lasting longer than four hours, he needs to seek immediate treatment at the emergency department as the longer he waits, the more damage that is done to the penile tissue.    The patient states that he is  not withholding any information about his condition or the use of medications in order to receive a PDE-5 inhibitor class medication. No barriers to learning were identified. After all of the patient's questions were satisfactorily answered, he expressed understanding of the risks of therapy and wishes to proceed.    Prescription of cialis 20 mg given         Benign prostatic hyperplasia with urinary obstruction - Primary N40.1, N13.8     Order for urine culture, PSA placed. Patient will go at earliest convenience    Today we discussed BPH. BPH is the benign growth of prostate tissue that may cause bothersome urinary symptoms. The mechanism of action as well as the risks, benefits, common side effects, and alternatives to all prescribed medications were discussed with the patient at length. I discussed minimally invasive procedures to alleviate these symptoms since he is already on Flomax 0.8 mg.    Patient will follow up with Dr. Sebastian for further management.            Relevant Medications    tadalafil (Cialis) 20 mg tablet    Other Relevant Orders    Urine culture    Urinalysis with Reflex Microscopic    Testosterone    PSA            Williams Mendes PA-C 02/08/24 10:43 AM

## 2024-02-08 NOTE — ASSESSMENT & PLAN NOTE
Order for urine culture, PSA placed. Patient will go at earliest convenience    Today we discussed BPH. BPH is the benign growth of prostate tissue that may cause bothersome urinary symptoms. The mechanism of action as well as the risks, benefits, common side effects, and alternatives to all prescribed medications were discussed with the patient at length. I discussed minimally invasive procedures to alleviate these symptoms since he is already on Flomax 0.8 mg.    Patient will follow up with Dr. Sebastian for further management.

## 2024-02-09 LAB — BACTERIA SPT CF RESP CULT: NORMAL

## 2024-02-18 ASSESSMENT — ENCOUNTER SYMPTOMS
DIZZINESS: 0
DYSPHORIC MOOD: 0
TROUBLE SWALLOWING: 0
POLYPHAGIA: 0
JOINT SWELLING: 0
AGITATION: 0
BLOOD IN STOOL: 0
EYE REDNESS: 0
HEADACHES: 0
ABDOMINAL PAIN: 0
DYSURIA: 0
CONSTIPATION: 0
SORE THROAT: 0
DIARRHEA: 1
WHEEZING: 1
CONFUSION: 0
NAUSEA: 1
FATIGUE: 1

## 2024-02-18 NOTE — PROGRESS NOTES
"Subjective   Patient ID: Servando Leigh is a 58 y.o. male who presents for follow up.     HPI  Mr. Leigh is a 54 y/o AAM with h/o COPD, seizure d/o ( on Keppra - last seizure was back in 2019), h/o perirectal abscess s/p EUA with seton placed  by colorectal surgery, who was seen here initially on July 2020 secondary to chronic abd pain and diarrhea, with loss of appetite and gradual weight loss.      Pt states that he has chronic diarrhea and abd pain for over 15 yrs. He has underwent EGD and Colonoscopy several times at [facility]. Pt does not recall being diagnosed with colitis ( IBD or microscopic ). He has taken Immodium and Pepto bismol with some minimal relief.     He was having about 6-8 episodes of watery/loose/formed BMs. He has some periumbilical abd pain (cramping) with these episodes, and intermittent episodes of hematochezia, and nocturnal awakenings.   His appetite was low and his weight \"goes up and down\".     Pt has h/o scrotal and perirectal abscess that was diagnosed back in Nov 2019 after he \"passed out\" and ever since then, he has been having problems with this rectal abscess.     Pt tested for C.diff back in 12/2019 and it was negative.      We treated him for H.pylori infection with the triple therapy ( pt was positve for H.pylori from a 2017 EGD done at [facility], and he does not recall being treated ) and he completed them at end of July 2020.  His H.pylori breath test was negative on Oct 2020.     He underwent a colonoscopy on 8/27/20 and it revealed:  Impression: - A single yellow seton and evidence of a recent    fistulotomy, found on perianal exam.    - Moderate amount of semi-liquid stool in the entire    examined colon.    - The examined portion of the terminal ileum was normal.    - One diminutive polyp in the distal rectum, removed with    a cold biopsy forceps. Resected and retrieved.    - The colon examination was otherwise normal.    - Scar in the distal " "rectum.    - Moderate non-bleeding external and internal hemorrhoids.    - Random biopsies were taken with a cold forceps for    histology in the rectum, in the sigmoid colon, in the    descending colon, in the transverse colon, in the right    colon and in the terminal ileum.     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 All the random colon biopsies were negative for any pathology.     We then obtained a CT abd/p on Oct 2020 to assess for any lesions and it was negative for any lesions or mets.  He did have a rectal abscess that colorectal knew about. He did undergo another fistulotomy on Nov 2020.   Our plan was then to repeat a flex sigmoidoscopy in Feb 2021 to assess distal rectum for any re occurrence of NE tumor.      Back in 2023, he was still c/o persistent diarrhea \"most of the time\" and  vomiting episodes in the morning for the past few months.  Pt admits to vomiting up \"liquid\" emesis, not necessarily any food. He admits to smoking marijuana chronically (1-2 blunts/day) and that he does snack late at night. His appetite has decreased and he does drink Ensure or Boost to keep his nutrition maintained. His weight was stable at that time.      On a previous visit, we gave him cholestyramine which significantly improved his diarrhea.    He underwent a Flex sigmoid on 2/11/21 secondary to NE lesion in rectum.  Findings of this exam revealed decreased sphincter tone, scar in rectum at 10cm (biopsy pending), scars in distal rectum and moderate non bleeding internal and external hemorrhoids. He will due for repeat colonoscopy in 5 yrs (2025- since last full colonoscopy was on 8/2020).     Back in May 2021, pt stated that his diarrhea was worsening and therefore we did a carcinoid w/u including labs and MRE on June 2021 that all revealed normal studies.     Since he was c/o persistent n/v, pt did undergo an EGD on June 2021 (with Dr. Vazquez) and it was unrevealing for cause of sx.. He recently " "underwent another EGD on Nov 2021 (with Dr. Scott) and only a small HH was found and biopsies of stomach and duodenum were negative.      On a visit back in Dec 2021, pt informed me he was taking Pantoprazole 3x/day and the Cholestyramine daily (he forgot why he stopped taking it BID).  I informed him to only take the PPI daily, or he may experience side effects of this medication (such as worsen his diarrhea).      We did order a GES and it was completed on Jan 2022 and it was a normal study.     Back in  11/2022, he was seen as a telephone visit ( he was scheduled as a virtual appt, but is having technical difficulties with his phone). Pt just wanted to make sure he was on the \"proper meds\" for us. Since [facility] closed, he has to obtain a different PCP.  At that time, he felt that his diarrhea was stable and under control with use of immodium and cholestyramine (when needed). He still takes Pantoprazole 40 mg daily (for his chronic nausea). He also started wearing an AFFLO vest (which vibrates his chest and breaks down the mucus) for 30 minutes a day and he claims that this has helped with his nausea significantly.      Back on 12/2022, he wanted advice on his bowel regimen. He takes Immodium (2 caps daily) secondary to his chronic diarrhea, but then he is noticing that it \"blocks him up\".   He also feels that his nausea has returned, but admits that he is \"bringing up mucus\". He does follow Pulmonology closely secondary to his COPD.     On a previous visit with me, we then had him decrease his Immodium dose to 1 cap daily and added fiber supplementation to his daily routine. I also mentioned follow up with Colorectal surgery, since he had some intermittent incontinence.     Pt was lost to our clinic for past 2 yrs. He came back on Jan 2024.  His diarrhea seems to have returned but he admits that he ran out of both fiber and cholestyramine.  He would like to restart fiber first to see if this would be " enough to bulk up and slow down his BMs.   He is still taking his Pantoprazole 40 mg daily and his nausea is about the same.  He admits that he only wears his AFFLO vest weekly.  He does have a follow up with Pulmonary soon.        Pt has lost weight, approx 9-10 lbs in 1 year, but pt admits that he is not eating because of poor appetite and just drinks 2-3 Ensures a day.        Review of Systems   Constitutional:  Positive for fatigue (chronic).   HENT:  Negative for sore throat and trouble swallowing.    Eyes:  Negative for redness.   Respiratory:  Positive for wheezing (chronic).    Cardiovascular:  Negative for chest pain and leg swelling.   Gastrointestinal:  Positive for diarrhea (chronic,but also non compliant with certain meds) and nausea (chronic). Negative for abdominal pain, blood in stool and constipation.   Endocrine: Negative for polyphagia.   Genitourinary:  Negative for dysuria.   Musculoskeletal:  Negative for joint swelling.   Skin:  Negative for rash.   Neurological:  Negative for dizziness and headaches.   Psychiatric/Behavioral:  Negative for agitation, confusion and dysphoric mood.          Objective   Visit Vitals  Smoking Status Every Day        Physical Exam  Vitals reviewed.   Constitutional:       General: He is not in acute distress.     Appearance: He is not ill-appearing.   HENT:      Head: Normocephalic and atraumatic.   Eyes:      General: No scleral icterus.  Cardiovascular:      Rate and Rhythm: Normal rate and regular rhythm.      Heart sounds: Normal heart sounds.   Pulmonary:      Effort: Pulmonary effort is normal.      Breath sounds: Wheezing and rhonchi present.   Abdominal:      General: There is no distension.      Palpations: Abdomen is soft.      Tenderness: There is no abdominal tenderness.   Musculoskeletal:         General: No swelling or deformity.      Cervical back: Neck supple.   Neurological:      General: No focal deficit present.      Mental Status: He is alert.  Mental status is at baseline.   Psychiatric:         Mood and Affect: Mood normal.         Behavior: Behavior normal.           Assessment/Plan     1) Chronic diarrhea and h/o scrotal and perirectal abscess with h/o of EUA with seton placement, as well as multiple I and Ds. - Pt had extensive w/u to assess for organic cause of diarrhea but it has been unrevealing and it is likely he has IBS-D.  s/p Colonoscopy on 8/2020 revealing normal colon biopsies, but a small rectal polyp (positive for neuroendocrine, grade 1) was found. CT abd/p from Oct 2020 did not show any other concerning lesions. Flex sigmoid from Feb 2021 did not show any reoccurence of NE lesions.   We also worked him up for carcinoid lesion via labs ( including gastrin, urine 5HIAA) and MRE on June 2021 and all studies were normal. We also checked pancreatic elastase to r/o EPI and it was normal.     We will restart fiber supplementation to BID dosing. If no significant relief, pt to resume use of Cholestyramine as needed, with his Immodium.     Pt will be due for his next screening colonoscopy in 2025. Last full colonoscopy was on 2020.     2) Chronic n/v -   Pt does have COPD and his bronchiectasis may be contributing factor and may be the reason for his episodes.   Encouraged pt to use Afflovest more regularly and consistently. Pt to continue f/u with pulmonary medicine.  Pt had 2 EGDs back in 2021 that was unrevealing except for a small HH and some erythema in antrum. Pt can continue daily use of Pantoprazole 40 mg daily for now.      3) h/o H.pylori infection -  completed triple therapy regimen (Amoxil, clarithromycin, and PPI) at end of July 2020.  His H.pylori breath test was negative on Oct 2020.      4) Anorexia/malnutrition -  Likely multifactorial. Continue with Ensure supplementation.  Trended his past weight and appears stable. He was 216 lbs (early Feb 2024) and he weighed the same back in Dec 2021.

## 2024-02-19 ENCOUNTER — APPOINTMENT (OUTPATIENT)
Dept: RADIOLOGY | Facility: HOSPITAL | Age: 59
End: 2024-02-19
Payer: COMMERCIAL

## 2024-02-19 ENCOUNTER — APPOINTMENT (OUTPATIENT)
Dept: GASTROENTEROLOGY | Facility: HOSPITAL | Age: 59
End: 2024-02-19
Payer: COMMERCIAL

## 2024-02-26 LAB
FUNGUS SPEC CULT: NORMAL
FUNGUS SPEC FUNGUS STN: NORMAL

## 2024-02-27 DIAGNOSIS — R07.89 CHEST TIGHTNESS: ICD-10-CM

## 2024-02-27 DIAGNOSIS — F41.9 ANXIETY: Primary | ICD-10-CM

## 2024-02-27 DIAGNOSIS — R41.0 CONFUSION WITH NON-FOCAL NEURO EXAM: Chronic | ICD-10-CM

## 2024-02-27 DIAGNOSIS — G89.29 CHRONIC NECK PAIN: ICD-10-CM

## 2024-02-27 DIAGNOSIS — R33.9 RETENTION OF URINE: ICD-10-CM

## 2024-02-27 DIAGNOSIS — L20.82 FLEXURAL ECZEMA: ICD-10-CM

## 2024-02-27 DIAGNOSIS — F51.01 PRIMARY INSOMNIA: ICD-10-CM

## 2024-02-27 DIAGNOSIS — J44.1 COPD EXACERBATION (MULTI): ICD-10-CM

## 2024-02-27 DIAGNOSIS — J69.0 ASPIRATION PNEUMONITIS (MULTI): ICD-10-CM

## 2024-02-27 DIAGNOSIS — R56.9 SEIZURES (MULTI): ICD-10-CM

## 2024-02-27 DIAGNOSIS — N13.8 BENIGN PROSTATIC HYPERPLASIA WITH URINARY OBSTRUCTION: ICD-10-CM

## 2024-02-27 DIAGNOSIS — J47.9 BRONCHIECTASIS WITHOUT COMPLICATION (MULTI): ICD-10-CM

## 2024-02-27 DIAGNOSIS — K61.1 PERIRECTAL ABSCESS: ICD-10-CM

## 2024-02-27 DIAGNOSIS — N40.1 BENIGN PROSTATIC HYPERPLASIA WITH URINARY OBSTRUCTION: ICD-10-CM

## 2024-02-27 DIAGNOSIS — R11.10 REGURGITATION OF FOOD: ICD-10-CM

## 2024-02-27 DIAGNOSIS — J45.909 ASTHMA, UNSPECIFIED ASTHMA SEVERITY, UNSPECIFIED WHETHER COMPLICATED, UNSPECIFIED WHETHER PERSISTENT (HHS-HCC): ICD-10-CM

## 2024-02-27 DIAGNOSIS — M54.2 CHRONIC NECK PAIN: ICD-10-CM

## 2024-02-27 DIAGNOSIS — K58.0 IRRITABLE BOWEL SYNDROME WITH DIARRHEA: ICD-10-CM

## 2024-02-27 NOTE — TELEPHONE ENCOUNTER
Communication received pt is in need of all orders to be sent to [facility]. Orders pended and routed to provider with message.

## 2024-02-29 ENCOUNTER — DOCUMENTATION (OUTPATIENT)
Dept: BEHAVIORAL HEALTH | Facility: CLINIC | Age: 59
End: 2024-02-29
Payer: COMMERCIAL

## 2024-02-29 RX ORDER — PANTOPRAZOLE SODIUM 40 MG/1
40 TABLET, DELAYED RELEASE ORAL
Qty: 30 TABLET | Refills: 3 | Status: SHIPPED | OUTPATIENT
Start: 2024-02-29 | End: 2024-05-28 | Stop reason: SDUPTHER

## 2024-02-29 RX ORDER — TALC
3 POWDER (GRAM) TOPICAL NIGHTLY
Qty: 90 TABLET | Refills: 3 | Status: SHIPPED | OUTPATIENT
Start: 2024-02-29 | End: 2024-05-17 | Stop reason: WASHOUT

## 2024-02-29 RX ORDER — MOMETASONE FUROATE 1 MG/G
OINTMENT TOPICAL DAILY
Qty: 15 G | Refills: 1 | Status: SHIPPED | OUTPATIENT
Start: 2024-02-29 | End: 2025-02-28

## 2024-02-29 RX ORDER — OLANZAPINE 5 MG/1
5 TABLET ORAL NIGHTLY
Qty: 90 TABLET | Refills: 3 | Status: SHIPPED | OUTPATIENT
Start: 2024-02-29

## 2024-02-29 RX ORDER — MIRTAZAPINE 15 MG/1
TABLET, FILM COATED ORAL
Qty: 90 TABLET | Refills: 3 | Status: SHIPPED | OUTPATIENT
Start: 2024-02-29

## 2024-02-29 RX ORDER — IBUPROFEN 800 MG/1
TABLET ORAL
OUTPATIENT
Start: 2024-02-29

## 2024-02-29 RX ORDER — LOPERAMIDE HYDROCHLORIDE 2 MG/1
2 CAPSULE ORAL 4 TIMES DAILY PRN
Qty: 30 CAPSULE | Refills: 0 | Status: SHIPPED | OUTPATIENT
Start: 2024-02-29 | End: 2024-04-17

## 2024-02-29 RX ORDER — TIOTROPIUM BROMIDE 18 UG/1
1 CAPSULE ORAL; RESPIRATORY (INHALATION)
Qty: 30 CAPSULE | Refills: 5 | Status: SHIPPED | OUTPATIENT
Start: 2024-02-29 | End: 2024-04-29 | Stop reason: ALTCHOICE

## 2024-02-29 RX ORDER — BENZOYL PEROXIDE 100 MG/ML
LOTION TOPICAL
OUTPATIENT
Start: 2024-02-29

## 2024-02-29 RX ORDER — MONTELUKAST SODIUM 10 MG/1
10 TABLET ORAL NIGHTLY
Qty: 30 TABLET | Refills: 3 | Status: SHIPPED | OUTPATIENT
Start: 2024-02-29 | End: 2024-05-28 | Stop reason: SDUPTHER

## 2024-02-29 RX ORDER — DIPHENOXYLATE HYDROCHLORIDE AND ATROPINE SULFATE 2.5; .025 MG/1; MG/1
TABLET ORAL EVERY 12 HOURS
OUTPATIENT
Start: 2024-02-29

## 2024-02-29 RX ORDER — ALBUTEROL SULFATE 0.83 MG/ML
2.5 SOLUTION RESPIRATORY (INHALATION) EVERY 6 HOURS PRN
Qty: 30 ML | Refills: 1 | Status: SHIPPED | OUTPATIENT
Start: 2024-02-29 | End: 2024-04-29 | Stop reason: ALTCHOICE

## 2024-02-29 RX ORDER — EPINEPHRINE 0.3 MG/.3ML
1 INJECTION SUBCUTANEOUS ONCE AS NEEDED
Qty: 2 EACH | Refills: 0 | Status: SHIPPED | OUTPATIENT
Start: 2024-02-29 | End: 2024-04-17

## 2024-02-29 RX ORDER — IBUPROFEN 200 MG
1 TABLET ORAL DAILY
Qty: 30 PATCH | Refills: 0 | Status: SHIPPED | OUTPATIENT
Start: 2024-02-29 | End: 2024-05-07 | Stop reason: SDUPTHER

## 2024-02-29 RX ORDER — AMMONIUM LACTATE 12 G/100G
CREAM TOPICAL
OUTPATIENT
Start: 2024-02-29

## 2024-02-29 RX ORDER — IPRATROPIUM BROMIDE 0.5 MG/2.5ML
0.5 SOLUTION RESPIRATORY (INHALATION) 2 TIMES DAILY
Qty: 75 ML | Refills: 3 | Status: SHIPPED | OUTPATIENT
Start: 2024-02-29 | End: 2024-04-29 | Stop reason: ALTCHOICE

## 2024-02-29 RX ORDER — DICLOFENAC SODIUM 10 MG/G
4 GEL TOPICAL 4 TIMES DAILY PRN
Qty: 150 G | Refills: 0 | Status: SHIPPED | OUTPATIENT
Start: 2024-02-29 | End: 2024-04-17

## 2024-02-29 RX ORDER — CHOLESTYRAMINE 4 G/9G
4 POWDER, FOR SUSPENSION ORAL DAILY
Qty: 60 PACKET | Refills: 2 | Status: SHIPPED | OUTPATIENT
Start: 2024-02-29 | End: 2024-05-20

## 2024-02-29 RX ORDER — SODIUM CHLORIDE FOR INHALATION 3 %
4 VIAL, NEBULIZER (ML) INHALATION 3 TIMES DAILY
Qty: 750 ML | Refills: 3 | Status: SHIPPED | OUTPATIENT
Start: 2024-02-29 | End: 2024-04-29 | Stop reason: SDUPTHER

## 2024-02-29 RX ORDER — PREDNISONE 10 MG/1
20 TABLET ORAL DAILY
Qty: 10 TABLET | Refills: 0 | Status: SHIPPED | OUTPATIENT
Start: 2024-02-29 | End: 2024-04-29 | Stop reason: ALTCHOICE

## 2024-02-29 RX ORDER — ATORVASTATIN CALCIUM 10 MG/1
10 TABLET, FILM COATED ORAL NIGHTLY
Qty: 90 TABLET | Refills: 3 | Status: SHIPPED | OUTPATIENT
Start: 2024-02-29

## 2024-02-29 RX ORDER — GABAPENTIN 600 MG/1
600 TABLET ORAL DAILY
Qty: 30 TABLET | Refills: 2 | Status: SHIPPED | OUTPATIENT
Start: 2024-02-29 | End: 2024-05-28 | Stop reason: SDUPTHER

## 2024-02-29 RX ORDER — ALBUTEROL SULFATE 90 UG/1
2 AEROSOL, METERED RESPIRATORY (INHALATION) EVERY 4 HOURS PRN
Qty: 18 G | Refills: 1 | Status: SHIPPED | OUTPATIENT
Start: 2024-02-29 | End: 2024-05-28 | Stop reason: SDUPTHER

## 2024-02-29 RX ORDER — LEVETIRACETAM 500 MG/1
500 TABLET ORAL 2 TIMES DAILY
Qty: 60 TABLET | Refills: 11 | Status: SHIPPED | OUTPATIENT
Start: 2024-02-29

## 2024-02-29 RX ORDER — TADALAFIL 20 MG/1
20 TABLET ORAL DAILY PRN
Qty: 30 TABLET | Refills: 11 | Status: SHIPPED | OUTPATIENT
Start: 2024-02-29 | End: 2024-06-11

## 2024-02-29 RX ORDER — TAMSULOSIN HYDROCHLORIDE 0.4 MG/1
0.4 CAPSULE ORAL DAILY
Qty: 90 CAPSULE | Refills: 3 | Status: SHIPPED | OUTPATIENT
Start: 2024-02-29

## 2024-02-29 RX ORDER — FLUTICASONE PROPIONATE AND SALMETEROL 500; 50 UG/1; UG/1
1 POWDER RESPIRATORY (INHALATION)
Qty: 60 EACH | Refills: 3 | Status: SHIPPED | OUTPATIENT
Start: 2024-02-29 | End: 2024-04-29 | Stop reason: ALTCHOICE

## 2024-02-29 RX ORDER — ROPINIROLE 0.5 MG/1
0.5 TABLET, FILM COATED ORAL 3 TIMES DAILY
Qty: 90 TABLET | Refills: 3 | Status: SHIPPED | OUTPATIENT
Start: 2024-02-29 | End: 2024-05-28 | Stop reason: SDUPTHER

## 2024-03-01 ENCOUNTER — LAB (OUTPATIENT)
Dept: LAB | Facility: LAB | Age: 59
End: 2024-03-01
Payer: COMMERCIAL

## 2024-03-01 ENCOUNTER — HOSPITAL ENCOUNTER (OUTPATIENT)
Dept: RADIOLOGY | Facility: HOSPITAL | Age: 59
Discharge: HOME | End: 2024-03-01
Payer: COMMERCIAL

## 2024-03-01 DIAGNOSIS — N40.1 BENIGN PROSTATIC HYPERPLASIA WITH URINARY OBSTRUCTION: ICD-10-CM

## 2024-03-01 DIAGNOSIS — J47.9 BRONCHIECTASIS WITHOUT COMPLICATION (MULTI): ICD-10-CM

## 2024-03-01 DIAGNOSIS — N13.8 BENIGN PROSTATIC HYPERPLASIA WITH URINARY OBSTRUCTION: ICD-10-CM

## 2024-03-01 LAB
APPEARANCE UR: CLEAR
BILIRUB UR STRIP.AUTO-MCNC: NEGATIVE MG/DL
COLOR UR: NORMAL
GLUCOSE UR STRIP.AUTO-MCNC: NORMAL MG/DL
KETONES UR STRIP.AUTO-MCNC: NEGATIVE MG/DL
LEUKOCYTE ESTERASE UR QL STRIP.AUTO: NEGATIVE
NITRITE UR QL STRIP.AUTO: NEGATIVE
PH UR STRIP.AUTO: 5 [PH]
PROT UR STRIP.AUTO-MCNC: NEGATIVE MG/DL
PSA SERPL-MCNC: 0.42 NG/ML
RBC # UR STRIP.AUTO: NEGATIVE /UL
SP GR UR STRIP.AUTO: 1
TESTOST SERPL-MCNC: 473 NG/DL (ref 240–1000)
UROBILINOGEN UR STRIP.AUTO-MCNC: NORMAL MG/DL

## 2024-03-01 PROCEDURE — 71250 CT THORAX DX C-: CPT

## 2024-03-01 PROCEDURE — 71250 CT THORAX DX C-: CPT | Performed by: RADIOLOGY

## 2024-03-01 PROCEDURE — 81003 URINALYSIS AUTO W/O SCOPE: CPT

## 2024-03-01 PROCEDURE — 84403 ASSAY OF TOTAL TESTOSTERONE: CPT | Performed by: PHYSICIAN ASSISTANT

## 2024-03-01 PROCEDURE — 84153 ASSAY OF PSA TOTAL: CPT | Performed by: PHYSICIAN ASSISTANT

## 2024-03-05 DIAGNOSIS — M47.812 CERVICAL SPONDYLOSIS: ICD-10-CM

## 2024-03-18 ENCOUNTER — OFFICE VISIT (OUTPATIENT)
Dept: GASTROENTEROLOGY | Facility: HOSPITAL | Age: 59
End: 2024-03-18
Payer: COMMERCIAL

## 2024-03-18 VITALS
OXYGEN SATURATION: 98 % | HEART RATE: 98 BPM | TEMPERATURE: 97.3 F | RESPIRATION RATE: 18 BRPM | SYSTOLIC BLOOD PRESSURE: 114 MMHG | DIASTOLIC BLOOD PRESSURE: 77 MMHG | WEIGHT: 121 LBS | HEIGHT: 68 IN | BODY MASS INDEX: 18.34 KG/M2

## 2024-03-18 DIAGNOSIS — Z13.228 CYSTIC FIBROSIS SCREENING: ICD-10-CM

## 2024-03-18 DIAGNOSIS — K52.9 CHRONIC DIARRHEA: ICD-10-CM

## 2024-03-18 DIAGNOSIS — E46 MALNUTRITION, UNSPECIFIED TYPE (MULTI): Primary | ICD-10-CM

## 2024-03-18 DIAGNOSIS — E84.0 CYSTIC FIBROSIS WITH PULMONARY MANIFESTATIONS (MULTI): ICD-10-CM

## 2024-03-18 DIAGNOSIS — R11.0 CHRONIC NAUSEA: ICD-10-CM

## 2024-03-18 PROCEDURE — 99213 OFFICE O/P EST LOW 20 MIN: CPT | Performed by: PHYSICIAN ASSISTANT

## 2024-03-18 RX ORDER — UMECLIDINIUM 62.5 UG/1
AEROSOL, POWDER ORAL
COMMUNITY
End: 2024-04-29 | Stop reason: ALTCHOICE

## 2024-03-18 RX ORDER — FLUTICASONE FUROATE AND VILANTEROL TRIFENATATE 50; 25 UG/1; UG/1
POWDER RESPIRATORY (INHALATION)
COMMUNITY
End: 2024-04-29 | Stop reason: ALTCHOICE

## 2024-03-18 ASSESSMENT — PAIN SCALES - GENERAL: PAINLEVEL: 0-NO PAIN

## 2024-03-18 ASSESSMENT — ENCOUNTER SYMPTOMS
UNEXPECTED WEIGHT CHANGE: 0
LOSS OF SENSATION IN FEET: 0
DIZZINESS: 0
ABDOMINAL PAIN: 0
EYE REDNESS: 0
AGITATION: 0
TROUBLE SWALLOWING: 0
BLOOD IN STOOL: 0
NAUSEA: 0
DYSPHORIC MOOD: 0
DEPRESSION: 0
FATIGUE: 0
CONFUSION: 0
HEADACHES: 0
DIARRHEA: 1
SORE THROAT: 0
JOINT SWELLING: 0
CONSTIPATION: 0
OCCASIONAL FEELINGS OF UNSTEADINESS: 0
POLYPHAGIA: 0
WHEEZING: 1
DYSURIA: 0

## 2024-03-18 ASSESSMENT — COLUMBIA-SUICIDE SEVERITY RATING SCALE - C-SSRS
6. HAVE YOU EVER DONE ANYTHING, STARTED TO DO ANYTHING, OR PREPARED TO DO ANYTHING TO END YOUR LIFE?: NO
1. IN THE PAST MONTH, HAVE YOU WISHED YOU WERE DEAD OR WISHED YOU COULD GO TO SLEEP AND NOT WAKE UP?: NO
2. HAVE YOU ACTUALLY HAD ANY THOUGHTS OF KILLING YOURSELF?: NO

## 2024-03-18 ASSESSMENT — PATIENT HEALTH QUESTIONNAIRE - PHQ9
1. LITTLE INTEREST OR PLEASURE IN DOING THINGS: NOT AT ALL
2. FEELING DOWN, DEPRESSED OR HOPELESS: NOT AT ALL
SUM OF ALL RESPONSES TO PHQ9 QUESTIONS 1 AND 2: 0

## 2024-03-18 NOTE — PATIENT INSTRUCTIONS
Continue taking Pantoprazole 40 mg dailu (for acid reflux)    Start taking your cholestyramine (questran) - 1 packet at lunch and if needed, take another at dinner. Just remember to not take this around 2 hrs before or after other meds.    Make appt with nutritionist.  Dr. Choe's  should call you about appt with him to get checked out for cystic fibrosis.    Make appt with me in 4 months.  Give them your cell phone number.

## 2024-03-18 NOTE — PROGRESS NOTES
"Subjective   Patient ID: Servando Leigh is a 59 y.o. male who presents for follow up.     HPI  Mr. Leigh is a 56 y/o AAM with h/o COPD, seizure d/o ( on Keppra - last seizure was back in 2019), h/o perirectal abscess s/p EUA with seton placed  by colorectal surgery, who was seen here initially on July 2020 secondary to chronic abd pain and diarrhea, with loss of appetite and gradual weight loss.      Pt states that he has chronic diarrhea and abd pain for over 15 yrs. He has underwent EGD and Colonoscopy several times at [facility]. Pt does not recall being diagnosed with colitis ( IBD or microscopic ). He has taken Immodium and Pepto bismol with some minimal relief.     He was having about 6-8 episodes of watery/loose/formed BMs. He has some periumbilical abd pain (cramping) with these episodes, and intermittent episodes of hematochezia, and nocturnal awakenings.   His appetite was low and his weight \"goes up and down\".     Pt has h/o scrotal and perirectal abscess that was diagnosed back in Nov 2019 after he \"passed out\" and ever since then, he has been having problems with this rectal abscess.     Pt tested for C.diff back in 12/2019 and it was negative.      We treated him for H.pylori infection with the triple therapy ( pt was positve for H.pylori from a 2017 EGD done at [facility], and he does not recall being treated ) and he completed them at end of July 2020.  His H.pylori breath test was negative on Oct 2020.     He underwent a colonoscopy on 8/27/20 and it revealed:  Impression: - A single yellow seton and evidence of a recent    fistulotomy, found on perianal exam.    - Moderate amount of semi-liquid stool in the entire    examined colon.    - The examined portion of the terminal ileum was normal.    - One diminutive polyp in the distal rectum, removed with    a cold biopsy forceps. Resected and retrieved.    - The colon examination was otherwise normal.    - Scar in the distal " "rectum.    - Moderate non-bleeding external and internal hemorrhoids.    - Random biopsies were taken with a cold forceps for    histology in the rectum, in the sigmoid colon, in the    descending colon, in the transverse colon, in the right    colon and in the terminal ileum.     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 All the random colon biopsies were negative for any pathology.     We then obtained a CT abd/p on Oct 2020 to assess for any lesions and it was negative for any lesions or mets.  He did have a rectal abscess that colorectal knew about. He did undergo another fistulotomy on Nov 2020.   Our plan was then to repeat a flex sigmoidoscopy in Feb 2021 to assess distal rectum for any re occurrence of NE tumor.      Back in 2023, he was still c/o persistent diarrhea \"most of the time\" and  vomiting episodes in the morning for the past few months.  Pt admits to vomiting up \"liquid\" emesis, not necessarily any food. He admits to smoking marijuana chronically (1-2 blunts/day) and that he does snack late at night. His appetite has decreased and he does drink Ensure or Boost to keep his nutrition maintained. His weight was stable at that time.      On a previous visit, we gave him cholestyramine which significantly improved his diarrhea.    He underwent a Flex sigmoid on 2/11/21 secondary to NE lesion in rectum.  Findings of this exam revealed decreased sphincter tone, scar in rectum at 10cm (biopsy pending), scars in distal rectum and moderate non bleeding internal and external hemorrhoids. He will due for repeat colonoscopy in 5 yrs (2025- since last full colonoscopy was on 8/2020).     Back in May 2021, pt stated that his diarrhea was worsening and therefore we did a carcinoid w/u including labs and MRE on June 2021 that all revealed normal studies.     Since he was c/o persistent n/v, pt did undergo an EGD on June 2021 (with Dr. Vazquez) and it was unrevealing for cause of sx.. He recently " "underwent another EGD on Nov 2021 (with Dr. Scott) and only a small HH was found and biopsies of stomach and duodenum were negative.      On a visit back in Dec 2021, pt informed me he was taking Pantoprazole 3x/day and the Cholestyramine daily (he forgot why he stopped taking it BID).  I informed him to only take the PPI daily, or he may experience side effects of this medication (such as worsen his diarrhea).      We did order a GES and it was completed on Jan 2022 and it was a normal study.     Back in  11/2022, he was seen as a telephone visit ( he was scheduled as a virtual appt, but is having technical difficulties with his phone). Pt just wanted to make sure he was on the \"proper meds\" for us. Since [facility] closed, he has to obtain a different PCP.  At that time, he felt that his diarrhea was stable and under control with use of immodium and cholestyramine (when needed). He still takes Pantoprazole 40 mg daily (for his chronic nausea). He also started wearing an AFFLO vest (which vibrates his chest and breaks down the mucus) for 30 minutes a day and he claims that this has helped with his nausea significantly.      Back on 12/2022, he wanted advice on his bowel regimen. He takes Immodium (2 caps daily) secondary to his chronic diarrhea, but then he is noticing that it \"blocks him up\".   He also feels that his nausea has returned, but admits that he is \"bringing up mucus\". He does follow Pulmonology closely secondary to his COPD.     On a previous visit with me, we then had him decrease his Immodium dose to 1 cap daily and added fiber supplementation to his daily routine. I also mentioned follow up with Colorectal surgery, since he had some intermittent incontinence.     Pt was lost to our clinic for past 2 yrs. He came back on Jan 2024.  His diarrhea seems to have returned but he admits that he ran out of both fiber and cholestyramine.  He would like to restart fiber first to see if this would be " "enough to bulk up and slow down his BMs.   He is still taking his Pantoprazole 40 mg daily and his nausea is about the same.  He admits that he only wears his AFFLO vest weekly.  He does have a follow up with Pulmonary soon.        Pt has lost weight, approx 9-10 lbs in 1 year, but pt admits that he is not eating because of poor appetite.    Pt is back today for follow up.  His diarrhea is a \"little better\", but he has to take approx 4 caps daily to keep it under control. He admits that he is forgetful and he has not yet started the cholestyramine powder.  He thought it was just for his cholesterol. Pt continues to take his Pantoprazole daily and nausea is stable at this time.        Review of Systems   Constitutional:  Negative for fatigue and unexpected weight change (weight has gone up by 5-6 lbs).   HENT:  Negative for sore throat and trouble swallowing.    Eyes:  Negative for redness.   Respiratory:  Positive for wheezing (chronic).    Cardiovascular:  Negative for chest pain and leg swelling.   Gastrointestinal:  Positive for diarrhea (chronic,but also non compliant with certain meds). Negative for abdominal pain, blood in stool, constipation and nausea.   Endocrine: Negative for polyphagia.   Genitourinary:  Negative for dysuria.   Musculoskeletal:  Negative for joint swelling.   Skin:  Negative for rash.   Neurological:  Negative for dizziness and headaches.   Psychiatric/Behavioral:  Negative for agitation, confusion and dysphoric mood.          Objective   Visit Vitals  /77   Pulse 98   Temp 36.3 °C (97.3 °F)   Resp 18   Ht 1.727 m (5' 8\")   Wt 54.9 kg (121 lb)   SpO2 98%   BMI 18.40 kg/m²   Smoking Status Every Day   BSA 1.62 m²        Physical Exam  Vitals reviewed.   Constitutional:       General: He is not in acute distress.     Appearance: He is not ill-appearing.   HENT:      Head: Normocephalic and atraumatic.   Eyes:      General: No scleral icterus.  Cardiovascular:      Rate and Rhythm: " Normal rate and regular rhythm.      Heart sounds: Normal heart sounds.   Pulmonary:      Effort: Pulmonary effort is normal.      Breath sounds: Wheezing and rhonchi present.   Abdominal:      General: There is no distension.      Palpations: Abdomen is soft.      Tenderness: There is no abdominal tenderness.   Musculoskeletal:         General: No swelling or deformity.      Cervical back: Neck supple.   Neurological:      General: No focal deficit present.      Mental Status: He is alert. Mental status is at baseline.   Psychiatric:         Mood and Affect: Mood normal.         Behavior: Behavior normal.           Assessment/Plan     1) Chronic diarrhea and h/o scrotal and perirectal abscess with h/o of EUA with seton placement, as well as multiple I and Ds. - Pt had extensive w/u to assess for organic cause of diarrhea but it has been unrevealing and it is likely he has IBS-D.  s/p Colonoscopy on 8/2020 revealing normal colon biopsies, but a small rectal polyp (positive for neuroendocrine, grade 1) was found. CT abd/p from Oct 2020 did not show any other concerning lesions. Flex sigmoid from Feb 2021 did not show any reoccurence of NE lesions.   We also worked him up for carcinoid lesion via labs ( including gastrin, urine 5HIAA) and MRE on June 2021 and all studies were normal. We also checked pancreatic elastase to r/o EPI and it was normal.     Pt to start daily use of Cholestyramine (during lunch) and can use immodium as needed.      Pt will be due for his next screening colonoscopy in 2025. Last full colonoscopy was on 2020.     2) Chronic n/v -   Stable at this time.  Pt does have COPD and his bronchiectasis may be contributing factor and may be the reason for his episodes. He will also be seeing Dr. Choe at [facility] to be assessed for possible cystic fibrosis ?  Encouraged pt to use Afflovest more regularly and consistently.  Pt had 2 EGDs back in 2021 that was unrevealing except for a small HH and some  erythema in antrum. Pt can continue daily use of Pantoprazole 40 mg daily for now.      3) h/o H.pylori infection -  completed triple therapy regimen (Amoxil, clarithromycin, and PPI) at end of July 2020.  His H.pylori breath test was negative on Oct 2020.      4) Anorexia/malnutrition -  Will refer to Nutritionist.  Likely multifactorial. Continue with Ensure supplementation for now.   Current weight is stable.    Follow up in 4 months

## 2024-03-19 ENCOUNTER — APPOINTMENT (OUTPATIENT)
Dept: UROLOGY | Facility: CLINIC | Age: 59
End: 2024-03-19
Payer: COMMERCIAL

## 2024-03-27 LAB
ACID FAST STN SPEC: NORMAL
MYCOBACTERIUM SPEC CULT: NORMAL

## 2024-03-30 ENCOUNTER — APPOINTMENT (OUTPATIENT)
Dept: RADIOLOGY | Facility: HOSPITAL | Age: 59
End: 2024-03-30
Payer: COMMERCIAL

## 2024-03-30 ENCOUNTER — HOSPITAL ENCOUNTER (EMERGENCY)
Facility: HOSPITAL | Age: 59
Discharge: HOME | End: 2024-03-30
Payer: COMMERCIAL

## 2024-03-30 VITALS
OXYGEN SATURATION: 99 % | WEIGHT: 121 LBS | DIASTOLIC BLOOD PRESSURE: 87 MMHG | BODY MASS INDEX: 18.34 KG/M2 | TEMPERATURE: 98.2 F | RESPIRATION RATE: 18 BRPM | SYSTOLIC BLOOD PRESSURE: 129 MMHG | HEIGHT: 68 IN | HEART RATE: 81 BPM

## 2024-03-30 DIAGNOSIS — S63.501A SPRAIN OF RIGHT WRIST, INITIAL ENCOUNTER: ICD-10-CM

## 2024-03-30 DIAGNOSIS — S43.401A SPRAIN OF RIGHT SHOULDER, UNSPECIFIED SHOULDER SPRAIN TYPE, INITIAL ENCOUNTER: ICD-10-CM

## 2024-03-30 DIAGNOSIS — Z04.1 EXAM FOLLOWING MVC (MOTOR VEHICLE COLLISION), NO APPARENT INJURY: Primary | ICD-10-CM

## 2024-03-30 PROCEDURE — 70450 CT HEAD/BRAIN W/O DYE: CPT

## 2024-03-30 PROCEDURE — 73110 X-RAY EXAM OF WRIST: CPT | Mod: RIGHT SIDE | Performed by: RADIOLOGY

## 2024-03-30 PROCEDURE — 2500000004 HC RX 250 GENERAL PHARMACY W/ HCPCS (ALT 636 FOR OP/ED): Mod: SE | Performed by: PHYSICIAN ASSISTANT

## 2024-03-30 PROCEDURE — 70450 CT HEAD/BRAIN W/O DYE: CPT | Performed by: STUDENT IN AN ORGANIZED HEALTH CARE EDUCATION/TRAINING PROGRAM

## 2024-03-30 PROCEDURE — 73030 X-RAY EXAM OF SHOULDER: CPT | Mod: RT

## 2024-03-30 PROCEDURE — 73130 X-RAY EXAM OF HAND: CPT | Mod: RT

## 2024-03-30 PROCEDURE — 99285 EMERGENCY DEPT VISIT HI MDM: CPT | Mod: 25

## 2024-03-30 PROCEDURE — 96372 THER/PROPH/DIAG INJ SC/IM: CPT | Performed by: PHYSICIAN ASSISTANT

## 2024-03-30 PROCEDURE — 73110 X-RAY EXAM OF WRIST: CPT | Mod: RT

## 2024-03-30 PROCEDURE — 73030 X-RAY EXAM OF SHOULDER: CPT | Mod: RIGHT SIDE | Performed by: RADIOLOGY

## 2024-03-30 PROCEDURE — 99284 EMERGENCY DEPT VISIT MOD MDM: CPT | Performed by: PHYSICIAN ASSISTANT

## 2024-03-30 PROCEDURE — 72125 CT NECK SPINE W/O DYE: CPT | Performed by: STUDENT IN AN ORGANIZED HEALTH CARE EDUCATION/TRAINING PROGRAM

## 2024-03-30 PROCEDURE — 72125 CT NECK SPINE W/O DYE: CPT

## 2024-03-30 PROCEDURE — 73130 X-RAY EXAM OF HAND: CPT | Mod: RIGHT SIDE | Performed by: RADIOLOGY

## 2024-03-30 RX ORDER — NAPROXEN 500 MG/1
500 TABLET ORAL
Qty: 14 TABLET | Refills: 0 | Status: SHIPPED | OUTPATIENT
Start: 2024-03-30 | End: 2024-04-06

## 2024-03-30 RX ORDER — ORPHENADRINE CITRATE 30 MG/ML
60 INJECTION INTRAMUSCULAR; INTRAVENOUS ONCE
Status: COMPLETED | OUTPATIENT
Start: 2024-03-30 | End: 2024-03-30

## 2024-03-30 RX ORDER — ACETAMINOPHEN 325 MG/1
975 TABLET ORAL ONCE
Status: COMPLETED | OUTPATIENT
Start: 2024-03-30 | End: 2024-03-30

## 2024-03-30 RX ORDER — CYCLOBENZAPRINE HCL 10 MG
10 TABLET ORAL 3 TIMES DAILY PRN
Qty: 14 TABLET | Refills: 0 | Status: SHIPPED | OUTPATIENT
Start: 2024-03-30 | End: 2024-05-17

## 2024-03-30 RX ADMIN — ORPHENADRINE CITRATE 60 MG: 60 INJECTION INTRAMUSCULAR; INTRAVENOUS at 18:48

## 2024-03-30 RX ADMIN — ACETAMINOPHEN 975 MG: 325 TABLET ORAL at 18:48

## 2024-03-30 ASSESSMENT — COLUMBIA-SUICIDE SEVERITY RATING SCALE - C-SSRS
2. HAVE YOU ACTUALLY HAD ANY THOUGHTS OF KILLING YOURSELF?: NO
1. IN THE PAST MONTH, HAVE YOU WISHED YOU WERE DEAD OR WISHED YOU COULD GO TO SLEEP AND NOT WAKE UP?: NO
6. HAVE YOU EVER DONE ANYTHING, STARTED TO DO ANYTHING, OR PREPARED TO DO ANYTHING TO END YOUR LIFE?: NO
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
## 2024-04-01 ENCOUNTER — APPOINTMENT (OUTPATIENT)
Dept: PULMONOLOGY | Facility: HOSPITAL | Age: 59
End: 2024-04-01
Payer: COMMERCIAL

## 2024-04-16 DIAGNOSIS — J45.909 ASTHMA, UNSPECIFIED ASTHMA SEVERITY, UNSPECIFIED WHETHER COMPLICATED, UNSPECIFIED WHETHER PERSISTENT (HHS-HCC): ICD-10-CM

## 2024-04-16 DIAGNOSIS — J44.1 COPD EXACERBATION (MULTI): ICD-10-CM

## 2024-04-16 DIAGNOSIS — R56.9 SEIZURES (MULTI): ICD-10-CM

## 2024-04-16 DIAGNOSIS — K61.1 PERIRECTAL ABSCESS: ICD-10-CM

## 2024-04-16 DIAGNOSIS — G89.29 CHRONIC NECK PAIN: ICD-10-CM

## 2024-04-16 DIAGNOSIS — M54.2 CHRONIC NECK PAIN: ICD-10-CM

## 2024-04-17 ENCOUNTER — OFFICE VISIT (OUTPATIENT)
Dept: NEUROSURGERY | Facility: HOSPITAL | Age: 59
End: 2024-04-17
Payer: COMMERCIAL

## 2024-04-17 VITALS
WEIGHT: 120 LBS | BODY MASS INDEX: 18.19 KG/M2 | RESPIRATION RATE: 19 BRPM | DIASTOLIC BLOOD PRESSURE: 63 MMHG | HEART RATE: 100 BPM | HEIGHT: 68 IN | SYSTOLIC BLOOD PRESSURE: 94 MMHG

## 2024-04-17 DIAGNOSIS — M47.812 CERVICAL SPONDYLOSIS: ICD-10-CM

## 2024-04-17 PROCEDURE — 99212 OFFICE O/P EST SF 10 MIN: CPT | Performed by: NEUROLOGICAL SURGERY

## 2024-04-17 RX ORDER — EPINEPHRINE 0.3 MG/.3ML
INJECTION SUBCUTANEOUS
Qty: 2 EACH | Refills: 11 | Status: SHIPPED | OUTPATIENT
Start: 2024-04-17

## 2024-04-17 RX ORDER — DICLOFENAC SODIUM 10 MG/G
GEL TOPICAL
Qty: 100 G | Refills: 59 | Status: SHIPPED | OUTPATIENT
Start: 2024-04-17

## 2024-04-17 RX ORDER — LOPERAMIDE HYDROCHLORIDE 2 MG/1
CAPSULE ORAL
Qty: 30 CAPSULE | Refills: 44 | Status: SHIPPED | OUTPATIENT
Start: 2024-04-17

## 2024-04-17 RX ORDER — PREDNISONE 10 MG/1
20 TABLET ORAL DAILY
Qty: 10 TABLET | Refills: 72 | OUTPATIENT
Start: 2024-04-17

## 2024-04-17 ASSESSMENT — ENCOUNTER SYMPTOMS
FATIGUE: 1
WEAKNESS: 1
DIZZINESS: 1
SHORTNESS OF BREATH: 1
NAUSEA: 1
NUMBNESS: 1
NECK PAIN: 1
APPETITE CHANGE: 1
HEMATOLOGIC/LYMPHATIC NEGATIVE: 1
ENDOCRINE NEGATIVE: 1
EYES NEGATIVE: 1
WHEEZING: 1
ACTIVITY CHANGE: 1
CONFUSION: 1
ALLERGIC/IMMUNOLOGIC NEGATIVE: 1
VOMITING: 1
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
## 2024-04-28 PROBLEM — Z91.89 AT RISK FOR ASPIRATION: Status: ACTIVE | Noted: 2024-04-28
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
## 2024-04-29 ENCOUNTER — OFFICE VISIT (OUTPATIENT)
Dept: PULMONOLOGY | Facility: HOSPITAL | Age: 59
End: 2024-04-29
Payer: COMMERCIAL

## 2024-04-29 ENCOUNTER — SPECIALTY PHARMACY (OUTPATIENT)
Dept: PHARMACY | Facility: CLINIC | Age: 59
End: 2024-04-29

## 2024-04-29 VITALS
HEART RATE: 83 BPM | DIASTOLIC BLOOD PRESSURE: 81 MMHG | SYSTOLIC BLOOD PRESSURE: 118 MMHG | RESPIRATION RATE: 20 BRPM | OXYGEN SATURATION: 99 % | BODY MASS INDEX: 18.25 KG/M2 | WEIGHT: 120 LBS | TEMPERATURE: 98.1 F

## 2024-04-29 DIAGNOSIS — Z91.89 AT RISK FOR ASPIRATION: ICD-10-CM

## 2024-04-29 DIAGNOSIS — J47.9 BRONCHIECTASIS WITHOUT COMPLICATION (MULTI): Primary | ICD-10-CM

## 2024-04-29 DIAGNOSIS — Z87.19 HISTORY OF HIATAL HERNIA: ICD-10-CM

## 2024-04-29 DIAGNOSIS — J44.9 COPD WITHOUT EXACERBATION (MULTI): ICD-10-CM

## 2024-04-29 DIAGNOSIS — Z91.89 AT RISK FOR POLYPHARMACY: ICD-10-CM

## 2024-04-29 PROBLEM — Z97.8 ENDOTRACHEAL TUBE PRESENT: Status: RESOLVED | Noted: 2023-09-17 | Resolved: 2024-04-29

## 2024-04-29 PROCEDURE — RXMED WILLOW AMBULATORY MEDICATION CHARGE

## 2024-04-29 PROCEDURE — 99214 OFFICE O/P EST MOD 30 MIN: CPT | Performed by: INTERNAL MEDICINE

## 2024-04-29 RX ORDER — CHLORHEXIDINE GLUCONATE ORAL RINSE 1.2 MG/ML
SOLUTION DENTAL
COMMUNITY
Start: 2024-04-24

## 2024-04-29 RX ORDER — MULTIVIT-MIN/IRON FUM/FOLIC AC 7.5 MG-4
1 TABLET ORAL DAILY
COMMUNITY
Start: 2024-03-25 | End: 2024-05-17 | Stop reason: WASHOUT

## 2024-04-29 RX ORDER — FLUTICASONE PROPIONATE AND SALMETEROL 250; 50 UG/1; UG/1
POWDER RESPIRATORY (INHALATION)
COMMUNITY
Start: 2024-04-23 | End: 2024-04-29 | Stop reason: ALTCHOICE

## 2024-04-29 RX ORDER — IPRATROPIUM BROMIDE AND ALBUTEROL SULFATE 2.5; .5 MG/3ML; MG/3ML
3 SOLUTION RESPIRATORY (INHALATION)
Qty: 90 ML | Refills: 3 | Status: SHIPPED | OUTPATIENT
Start: 2024-04-29 | End: 2024-06-08

## 2024-04-29 RX ORDER — SODIUM CHLORIDE FOR INHALATION 3 %
4 VIAL, NEBULIZER (ML) INHALATION 3 TIMES DAILY
Qty: 187 ML | Refills: 2
Start: 2024-02-29

## 2024-04-29 ASSESSMENT — ENCOUNTER SYMPTOMS
WHEEZING: 1
SINUS PAIN: 0
COUGH: 1
VOMITING: 0
DIARRHEA: 1
ABDOMINAL PAIN: 0
SINUS PRESSURE: 0
TROUBLE SWALLOWING: 0
DYSPHORIC MOOD: 0
FATIGUE: 1
PALPITATIONS: 0
NECK PAIN: 1
FEVER: 0
BLOOD IN STOOL: 0
ADENOPATHY: 0
BACK PAIN: 1
SPEECH DIFFICULTY: 0
WEAKNESS: 1
SHORTNESS OF BREATH: 1
DIFFICULTY URINATING: 0
CHEST TIGHTNESS: 1

## 2024-04-29 ASSESSMENT — PAIN SCALES - GENERAL: PAINLEVEL: 5

## 2024-04-29 NOTE — ASSESSMENT & PLAN NOTE
I think this process is happening more often than we think and is the likely basis for bronchiectasis worsening over time.  The pH of the fluid really doesn't matter: the simple fact that gastric contents are potentially being aspirated is injurious.  Some of his medications could be sedating, especially if he drinks any alcohol, in which case he may have reduced protective reflexes.  I've ordered a formal contrast esophagram.  He does not report oropharyngeal dysphagia, so this does not need to be performed as a modified barium swallow with speech therapy.  He might need additional diagnostic and therapeutic procedures with gastroenterology.

## 2024-04-29 NOTE — ASSESSMENT & PLAN NOTE
I've attempted to consolidate his inhaled medication regimen.  I think that he might actually do better WITHOUT inhaled corticosteroid due to the potentially immunosuppressive effects at level of bronchial epithelium and association with increased risk of pneumonia in patients with COPD.  Instead, LABA/LAMA might be more helpful, and would prefer to avoid dry powder inhaler (DPI) formulation because he might not be able to generate peak inspiratory flow rate (PIFR) required to overcome the internal resistance of DPI inhalers, resulting in reduced drug delivery.   I have discontinued the Advair, Breo, Incruse, and Spiriva to clean-up his medication list.  I have prescribed Bevespi Aerosphere, 2 puffs inhaled twice daily through a valved holding chamber (provided in clinic today by Kameron Day, RRT with instruction).  I've prescribed Duonebs instead of the separate albuterol and ipratropium nebulizer solutions.  Continue the nebulized hypertonic saline and HFCWO with the AffloVEST.  I would normally start chronic macrolide therapy with azithromycin, but I take pause because of the chronic problem with loose stools.  Azithromycin could certainly worsen this process.  I am not concerned about isolation of Mycobacterium fortuitum in October 2023.  This species of non-tuberculous mycobacteria (NTM) is frequently observed in cases of GERD, which I think in his case is happening more than we think.  Recommend balance risk-benefit of chronic azithromycin, perhaps start if/when his stool pattern comes under better control.

## 2024-04-29 NOTE — ASSESSMENT & PLAN NOTE
Servando struck me as being a bit overwhelmed by multiplicity of medications, and at least with respect to his inhaled medication regimen, some consolidation could be performed.  I suspect that inhaled medications of overlapping classes have persisted on the medication list due to insurance company formulary preferences.  I have consulted clinical pharmacy to see if we can consolidate his regimen and try to fill as many things as possible through  [facility] and/or [facility].

## 2024-04-29 NOTE — PATIENT INSTRUCTIONS
"    Department of Medicine  Division of Pulmonary, Critical Care, and Sleep Medicine  [address] [facility]    It was very nice to see you today. We can offer you in-person and \"virtual\" (online) appointments.    To reach the nurse, Aleena Calvo RN, please call [phone]. Aleena has a secure voice mail account if you want to leave a message.    Information on COVID-19 Vaccination:  Vaccines.gov (https://www.vaccines.gov/)  [place] Department of Health (https://gettheshot.coronavirus.[place].gov/)    If you have COVID-19 infection and we decide to treat with anti-viral medications, what pharmacies have medications in-stock?  COVID-19 Therapeutic Distribution  Map (https://covid-19-therapeutics--[email or web address]    Would like to simplify your inhaler regimen.  I've prescribed a new inhaler to replace the Spiriva, Breo, and the Advair.  It is called Bevespi Aerosphere, 2 puffs inhaled twice daily with a spacer tube.  Consulting pharmacy to help organize medications and where they're coming from.  Ordering an x-ray movie of your swallowing (barium swallow).  I suspect that food and liquid is coming up out of your stomach and sneaking into your lungs, causing damage.  Switched from albuterol and ipratropium nebulizer solution to something called Duonebs, which is a combination of ipratropium-albuterol in the same vial.  I will talk to Adenike Upton and review my changes.    Next appointment: 1 month with Adenike Upton in pulmonary clinic.  "

## 2024-04-29 NOTE — ASSESSMENT & PLAN NOTE
This could anatomically predispose him to esophageal reflux and by extension to aspiration.   Will assess with formal contrast esophagram.  He follows with gastroenterology.  Depending on findings, may need to have repeat EGD (last in 2021) and/or pH probe and high-resolution esophageal manometry (HREM).   Continue proton-pump inhibitor.

## 2024-04-30 ENCOUNTER — SPECIALTY PHARMACY (OUTPATIENT)
Dept: PHARMACY | Facility: CLINIC | Age: 59
End: 2024-04-30

## 2024-05-02 ENCOUNTER — APPOINTMENT (OUTPATIENT)
Dept: NEUROLOGY | Facility: CLINIC | Age: 59
End: 2024-05-02
Payer: COMMERCIAL

## 2024-05-03 ENCOUNTER — PHARMACY VISIT (OUTPATIENT)
Dept: PHARMACY | Facility: CLINIC | Age: 59
End: 2024-05-03
Payer: COMMERCIAL

## 2024-05-06 ENCOUNTER — APPOINTMENT (OUTPATIENT)
Dept: PHARMACY | Facility: HOSPITAL | Age: 59
End: 2024-05-06
Payer: COMMERCIAL

## 2024-05-07 ENCOUNTER — TELEMEDICINE (OUTPATIENT)
Dept: PRIMARY CARE | Facility: CLINIC | Age: 59
End: 2024-05-07
Payer: COMMERCIAL

## 2024-05-07 DIAGNOSIS — L70.0 ACNE VULGARIS: ICD-10-CM

## 2024-05-07 DIAGNOSIS — F10.90 ALCOHOL USE DISORDER: Primary | ICD-10-CM

## 2024-05-07 DIAGNOSIS — J69.0 ASPIRATION PNEUMONITIS (MULTI): ICD-10-CM

## 2024-05-07 PROCEDURE — 99443 PR PHYS/QHP TELEPHONE EVALUATION 21-30 MIN: CPT | Performed by: STUDENT IN AN ORGANIZED HEALTH CARE EDUCATION/TRAINING PROGRAM

## 2024-05-07 RX ORDER — IBUPROFEN 200 MG
1 TABLET ORAL DAILY
Qty: 30 PATCH | Refills: 0 | Status: SHIPPED | OUTPATIENT
Start: 2024-05-07 | End: 2024-06-06

## 2024-05-07 RX ORDER — BENZOYL PEROXIDE 100 MG/ML
1 LOTION TOPICAL NIGHTLY
Qty: 29.5 ML | Refills: 3 | Status: SHIPPED | OUTPATIENT
Start: 2024-05-07
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
## 2024-05-14 ENCOUNTER — APPOINTMENT (OUTPATIENT)
Dept: NUTRITION | Facility: HOSPITAL | Age: 59
End: 2024-05-14
Payer: COMMERCIAL

## 2024-05-14 ENCOUNTER — APPOINTMENT (OUTPATIENT)
Dept: RADIOLOGY | Facility: HOSPITAL | Age: 59
End: 2024-05-14
Payer: COMMERCIAL

## 2024-05-16 ENCOUNTER — APPOINTMENT (OUTPATIENT)
Dept: PHARMACY | Facility: HOSPITAL | Age: 59
End: 2024-05-16
Payer: COMMERCIAL

## 2024-05-17 ENCOUNTER — TELEMEDICINE (OUTPATIENT)
Dept: PHARMACY | Facility: HOSPITAL | Age: 59
End: 2024-05-17
Payer: COMMERCIAL

## 2024-05-17 DIAGNOSIS — K58.0 IRRITABLE BOWEL SYNDROME WITH DIARRHEA: ICD-10-CM

## 2024-05-17 DIAGNOSIS — Z91.89 AT RISK FOR POLYPHARMACY: ICD-10-CM

## 2024-05-17 NOTE — PROGRESS NOTES
"  Pharmacist Clinic: Pulmonary Management    Servando Leigh is a 59 y.o. male was referred to Clinical Pharmacy Team for Pulmonary assessment.   Referring Provider: Jesus Manuel Choe MD  Last visit: 4/29/24  Next visit: 6/20/24  PCP: Sigifredo Sinha    Subjective   Allergies   Allergen Reactions    Coconut Swelling    Shellfish Containing Products Anaphylaxis and Unknown    Shellfish Derived Anaphylaxis, Unknown and Other     Other Reaction(s): Unknown    Other Other    Penicillin G Swelling    Cat Dander Other     itching    House Dust Other     Watery eyes.       HPI    PULMONARY ASSESSMENT  Patient has been diagnosed with: COPD/bronchiectasis  has not had PFT's completed in last 2 years (last recorded 2021)    Current Regimen:  Albuterol HFA PRN  Bevespi 9-4.8 mcg 2 puffs twice daily  Duoneb PRN  Montelukast 10 mg daily    Historical Treatment:  N/A    Symptom Management:  Current symptoms: cough and increased sputum; SOB on exertion  Triggers: waking in the morning has a lot of mucus   Alleviating factors: duoneb/albuterol    Exacerbation Hx:  When was your last hospitalization for an exacerbation? None recently   When was the last time you were treated with antibiotics and/or steroids? 2/29/24 (prednisone)     Rescue Inhaler Use:  How often do you use your rescue inhaler? Daily    Appropriate Inhaler Technique:  How do you use your rescue inhaler?  No issues  How do you use your maintenance inhaler?  No issues    Smoking history  - He currently smokes  1/4  packs per day.   - 22 pack year history   - Also smokes cannabis 1-2 blunts/day    Objective   There were no vitals taken for this visit.    Secondary Prevention (vaccines):  -Influenza: Date [11/6/23]  -PCV13: Date [N/A]  -PPSV23: Date [2/10/20]  -PCV20: Date [11/6/23]  -COVID: Date [4/16/21]  -RSV: Date [N/A]  -TDAP: Date [11/2/15]  -Shingrix: Date [N/A]    Pulmonary Functions Testing Results:  No results found for: \"FEV1\", \"FVC\", \"HQU7QNE\", \"TLC\", " "\"DLCO\"    Lab Review  Lab Results   Component Value Date    BILITOT 0.2 10/05/2023    CALCIUM 9.0 10/07/2023    CO2 25 10/07/2023     10/07/2023    CREATININE 0.89 10/07/2023    GLUCOSE 101 (H) 10/07/2023    ALKPHOS 108 10/05/2023    K 3.3 (L) 10/07/2023    PROT 6.7 10/18/2023     10/07/2023    AST 25 10/05/2023    ALT 10 10/05/2023    BUN 7 10/07/2023    ANIONGAP 14 10/07/2023    MG 1.71 10/07/2023    PHOS 3.0 10/07/2023     (H) 11/08/2019    ALBUMIN 3.5 10/07/2023    GFRMALE >90 07/11/2023     Hemoglobin   Date Value Ref Range Status   10/07/2023 12.1 (L) 13.5 - 17.5 g/dL Final     WBC   Date Value Ref Range Status   10/07/2023 11.0 4.4 - 11.3 x10*3/uL Final     Platelets   Date Value Ref Range Status   10/07/2023 383 150 - 450 x10*3/uL Final       The ASCVD Risk score (Shawna DK, et al., 2019) failed to calculate for the following reasons:    Cannot find a previous HDL lab    Cannot find a previous total cholesterol lab    Current Outpatient Medications   Medication Instructions    albuterol (Ventolin HFA) 90 mcg/actuation inhaler 2 puffs, inhalation, Every 4 hours PRN    ammonium lactate (Amlactin) 12 % cream Apply 1 application twice a day by topical route.    atorvastatin (LIPITOR) 10 mg, oral, Nightly    benzoyl peroxide (Acne Medication) 10 % lotion lotion 1 Application, Topical, Nightly    Bevespi Aerosphere 9-4.8 mcg HFA aerosol inhaler 2 puffs, inhalation, 2 times daily RT    chlorhexidine (Peridex) 0.12 % solution RINSE WITH 15ML TWICE A DAY FOR 30 SECONDS AFTER TOOTH BRUSHING START USING 2 DAYS AFTER SURGERY    cholestyramine (Questran) 4 gram powder 4 g, oral, Daily, Dissolve in 8 oz of liquid and drink before a meal. Pt to take in afternoon (separate from other meds). He may take an additional dose in evening if needed    cyclobenzaprine (FLEXERIL) 10 mg, oral, 3 times daily PRN    diclofenac sodium (Voltaren) 1 % gel APPLY FOUR AND ONE-HALF INCHES (4 GRAMS) TOPICALLY FOUR TIMES A " DAY AS NEEDED FOR JOINT PAIN    EPINEPHrine 0.3 mg/0.3 mL injection syringe INJECT 0.3 ML (0.3 MG) INTO THE MUSCLE ONE TIME IF NEEDED FOR ANAPHYLAXIS FOR UP TO 2 DOSES    food supplemt, lactose-reduced (Ensure Original) 0.04-1.05 gram-kcal/mL liquid 3 bottles, oral, Daily, 2-3 cans    gabapentin (NEURONTIN) 600 mg, oral, Daily    ibuprofen 800 mg tablet TAKE 1 TABLET 3 TIMES A DAY BY ORAL ROUTE.    ipratropium-albuteroL (Duo-Neb) 0.5-2.5 mg/3 mL nebulizer solution Inhale 3 mL by nebulization every 6 hours while awake.    levETIRAcetam (KEPPRA) 500 mg, oral, 2 times daily    loperamide (Imodium) 2 mg capsule TAKE 1 CAPSULE FOUR TIMES A DAY AS NEEDED FOR DIARRHEA    melatonin 3 mg, oral, Nightly    mirtazapine (Remeron) 15 mg tablet TAKE ONE TABLET (15 MG) BY MOUTH DAILY AT 9PM AT BEDTIME    mometasone (Elocon) 0.1 % ointment Topical, Daily    montelukast (SINGULAIR) 10 mg, oral, Nightly    multivit-min-iron fum-folic ac 7.5 mg iron-400 mcg tablet 1 tablet, oral, Daily    multivitamin with minerals tablet 1 tablet, oral, Daily RT    nicotine (Nicoderm CQ) 14 mg/24 hr patch 1 patch, transdermal, Daily    OLANZapine (ZYPREXA) 5 mg, oral, Nightly    pantoprazole (PROTONIX) 40 mg, oral, Daily before breakfast, Do not crush, chew, or split.    psyllium (Metamucil) powder 5.8 g, oral, 2 times daily    rOPINIRole (REQUIP) 0.5 mg, oral, 3 times daily    sodium chloride 3 % nebulizer solution 4 mL, nebulization, 3 times daily    tadalafil (CIALIS) 20 mg, oral, Daily PRN    tamsulosin (FLOMAX) 0.4 mg, oral, Daily       Drug Interactions:  None    Affordability/Accessibility:  Patient struggling with access d/t multiple prescriptions across multiple pharmacies; wishes to all come from [facility]    Assessment/Plan   Problem List Items Addressed This Visit             ICD-10-CM    At risk for polypharmacy Z91.89     ASSESSMENT:  Patient with COPD/bronchiectasis referred to assist with polypharmacy/prescriptions at  multiple pharmacies.    PLAN:  Continue all current pharmacotherapy  I will work with [facility] to request active Rx's with refills from current pharmacies to be transferred over   I will work with PCP + specialists to request any new Rx's needed to be sent to [facility]  Follow up: will contact patient once everything is switched over to [facility]    Sanaz Michelle PharmD  Clinical Pharmacy Specialist  [phone]  [email or web address]     Continue all meds under the continuation of care with the referring provider and clinical pharmacy team.    Verbal consent to manage patient's drug therapy was obtained from the patient. They were informed they may decline to participate or withdraw from participation in pharmacy services at any time.

## 2024-05-20 RX ORDER — CHOLESTYRAMINE 4 G/9G
4 POWDER, FOR SUSPENSION ORAL
Qty: 60 PACKET | Refills: 3 | Status: SHIPPED | OUTPATIENT
Start: 2024-05-20 | End: 2025-05-20

## 2024-05-21 PROCEDURE — RXMED WILLOW AMBULATORY MEDICATION CHARGE

## 2024-05-22 ENCOUNTER — TELEPHONE (OUTPATIENT)
Dept: PEDIATRIC PULMONOLOGY | Facility: HOSPITAL | Age: 59
End: 2024-05-22
Payer: COMMERCIAL

## 2024-05-22 NOTE — TELEPHONE ENCOUNTER
Pt called back to reschedule his sweat test originally scheduled in March.    Appointment rescheduled for 5/29/24 at 11:00 a.m. All questions answered at this time. Servando to reach back out with any further questions or concerns.

## 2024-05-24 ENCOUNTER — HOSPITAL ENCOUNTER (OUTPATIENT)
Dept: RADIOLOGY | Facility: HOSPITAL | Age: 59
Discharge: HOME | End: 2024-05-24
Payer: COMMERCIAL

## 2024-05-24 ENCOUNTER — PHARMACY VISIT (OUTPATIENT)
Dept: PHARMACY | Facility: CLINIC | Age: 59
End: 2024-05-24
Payer: COMMERCIAL

## 2024-05-24 DIAGNOSIS — Z91.89 AT RISK FOR ASPIRATION: ICD-10-CM

## 2024-05-24 DIAGNOSIS — Z87.19 HISTORY OF HIATAL HERNIA: ICD-10-CM

## 2024-05-24 PROCEDURE — 2500000001 HC RX 250 WO HCPCS SELF ADMINISTERED DRUGS (ALT 637 FOR MEDICARE OP): Mod: SE | Performed by: INTERNAL MEDICINE

## 2024-05-24 PROCEDURE — A9698 NON-RAD CONTRAST MATERIALNOC: HCPCS | Mod: SE | Performed by: INTERNAL MEDICINE

## 2024-05-24 PROCEDURE — 74220 X-RAY XM ESOPHAGUS 1CNTRST: CPT | Performed by: RADIOLOGY

## 2024-05-24 PROCEDURE — 2500000005 HC RX 250 GENERAL PHARMACY W/O HCPCS: Mod: SE | Performed by: INTERNAL MEDICINE

## 2024-05-24 PROCEDURE — 74220 X-RAY XM ESOPHAGUS 1CNTRST: CPT

## 2024-05-24 RX ADMIN — BARIUM SULFATE 5 ML: 960 POWDER, FOR SUSPENSION ORAL at 11:08

## 2024-05-24 RX ADMIN — BARIUM SULFATE 30 ML: 980 POWDER, FOR SUSPENSION ORAL at 11:08

## 2024-05-24 RX ADMIN — ANTACID/ANTIFLATULENT 1 PACKET: 380; 550; 10; 10 GRANULE, EFFERVESCENT ORAL at 11:30

## 2024-05-28 DIAGNOSIS — J69.0 ASPIRATION PNEUMONITIS (MULTI): ICD-10-CM

## 2024-05-28 DIAGNOSIS — R41.0 CONFUSION WITH NON-FOCAL NEURO EXAM: Chronic | ICD-10-CM

## 2024-05-28 DIAGNOSIS — R11.10 REGURGITATION OF FOOD: ICD-10-CM

## 2024-05-28 DIAGNOSIS — M54.2 CHRONIC NECK PAIN: ICD-10-CM

## 2024-05-28 DIAGNOSIS — J44.1 COPD EXACERBATION (MULTI): ICD-10-CM

## 2024-05-28 DIAGNOSIS — J45.909 ASTHMA, UNSPECIFIED ASTHMA SEVERITY, UNSPECIFIED WHETHER COMPLICATED, UNSPECIFIED WHETHER PERSISTENT (HHS-HCC): ICD-10-CM

## 2024-05-28 DIAGNOSIS — G89.29 CHRONIC NECK PAIN: ICD-10-CM

## 2024-05-28 PROCEDURE — RXMED WILLOW AMBULATORY MEDICATION CHARGE

## 2024-05-28 RX ORDER — ROPINIROLE 0.5 MG/1
0.5 TABLET, FILM COATED ORAL 3 TIMES DAILY
Qty: 90 TABLET | Refills: 3 | Status: SHIPPED | OUTPATIENT
Start: 2024-05-28

## 2024-05-28 RX ORDER — MONTELUKAST SODIUM 10 MG/1
10 TABLET ORAL NIGHTLY
Qty: 30 TABLET | Refills: 3 | Status: SHIPPED | OUTPATIENT
Start: 2024-05-28

## 2024-05-28 RX ORDER — GABAPENTIN 600 MG/1
600 TABLET ORAL DAILY
Qty: 30 TABLET | Refills: 2 | Status: SHIPPED | OUTPATIENT
Start: 2024-05-28

## 2024-05-28 RX ORDER — ALBUTEROL SULFATE 90 UG/1
2 AEROSOL, METERED RESPIRATORY (INHALATION) EVERY 4 HOURS PRN
Qty: 18 G | Refills: 1 | Status: SHIPPED | OUTPATIENT
Start: 2024-05-28 | End: 2024-05-30 | Stop reason: SDUPTHER

## 2024-05-28 RX ORDER — PANTOPRAZOLE SODIUM 40 MG/1
40 TABLET, DELAYED RELEASE ORAL
Qty: 30 TABLET | Refills: 3 | Status: SHIPPED | OUTPATIENT
Start: 2024-05-28 | End: 2025-05-28

## 2024-05-28 NOTE — TELEPHONE ENCOUNTER
[facility] called requesting refill of pt montelukast, ropinirole, gabapentin, and pantoprazole  Pended orders routed to provider.

## 2024-05-28 NOTE — TELEPHONE ENCOUNTER
"Copied from CRM #[id number]. Topic: Information Request - Doctor, Hospital, or Provider  >> May 28, 2024 10:50 AM Annemarie ASHRAF wrote:  Pt is calling about if his \"Bus passes\" were taken care of for his tranportation. Pt would like a call back.  "

## 2024-05-29 ENCOUNTER — CLINICAL SUPPORT (OUTPATIENT)
Dept: PEDIATRIC PULMONOLOGY | Facility: HOSPITAL | Age: 59
End: 2024-05-29
Payer: COMMERCIAL

## 2024-05-29 DIAGNOSIS — E84.0 CYSTIC FIBROSIS WITH PULMONARY MANIFESTATIONS (MULTI): ICD-10-CM

## 2024-05-29 DIAGNOSIS — Z13.228 CYSTIC FIBROSIS SCREENING: ICD-10-CM

## 2024-05-29 LAB
SWEAT CHLORIDE, LEFT ARM: 51 MMOL/L
SWEAT CHLORIDE, RIGHT ARM: 57 MMOL/L

## 2024-05-29 PROCEDURE — 89230 COLLECT SWEAT FOR TEST: CPT

## 2024-05-30 ENCOUNTER — PHARMACY VISIT (OUTPATIENT)
Dept: PHARMACY | Facility: CLINIC | Age: 59
End: 2024-05-30
Payer: COMMERCIAL

## 2024-05-30 DIAGNOSIS — J69.0 ASPIRATION PNEUMONITIS (MULTI): ICD-10-CM

## 2024-05-30 RX ORDER — ALBUTEROL SULFATE 90 UG/1
2 AEROSOL, METERED RESPIRATORY (INHALATION) EVERY 4 HOURS PRN
Qty: 18 G | Refills: 11 | Status: SHIPPED | OUTPATIENT
Start: 2024-05-30

## 2024-05-30 NOTE — TELEPHONE ENCOUNTER
[facility] called requesting refill of pt albuterol inhaler. Order pended and routed to provider.

## 2024-06-04 ENCOUNTER — TELEPHONE (OUTPATIENT)
Dept: PEDIATRIC PULMONOLOGY | Facility: HOSPITAL | Age: 59
End: 2024-06-04
Payer: MEDICARE

## 2024-06-04 ENCOUNTER — SPECIALTY PHARMACY (OUTPATIENT)
Dept: PHARMACY | Facility: CLINIC | Age: 59
End: 2024-06-04

## 2024-06-04 NOTE — TELEPHONE ENCOUNTER
Pulmonary Attending  Telephone Note    I called Mr. Leigh to discuss results of sweat chloride measurements collected on 5/29/2024 (see below).  I left a VM message asking him to return my call at his convenience.  I left my direct office number, [phone].  I also have VM setup at that extension.    Sweat Chloride, Left arm   Date Value Ref Range Status   05/29/2024 51 (H) <30 mmol/L Final     Sweat Chloride, Right arm   Date Value Ref Range Status   05/29/2024 57 (H) <30 mmol/L Final     Impression:  These sweat chloride measurements are in the intermediate range.  This could reflect carrier status for a pathogenic CFTR gene variant or homozygosity for the same pathogenic CFTR gene variant or heterozygosity for two pathogenic CFTR gene variants.  Diagnostic guidelines for CF recommend repeat measurement after a minimum of 4 weeks, which puts us out to June 26, 2024.  Repeat testing after June 26, 2024 is, of course, fine.  Premature at this point to order CFTR gene sequencing.  Alternative reasons for elevated sweat chloride are few, and include thyroid disease, pseudohypoaldosteronism type 1 (mutations in epithelial sodium channel, ENaC), nephrogenic diabetes insipidus, anorexia nervosa (severe malnutrition), and glycogen storage diseases.  False positive and negative results occur with frequency of 10-15%.     Plan:  I will share this information with the patient if/when he returns my call.  Alternatively, patient has an appointment with primary pulmonary provider, ABIOLA Rasmussen, on 6/20/2024.  Copying her on this note for awareness.    Jesus Manuel Choe MD  6/4/2024  11:15 AM

## 2024-06-06 DIAGNOSIS — G89.29 CHRONIC NECK PAIN: ICD-10-CM

## 2024-06-06 DIAGNOSIS — R11.10 REGURGITATION OF FOOD: ICD-10-CM

## 2024-06-06 DIAGNOSIS — J69.0 ASPIRATION PNEUMONITIS (MULTI): ICD-10-CM

## 2024-06-06 DIAGNOSIS — J44.1 COPD EXACERBATION (MULTI): ICD-10-CM

## 2024-06-06 DIAGNOSIS — J45.909 ASTHMA, UNSPECIFIED ASTHMA SEVERITY, UNSPECIFIED WHETHER COMPLICATED, UNSPECIFIED WHETHER PERSISTENT (HHS-HCC): ICD-10-CM

## 2024-06-06 DIAGNOSIS — M54.2 CHRONIC NECK PAIN: ICD-10-CM

## 2024-06-06 NOTE — PROGRESS NOTES
Received request for [facility] refill of singulair, albuterol, PPI, and gabapentin. Review of PDMP shows he filled the gabapentin 5/29/24, so will not refill at this time. All others filled by [facility] or [facility]. Will change pharmacy to  and no further action today.

## 2024-06-11 ENCOUNTER — LAB (OUTPATIENT)
Dept: LAB | Facility: LAB | Age: 59
End: 2024-06-11
Payer: MEDICARE

## 2024-06-11 ENCOUNTER — OFFICE VISIT (OUTPATIENT)
Dept: PRIMARY CARE | Facility: CLINIC | Age: 59
End: 2024-06-11
Payer: MEDICARE

## 2024-06-11 ENCOUNTER — PHARMACY VISIT (OUTPATIENT)
Dept: PHARMACY | Facility: CLINIC | Age: 59
End: 2024-06-11
Payer: MEDICARE

## 2024-06-11 VITALS
SYSTOLIC BLOOD PRESSURE: 94 MMHG | WEIGHT: 127.2 LBS | OXYGEN SATURATION: 95 % | DIASTOLIC BLOOD PRESSURE: 62 MMHG | HEART RATE: 88 BPM | TEMPERATURE: 98.1 F | HEIGHT: 68 IN | BODY MASS INDEX: 19.28 KG/M2

## 2024-06-11 DIAGNOSIS — Z00.00 HEALTHCARE MAINTENANCE: ICD-10-CM

## 2024-06-11 DIAGNOSIS — Z23 NEED FOR SHINGLES VACCINE: ICD-10-CM

## 2024-06-11 DIAGNOSIS — H53.8 BLURRY VISION: ICD-10-CM

## 2024-06-11 DIAGNOSIS — F10.90 ALCOHOL USE DISORDER: ICD-10-CM

## 2024-06-11 DIAGNOSIS — D64.9 ANEMIA, UNSPECIFIED TYPE: ICD-10-CM

## 2024-06-11 DIAGNOSIS — Z00.00 HEALTHCARE MAINTENANCE: Primary | ICD-10-CM

## 2024-06-11 LAB
ALBUMIN SERPL BCP-MCNC: 3.5 G/DL (ref 3.4–5)
ALP SERPL-CCNC: 88 U/L (ref 33–120)
ALT SERPL W P-5'-P-CCNC: 7 U/L (ref 10–52)
ANION GAP SERPL CALC-SCNC: 17 MMOL/L (ref 10–20)
AST SERPL W P-5'-P-CCNC: 13 U/L (ref 9–39)
BASOPHILS # BLD AUTO: 0.04 X10*3/UL (ref 0–0.1)
BASOPHILS NFR BLD AUTO: 0.4 %
BILIRUB SERPL-MCNC: 0.2 MG/DL (ref 0–1.2)
BUN SERPL-MCNC: 3 MG/DL (ref 6–23)
CALCIUM SERPL-MCNC: 8.9 MG/DL (ref 8.6–10.6)
CHLORIDE SERPL-SCNC: 103 MMOL/L (ref 98–107)
CHOLEST SERPL-MCNC: 152 MG/DL (ref 0–199)
CHOLESTEROL/HDL RATIO: 2
CO2 SERPL-SCNC: 23 MMOL/L (ref 21–32)
CREAT SERPL-MCNC: 0.8 MG/DL (ref 0.5–1.3)
EGFRCR SERPLBLD CKD-EPI 2021: >90 ML/MIN/1.73M*2
EOSINOPHIL # BLD AUTO: 0.1 X10*3/UL (ref 0–0.7)
EOSINOPHIL NFR BLD AUTO: 0.9 %
ERYTHROCYTE [DISTWIDTH] IN BLOOD BY AUTOMATED COUNT: 12.2 % (ref 11.5–14.5)
EST. AVERAGE GLUCOSE BLD GHB EST-MCNC: 100 MG/DL
FERRITIN SERPL-MCNC: 52 NG/ML (ref 20–300)
FOLATE SERPL-MCNC: 19.6 NG/ML
GLUCOSE SERPL-MCNC: 69 MG/DL (ref 74–99)
HBA1C MFR BLD: 5.1 %
HBV SURFACE AB SER-ACNC: <3.1 MIU/ML
HCT VFR BLD AUTO: 36.7 % (ref 41–52)
HCV AB SER QL: NONREACTIVE
HDLC SERPL-MCNC: 77.1 MG/DL
HGB BLD-MCNC: 12.3 G/DL (ref 13.5–17.5)
HIV 1+2 AB+HIV1 P24 AG SERPL QL IA: NONREACTIVE
IMM GRANULOCYTES # BLD AUTO: 0.07 X10*3/UL (ref 0–0.7)
IMM GRANULOCYTES NFR BLD AUTO: 0.6 % (ref 0–0.9)
IRON SATN MFR SERPL: 9 % (ref 25–45)
IRON SERPL-MCNC: 32 UG/DL (ref 35–150)
LDLC SERPL CALC-MCNC: 60 MG/DL
LYMPHOCYTES # BLD AUTO: 2.13 X10*3/UL (ref 1.2–4.8)
LYMPHOCYTES NFR BLD AUTO: 18.7 %
MCH RBC QN AUTO: 29.9 PG (ref 26–34)
MCHC RBC AUTO-ENTMCNC: 33.5 G/DL (ref 32–36)
MCV RBC AUTO: 89 FL (ref 80–100)
MONOCYTES # BLD AUTO: 0.8 X10*3/UL (ref 0.1–1)
MONOCYTES NFR BLD AUTO: 7 %
NEUTROPHILS # BLD AUTO: 8.27 X10*3/UL (ref 1.2–7.7)
NEUTROPHILS NFR BLD AUTO: 72.4 %
NON HDL CHOLESTEROL: 75 MG/DL (ref 0–149)
NRBC BLD-RTO: 0 /100 WBCS (ref 0–0)
PLATELET # BLD AUTO: 409 X10*3/UL (ref 150–450)
POTASSIUM SERPL-SCNC: 4 MMOL/L (ref 3.5–5.3)
PROT SERPL-MCNC: 7.2 G/DL (ref 6.4–8.2)
RBC # BLD AUTO: 4.11 X10*6/UL (ref 4.5–5.9)
SODIUM SERPL-SCNC: 139 MMOL/L (ref 136–145)
TIBC SERPL-MCNC: 355 UG/DL (ref 240–445)
TREPONEMA PALLIDUM IGG+IGM AB [PRESENCE] IN SERUM OR PLASMA BY IMMUNOASSAY: NONREACTIVE
TRIGL SERPL-MCNC: 76 MG/DL (ref 0–149)
UIBC SERPL-MCNC: 323 UG/DL (ref 110–370)
VIT B12 SERPL-MCNC: 849 PG/ML (ref 211–911)
VLDL: 15 MG/DL (ref 0–40)
WBC # BLD AUTO: 11.4 X10*3/UL (ref 4.4–11.3)

## 2024-06-11 PROCEDURE — RXMED WILLOW AMBULATORY MEDICATION CHARGE

## 2024-06-11 PROCEDURE — 82607 VITAMIN B-12: CPT

## 2024-06-11 PROCEDURE — 80061 LIPID PANEL: CPT

## 2024-06-11 PROCEDURE — 80053 COMPREHEN METABOLIC PANEL: CPT

## 2024-06-11 PROCEDURE — 90750 HZV VACC RECOMBINANT IM: CPT | Performed by: STUDENT IN AN ORGANIZED HEALTH CARE EDUCATION/TRAINING PROGRAM

## 2024-06-11 PROCEDURE — 82746 ASSAY OF FOLIC ACID SERUM: CPT

## 2024-06-11 PROCEDURE — 86706 HEP B SURFACE ANTIBODY: CPT

## 2024-06-11 PROCEDURE — 83550 IRON BINDING TEST: CPT

## 2024-06-11 PROCEDURE — 86803 HEPATITIS C AB TEST: CPT

## 2024-06-11 PROCEDURE — 83540 ASSAY OF IRON: CPT

## 2024-06-11 PROCEDURE — 86780 TREPONEMA PALLIDUM: CPT

## 2024-06-11 PROCEDURE — 90471 IMMUNIZATION ADMIN: CPT | Performed by: STUDENT IN AN ORGANIZED HEALTH CARE EDUCATION/TRAINING PROGRAM

## 2024-06-11 PROCEDURE — 82728 ASSAY OF FERRITIN: CPT

## 2024-06-11 PROCEDURE — 99396 PREV VISIT EST AGE 40-64: CPT | Performed by: STUDENT IN AN ORGANIZED HEALTH CARE EDUCATION/TRAINING PROGRAM

## 2024-06-11 PROCEDURE — 99214 OFFICE O/P EST MOD 30 MIN: CPT | Performed by: STUDENT IN AN ORGANIZED HEALTH CARE EDUCATION/TRAINING PROGRAM

## 2024-06-11 RX ORDER — LANOLIN ALCOHOL/MO/W.PET/CERES
100 CREAM (GRAM) TOPICAL DAILY
Qty: 30 TABLET | Refills: 11 | Status: SHIPPED | OUTPATIENT
Start: 2024-06-11 | End: 2025-06-11

## 2024-06-11 RX ORDER — MULTIVITAMIN/IRON/FOLIC ACID 18MG-0.4MG
1 TABLET ORAL DAILY
Qty: 30 TABLET | Refills: 11 | Status: SHIPPED | OUTPATIENT
Start: 2024-06-11 | End: 2025-06-11

## 2024-06-11 RX ORDER — NALTREXONE HYDROCHLORIDE 50 MG/1
50 TABLET, FILM COATED ORAL DAILY
Qty: 30 TABLET | Refills: 11 | Status: SHIPPED | OUTPATIENT
Start: 2024-06-11 | End: 2025-06-11

## 2024-06-11 ASSESSMENT — ENCOUNTER SYMPTOMS
DEPRESSION: 1
OCCASIONAL FEELINGS OF UNSTEADINESS: 1
LOSS OF SENSATION IN FEET: 1

## 2024-06-11 ASSESSMENT — PATIENT HEALTH QUESTIONNAIRE - PHQ9
2. FEELING DOWN, DEPRESSED OR HOPELESS: SEVERAL DAYS
1. LITTLE INTEREST OR PLEASURE IN DOING THINGS: SEVERAL DAYS
SUM OF ALL RESPONSES TO PHQ9 QUESTIONS 1 AND 2: 2
10. IF YOU CHECKED OFF ANY PROBLEMS, HOW DIFFICULT HAVE THESE PROBLEMS MADE IT FOR YOU TO DO YOUR WORK, TAKE CARE OF THINGS AT HOME, OR GET ALONG WITH OTHER PEOPLE: NOT DIFFICULT AT ALL

## 2024-06-11 ASSESSMENT — PAIN SCALES - GENERAL: PAINLEVEL: 0-NO PAIN

## 2024-06-11 NOTE — PROGRESS NOTES
I saw and evaluated the patient. I personally obtained the key and critical portions of the history and physical exam or was physically present for key and critical portions performed by the resident/fellow. I reviewed the resident/fellow's documentation and discussed the patient with the resident/fellow. I agree with the resident/fellow's medical decision making as documented in the note.    Douglas Betts MD

## 2024-06-11 NOTE — PROGRESS NOTES
/Subjective   Patient ID: Servando Leigh is a 59 y.o. male who presents for health maintenance.     HPI     Servando Leigh is a 59 y.o. male with past medical history of AUD, Nicotine use disorder, COPD/Bronchiectasis who presents to the clinic for health maintenance and management of his AUD.   Patient reports drinking Six 24 Oz cans or more every single day. Denies use of any opioids. Would like to try a medication to curtail his drinking.     DSM-V-5-TR diagnostic criteria for alcohol use disorder  A problematic pattern of alcohol use leading to clinically significant impairment or distress, as manifested by at least 2 of the following, occurring within a 12-month period:  Alcohol is often taken in larger amounts or over a longer period than was intended.YES  There is a persistent desire or unsuccessful efforts to cut down or control alcohol use. YES  A great deal of time is spent in activities necessary to obtain alcohol, use alcohol, or recover from its effects. YES  Craving, or a strong desire or urge to use alcohol.YES  Recurrent alcohol use resulting in a failure to fulfill major role obligations at work, school, or home. YES  Continued alcohol use despite having persistent or recurrent social or interpersonal problems caused or exacerbated by the effects of alcohol. YES  Important social, occupational, or recreational activities are given up or reduced because of alcohol use. YES  Recurrent alcohol use in situations in which it is physically hazardous. YES  Alcohol use is continued despite knowledge of having a persistent or recurrent physical or psychological problem that is likely to have been caused or exacerbated by alcohol. YES  Tolerance, as defined by either of the following:  A need for markedly increased amounts of alcohol to achieve intoxication or desired effect. YES  A markedly diminished effect with continued use of the same amount of alcohol. YES  Withdrawal, as manifested by either of the  following: YES  The characteristic withdrawal syndrome for alcohol (refer to Criteria A and B of the criteria set for alcohol withdrawal, pp. 499 to 500).  Alcohol (or a closely related substance, such as a benzodiazepine) is taken to relieve or avoid withdrawal symptoms.    Review of Systems    - CONSTITUTIONAL: Denies fever and chills.    - HEENT: Denies changes in hearing.    - RESPIRATORY: Denies SOB and cough.    - CV: Denies palpitations and CP.    - GI: Denies abdominal pain, nausea, vomiting and diarrhea.    - : Denies dysuria and urinary frequency.    - MSK: Denies myalgia and joint pain.    - SKIN: Denies rash and pruritus.    - NEUROLOGICAL: Denies headache and syncope.    A 12-point review of systems was completed and is otherwise negative except as noted in the HPI.    Objective   Vitals:    06/11/24 1119   BP: 94/62   Pulse: 88   Temp: 36.7 °C (98.1 °F)   SpO2: 95%      Physical Exam    - GENERAL: Alert and oriented x 3. No acute distress    - EYES: EOMI. Anicteric.    - HENT: Moist mucous membranes. No scleral icterus. No cervical lymphadenopathy.    - LUNGS: Clear to auscultation bilaterally. No accessory muscle use.    - CARDIOVASCULAR: Regular rate and rhythm. No murmur. No JVD.    - ABDOMEN: Soft, non-tender and non-distended. No palpable masses.    - EXTREMITIES: No edema. Non-tender.    - SKIN: No rashes or lesions. Warm.    - NEUROLOGIC: No focal neurological deficits. CN II-XII grossly intact, but not individually tested.    - PSYCHIATRIC: Cooperative. Appropriate mood and affect.    Assessment/Plan   Servando Leigh is a 59 y.o. male with past medical history of AUD, Nicotine use disorder, COPD/Bronchiectasis who presents to the clinic for health maintenance and management of his AUD.     AUD, Severe as defined by 11 of the 11 symptoms as per the DSM-V-TR diagnostic criteria  Patient has an alcohol use disorder and has expressed desire to reduce alcohol consumption.     History of seizure:  Yes, on Keppra      Past admission for etoh: None      Self-reported drinking days in past week: Every Day      Patient does not endorse using opioids (including heroin, methadone, or buprenorphine)     Treatment goals: Reduction of heavy drinking to eventual abstinence.     Patient has normal AST, ALT, and ALP.  However total bilirubin is 1.6. Does not have cirrhosis or acute liver injury with LFT>250 which would preclude use use of Naltrexone.      Patient does not have CKD or renal failure. Can consider Gabapentin for protracted withdrawal suppression.        Patient prescribed Naltrexone 50mg PO daily for craving suppression. Will consider up titration to 100 mg once daily on next appointment based on response and tolerability.     Prescribed multivitamin with folic acid, and thiamine supplementations.     Referral provided to addiction recovery services/Psychology for behavioral interventions     # Routine Health Maintenance  - Flu vaccine: recommended annually  - Pneumococcal: UTD  - Tdap: UTD. Next in   - Shingrix(50+): ordered   - Hep C: ordered   - HIV: ordered   - Syphilis: ordered   - Lipid Panel (35M,45F): ordered lipid panel   - DM screening: ordered A1c  - HTN screenin/62. Continue to monitor   - Depression: PHQ-2 0  - Tobacco Cessation: 1/2 PPD  - Last Dental: recommended follow up  - Last Eye exam: recommended follow up  - Colonoscopy (50-75): Colonoscopy in .  - AAA screening (65-75M): not indicated  - Lung CA screening (55-80): CT Chest in . Small rectal polyp (positive for neuroendocrine, grade 1). CT abd/p from Oct 2020 did not show any other concerning lesions. Flex sigmoid from 2021 did not show any reoccurence of NE lesions. Discuss with GI regarding repeat screening.     #Tobacco use, 1/2 PPD  -Advised smoking cessation  -Patient ready to cut down on his tobacco use.   -Discussed available resources: support groups, behavioral counseling, and pharmacological interventions    -Patient interested in nicotine patch.  -Patient is advised to call 1-800-QUIT-NOW for support in quitting, including free quit coaching, a free quit plan, free educational materials, and referrals to local resources.  -Discussed and advised on nicotine withdrawal symptoms (increased appetite and weight gain, changes in mood, insomnia, irritability, anxiety, difficulty concentrating, and restlessness).  -Continue to assess and follow up on upcoming visits    Orders Placed This Encounter   Procedures    Zoster vaccine, recombinant, adult (SHINGRIX)    Hepatitis C antibody    HIV 1/2 Antigen/Antibody Screen with Reflex to Confirmation    Lipid panel    Hemoglobin A1c    Hepatitis B surface antibody    Syphilis Screen with Reflex    Comprehensive metabolic panel    CBC and Auto Differential    Folate    Vitamin B12    Iron and TIBC    Ferritin    Referral to Ophthalmology    Referral to Addiction Recovery Services      Patient seen and discussed with attending physician Dr. Roxane Neumann MD  Family Medicine, PGY-3    This note was completed using Dragon voice recognition technology and may include unintended errors with respect to translation of words, typographical errors or grammar errors which may not have been identified while finalizing the chart.

## 2024-06-18 ENCOUNTER — APPOINTMENT (OUTPATIENT)
Dept: BEHAVIORAL HEALTH | Facility: CLINIC | Age: 59
End: 2024-06-18
Payer: MEDICARE

## 2024-06-20 ENCOUNTER — APPOINTMENT (OUTPATIENT)
Dept: PULMONOLOGY | Facility: HOSPITAL | Age: 59
End: 2024-06-20
Payer: MEDICARE

## 2024-06-28 ENCOUNTER — APPOINTMENT (OUTPATIENT)
Dept: ENDOCRINOLOGY | Facility: CLINIC | Age: 59
End: 2024-06-28
Payer: MEDICARE

## 2024-07-08 PROCEDURE — RXMED WILLOW AMBULATORY MEDICATION CHARGE

## 2024-07-09 ENCOUNTER — PHARMACY VISIT (OUTPATIENT)
Dept: PHARMACY | Facility: CLINIC | Age: 59
End: 2024-07-09
Payer: MEDICARE

## 2024-07-11 ENCOUNTER — PHARMACY VISIT (OUTPATIENT)
Dept: PHARMACY | Facility: CLINIC | Age: 59
End: 2024-07-11

## 2024-07-25 ENCOUNTER — TELEPHONE (OUTPATIENT)
Dept: PULMONOLOGY | Facility: HOSPITAL | Age: 59
End: 2024-07-25
Payer: COMMERCIAL

## 2024-07-31 ENCOUNTER — PHARMACY VISIT (OUTPATIENT)
Dept: PHARMACY | Facility: CLINIC | Age: 59
End: 2024-07-31
Payer: MEDICARE

## 2024-07-31 ENCOUNTER — APPOINTMENT (OUTPATIENT)
Dept: PRIMARY CARE | Facility: CLINIC | Age: 59
End: 2024-07-31
Payer: MEDICARE

## 2024-07-31 VITALS
OXYGEN SATURATION: 97 % | HEART RATE: 96 BPM | WEIGHT: 119 LBS | HEIGHT: 68 IN | DIASTOLIC BLOOD PRESSURE: 72 MMHG | SYSTOLIC BLOOD PRESSURE: 106 MMHG | BODY MASS INDEX: 18.04 KG/M2

## 2024-07-31 DIAGNOSIS — M54.2 CHRONIC NECK PAIN: ICD-10-CM

## 2024-07-31 DIAGNOSIS — F10.90 ALCOHOL USE DISORDER: Primary | ICD-10-CM

## 2024-07-31 DIAGNOSIS — G89.29 CHRONIC NECK PAIN: ICD-10-CM

## 2024-07-31 DIAGNOSIS — M54.42 ACUTE BILATERAL LOW BACK PAIN WITH LEFT-SIDED SCIATICA: ICD-10-CM

## 2024-07-31 PROCEDURE — RXMED WILLOW AMBULATORY MEDICATION CHARGE

## 2024-07-31 RX ORDER — GABAPENTIN 800 MG/1
800 TABLET ORAL 3 TIMES DAILY
Qty: 90 TABLET | Refills: 0 | Status: SHIPPED | OUTPATIENT
Start: 2024-07-31

## 2024-07-31 RX ORDER — TIZANIDINE HYDROCHLORIDE 2 MG/1
2 CAPSULE, GELATIN COATED ORAL 3 TIMES DAILY
Qty: 90 CAPSULE | Refills: 0 | Status: SHIPPED | OUTPATIENT
Start: 2024-07-31 | End: 2024-08-30

## 2024-07-31 ASSESSMENT — PATIENT HEALTH QUESTIONNAIRE - PHQ9
4. FEELING TIRED OR HAVING LITTLE ENERGY: MORE THAN HALF THE DAYS
SUM OF ALL RESPONSES TO PHQ9 QUESTIONS 1 AND 2: 4
1. LITTLE INTEREST OR PLEASURE IN DOING THINGS: MORE THAN HALF THE DAYS
7. TROUBLE CONCENTRATING ON THINGS, SUCH AS READING THE NEWSPAPER OR WATCHING TELEVISION: NOT AT ALL
8. MOVING OR SPEAKING SO SLOWLY THAT OTHER PEOPLE COULD HAVE NOTICED. OR THE OPPOSITE, BEING SO FIGETY OR RESTLESS THAT YOU HAVE BEEN MOVING AROUND A LOT MORE THAN USUAL: NOT AT ALL
3. TROUBLE FALLING OR STAYING ASLEEP OR SLEEPING TOO MUCH: MORE THAN HALF THE DAYS
9. THOUGHTS THAT YOU WOULD BE BETTER OFF DEAD, OR OF HURTING YOURSELF: NOT AT ALL
6. FEELING BAD ABOUT YOURSELF - OR THAT YOU ARE A FAILURE OR HAVE LET YOURSELF OR YOUR FAMILY DOWN: SEVERAL DAYS
SUM OF ALL RESPONSES TO PHQ QUESTIONS 1-9: 12
10. IF YOU CHECKED OFF ANY PROBLEMS, HOW DIFFICULT HAVE THESE PROBLEMS MADE IT FOR YOU TO DO YOUR WORK, TAKE CARE OF THINGS AT HOME, OR GET ALONG WITH OTHER PEOPLE: SOMEWHAT DIFFICULT
2. FEELING DOWN, DEPRESSED OR HOPELESS: MORE THAN HALF THE DAYS
5. POOR APPETITE OR OVEREATING: NEARLY EVERY DAY

## 2024-07-31 ASSESSMENT — PAIN SCALES - GENERAL: PAINLEVEL: 8

## 2024-07-31 ASSESSMENT — ENCOUNTER SYMPTOMS
LOSS OF SENSATION IN FEET: 1
OCCASIONAL FEELINGS OF UNSTEADINESS: 0
DEPRESSION: 1

## 2024-07-31 NOTE — PROGRESS NOTES
"  Subjective   Patient ID: Servando Leigh is a 59 y.o. male who presents for Follow-up (Severe back pain).     Interval changes:  Naltrexone 24 oz cans beer 2-3 per day      1. Insomnia  Waking up at early hours, difficult to get back to sleep  Poor sleep hygiene (TV, phone)  Significant stress from brother (recently probated) and wife deciding if she wants to leave him or not  Also feeling of hopelessness in regard to his drinking    A/  Denies SI/HI  Denies violence in home, but often arguing verbally    P/  [ ] counseling: resources given, referral to psychology, and he will call his insurance provider    2. Low back pain  Gabapentin not helping  Flexeril no helping  7/9/24 recent cortisol injection in previously broken ankle    A/  Lumbar paraspinal muscle TTP: moderate    P/  [ ] inceased gabapentin from 60 mg TID to 800 mg TID, reviewed PDMP  [ ] tizanadine 2 mg TID prn, warned about drowsiness effects                 Review of Systems   All other systems reviewed and are negative.      Objective   /72   Pulse 96   Ht 1.727 m (5' 8\")   Wt 54 kg (119 lb)   SpO2 97%   BMI 18.09 kg/m²     Physical Exam  Vitals reviewed.   Constitutional:       General: He is not in acute distress.  HENT:      Head: Normocephalic and atraumatic.      Nose: Nose normal.      Mouth/Throat:      Mouth: Mucous membranes are moist.      Pharynx: Oropharynx is clear.   Eyes:      Extraocular Movements: Extraocular movements intact.      Conjunctiva/sclera: Conjunctivae normal.      Pupils: Pupils are equal, round, and reactive to light.   Cardiovascular:      Rate and Rhythm: Normal rate.      Pulses: Normal pulses.      Heart sounds: Normal heart sounds. No murmur heard.     No friction rub. No gallop.   Pulmonary:      Effort: Pulmonary effort is normal.      Breath sounds: Normal breath sounds.   Abdominal:      General: Abdomen is flat. Bowel sounds are normal.      Palpations: Abdomen is soft.   Musculoskeletal:      " Cervical back: Normal range of motion and neck supple.      Comments: Moderate TTP of the paraspinal muscles bilaterally of the lumbar back   Skin:     General: Skin is warm and dry.      Capillary Refill: Capillary refill takes less than 2 seconds.   Neurological:      General: No focal deficit present.      Mental Status: He is alert.   Psychiatric:         Mood and Affect: Mood normal.         Behavior: Behavior normal.         Assessment/Plan   Problem List Items Addressed This Visit             ICD-10-CM    Chronic neck pain M54.2, G89.29    Relevant Medications    gabapentin (Neurontin) 800 mg tablet     Other Visit Diagnoses         Codes    Alcohol use disorder    -  Primary F10.90    Relevant Orders    Referral to Psychology    Acute bilateral low back pain with left-sided sciatica     M54.42    Relevant Medications    tiZANidine (Zanaflex) 2 mg capsule                       OVERALL PLAN:  [ ] insomnia - needs counseling referral  [ ] inceased gabapentin from 60 mg TID to 800 mg TID, reviewed PDMP  [ ] tizanadine 2 mg TID prn, warned about drowsiness effects        Note: This documentaion was entered into the electronic medical record using [address] medical dictation software, and was not necessarily edited thereafter. Please excuse any errors of spelling or grammar.

## 2024-07-31 NOTE — PATIENT INSTRUCTIONS
Flexeril (cyclobenzaprine) can cause drowsiness.  Mirtazapine (remeron) can cause drowsiness in some people.    Call your insurance card information line and ask if they have a preferred or network list of counselors you could call. I am also giving you a list of community mental health providers

## 2024-08-05 ENCOUNTER — TELEPHONE (OUTPATIENT)
Dept: PRIMARY CARE | Facility: CLINIC | Age: 59
End: 2024-08-05
Payer: COMMERCIAL

## 2024-08-05 NOTE — TELEPHONE ENCOUNTER
Patient called and stated that medication that was prescribed for his back is not working. He also states that his rash is getting worse.

## 2024-08-12 ENCOUNTER — LAB (OUTPATIENT)
Dept: LAB | Facility: LAB | Age: 59
End: 2024-08-12
Payer: COMMERCIAL

## 2024-08-12 ENCOUNTER — PHARMACY VISIT (OUTPATIENT)
Dept: PHARMACY | Facility: CLINIC | Age: 59
End: 2024-08-12
Payer: COMMERCIAL

## 2024-08-12 ENCOUNTER — HOSPITAL ENCOUNTER (OUTPATIENT)
Dept: RADIOLOGY | Facility: HOSPITAL | Age: 59
Discharge: HOME | End: 2024-08-12
Payer: COMMERCIAL

## 2024-08-12 DIAGNOSIS — R61 NIGHT SWEATS: ICD-10-CM

## 2024-08-12 DIAGNOSIS — M54.50 ACUTE MIDLINE LOW BACK PAIN WITHOUT SCIATICA: ICD-10-CM

## 2024-08-12 DIAGNOSIS — L30.9 ACUTE DERMATITIS: ICD-10-CM

## 2024-08-12 DIAGNOSIS — M54.50 ACUTE MIDLINE LOW BACK PAIN WITHOUT SCIATICA: Primary | ICD-10-CM

## 2024-08-12 LAB
BASOPHILS # BLD AUTO: 0.12 X10*3/UL (ref 0–0.1)
BASOPHILS NFR BLD AUTO: 1.8 %
EOSINOPHIL # BLD AUTO: 0.3 X10*3/UL (ref 0–0.7)
EOSINOPHIL NFR BLD AUTO: 4.4 %
ERYTHROCYTE [DISTWIDTH] IN BLOOD BY AUTOMATED COUNT: 15.9 % (ref 11.5–14.5)
HCT VFR BLD AUTO: 36.1 % (ref 41–52)
HGB BLD-MCNC: 11.8 G/DL (ref 13.5–17.5)
IMM GRANULOCYTES # BLD AUTO: 0.03 X10*3/UL (ref 0–0.7)
IMM GRANULOCYTES NFR BLD AUTO: 0.4 % (ref 0–0.9)
LYMPHOCYTES # BLD AUTO: 1.08 X10*3/UL (ref 1.2–4.8)
LYMPHOCYTES NFR BLD AUTO: 15.8 %
MCH RBC QN AUTO: 29.7 PG (ref 26–34)
MCHC RBC AUTO-ENTMCNC: 32.7 G/DL (ref 32–36)
MCV RBC AUTO: 91 FL (ref 80–100)
MONOCYTES # BLD AUTO: 1.08 X10*3/UL (ref 0.1–1)
MONOCYTES NFR BLD AUTO: 15.8 %
NEUTROPHILS # BLD AUTO: 4.22 X10*3/UL (ref 1.2–7.7)
NEUTROPHILS NFR BLD AUTO: 61.8 %
NRBC BLD-RTO: 0 /100 WBCS (ref 0–0)
PLATELET # BLD AUTO: 374 X10*3/UL (ref 150–450)
RBC # BLD AUTO: 3.97 X10*6/UL (ref 4.5–5.9)
WBC # BLD AUTO: 6.8 X10*3/UL (ref 4.4–11.3)

## 2024-08-12 PROCEDURE — RXMED WILLOW AMBULATORY MEDICATION CHARGE

## 2024-08-12 PROCEDURE — 72110 X-RAY EXAM L-2 SPINE 4/>VWS: CPT | Performed by: STUDENT IN AN ORGANIZED HEALTH CARE EDUCATION/TRAINING PROGRAM

## 2024-08-12 PROCEDURE — 85025 COMPLETE CBC W/AUTO DIFF WBC: CPT

## 2024-08-12 PROCEDURE — 72110 X-RAY EXAM L-2 SPINE 4/>VWS: CPT

## 2024-08-12 RX ORDER — OXYCODONE AND ACETAMINOPHEN 10; 325 MG/1; MG/1
1 TABLET ORAL EVERY 8 HOURS PRN
Qty: 21 TABLET | Refills: 0 | Status: SHIPPED | OUTPATIENT
Start: 2024-08-12 | End: 2024-08-19

## 2024-08-12 RX ORDER — HYDROCORTISONE 25 MG/G
CREAM TOPICAL 2 TIMES DAILY PRN
Qty: 30 G | Refills: 11 | Status: SHIPPED | OUTPATIENT
Start: 2024-08-12 | End: 2025-08-12

## 2024-08-12 NOTE — PROGRESS NOTES
Spoke with patient over the phone.  Lumbar back pain is worsening and excruciating.  Has not been able to sleep well at all for almost 4 days.  He has been taking excessive nights of gabapentin.  He is prescribed 800 mg 3 times daily and is taking it 1600 mg 4 times daily.  He is also taking 4 to 6 g of tizanidine at a time.  No relief in his back pain.  At baseline patient is incontinent of stool at times.  He has excessive sweating especially at night.  CBC shows a mild anemia with no other aberration.  HIV was -2 months ago.  He also has a worsening rash on his lower back.    I encouraged the patient to be evaluated in the emergency room.  He has to watch his 6-year-old son during the day, it is not an easy option.  Interim plan is pain control with Percocet, CBC with differential, and x-ray of the lumbar back as well as hydrocortisone 2.5% cream for his rash.

## 2024-08-14 ENCOUNTER — PHARMACY VISIT (OUTPATIENT)
Dept: PHARMACY | Facility: CLINIC | Age: 59
End: 2024-08-14

## 2024-08-15 ENCOUNTER — PHARMACY VISIT (OUTPATIENT)
Dept: PHARMACY | Facility: CLINIC | Age: 59
End: 2024-08-15
Payer: COMMERCIAL

## 2024-08-15 DIAGNOSIS — K92.1 BLOOD IN STOOL: ICD-10-CM

## 2024-08-15 DIAGNOSIS — D64.9 ANEMIA, UNSPECIFIED TYPE: Primary | ICD-10-CM

## 2024-08-15 DIAGNOSIS — K92.0 HEMATEMESIS WITH NAUSEA: ICD-10-CM

## 2024-08-15 PROCEDURE — RXMED WILLOW AMBULATORY MEDICATION CHARGE

## 2024-08-15 RX ORDER — ONDANSETRON 4 MG/1
4 TABLET, ORALLY DISINTEGRATING ORAL EVERY 8 HOURS PRN
Qty: 20 TABLET | Refills: 0 | Status: SHIPPED | OUTPATIENT
Start: 2024-08-15 | End: 2024-08-22

## 2024-08-15 RX ORDER — SUCRALFATE 1 G/10ML
1 SUSPENSION ORAL 4 TIMES DAILY
Qty: 1200 ML | Refills: 0 | Status: SHIPPED | OUTPATIENT
Start: 2024-08-15 | End: 2024-09-14

## 2024-08-15 NOTE — PROGRESS NOTES
Spoke to patient over the phone in regard to follow-up from labs and imaging that we did on Friday.  He continues to have excruciating back pain in the lumbar region.  He also notes that her rash is getting worse, but it also is excoriated from scratching.  I prescribed him a short course of Percocet  mg every 8 hours as needed as we have tried many other nonnarcotic options for pain control unsuccessfully.  Of note patient is a daily drinker (endorses 3X 21 ounce cans daily of beer).    Patient has a history of GI disorders, including hiatal hernia, fistulotomy in 2020, fecal incontinence.  He continues to complain of black stool, as well as some blood streaking in his stool.  He has not had a drink of alcohol in 3 days, as the combination of alcohol and pain medication was making him nauseous and vomit.  He describes the vomit as orange and frothy.  There also blood streaks in it occasionally.  He removed all red another colored food from his diet recently to be sure that it was not that.  Continues to have orange-red streaked emesis.    In terms of laboratory and imaging diagnostics, his hemoglobin is low with an absolute lymphocyte count that is low and an absolute monocyte count that is slightly elevated.  Lumbar x-ray did not show any lytic lesions, but did show mild anterior listhesis at L4-L5 and some degenerative changes.    I counseled the patient to report to the nearest emergency room.  Given his heavy alcohol use with 3 days of sobriety, he is at high risk for withdrawal seizures.  Given his low hemoglobin and reports of bloody emesis and bloody stools with melena, I am concerned for acute blood loss.  Further concern for etiologies such as esophageal varices or other potentially life-threatening complications of his heavy alcohol use in the setting of his GI history.  Still concern for the back pain and weight loss pointing to another insidious etiology.  I discussed this with the patient, but he  is unable to go to the hospital at this time.  Relationship with his wife is in dire straits.  He has not seen or spoken to her in 2 days.  She usually works nights and their communication is very poor, as she does not like that he is unemployed and drink so much.  They have a 6-year-old son that the patient watches during the day.  There is no other reliable person to care for the child at this time.  Patient states that he will try to get to the emergency room on Monday when his son starts school    Interim plan:  In place of emergency room evaluation right now, I have ordered Carafate and Zofran to help relieve the symptoms of his nausea.  I have also ordered a fecal occult blood test that he can  at any  lab.  And I have ordered a repeat of the colonoscopy.  I did not order an EGD at this time as I have not consulted with GI.

## 2024-08-20 ENCOUNTER — APPOINTMENT (OUTPATIENT)
Dept: BEHAVIORAL HEALTH | Facility: CLINIC | Age: 59
End: 2024-08-20
Payer: COMMERCIAL

## 2024-09-25 PROCEDURE — RXMED WILLOW AMBULATORY MEDICATION CHARGE

## 2024-09-27 ENCOUNTER — PHARMACY VISIT (OUTPATIENT)
Dept: PHARMACY | Facility: CLINIC | Age: 59
End: 2024-09-27
Payer: COMMERCIAL

## 2024-09-27 ENCOUNTER — TELEPHONE (OUTPATIENT)
Dept: PRIMARY CARE | Facility: CLINIC | Age: 59
End: 2024-09-27
Payer: COMMERCIAL

## 2024-09-27 NOTE — TELEPHONE ENCOUNTER
Pt called stating insurance is denying albuterol and duoneb nebulizer solutions, and is requesting an alternative therapy. Call placed to pt for F/U. Nurse informed pt to contact insurance to see what alternatives that they will cover, and to call nurse back with info, so that new orders can be submitted without further delay. Pt confirmed understanding.

## 2024-09-28 PROCEDURE — RXMED WILLOW AMBULATORY MEDICATION CHARGE

## 2024-10-01 ENCOUNTER — PHARMACY VISIT (OUTPATIENT)
Dept: PHARMACY | Facility: CLINIC | Age: 59
End: 2024-10-01
Payer: COMMERCIAL

## 2024-10-02 PROCEDURE — RXMED WILLOW AMBULATORY MEDICATION CHARGE

## 2024-10-04 ENCOUNTER — PHARMACY VISIT (OUTPATIENT)
Dept: PHARMACY | Facility: CLINIC | Age: 59
End: 2024-10-04
Payer: COMMERCIAL

## 2024-10-04 PROCEDURE — RXMED WILLOW AMBULATORY MEDICATION CHARGE

## 2024-10-05 ENCOUNTER — PHARMACY VISIT (OUTPATIENT)
Dept: PHARMACY | Facility: CLINIC | Age: 59
End: 2024-10-05
Payer: COMMERCIAL

## 2024-10-06 PROCEDURE — RXMED WILLOW AMBULATORY MEDICATION CHARGE

## 2024-10-09 ENCOUNTER — PHARMACY VISIT (OUTPATIENT)
Dept: PHARMACY | Facility: CLINIC | Age: 59
End: 2024-10-09
Payer: COMMERCIAL

## 2024-10-11 ENCOUNTER — PHARMACY VISIT (OUTPATIENT)
Dept: PHARMACY | Facility: CLINIC | Age: 59
End: 2024-10-11
Payer: COMMERCIAL

## 2024-10-11 PROCEDURE — RXMED WILLOW AMBULATORY MEDICATION CHARGE

## 2024-10-12 PROCEDURE — RXMED WILLOW AMBULATORY MEDICATION CHARGE

## 2024-10-17 ENCOUNTER — PHARMACY VISIT (OUTPATIENT)
Dept: PHARMACY | Facility: CLINIC | Age: 59
End: 2024-10-17
Payer: COMMERCIAL

## 2024-10-20 PROCEDURE — RXMED WILLOW AMBULATORY MEDICATION CHARGE

## 2024-10-21 ENCOUNTER — APPOINTMENT (OUTPATIENT)
Dept: OPHTHALMOLOGY | Facility: CLINIC | Age: 59
End: 2024-10-21
Payer: MEDICARE

## 2024-10-23 ENCOUNTER — APPOINTMENT (OUTPATIENT)
Dept: PRIMARY CARE | Facility: CLINIC | Age: 59
End: 2024-10-23
Payer: COMMERCIAL

## 2024-10-23 ENCOUNTER — PHARMACY VISIT (OUTPATIENT)
Dept: PHARMACY | Facility: CLINIC | Age: 59
End: 2024-10-23
Payer: MEDICARE

## 2024-11-01 PROCEDURE — RXMED WILLOW AMBULATORY MEDICATION CHARGE

## 2024-11-02 ENCOUNTER — PHARMACY VISIT (OUTPATIENT)
Dept: PHARMACY | Facility: CLINIC | Age: 59
End: 2024-11-02
Payer: COMMERCIAL

## 2024-11-04 PROCEDURE — RXMED WILLOW AMBULATORY MEDICATION CHARGE

## 2024-11-06 ENCOUNTER — PHARMACY VISIT (OUTPATIENT)
Dept: PHARMACY | Facility: CLINIC | Age: 59
End: 2024-11-06
Payer: COMMERCIAL

## 2024-11-16 PROCEDURE — RXMED WILLOW AMBULATORY MEDICATION CHARGE

## 2024-11-18 ENCOUNTER — APPOINTMENT (OUTPATIENT)
Dept: PULMONOLOGY | Facility: HOSPITAL | Age: 59
End: 2024-11-18
Payer: COMMERCIAL

## 2024-11-19 ENCOUNTER — PHARMACY VISIT (OUTPATIENT)
Dept: PHARMACY | Facility: CLINIC | Age: 59
End: 2024-11-19
Payer: COMMERCIAL

## 2024-11-19 PROCEDURE — RXMED WILLOW AMBULATORY MEDICATION CHARGE

## 2024-11-21 ENCOUNTER — PHARMACY VISIT (OUTPATIENT)
Dept: PHARMACY | Facility: CLINIC | Age: 59
End: 2024-11-21
Payer: COMMERCIAL

## 2024-11-30 PROCEDURE — RXMED WILLOW AMBULATORY MEDICATION CHARGE

## 2024-12-01 DIAGNOSIS — R41.0 CONFUSION WITH NON-FOCAL NEURO EXAM: Chronic | ICD-10-CM

## 2024-12-01 DIAGNOSIS — J45.909 ASTHMA, UNSPECIFIED ASTHMA SEVERITY, UNSPECIFIED WHETHER COMPLICATED, UNSPECIFIED WHETHER PERSISTENT (HHS-HCC): ICD-10-CM

## 2024-12-01 DIAGNOSIS — R11.10 REGURGITATION OF FOOD: ICD-10-CM

## 2024-12-01 DIAGNOSIS — J44.1 COPD EXACERBATION (MULTI): ICD-10-CM

## 2024-12-01 PROCEDURE — RXMED WILLOW AMBULATORY MEDICATION CHARGE

## 2024-12-02 ENCOUNTER — TELEPHONE (OUTPATIENT)
Dept: GASTROENTEROLOGY | Facility: HOSPITAL | Age: 59
End: 2024-12-02
Payer: COMMERCIAL

## 2024-12-02 DIAGNOSIS — D3A.8 BENIGN NEUROENDOCRINE TUMOR OF RECTUM (CMS-HCC): Primary | ICD-10-CM

## 2024-12-02 PROCEDURE — RXMED WILLOW AMBULATORY MEDICATION CHARGE

## 2024-12-02 RX ORDER — POLYETHYLENE GLYCOL 3350, SODIUM CHLORIDE, SODIUM BICARBONATE, POTASSIUM CHLORIDE 420; 11.2; 5.72; 1.48 G/4L; G/4L; G/4L; G/4L
4000 POWDER, FOR SOLUTION ORAL ONCE
Qty: 4000 ML | Refills: 0 | Status: SHIPPED | OUTPATIENT
Start: 2024-12-02 | End: 2024-12-05

## 2024-12-02 RX ORDER — MONTELUKAST SODIUM 10 MG/1
10 TABLET ORAL NIGHTLY
Qty: 30 TABLET | Refills: 3 | Status: SHIPPED | OUTPATIENT
Start: 2024-12-02

## 2024-12-02 RX ORDER — PANTOPRAZOLE SODIUM 40 MG/1
40 TABLET, DELAYED RELEASE ORAL
Qty: 30 TABLET | Refills: 3 | Status: SHIPPED | OUTPATIENT
Start: 2024-12-02 | End: 2025-12-02

## 2024-12-02 RX ORDER — ROPINIROLE 0.5 MG/1
0.5 TABLET, FILM COATED ORAL 3 TIMES DAILY
Qty: 90 TABLET | Refills: 3 | Status: SHIPPED | OUTPATIENT
Start: 2024-12-02

## 2024-12-04 ENCOUNTER — PHARMACY VISIT (OUTPATIENT)
Dept: PHARMACY | Facility: CLINIC | Age: 59
End: 2024-12-04
Payer: COMMERCIAL

## 2024-12-18 ENCOUNTER — APPOINTMENT (OUTPATIENT)
Dept: PRIMARY CARE | Facility: CLINIC | Age: 59
End: 2024-12-18
Payer: COMMERCIAL

## 2024-12-18 PROCEDURE — RXMED WILLOW AMBULATORY MEDICATION CHARGE

## 2024-12-19 ENCOUNTER — PHARMACY VISIT (OUTPATIENT)
Dept: PHARMACY | Facility: CLINIC | Age: 59
End: 2024-12-19
Payer: COMMERCIAL

## 2024-12-19 PROCEDURE — RXMED WILLOW AMBULATORY MEDICATION CHARGE

## 2024-12-23 ENCOUNTER — PHARMACY VISIT (OUTPATIENT)
Dept: PHARMACY | Facility: CLINIC | Age: 59
End: 2024-12-23
Payer: MEDICARE

## 2024-12-29 PROCEDURE — RXMED WILLOW AMBULATORY MEDICATION CHARGE

## 2024-12-31 ENCOUNTER — PHARMACY VISIT (OUTPATIENT)
Dept: PHARMACY | Facility: CLINIC | Age: 59
End: 2024-12-31
Payer: COMMERCIAL

## 2024-12-31 DIAGNOSIS — F41.9 ANXIETY: ICD-10-CM

## 2024-12-31 DIAGNOSIS — F51.01 PRIMARY INSOMNIA: ICD-10-CM

## 2024-12-31 PROCEDURE — RXMED WILLOW AMBULATORY MEDICATION CHARGE

## 2025-01-02 ENCOUNTER — TELEPHONE (OUTPATIENT)
Dept: PEDIATRIC PULMONOLOGY | Facility: HOSPITAL | Age: 60
End: 2025-01-02
Payer: COMMERCIAL

## 2025-01-02 ENCOUNTER — PHARMACY VISIT (OUTPATIENT)
Dept: PHARMACY | Facility: CLINIC | Age: 60
End: 2025-01-02
Payer: COMMERCIAL

## 2025-01-02 PROCEDURE — RXMED WILLOW AMBULATORY MEDICATION CHARGE

## 2025-01-02 RX ORDER — MIRTAZAPINE 15 MG/1
TABLET, FILM COATED ORAL
Qty: 90 TABLET | Refills: 3 | Status: SHIPPED | OUTPATIENT
Start: 2025-01-02

## 2025-01-02 NOTE — TELEPHONE ENCOUNTER
RN called Servando and left a voicemail about coordinating a repeat sweat chloride test on the same day as appointment with Dr. Upton on 1/6. RN asked for Servando to return call.

## 2025-01-06 ENCOUNTER — APPOINTMENT (OUTPATIENT)
Dept: PULMONOLOGY | Facility: HOSPITAL | Age: 60
End: 2025-01-06
Payer: COMMERCIAL

## 2025-01-06 ENCOUNTER — PHARMACY VISIT (OUTPATIENT)
Dept: PHARMACY | Facility: CLINIC | Age: 60
End: 2025-01-06
Payer: COMMERCIAL

## 2025-01-15 DIAGNOSIS — K58.0 IRRITABLE BOWEL SYNDROME WITH DIARRHEA: ICD-10-CM

## 2025-01-15 PROCEDURE — RXMED WILLOW AMBULATORY MEDICATION CHARGE

## 2025-01-15 RX ORDER — CHOLESTYRAMINE 4 G/9G
4 POWDER, FOR SUSPENSION ORAL
Qty: 60 PACKET | Refills: 3 | Status: SHIPPED | OUTPATIENT
Start: 2025-01-15 | End: 2026-01-15

## 2025-01-17 ENCOUNTER — PHARMACY VISIT (OUTPATIENT)
Dept: PHARMACY | Facility: CLINIC | Age: 60
End: 2025-01-17
Payer: MEDICARE

## 2025-02-05 ENCOUNTER — HOSPITAL ENCOUNTER (OUTPATIENT)
Dept: RADIOLOGY | Facility: HOSPITAL | Age: 60
Discharge: HOME | End: 2025-02-05
Payer: COMMERCIAL

## 2025-02-05 ENCOUNTER — APPOINTMENT (OUTPATIENT)
Dept: PRIMARY CARE | Facility: CLINIC | Age: 60
End: 2025-02-05
Payer: COMMERCIAL

## 2025-02-05 VITALS
RESPIRATION RATE: 16 BRPM | HEART RATE: 89 BPM | TEMPERATURE: 97.5 F | WEIGHT: 121.8 LBS | DIASTOLIC BLOOD PRESSURE: 68 MMHG | BODY MASS INDEX: 18.46 KG/M2 | HEIGHT: 68 IN | OXYGEN SATURATION: 99 % | SYSTOLIC BLOOD PRESSURE: 104 MMHG

## 2025-02-05 DIAGNOSIS — K92.1 MELENA: ICD-10-CM

## 2025-02-05 DIAGNOSIS — R10.13 EPIGASTRIC PAIN: ICD-10-CM

## 2025-02-05 DIAGNOSIS — R04.2 BLOOD-TINGED SPUTUM: ICD-10-CM

## 2025-02-05 DIAGNOSIS — R05.3 CHRONIC COUGH: ICD-10-CM

## 2025-02-05 DIAGNOSIS — R63.4 UNINTENDED WEIGHT LOSS: ICD-10-CM

## 2025-02-05 DIAGNOSIS — R04.2 BLOOD-TINGED SPUTUM: Primary | ICD-10-CM

## 2025-02-05 DIAGNOSIS — R11.14 BILIOUS VOMITING WITH NAUSEA: ICD-10-CM

## 2025-02-05 PROCEDURE — 3008F BODY MASS INDEX DOCD: CPT | Performed by: STUDENT IN AN ORGANIZED HEALTH CARE EDUCATION/TRAINING PROGRAM

## 2025-02-05 PROCEDURE — 99214 OFFICE O/P EST MOD 30 MIN: CPT | Performed by: STUDENT IN AN ORGANIZED HEALTH CARE EDUCATION/TRAINING PROGRAM

## 2025-02-05 PROCEDURE — 71046 X-RAY EXAM CHEST 2 VIEWS: CPT

## 2025-02-05 ASSESSMENT — ENCOUNTER SYMPTOMS
DEPRESSION: 1
LOSS OF SENSATION IN FEET: 0
OCCASIONAL FEELINGS OF UNSTEADINESS: 0

## 2025-02-05 ASSESSMENT — PAIN SCALES - GENERAL: PAINLEVEL_OUTOF10: 0-NO PAIN

## 2025-02-05 ASSESSMENT — COLUMBIA-SUICIDE SEVERITY RATING SCALE - C-SSRS
1. IN THE PAST MONTH, HAVE YOU WISHED YOU WERE DEAD OR WISHED YOU COULD GO TO SLEEP AND NOT WAKE UP?: NO
2. HAVE YOU ACTUALLY HAD ANY THOUGHTS OF KILLING YOURSELF?: NO
6. HAVE YOU EVER DONE ANYTHING, STARTED TO DO ANYTHING, OR PREPARED TO DO ANYTHING TO END YOUR LIFE?: NO

## 2025-02-05 NOTE — PROGRESS NOTES
"  Subjective   Patient ID: Servando Leigh is a 59 y.o. male who presents for Follow-up and Med Refill.       1. Hemoptysis  Everymornig for this week  +taste of blood  No epistaxis  Endorses emesis, but this is different (see below)    2. BRBPR  Also melena  2 instances of stooling clots/blood  Every time he wipes,also blood  No pain with wiping  No dyschezia    3. High-volume emesis  No blood streaking in the emesis  Large quantity - he most often collects it in a jug at bedside, because it comes on so suddenly, he cannot always make it to the bathroom  States that it is almost a liter in volume every time  Sometimes bilious  Never bloody  Daily drinker: 5-6 cans (8 oz cans) a day, starting in the morning and going all throughout the day    Med Refill  Associated symptoms include abdominal pain, coughing, nausea and vomiting. Pertinent negatives include no chest pain.            Review of Systems   Respiratory:  Positive for cough and shortness of breath.    Cardiovascular:  Negative for chest pain.   Gastrointestinal:  Positive for abdominal pain, anal bleeding, blood in stool, nausea and vomiting.   All other systems reviewed and are negative.      Objective   /68 (BP Location: Right arm, Patient Position: Sitting, BP Cuff Size: Small adult)   Pulse 89   Temp 36.4 °C (97.5 °F) (Temporal)   Resp 16   Ht 1.727 m (5' 8\")   Wt 55.2 kg (121 lb 12.8 oz)   SpO2 99%   BMI 18.52 kg/m²     Physical Exam  Vitals reviewed.   Constitutional:       General: He is not in acute distress.     Comments: BMI 18.52, underweight   HENT:      Head: Normocephalic and atraumatic.      Nose: Nose normal.      Mouth/Throat:      Mouth: Mucous membranes are moist.      Pharynx: Oropharynx is clear.   Eyes:      Extraocular Movements: Extraocular movements intact.      Conjunctiva/sclera: Conjunctivae normal.      Pupils: Pupils are equal, round, and reactive to light.   Cardiovascular:      Rate and Rhythm: Normal rate.      " Pulses: Normal pulses.      Heart sounds: Normal heart sounds. No murmur heard.     No friction rub. No gallop.   Pulmonary:      Effort: Pulmonary effort is normal.      Breath sounds: Rhonchi present.      Comments: Coarse rhonchi auscultated in all lung fields anteriorly and posteriorly  Abdominal:      General: Abdomen is flat. Bowel sounds are normal.      Palpations: Abdomen is soft.      Tenderness: There is abdominal tenderness. There is guarding. There is no rebound.      Comments: RUQ/epigastric TTP with voluntary guarding, no rebound   Musculoskeletal:      Cervical back: Normal range of motion and neck supple.   Skin:     General: Skin is warm and dry.      Capillary Refill: Capillary refill takes less than 2 seconds.   Neurological:      General: No focal deficit present.      Mental Status: He is alert.   Psychiatric:         Mood and Affect: Mood normal.         Behavior: Behavior normal.         Assessment/Plan   Problem List Items Addressed This Visit    None  Visit Diagnoses         Codes    Blood-tinged sputum    -  Primary R04.2    Relevant Orders    Protime-INR (Completed)    HIV 1/2 Antigen/Antibody Screen with Reflex to Confirmation    XR chest 2 views (Completed)    Chronic cough     R05.3    Relevant Orders    XR chest 2 views (Completed)    Unintended weight loss     R63.4    Relevant Orders    CBC and Auto Differential (Completed)    Protime-INR (Completed)    Epigastric pain     R10.13    Relevant Orders    Comprehensive metabolic panel (Completed)    Referral to Gastroenterology    US right upper quadrant    Bilious vomiting with nausea     R11.14    Relevant Orders    Comprehensive metabolic panel (Completed)    Referral to Gastroenterology    US right upper quadrant    Melena     K92.1    Relevant Orders    Comprehensive metabolic panel (Completed)    CBC and Auto Differential (Completed)    Referral to Gastroenterology               OVERALL PLAN:  [ ] For hemoptysis, I counseled the  patient that the differential is wide. He has been treated for MAC in the past, is an every day alcohol drinker, current every day smoker, and is likely chronically anemic & malnourished.  - Ordered CBC with diff, PT/INR, CMP, as well as a Chest XR.  - IF infectious, will Rx abx for pneumonia  - suggested to schedule with his Pulmonologist soon  - message sent to Adenike Upton CNP in Pulm to discuss this further  [ ] For melena, and based on other reported symptoms, differential also wide.  - Has screening colonoscopy scheduled 2/27/25, encouraged to keep this  - formal referral to GI ordered: consider EGD? Other evalution  - RUQUS ordered for his epigastric pain in setting of frequent emesis that is otfen bilious      Note: This documentaion was entered into the electronic medical record using [employer] medical dictation software, and was not necessarily edited thereafter. Please excuse any errors of spelling or grammar.

## 2025-02-05 NOTE — PATIENT INSTRUCTIONS
Call to schedule an appointment with Gastroenterology (GI).    Call to schedule your ultrasound of the liver and gall bladder.    Stop taking tizanadine 2 mg.    Get blood work done as soon as you can. We use [facility] now. There is one in the [facility] where the old lab was.    Get your chest X-ray done. You can go to [facility] 5th floor as a walk-in, or else schedule it to be done. Get this done ASAP.    Follow up with Pulmonology soon.

## 2025-02-06 DIAGNOSIS — R04.2 BLOOD-TINGED SPUTUM: Primary | ICD-10-CM

## 2025-02-06 DIAGNOSIS — R05.3 CHRONIC COUGH: ICD-10-CM

## 2025-02-06 LAB
ALBUMIN SERPL-MCNC: 3.5 G/DL (ref 3.6–5.1)
ALP SERPL-CCNC: 95 U/L (ref 35–144)
ALT SERPL-CCNC: 10 U/L (ref 9–46)
ANION GAP SERPL CALCULATED.4IONS-SCNC: 11 MMOL/L (CALC) (ref 7–17)
AST SERPL-CCNC: 21 U/L (ref 10–35)
BASOPHILS # BLD AUTO: 95 CELLS/UL (ref 0–200)
BASOPHILS NFR BLD AUTO: 0.9 %
BILIRUB SERPL-MCNC: 0.3 MG/DL (ref 0.2–1.2)
BUN SERPL-MCNC: 7 MG/DL (ref 7–25)
CALCIUM SERPL-MCNC: 8.5 MG/DL (ref 8.6–10.3)
CHLORIDE SERPL-SCNC: 106 MMOL/L (ref 98–110)
CO2 SERPL-SCNC: 22 MMOL/L (ref 20–32)
CREAT SERPL-MCNC: 0.74 MG/DL (ref 0.7–1.3)
EGFRCR SERPLBLD CKD-EPI 2021: 106 ML/MIN/1.73M2
EOSINOPHIL # BLD AUTO: 95 CELLS/UL (ref 15–500)
EOSINOPHIL NFR BLD AUTO: 0.9 %
ERYTHROCYTE [DISTWIDTH] IN BLOOD BY AUTOMATED COUNT: 12.1 % (ref 11–15)
GLUCOSE SERPL-MCNC: 75 MG/DL (ref 65–99)
HCT VFR BLD AUTO: 36.4 % (ref 38.5–50)
HGB BLD-MCNC: 12.3 G/DL (ref 13.2–17.1)
HIV 1+2 AB+HIV1 P24 AG SERPL QL IA: NORMAL
INR PPP: 1
LYMPHOCYTES # BLD AUTO: 2470 CELLS/UL (ref 850–3900)
LYMPHOCYTES NFR BLD AUTO: 23.3 %
MCH RBC QN AUTO: 31.5 PG (ref 27–33)
MCHC RBC AUTO-ENTMCNC: 33.8 G/DL (ref 32–36)
MCV RBC AUTO: 93.1 FL (ref 80–100)
MONOCYTES # BLD AUTO: 827 CELLS/UL (ref 200–950)
MONOCYTES NFR BLD AUTO: 7.8 %
NEUTROPHILS # BLD AUTO: 7113 CELLS/UL (ref 1500–7800)
NEUTROPHILS NFR BLD AUTO: 67.1 %
PLATELET # BLD AUTO: 406 THOUSAND/UL (ref 140–400)
PMV BLD REES-ECKER: 9.3 FL (ref 7.5–12.5)
POTASSIUM SERPL-SCNC: 4 MMOL/L (ref 3.5–5.3)
PROT SERPL-MCNC: 6.9 G/DL (ref 6.1–8.1)
PROTHROMBIN TIME: 10.3 SEC (ref 9–11.5)
RBC # BLD AUTO: 3.91 MILLION/UL (ref 4.2–5.8)
SODIUM SERPL-SCNC: 139 MMOL/L (ref 135–146)
WBC # BLD AUTO: 10.6 THOUSAND/UL (ref 3.8–10.8)

## 2025-02-06 ASSESSMENT — ENCOUNTER SYMPTOMS
ABDOMINAL PAIN: 1
NAUSEA: 1
SHORTNESS OF BREATH: 1
COUGH: 1
ANAL BLEEDING: 1
BLOOD IN STOOL: 1
VOMITING: 1

## 2025-02-06 NOTE — PROGRESS NOTES
"Results review from yesterday: 2/5/25    Labs showed hgb mildly low (anemia). But not transfusion level. Has Colonoscopy 2/27 and h/o hemorrhoids, so that is still most likely culprit.    Chest X-ray IMPRESSION:  1. Perihilar and left basilar ground-glass opacity. Findings may be  due to edema developing infiltrate or pulmonary hemorrhage given the  provided history. If there is concern for focal lung  lesion/nodule/mass, consider correlation with chest C T.  2. Lungs are hyperinflated in keeping with patient's known  COPD/emphysema.    Called twice to discuss but received message \"Voicemail box is not set up and person cannot receive messages.\" Sent [employer] message with instructions.  - CT chest  - Colonoscopy  - appt with GI  - appt with Pulm  - GO TO ED if any symptoms worsen  "

## 2025-02-10 DIAGNOSIS — E87.8 HIGH SWEAT CHLORIDE: Primary | ICD-10-CM

## 2025-02-10 DIAGNOSIS — J44.9 COPD WITHOUT EXACERBATION (MULTI): ICD-10-CM

## 2025-02-10 DIAGNOSIS — J47.9 BRONCHIECTASIS WITHOUT COMPLICATION (MULTI): Primary | ICD-10-CM

## 2025-02-10 DIAGNOSIS — R63.4 UNINTENDED WEIGHT LOSS: ICD-10-CM

## 2025-02-10 DIAGNOSIS — R05.3 CHRONIC COUGH: ICD-10-CM

## 2025-02-10 DIAGNOSIS — R04.2 BLOOD-TINGED SPUTUM: ICD-10-CM

## 2025-02-10 NOTE — PROGRESS NOTES
Communicated with Pulm team. They agree with CT and repeat AFB sputum cultures. They are also concerned for potetial cystic fibrosis as cause of his symptoms. Needs to follow up with Dr. Jesus Manuel Choe.    Called and left message with patient's brother that I will be calling again today/tomorrow to explain this plan---    Plan:  [ ] Culture and Smears: AFB, CF, bacteria  [ ] Referral to Dr. Choe (Pul) for evaluation of possible CF  [ ] CT chest to FU CXR

## 2025-02-10 NOTE — PROGRESS NOTES
Spoke with Dr. Sinha his PCP - pt with melena/ hemoptysis. He ordered a CT chest - will send cups for sputum cultures. He has upcoming colonoscopy. Will have my nurse reach out to him as well as my office get him scheduled for a follow up appt with me.

## 2025-02-11 DIAGNOSIS — K61.1 PERIRECTAL ABSCESS: ICD-10-CM

## 2025-02-11 DIAGNOSIS — G89.29 CHRONIC NECK PAIN: ICD-10-CM

## 2025-02-11 DIAGNOSIS — L20.82 FLEXURAL ECZEMA: ICD-10-CM

## 2025-02-11 DIAGNOSIS — R56.9 SEIZURES (MULTI): ICD-10-CM

## 2025-02-11 DIAGNOSIS — R07.89 CHEST TIGHTNESS: ICD-10-CM

## 2025-02-11 DIAGNOSIS — R11.10 REGURGITATION OF FOOD: ICD-10-CM

## 2025-02-11 DIAGNOSIS — J44.9 COPD WITHOUT EXACERBATION (MULTI): ICD-10-CM

## 2025-02-11 DIAGNOSIS — L30.9 ACUTE DERMATITIS: ICD-10-CM

## 2025-02-11 DIAGNOSIS — M54.2 CHRONIC NECK PAIN: ICD-10-CM

## 2025-02-11 DIAGNOSIS — F51.01 PRIMARY INSOMNIA: ICD-10-CM

## 2025-02-11 DIAGNOSIS — J47.9 BRONCHIECTASIS WITHOUT COMPLICATION (MULTI): ICD-10-CM

## 2025-02-11 DIAGNOSIS — R33.9 RETENTION OF URINE: ICD-10-CM

## 2025-02-11 DIAGNOSIS — F05 DELIRIUM OF MIXED ORIGIN: ICD-10-CM

## 2025-02-11 DIAGNOSIS — M54.42 ACUTE BILATERAL LOW BACK PAIN WITH LEFT-SIDED SCIATICA: ICD-10-CM

## 2025-02-11 DIAGNOSIS — J69.0 ASPIRATION PNEUMONITIS (MULTI): ICD-10-CM

## 2025-02-11 DIAGNOSIS — L70.0 ACNE VULGARIS: ICD-10-CM

## 2025-02-11 DIAGNOSIS — J45.909 ASTHMA, UNSPECIFIED ASTHMA SEVERITY, UNSPECIFIED WHETHER COMPLICATED, UNSPECIFIED WHETHER PERSISTENT (HHS-HCC): ICD-10-CM

## 2025-02-11 DIAGNOSIS — F10.90 ALCOHOL USE DISORDER: ICD-10-CM

## 2025-02-11 DIAGNOSIS — F41.9 ANXIETY: ICD-10-CM

## 2025-02-11 DIAGNOSIS — R41.0 CONFUSION WITH NON-FOCAL NEURO EXAM: Chronic | ICD-10-CM

## 2025-02-11 DIAGNOSIS — J44.1 COPD EXACERBATION (MULTI): ICD-10-CM

## 2025-02-12 ENCOUNTER — TELEPHONE (OUTPATIENT)
Dept: PULMONOLOGY | Facility: HOSPITAL | Age: 60
End: 2025-02-12
Payer: COMMERCIAL

## 2025-02-12 NOTE — TELEPHONE ENCOUNTER
Per Adenike Upton CNP, this nurse contacted the patient to see how he was feeling.  Sent the following message to Adenike Upton who added a member of the GI team to the secure messaging:  This patient has hemoptysis every morning but it clears up after about 2 hours. Patient expressed that it is blood. Patient is also complaining of SOB at rest and with exertion, chest tightness, wheezing, increase nebulizer use with little relief and bloody stools. Patient states he doesn't think the Breztri or the Albuterol inhaler are working. I advised him to go to the ED but he is refusing because he doesn't have anyone to watch his 7 year old son. I also advised him to call his GI in case they may want to move up his colonoscopy but again he denied to call because it is too hard for him to find a  so he is leaving it schedule on 2/27/25

## 2025-02-14 RX ORDER — EPINEPHRINE 0.3 MG/.3ML
1 INJECTION SUBCUTANEOUS ONCE AS NEEDED
Qty: 1 EACH | Refills: 0 | Status: SHIPPED | OUTPATIENT
Start: 2025-02-14

## 2025-02-14 RX ORDER — ROPINIROLE 0.5 MG/1
0.5 TABLET, FILM COATED ORAL 3 TIMES DAILY
Qty: 90 TABLET | Refills: 3 | Status: SHIPPED | OUTPATIENT
Start: 2025-02-14

## 2025-02-14 RX ORDER — MONTELUKAST SODIUM 10 MG/1
10 TABLET ORAL NIGHTLY
Qty: 30 TABLET | Refills: 3 | Status: SHIPPED | OUTPATIENT
Start: 2025-02-14

## 2025-02-14 RX ORDER — HYDROCORTISONE 25 MG/G
CREAM TOPICAL 2 TIMES DAILY PRN
Qty: 30 G | Refills: 11 | OUTPATIENT
Start: 2025-02-14 | End: 2026-02-14

## 2025-02-14 RX ORDER — PANTOPRAZOLE SODIUM 40 MG/1
40 TABLET, DELAYED RELEASE ORAL
Qty: 30 TABLET | Refills: 3 | Status: SHIPPED | OUTPATIENT
Start: 2025-02-14 | End: 2026-02-14

## 2025-02-14 RX ORDER — FEEDER CONTAINER WITH PUMP SET
3 EACH MISCELLANEOUS DAILY
Qty: 21330 ML | Refills: 3 | Status: SHIPPED | OUTPATIENT
Start: 2025-02-14

## 2025-02-14 RX ORDER — IBUPROFEN 200 MG
1 TABLET ORAL DAILY
Qty: 30 PATCH | Refills: 0 | Status: SHIPPED | OUTPATIENT
Start: 2025-02-14 | End: 2025-03-16

## 2025-02-14 RX ORDER — ATORVASTATIN CALCIUM 10 MG/1
10 TABLET, FILM COATED ORAL NIGHTLY
Qty: 90 TABLET | Refills: 3 | Status: SHIPPED | OUTPATIENT
Start: 2025-02-14

## 2025-02-14 RX ORDER — DICLOFENAC SODIUM 10 MG/G
GEL TOPICAL
Qty: 100 G | Refills: 5 | Status: SHIPPED | OUTPATIENT
Start: 2025-02-14

## 2025-02-14 RX ORDER — IPRATROPIUM BROMIDE AND ALBUTEROL SULFATE 2.5; .5 MG/3ML; MG/3ML
3 SOLUTION RESPIRATORY (INHALATION)
Qty: 90 ML | Refills: 3 | Status: SHIPPED | OUTPATIENT
Start: 2025-02-14 | End: 2025-03-26

## 2025-02-14 RX ORDER — GABAPENTIN 800 MG/1
800 TABLET ORAL 3 TIMES DAILY
Qty: 90 TABLET | Refills: 0 | Status: SHIPPED | OUTPATIENT
Start: 2025-02-14

## 2025-02-14 RX ORDER — MOMETASONE FUROATE 1 MG/G
OINTMENT TOPICAL DAILY
Qty: 15 G | Refills: 1 | OUTPATIENT
Start: 2025-02-14 | End: 2026-02-14

## 2025-02-14 RX ORDER — LOPERAMIDE HYDROCHLORIDE 2 MG/1
CAPSULE ORAL
Qty: 30 CAPSULE | Refills: 44 | Status: SHIPPED | OUTPATIENT
Start: 2025-02-14

## 2025-02-14 RX ORDER — MULTIVITAMIN/IRON/FOLIC ACID 18MG-0.4MG
1 TABLET ORAL DAILY
Qty: 30 TABLET | Refills: 11 | Status: SHIPPED | OUTPATIENT
Start: 2025-02-14 | End: 2026-02-14

## 2025-02-14 RX ORDER — BENZOYL PEROXIDE 100 MG/ML
1 LOTION TOPICAL NIGHTLY
Qty: 29.5 ML | Refills: 3 | OUTPATIENT
Start: 2025-02-14

## 2025-02-14 RX ORDER — MIRTAZAPINE 15 MG/1
TABLET, FILM COATED ORAL
Qty: 90 TABLET | Refills: 3 | Status: SHIPPED | OUTPATIENT
Start: 2025-02-14

## 2025-02-14 RX ORDER — OLANZAPINE 5 MG/1
5 TABLET ORAL NIGHTLY
Qty: 90 TABLET | Refills: 3 | Status: SHIPPED | OUTPATIENT
Start: 2025-02-14

## 2025-02-14 RX ORDER — ALBUTEROL SULFATE 90 UG/1
2 INHALANT RESPIRATORY (INHALATION) EVERY 4 HOURS PRN
Qty: 18 G | Refills: 11 | Status: SHIPPED | OUTPATIENT
Start: 2025-02-14

## 2025-02-14 RX ORDER — LANOLIN ALCOHOL/MO/W.PET/CERES
100 CREAM (GRAM) TOPICAL DAILY
Qty: 30 TABLET | Refills: 11 | Status: SHIPPED | OUTPATIENT
Start: 2025-02-14 | End: 2026-02-14

## 2025-02-14 RX ORDER — LEVETIRACETAM 500 MG/1
500 TABLET ORAL 2 TIMES DAILY
Qty: 60 TABLET | Refills: 11 | Status: SHIPPED | OUTPATIENT
Start: 2025-02-14

## 2025-02-14 RX ORDER — TAMSULOSIN HYDROCHLORIDE 0.4 MG/1
0.4 CAPSULE ORAL DAILY
Qty: 90 CAPSULE | Refills: 3 | Status: SHIPPED | OUTPATIENT
Start: 2025-02-14

## 2025-02-14 RX ORDER — TIZANIDINE HYDROCHLORIDE 2 MG/1
2 CAPSULE, GELATIN COATED ORAL 3 TIMES DAILY
Qty: 90 CAPSULE | Refills: 0 | Status: SHIPPED | OUTPATIENT
Start: 2025-02-14 | End: 2025-03-16

## 2025-02-14 NOTE — TELEPHONE ENCOUNTER
Pt called stating he is in need of all orders to go to [facility] d/t preferred pharmacy change. Orders pended and routed to provider.

## 2025-02-18 ENCOUNTER — TELEPHONE (OUTPATIENT)
Facility: HOSPITAL | Age: 60
End: 2025-02-18
Payer: COMMERCIAL

## 2025-02-18 NOTE — TELEPHONE ENCOUNTER
Pt called requesting refills of all meds. Noted provider sent all requested meds to pharmacy on Feb 14th 2025. Call placed to make pt aware. Pt informed provider refused refill of benzoyl peroxide, hydrocortisone, and mometasoe furoate. All other meds sent. Pt confirms understanding.

## 2025-02-19 ENCOUNTER — TELEPHONE (OUTPATIENT)
Dept: PRIMARY CARE | Facility: CLINIC | Age: 60
End: 2025-02-19
Payer: COMMERCIAL

## 2025-02-19 NOTE — TELEPHONE ENCOUNTER
[facility] ([facility]) would like to speak with you about patient taking (tiZANidine (Zanaflex) 2 mg capsule). [phone] Ref# [id number]. Showing possible liver disease and script can make it worse. Please reach out.

## 2025-02-20 NOTE — TELEPHONE ENCOUNTER
[facility] called again and would like to speak with you about patient taking (tiZANidine (Zanaflex) 2 mg capsule). [phone] Ref# [id number]. Showing possible liver disease and script can make it worse. Please reach out.

## 2025-02-24 NOTE — TELEPHONE ENCOUNTER
Alfredo from [facility] called to get clarification on the directions for [employer]. Please contact Alfredo at [phone]. Thanks!

## 2025-02-26 ENCOUNTER — TELEPHONE (OUTPATIENT)
Dept: GASTROENTEROLOGY | Facility: HOSPITAL | Age: 60
End: 2025-02-26
Payer: COMMERCIAL

## 2025-02-26 NOTE — TELEPHONE ENCOUNTER
Call placed to pt to make aware orders transferred to [facility]. Pt confirmed notification previously received.

## 2025-02-27 ENCOUNTER — APPOINTMENT (OUTPATIENT)
Dept: GASTROENTEROLOGY | Facility: HOSPITAL | Age: 60
End: 2025-02-27
Payer: COMMERCIAL

## 2025-02-27 DIAGNOSIS — J69.0 ASPIRATION PNEUMONITIS (MULTI): ICD-10-CM

## 2025-02-27 DIAGNOSIS — J44.9 COPD WITHOUT EXACERBATION (MULTI): ICD-10-CM

## 2025-02-27 DIAGNOSIS — J47.9 BRONCHIECTASIS WITHOUT COMPLICATION (MULTI): ICD-10-CM

## 2025-02-27 RX ORDER — ALBUTEROL SULFATE 90 UG/1
2 INHALANT RESPIRATORY (INHALATION) EVERY 4 HOURS PRN
Qty: 18 G | Refills: 11 | Status: CANCELLED | OUTPATIENT
Start: 2025-02-27

## 2025-02-27 RX ORDER — IPRATROPIUM BROMIDE AND ALBUTEROL SULFATE 2.5; .5 MG/3ML; MG/3ML
3 SOLUTION RESPIRATORY (INHALATION)
Qty: 90 ML | Refills: 3 | Status: SHIPPED | OUTPATIENT
Start: 2025-02-27 | End: 2025-04-08

## 2025-02-27 RX ORDER — GLYCOPYRROLATE AND FORMOTEROL FUMARATE 9; 4.8 UG/1; UG/1
2 AEROSOL, METERED RESPIRATORY (INHALATION)
Qty: 32.1 G | Refills: 3 | Status: SHIPPED | OUTPATIENT
Start: 2025-02-27

## 2025-02-28 ENCOUNTER — APPOINTMENT (OUTPATIENT)
Dept: GASTROENTEROLOGY | Facility: HOSPITAL | Age: 60
End: 2025-02-28
Payer: COMMERCIAL

## 2025-03-06 ENCOUNTER — HOSPITAL ENCOUNTER (OUTPATIENT)
Dept: RADIOLOGY | Facility: HOSPITAL | Age: 60
Discharge: HOME | End: 2025-03-06
Payer: COMMERCIAL

## 2025-03-06 DIAGNOSIS — R04.2 BLOOD-TINGED SPUTUM: ICD-10-CM

## 2025-03-06 DIAGNOSIS — R05.3 CHRONIC COUGH: ICD-10-CM

## 2025-03-06 PROCEDURE — 2550000001 HC RX 255 CONTRASTS: Performed by: STUDENT IN AN ORGANIZED HEALTH CARE EDUCATION/TRAINING PROGRAM

## 2025-03-06 PROCEDURE — 71260 CT THORAX DX C+: CPT

## 2025-03-06 RX ADMIN — IOHEXOL 75 ML: 350 INJECTION, SOLUTION INTRAVENOUS at 11:57

## 2025-03-07 ENCOUNTER — HOSPITAL ENCOUNTER (OUTPATIENT)
Dept: GASTROENTEROLOGY | Facility: HOSPITAL | Age: 60
Discharge: HOME | End: 2025-03-07
Payer: COMMERCIAL

## 2025-03-07 ENCOUNTER — ANESTHESIA EVENT (OUTPATIENT)
Dept: GASTROENTEROLOGY | Facility: HOSPITAL | Age: 60
End: 2025-03-07
Payer: COMMERCIAL

## 2025-03-07 ENCOUNTER — APPOINTMENT (OUTPATIENT)
Dept: LAB | Facility: HOSPITAL | Age: 60
End: 2025-03-07
Payer: COMMERCIAL

## 2025-03-07 ENCOUNTER — ANESTHESIA (OUTPATIENT)
Dept: GASTROENTEROLOGY | Facility: HOSPITAL | Age: 60
End: 2025-03-07
Payer: COMMERCIAL

## 2025-03-07 VITALS
WEIGHT: 113 LBS | DIASTOLIC BLOOD PRESSURE: 78 MMHG | RESPIRATION RATE: 13 BRPM | OXYGEN SATURATION: 95 % | TEMPERATURE: 97.7 F | BODY MASS INDEX: 17.18 KG/M2 | HEART RATE: 67 BPM | SYSTOLIC BLOOD PRESSURE: 122 MMHG

## 2025-03-07 DIAGNOSIS — J44.9 CHRONIC OBSTRUCTIVE PULMONARY DISEASE, UNSPECIFIED: ICD-10-CM

## 2025-03-07 DIAGNOSIS — D64.9 ANEMIA, UNSPECIFIED TYPE: ICD-10-CM

## 2025-03-07 DIAGNOSIS — K92.1 BLOOD IN STOOL: ICD-10-CM

## 2025-03-07 DIAGNOSIS — J47.9 BRONCHIECTASIS, UNCOMPLICATED (MULTI): Primary | ICD-10-CM

## 2025-03-07 PROCEDURE — 87077 CULTURE AEROBIC IDENTIFY: CPT

## 2025-03-07 PROCEDURE — 7100000010 HC PHASE TWO TIME - EACH INCREMENTAL 1 MINUTE

## 2025-03-07 PROCEDURE — 87102 FUNGUS ISOLATION CULTURE: CPT

## 2025-03-07 PROCEDURE — A45380 PR COLONOSCOPY,BIOPSY: Performed by: ANESTHESIOLOGY

## 2025-03-07 PROCEDURE — 3700000002 HC GENERAL ANESTHESIA TIME - EACH INCREMENTAL 1 MINUTE

## 2025-03-07 PROCEDURE — 7100000009 HC PHASE TWO TIME - INITIAL BASE CHARGE

## 2025-03-07 PROCEDURE — 87206 SMEAR FLUORESCENT/ACID STAI: CPT

## 2025-03-07 PROCEDURE — 87015 SPECIMEN INFECT AGNT CONCNTJ: CPT

## 2025-03-07 PROCEDURE — 45380 COLONOSCOPY AND BIOPSY: CPT | Performed by: STUDENT IN AN ORGANIZED HEALTH CARE EDUCATION/TRAINING PROGRAM

## 2025-03-07 PROCEDURE — A45380 PR COLONOSCOPY,BIOPSY: Performed by: NURSE ANESTHETIST, CERTIFIED REGISTERED

## 2025-03-07 PROCEDURE — 3700000001 HC GENERAL ANESTHESIA TIME - INITIAL BASE CHARGE

## 2025-03-07 PROCEDURE — 87070 CULTURE OTHR SPECIMN AEROBIC: CPT

## 2025-03-07 PROCEDURE — 87116 MYCOBACTERIA CULTURE: CPT

## 2025-03-07 PROCEDURE — 2500000004 HC RX 250 GENERAL PHARMACY W/ HCPCS (ALT 636 FOR OP/ED): Performed by: NURSE ANESTHETIST, CERTIFIED REGISTERED

## 2025-03-07 RX ORDER — ONDANSETRON HYDROCHLORIDE 2 MG/ML
4 INJECTION, SOLUTION INTRAVENOUS ONCE AS NEEDED
OUTPATIENT
Start: 2025-03-07

## 2025-03-07 RX ORDER — LIDOCAINE HYDROCHLORIDE 10 MG/ML
0.1 INJECTION, SOLUTION EPIDURAL; INFILTRATION; INTRACAUDAL; PERINEURAL ONCE
OUTPATIENT
Start: 2025-03-07 | End: 2025-03-07

## 2025-03-07 RX ORDER — PROPOFOL 10 MG/ML
INJECTION, EMULSION INTRAVENOUS AS NEEDED
Status: DISCONTINUED | OUTPATIENT
Start: 2025-03-07 | End: 2025-03-07

## 2025-03-07 RX ORDER — PHENYLEPHRINE HYDROCHLORIDE 10 MG/ML
INJECTION INTRAVENOUS AS NEEDED
Status: DISCONTINUED | OUTPATIENT
Start: 2025-03-07 | End: 2025-03-07

## 2025-03-07 RX ORDER — MIDAZOLAM HYDROCHLORIDE 1 MG/ML
INJECTION INTRAMUSCULAR; INTRAVENOUS AS NEEDED
Status: DISCONTINUED | OUTPATIENT
Start: 2025-03-07 | End: 2025-03-07

## 2025-03-07 RX ADMIN — PROPOFOL 20 MG: 10 INJECTION, EMULSION INTRAVENOUS at 13:35

## 2025-03-07 RX ADMIN — PROPOFOL 30 MG: 10 INJECTION, EMULSION INTRAVENOUS at 14:04

## 2025-03-07 RX ADMIN — PROPOFOL 50 MG: 10 INJECTION, EMULSION INTRAVENOUS at 14:07

## 2025-03-07 RX ADMIN — PROPOFOL 20 MG: 10 INJECTION, EMULSION INTRAVENOUS at 14:00

## 2025-03-07 RX ADMIN — MIDAZOLAM HYDROCHLORIDE 2 MG: 1 INJECTION, SOLUTION INTRAMUSCULAR; INTRAVENOUS at 13:30

## 2025-03-07 RX ADMIN — PROPOFOL 30 MG: 10 INJECTION, EMULSION INTRAVENOUS at 13:38

## 2025-03-07 RX ADMIN — PROPOFOL 50 MG: 10 INJECTION, EMULSION INTRAVENOUS at 13:47

## 2025-03-07 RX ADMIN — PROPOFOL 100 MG: 10 INJECTION, EMULSION INTRAVENOUS at 13:32

## 2025-03-07 RX ADMIN — PROPOFOL 50 MG: 10 INJECTION, EMULSION INTRAVENOUS at 13:56

## 2025-03-07 RX ADMIN — PROPOFOL 50 MG: 10 INJECTION, EMULSION INTRAVENOUS at 13:42

## 2025-03-07 RX ADMIN — PHENYLEPHRINE HYDROCHLORIDE 120 MCG: 10 INJECTION INTRAVENOUS at 13:43

## 2025-03-07 ASSESSMENT — PAIN DESCRIPTION - DESCRIPTORS: DESCRIPTORS: ACHING

## 2025-03-07 ASSESSMENT — PAIN - FUNCTIONAL ASSESSMENT
PAIN_FUNCTIONAL_ASSESSMENT: 0-10
PAIN_FUNCTIONAL_ASSESSMENT: 0-10

## 2025-03-07 ASSESSMENT — PAIN SCALES - GENERAL
PAINLEVEL_OUTOF10: 3
PAINLEVEL_OUTOF10: 0 - NO PAIN

## 2025-03-07 NOTE — H&P
History Of Present Illness  Servando Leigh is a 60 y.o. male with COPD, MAC infection, alcohol use disorder, bronchiectasis, seizures, chronic perianal and scrotal abscesses here, chiquita-anal fistula s/p fistulotomy, history of H.pylori infection and chronic diarrhea (extensive workup has been unrevealing, thought to be IBS-D) who is here for colonoscopy.    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 Workup thereafter was negative for carcinoid. He had a flex sig in Feb 2021 that was negative for recurrence of neuroendocrine tumor.     He is not on any blood thinners.      Past Medical History  Past Medical History:   Diagnosis Date    At risk for aspiration 04/28/2024    Brain tumor (benign) (Multi)     CKD (chronic kidney disease)     COPD (chronic obstructive pulmonary disease) (Multi)     Dermatitis     GERD (gastroesophageal reflux disease)     Incontinence of feces     Myalgia     Peripheral vascular disease, unspecified (CMS-HCC)     Tissue necrosis with gangrene in peripheral vascular disease    Personal history of other diseases of the digestive system     History of esophageal reflux    Personal history of other diseases of the nervous system and sense organs     History of seizure disorder    Personal history of other diseases of the respiratory system     History of pulmonary emphysema    Personal history of other mental and behavioral disorders     History of depression    Seizure (Multi)     3 to 4 times a week. stopped his depakote. no script. new md per patient    Unsteady gait when walking     Vertigo      Surgical History  Past Surgical History:   Procedure Laterality Date    CERVICAL FUSION      FOOT      NECK SURGERY  01/25/2017    Neck Surgery    OTHER SURGICAL HISTORY  07/13/2020    Perirectal abscess incision and drainage    OTHER SURGICAL HISTORY  01/07/2020    Shoulder replacement    TOTAL SHOULDER ARTHROPLASTY Right      right     Social History  He reports that he has been smoking cigarettes. He started smoking about 24 years ago. He has a 6 pack-year smoking history. He has never used smokeless tobacco. He reports current alcohol use. He reports current drug use. Drug: Marijuana.    Family History  Family History   Problem Relation Name Age of Onset    Diabetes Son      Glaucoma Other Grandfather         Allergies  Allergies   Allergen Reactions    Coconut Swelling    Shellfish Containing Products Anaphylaxis, Unknown and Swelling    Shellfish Derived Anaphylaxis, Unknown and Other     Other Reaction(s): Unknown    Other Other    Penicillin G Swelling    Cat Dander Other     itching    House Dust Other     Watery eyes.     Review of Systems  A 12 point ROS was performed and is negative except for HPI above.      Physical Exam  General: not in acute distress, frail body habitus  CV: regular rate and rhythm, no murmur  Resp: clear to auscultation bilaterally  GI: soft, active bowel sounds, non-tender to palpation, no rebound or guarding, no ascites  Msk: no lower extremity edema  Derm: no jaundice      Last Recorded Vitals  Blood pressure 109/76, pulse 87, temperature 36.2 °C (97.2 °F), temperature source Temporal, resp. rate 18, SpO2 96%.    Assessment/Plan   Servando Leigh is a 60 y.o. male with COPD, MAC infection, alcohol use disorder, bronchiectasis, seizures, chronic perianal and scrotal abscesses here, chiquita-anal fistula s/p fistulotomy, history of H.pylori infection and chronic diarrhea (extensive workup has been unrevealing, thought to be IBS-D) who is here for colonoscopy.    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 Workup thereafter was negative for carcinoid. He had a flex sig in Feb 2021 that was negative for recurrence of neuroendocrine tumor.     He is not on any blood thinners.     OK to proceed with colonoscopy with CELIA.      Seema MADSEN  MD Shin MPH  GI Fellow   [facility]

## 2025-03-07 NOTE — ANESTHESIA PREPROCEDURE EVALUATION
Patient: Servando Leigh    Procedure Information       Date/Time: 03/07/25 1300    Scheduled providers: Adriana Shi MD    Procedure: COLONOSCOPY    [facility]: [facility]            Relevant Problems   Cardiac   (+) Chest pain      Pulmonary   (+) Asthma   (+) COPD exacerbation (Multi)      Neuro   (+) Anxiety   (+) Seizures (Multi)   (+) Small fiber neuropathy      /Renal   (+) Benign prostatic hyperplasia with urinary obstruction      Musculoskeletal   (+) Cervical spondylosis with myelopathy   (+) Chronic neck pain      ID   (+) Helicobacter pylori (H. pylori) infection       Clinical information reviewed:                   NPO Detail:  No data recorded     Physical Exam    Airway  Mallampati: III  TM distance: >3 FB     Cardiovascular   Rhythm: regular  Rate: normal     Dental    Pulmonary - normal exam     Abdominal - normal exam         Anesthesia Plan    History of general anesthesia?: yes  History of complications of general anesthesia?: no    ASA 3     MAC     intravenous induction   Anesthetic plan and risks discussed with patient.    Plan discussed with CRNA.

## 2025-03-11 ENCOUNTER — TELEPHONE (OUTPATIENT)
Dept: PULMONOLOGY | Facility: HOSPITAL | Age: 60
End: 2025-03-11
Payer: COMMERCIAL

## 2025-03-11 LAB — HOLD SPECIMEN: NORMAL

## 2025-03-11 NOTE — TELEPHONE ENCOUNTER
Spoke with pt regarding specimens that he dropped off at the lab yesterday. The lab reached out wanting to confirm that these specimens were sputum samples as they looked like urine. Pt confirmed that the specimens were sputum samples.

## 2025-03-12 LAB
ACID FAST STN SPEC: NORMAL
FUNGUS SPEC CULT: ABNORMAL
FUNGUS SPEC FUNGUS STN: ABNORMAL
MYCOBACTERIUM SPEC CULT: NORMAL

## 2025-03-14 LAB
BACTERIA SPT CF RESP CULT: ABNORMAL
BACTERIA SPT CF RESP CULT: ABNORMAL
LABORATORY COMMENT REPORT: NORMAL
PATH REPORT.FINAL DX SPEC: NORMAL
PATH REPORT.GROSS SPEC: NORMAL
PATH REPORT.TOTAL CANCER: NORMAL

## 2025-03-18 ASSESSMENT — PAIN SCALES - GENERAL: PAIN_LEVEL: 0

## 2025-03-18 NOTE — ANESTHESIA POSTPROCEDURE EVALUATION
Patient: Servando Leigh    Procedure Summary       Date: 03/07/25 Room / Location: [facility]    Anesthesia Start: 1323 Anesthesia Stop: 1422    Procedure: COLONOSCOPY Diagnosis:       Anemia, unspecified type      Blood in stool    Scheduled Providers: Adriana Shi MD Responsible Provider: Eusebio Fabian MD    Anesthesia Type: MAC ASA Status: 3            Anesthesia Type: MAC    Vitals Value Taken Time   /78 03/07/25 1445   Temp 36.5 °C (97.7 °F) 03/07/25 1421   Pulse 67 03/07/25 1445   Resp 13 03/07/25 1445   SpO2 95 % 03/07/25 1445       Anesthesia Post Evaluation    Patient location during evaluation: PACU  Patient participation: complete - patient participated  Level of consciousness: awake  Pain score: 0  Pain management: adequate  Airway patency: patent  Cardiovascular status: acceptable  Respiratory status: acceptable  Hydration status: acceptable  Postoperative Nausea and Vomiting: none        No notable events documented.

## 2025-03-19 LAB
ACID FAST STN SPEC: NORMAL
MYCOBACTERIUM SPEC CULT: NORMAL

## 2025-03-26 ENCOUNTER — APPOINTMENT (OUTPATIENT)
Dept: RADIOLOGY | Facility: HOSPITAL | Age: 60
End: 2025-03-26
Payer: COMMERCIAL

## 2025-03-26 ENCOUNTER — APPOINTMENT (OUTPATIENT)
Dept: PULMONOLOGY | Facility: HOSPITAL | Age: 60
End: 2025-03-26
Payer: COMMERCIAL

## 2025-03-26 LAB
ACID FAST STN SPEC: NORMAL
MYCOBACTERIUM SPEC CULT: NORMAL

## 2025-04-02 ENCOUNTER — APPOINTMENT (OUTPATIENT)
Dept: RADIOLOGY | Facility: HOSPITAL | Age: 60
End: 2025-04-02
Payer: COMMERCIAL

## 2025-04-02 LAB
ACID FAST STN SPEC: NORMAL
MYCOBACTERIUM SPEC CULT: NORMAL

## 2025-04-09 LAB
ACID FAST STN SPEC: NORMAL
MYCOBACTERIUM SPEC CULT: NORMAL

## 2025-04-15 ENCOUNTER — APPOINTMENT (OUTPATIENT)
Dept: RADIOLOGY | Facility: HOSPITAL | Age: 60
DRG: 391 | End: 2025-04-15
Payer: COMMERCIAL

## 2025-04-15 ENCOUNTER — CLINICAL SUPPORT (OUTPATIENT)
Dept: EMERGENCY MEDICINE | Facility: HOSPITAL | Age: 60
DRG: 391 | End: 2025-04-15
Payer: COMMERCIAL

## 2025-04-15 ENCOUNTER — APPOINTMENT (OUTPATIENT)
Dept: GASTROENTEROLOGY | Facility: HOSPITAL | Age: 60
End: 2025-04-15
Payer: COMMERCIAL

## 2025-04-15 ENCOUNTER — HOSPITAL ENCOUNTER (INPATIENT)
Facility: HOSPITAL | Age: 60
LOS: 4 days | Discharge: HOME | DRG: 391 | End: 2025-04-19
Attending: STUDENT IN AN ORGANIZED HEALTH CARE EDUCATION/TRAINING PROGRAM | Admitting: INTERNAL MEDICINE
Payer: COMMERCIAL

## 2025-04-15 DIAGNOSIS — J47.9 BRONCHIECTASIS WITHOUT COMPLICATION (MULTI): ICD-10-CM

## 2025-04-15 DIAGNOSIS — M54.2 CHRONIC NECK PAIN: ICD-10-CM

## 2025-04-15 DIAGNOSIS — G89.29 CHRONIC NECK PAIN: ICD-10-CM

## 2025-04-15 DIAGNOSIS — Z91.89 AT RISK FOR POLYPHARMACY: ICD-10-CM

## 2025-04-15 DIAGNOSIS — J44.1 COPD EXACERBATION (MULTI): ICD-10-CM

## 2025-04-15 DIAGNOSIS — E55.9 VITAMIN D DEFICIENCY: ICD-10-CM

## 2025-04-15 DIAGNOSIS — K59.1 FUNCTIONAL DIARRHEA: ICD-10-CM

## 2025-04-15 DIAGNOSIS — R04.2 HEMOPTYSIS: Primary | ICD-10-CM

## 2025-04-15 DIAGNOSIS — D64.9 ANEMIA, UNSPECIFIED TYPE: ICD-10-CM

## 2025-04-15 DIAGNOSIS — J96.01 ACUTE HYPOXEMIC RESPIRATORY FAILURE: ICD-10-CM

## 2025-04-15 LAB
ABO GROUP (TYPE) IN BLOOD: NORMAL
ALBUMIN SERPL BCP-MCNC: 3.7 G/DL (ref 3.4–5)
ALP SERPL-CCNC: 107 U/L (ref 33–120)
ALT SERPL W P-5'-P-CCNC: 26 U/L (ref 10–52)
ANION GAP SERPL CALC-SCNC: 23 MMOL/L (ref 10–20)
ANTIBODY SCREEN: NORMAL
AST SERPL W P-5'-P-CCNC: 75 U/L (ref 9–39)
ATRIAL RATE: 97 BPM
BASOPHILS # BLD AUTO: 0.08 X10*3/UL (ref 0–0.1)
BASOPHILS NFR BLD AUTO: 0.7 %
BILIRUB SERPL-MCNC: 0.4 MG/DL (ref 0–1.2)
BUN SERPL-MCNC: 17 MG/DL (ref 6–23)
CALCIUM SERPL-MCNC: 8.8 MG/DL (ref 8.6–10.6)
CARDIAC TROPONIN I PNL SERPL HS: <3 NG/L (ref 0–53)
CARDIAC TROPONIN I PNL SERPL HS: <3 NG/L (ref 0–53)
CHLORIDE SERPL-SCNC: 89 MMOL/L (ref 98–107)
CO2 SERPL-SCNC: 33 MMOL/L (ref 21–32)
CREAT SERPL-MCNC: 0.71 MG/DL (ref 0.5–1.3)
EGFRCR SERPLBLD CKD-EPI 2021: >90 ML/MIN/1.73M*2
EOSINOPHIL # BLD AUTO: 0 X10*3/UL (ref 0–0.7)
EOSINOPHIL NFR BLD AUTO: 0 %
ERYTHROCYTE [DISTWIDTH] IN BLOOD BY AUTOMATED COUNT: 13.6 % (ref 11.5–14.5)
GLUCOSE SERPL-MCNC: 87 MG/DL (ref 74–99)
HCT VFR BLD AUTO: 33.7 % (ref 41–52)
HGB BLD-MCNC: 11.7 G/DL (ref 13.5–17.5)
IMM GRANULOCYTES # BLD AUTO: 0.1 X10*3/UL (ref 0–0.7)
IMM GRANULOCYTES NFR BLD AUTO: 0.9 % (ref 0–0.9)
INR PPP: 1.1 (ref 0.9–1.1)
LYMPHOCYTES # BLD AUTO: 1.23 X10*3/UL (ref 1.2–4.8)
LYMPHOCYTES NFR BLD AUTO: 11.4 %
MCH RBC QN AUTO: 31.2 PG (ref 26–34)
MCHC RBC AUTO-ENTMCNC: 34.7 G/DL (ref 32–36)
MCV RBC AUTO: 90 FL (ref 80–100)
MONOCYTES # BLD AUTO: 0.63 X10*3/UL (ref 0.1–1)
MONOCYTES NFR BLD AUTO: 5.8 %
NEUTROPHILS # BLD AUTO: 8.76 X10*3/UL (ref 1.2–7.7)
NEUTROPHILS NFR BLD AUTO: 81.2 %
NRBC BLD-RTO: 0 /100 WBCS (ref 0–0)
P AXIS: 76 DEGREES
P OFFSET: 199 MS
P ONSET: 159 MS
PLATELET # BLD AUTO: 320 X10*3/UL (ref 150–450)
POTASSIUM SERPL-SCNC: 3.5 MMOL/L (ref 3.5–5.3)
PR INTERVAL: 126 MS
PROT SERPL-MCNC: 7.8 G/DL (ref 6.4–8.2)
PROTHROMBIN TIME: 12.4 SECONDS (ref 9.8–12.4)
Q ONSET: 222 MS
QRS COUNT: 16 BEATS
QRS DURATION: 84 MS
QT INTERVAL: 362 MS
QTC CALCULATION(BAZETT): 459 MS
QTC FREDERICIA: 424 MS
R AXIS: 75 DEGREES
RBC # BLD AUTO: 3.75 X10*6/UL (ref 4.5–5.9)
RH FACTOR (ANTIGEN D): NORMAL
SODIUM SERPL-SCNC: 141 MMOL/L (ref 136–145)
T AXIS: 74 DEGREES
T OFFSET: 403 MS
VENTRICULAR RATE: 97 BPM
WBC # BLD AUTO: 10.8 X10*3/UL (ref 4.4–11.3)

## 2025-04-15 PROCEDURE — 83615 LACTATE (LD) (LDH) ENZYME: CPT

## 2025-04-15 PROCEDURE — 82728 ASSAY OF FERRITIN: CPT

## 2025-04-15 PROCEDURE — 99285 EMERGENCY DEPT VISIT HI MDM: CPT | Performed by: STUDENT IN AN ORGANIZED HEALTH CARE EDUCATION/TRAINING PROGRAM

## 2025-04-15 PROCEDURE — 96361 HYDRATE IV INFUSION ADD-ON: CPT

## 2025-04-15 PROCEDURE — 93010 ELECTROCARDIOGRAM REPORT: CPT | Performed by: NURSE PRACTITIONER

## 2025-04-15 PROCEDURE — 84484 ASSAY OF TROPONIN QUANT: CPT | Performed by: STUDENT IN AN ORGANIZED HEALTH CARE EDUCATION/TRAINING PROGRAM

## 2025-04-15 PROCEDURE — 1210000001 HC SEMI-PRIVATE ROOM DAILY

## 2025-04-15 PROCEDURE — 2550000001 HC RX 255 CONTRASTS: Performed by: STUDENT IN AN ORGANIZED HEALTH CARE EDUCATION/TRAINING PROGRAM

## 2025-04-15 PROCEDURE — 96374 THER/PROPH/DIAG INJ IV PUSH: CPT

## 2025-04-15 PROCEDURE — 2500000004 HC RX 250 GENERAL PHARMACY W/ HCPCS (ALT 636 FOR OP/ED)

## 2025-04-15 PROCEDURE — 2500000004 HC RX 250 GENERAL PHARMACY W/ HCPCS (ALT 636 FOR OP/ED): Performed by: EMERGENCY MEDICINE

## 2025-04-15 PROCEDURE — 85025 COMPLETE CBC W/AUTO DIFF WBC: CPT | Performed by: STUDENT IN AN ORGANIZED HEALTH CARE EDUCATION/TRAINING PROGRAM

## 2025-04-15 PROCEDURE — 80053 COMPREHEN METABOLIC PANEL: CPT | Performed by: STUDENT IN AN ORGANIZED HEALTH CARE EDUCATION/TRAINING PROGRAM

## 2025-04-15 PROCEDURE — 2500000002 HC RX 250 W HCPCS SELF ADMINISTERED DRUGS (ALT 637 FOR MEDICARE OP, ALT 636 FOR OP/ED)

## 2025-04-15 PROCEDURE — 85610 PROTHROMBIN TIME: CPT | Performed by: STUDENT IN AN ORGANIZED HEALTH CARE EDUCATION/TRAINING PROGRAM

## 2025-04-15 PROCEDURE — 71046 X-RAY EXAM CHEST 2 VIEWS: CPT | Performed by: RADIOLOGY

## 2025-04-15 PROCEDURE — 36415 COLL VENOUS BLD VENIPUNCTURE: CPT | Performed by: STUDENT IN AN ORGANIZED HEALTH CARE EDUCATION/TRAINING PROGRAM

## 2025-04-15 PROCEDURE — 94640 AIRWAY INHALATION TREATMENT: CPT

## 2025-04-15 PROCEDURE — 96375 TX/PRO/DX INJ NEW DRUG ADDON: CPT

## 2025-04-15 PROCEDURE — 86901 BLOOD TYPING SEROLOGIC RH(D): CPT | Performed by: STUDENT IN AN ORGANIZED HEALTH CARE EDUCATION/TRAINING PROGRAM

## 2025-04-15 PROCEDURE — 71260 CT THORAX DX C+: CPT | Performed by: RADIOLOGY

## 2025-04-15 PROCEDURE — 93005 ELECTROCARDIOGRAM TRACING: CPT

## 2025-04-15 PROCEDURE — 71046 X-RAY EXAM CHEST 2 VIEWS: CPT

## 2025-04-15 PROCEDURE — 99285 EMERGENCY DEPT VISIT HI MDM: CPT | Mod: 25 | Performed by: STUDENT IN AN ORGANIZED HEALTH CARE EDUCATION/TRAINING PROGRAM

## 2025-04-15 PROCEDURE — 83540 ASSAY OF IRON: CPT

## 2025-04-15 PROCEDURE — 74177 CT ABD & PELVIS W/CONTRAST: CPT

## 2025-04-15 PROCEDURE — 74177 CT ABD & PELVIS W/CONTRAST: CPT | Performed by: RADIOLOGY

## 2025-04-15 RX ORDER — GABAPENTIN 400 MG/1
800 CAPSULE ORAL 3 TIMES DAILY
Status: DISCONTINUED | OUTPATIENT
Start: 2025-04-15 | End: 2025-04-19 | Stop reason: HOSPADM

## 2025-04-15 RX ORDER — LANOLIN ALCOHOL/MO/W.PET/CERES
100 CREAM (GRAM) TOPICAL DAILY
Status: DISCONTINUED | OUTPATIENT
Start: 2025-04-16 | End: 2025-04-16

## 2025-04-15 RX ORDER — PANTOPRAZOLE SODIUM 40 MG/10ML
80 INJECTION, POWDER, LYOPHILIZED, FOR SOLUTION INTRAVENOUS ONCE
Status: DISCONTINUED | OUTPATIENT
Start: 2025-04-15 | End: 2025-04-15

## 2025-04-15 RX ORDER — ACETAMINOPHEN 500 MG
5 TABLET ORAL NIGHTLY PRN
Status: DISCONTINUED | OUTPATIENT
Start: 2025-04-15 | End: 2025-04-19 | Stop reason: HOSPADM

## 2025-04-15 RX ORDER — PANTOPRAZOLE SODIUM 40 MG/10ML
40 INJECTION, POWDER, LYOPHILIZED, FOR SOLUTION INTRAVENOUS
Status: DISCONTINUED | OUTPATIENT
Start: 2025-04-16 | End: 2025-04-17

## 2025-04-15 RX ORDER — ONDANSETRON HYDROCHLORIDE 2 MG/ML
4 INJECTION, SOLUTION INTRAVENOUS EVERY 6 HOURS PRN
Status: DISCONTINUED | OUTPATIENT
Start: 2025-04-15 | End: 2025-04-19 | Stop reason: HOSPADM

## 2025-04-15 RX ORDER — LORAZEPAM 2 MG/ML
0.5 INJECTION INTRAMUSCULAR EVERY 2 HOUR PRN
Status: DISCONTINUED | OUTPATIENT
Start: 2025-04-15 | End: 2025-04-19 | Stop reason: HOSPADM

## 2025-04-15 RX ORDER — FOLIC ACID 1 MG/1
1 TABLET ORAL DAILY
Status: DISCONTINUED | OUTPATIENT
Start: 2025-04-16 | End: 2025-04-15

## 2025-04-15 RX ORDER — SODIUM CHLORIDE FOR INHALATION 3 %
4 VIAL, NEBULIZER (ML) INHALATION 3 TIMES DAILY
Status: DISCONTINUED | OUTPATIENT
Start: 2025-04-15 | End: 2025-04-19 | Stop reason: HOSPADM

## 2025-04-15 RX ORDER — ATORVASTATIN CALCIUM 10 MG/1
10 TABLET, FILM COATED ORAL NIGHTLY
Status: DISCONTINUED | OUTPATIENT
Start: 2025-04-15 | End: 2025-04-19 | Stop reason: HOSPADM

## 2025-04-15 RX ORDER — TRIAMCINOLONE ACETONIDE 1 MG/G
OINTMENT TOPICAL 2 TIMES DAILY
Status: DISCONTINUED | OUTPATIENT
Start: 2025-04-15 | End: 2025-04-19 | Stop reason: HOSPADM

## 2025-04-15 RX ORDER — IPRATROPIUM BROMIDE AND ALBUTEROL SULFATE 2.5; .5 MG/3ML; MG/3ML
3 SOLUTION RESPIRATORY (INHALATION)
Status: DISCONTINUED | OUTPATIENT
Start: 2025-04-16 | End: 2025-04-17

## 2025-04-15 RX ORDER — LORAZEPAM 2 MG/ML
1 INJECTION INTRAMUSCULAR EVERY 2 HOUR PRN
Status: DISCONTINUED | OUTPATIENT
Start: 2025-04-15 | End: 2025-04-19 | Stop reason: HOSPADM

## 2025-04-15 RX ORDER — LEVETIRACETAM 500 MG/1
500 TABLET ORAL 2 TIMES DAILY
Status: DISCONTINUED | OUTPATIENT
Start: 2025-04-15 | End: 2025-04-19 | Stop reason: HOSPADM

## 2025-04-15 RX ORDER — PANTOPRAZOLE SODIUM 40 MG/10ML
80 INJECTION, POWDER, LYOPHILIZED, FOR SOLUTION INTRAVENOUS DAILY
Status: DISCONTINUED | OUTPATIENT
Start: 2025-04-16 | End: 2025-04-15

## 2025-04-15 RX ORDER — ROPINIROLE 0.5 MG/1
0.5 TABLET, FILM COATED ORAL 3 TIMES DAILY
Status: DISCONTINUED | OUTPATIENT
Start: 2025-04-15 | End: 2025-04-19 | Stop reason: HOSPADM

## 2025-04-15 RX ORDER — FOLIC ACID 1 MG/1
1 TABLET ORAL DAILY
Status: DISCONTINUED | OUTPATIENT
Start: 2025-04-16 | End: 2025-04-19 | Stop reason: HOSPADM

## 2025-04-15 RX ORDER — MULTIVIT-MIN/IRON FUM/FOLIC AC 7.5 MG-4
1 TABLET ORAL DAILY
Status: DISCONTINUED | OUTPATIENT
Start: 2025-04-16 | End: 2025-04-15

## 2025-04-15 RX ORDER — THIAMINE HYDROCHLORIDE 100 MG/ML
100 INJECTION, SOLUTION INTRAMUSCULAR; INTRAVENOUS DAILY
Status: DISCONTINUED | OUTPATIENT
Start: 2025-04-16 | End: 2025-04-15

## 2025-04-15 RX ORDER — ACETAMINOPHEN 650 MG/1
650 SUPPOSITORY RECTAL EVERY 4 HOURS PRN
Status: DISCONTINUED | OUTPATIENT
Start: 2025-04-15 | End: 2025-04-17

## 2025-04-15 RX ORDER — ACETAMINOPHEN 325 MG/1
650 TABLET ORAL EVERY 4 HOURS PRN
Status: DISCONTINUED | OUTPATIENT
Start: 2025-04-15 | End: 2025-04-17

## 2025-04-15 RX ORDER — PANTOPRAZOLE SODIUM 40 MG/10ML
80 INJECTION, POWDER, LYOPHILIZED, FOR SOLUTION INTRAVENOUS ONCE
Status: COMPLETED | OUTPATIENT
Start: 2025-04-15 | End: 2025-04-15

## 2025-04-15 RX ORDER — LANOLIN ALCOHOL/MO/W.PET/CERES
100 CREAM (GRAM) TOPICAL DAILY
Status: DISCONTINUED | OUTPATIENT
Start: 2025-04-18 | End: 2025-04-15

## 2025-04-15 RX ORDER — TAMSULOSIN HYDROCHLORIDE 0.4 MG/1
0.4 CAPSULE ORAL DAILY
Status: DISCONTINUED | OUTPATIENT
Start: 2025-04-16 | End: 2025-04-19 | Stop reason: HOSPADM

## 2025-04-15 RX ORDER — MULTIVIT-MIN/IRON FUM/FOLIC AC 7.5 MG-4
1 TABLET ORAL DAILY
Status: DISCONTINUED | OUTPATIENT
Start: 2025-04-16 | End: 2025-04-19 | Stop reason: HOSPADM

## 2025-04-15 RX ORDER — PANTOPRAZOLE SODIUM 40 MG/10ML
INJECTION, POWDER, LYOPHILIZED, FOR SOLUTION INTRAVENOUS
Status: COMPLETED
Start: 2025-04-15 | End: 2025-04-15

## 2025-04-15 RX ORDER — PANTOPRAZOLE SODIUM 40 MG/1
40 TABLET, DELAYED RELEASE ORAL
Status: DISCONTINUED | OUTPATIENT
Start: 2025-04-16 | End: 2025-04-18

## 2025-04-15 RX ORDER — IBUPROFEN 200 MG
1 TABLET ORAL DAILY
Status: DISCONTINUED | OUTPATIENT
Start: 2025-04-16 | End: 2025-04-19 | Stop reason: HOSPADM

## 2025-04-15 RX ORDER — ALBUTEROL SULFATE 0.83 MG/ML
2.5 SOLUTION RESPIRATORY (INHALATION) ONCE
Status: COMPLETED | OUTPATIENT
Start: 2025-04-15 | End: 2025-04-15

## 2025-04-15 RX ORDER — CHOLESTYRAMINE 4 G/9G
4 POWDER, FOR SUSPENSION ORAL
Status: DISCONTINUED | OUTPATIENT
Start: 2025-04-16 | End: 2025-04-19 | Stop reason: HOSPADM

## 2025-04-15 RX ORDER — HYDROCORTISONE 25 MG/G
CREAM TOPICAL 2 TIMES DAILY PRN
Status: DISCONTINUED | OUTPATIENT
Start: 2025-04-15 | End: 2025-04-19 | Stop reason: HOSPADM

## 2025-04-15 RX ORDER — ACETAMINOPHEN 160 MG/5ML
650 SOLUTION ORAL EVERY 4 HOURS PRN
Status: DISCONTINUED | OUTPATIENT
Start: 2025-04-15 | End: 2025-04-17

## 2025-04-15 RX ORDER — LORAZEPAM 2 MG/ML
2 INJECTION INTRAMUSCULAR EVERY 2 HOUR PRN
Status: DISCONTINUED | OUTPATIENT
Start: 2025-04-15 | End: 2025-04-19 | Stop reason: HOSPADM

## 2025-04-15 RX ORDER — OLANZAPINE 5 MG/1
5 TABLET, FILM COATED ORAL NIGHTLY
Status: DISCONTINUED | OUTPATIENT
Start: 2025-04-15 | End: 2025-04-19 | Stop reason: HOSPADM

## 2025-04-15 RX ORDER — MONTELUKAST SODIUM 10 MG/1
10 TABLET ORAL NIGHTLY
Status: DISCONTINUED | OUTPATIENT
Start: 2025-04-15 | End: 2025-04-19 | Stop reason: HOSPADM

## 2025-04-15 RX ORDER — LANOLIN ALCOHOL/MO/W.PET/CERES
100 CREAM (GRAM) TOPICAL DAILY
Status: DISCONTINUED | OUTPATIENT
Start: 2025-04-16 | End: 2025-04-19 | Stop reason: HOSPADM

## 2025-04-15 RX ORDER — FORMOTEROL FUMARATE 20 UG/2ML
20 SOLUTION RESPIRATORY (INHALATION)
Status: DISCONTINUED | OUTPATIENT
Start: 2025-04-15 | End: 2025-04-19 | Stop reason: HOSPADM

## 2025-04-15 RX ORDER — ONDANSETRON HYDROCHLORIDE 2 MG/ML
4 INJECTION, SOLUTION INTRAVENOUS ONCE
Status: COMPLETED | OUTPATIENT
Start: 2025-04-15 | End: 2025-04-15

## 2025-04-15 RX ADMIN — ALBUTEROL SULFATE 2.5 MG: 2.5 SOLUTION RESPIRATORY (INHALATION) at 22:24

## 2025-04-15 RX ADMIN — ONDANSETRON 4 MG: 2 INJECTION INTRAMUSCULAR; INTRAVENOUS at 23:32

## 2025-04-15 RX ADMIN — PANTOPRAZOLE SODIUM 80 MG: 40 INJECTION, POWDER, FOR SOLUTION INTRAVENOUS at 22:24

## 2025-04-15 RX ADMIN — ONDANSETRON 4 MG: 2 INJECTION INTRAMUSCULAR; INTRAVENOUS at 20:20

## 2025-04-15 RX ADMIN — SODIUM CHLORIDE, SODIUM LACTATE, POTASSIUM CHLORIDE, AND CALCIUM CHLORIDE 1000 ML: .6; .31; .03; .02 INJECTION, SOLUTION INTRAVENOUS at 20:20

## 2025-04-15 RX ADMIN — IOHEXOL 85 ML: 350 INJECTION, SOLUTION INTRAVENOUS at 22:10

## 2025-04-15 RX ADMIN — PANTOPRAZOLE SODIUM 80 MG: 40 INJECTION, POWDER, LYOPHILIZED, FOR SOLUTION INTRAVENOUS at 22:24

## 2025-04-15 ASSESSMENT — LIFESTYLE VARIABLES
NAUSEA AND VOMITING: MILD NAUSEA WITH NO VOMITING
HAVE PEOPLE ANNOYED YOU BY CRITICIZING YOUR DRINKING: NO
VISUAL DISTURBANCES: NOT PRESENT
EVER HAD A DRINK FIRST THING IN THE MORNING TO STEADY YOUR NERVES TO GET RID OF A HANGOVER: NO
TREMOR: 2
TOTAL SCORE: 0
ORIENTATION AND CLOUDING OF SENSORIUM: ORIENTED AND CAN DO SERIAL ADDITIONS
ANXIETY: MODERATELY ANXIOUS, OR GUARDED, SO ANXIETY IS INFERRED
PAROXYSMAL SWEATS: BARELY PERCEPTIBLE SWEATING, PALMS MOIST
TOTAL SCORE: 8
AGITATION: NORMAL ACTIVITY
HAVE YOU EVER FELT YOU SHOULD CUT DOWN ON YOUR DRINKING: NO
AUDITORY DISTURBANCES: NOT PRESENT
EVER FELT BAD OR GUILTY ABOUT YOUR DRINKING: NO
HEADACHE, FULLNESS IN HEAD: NOT PRESENT

## 2025-04-15 ASSESSMENT — COLUMBIA-SUICIDE SEVERITY RATING SCALE - C-SSRS
2. HAVE YOU ACTUALLY HAD ANY THOUGHTS OF KILLING YOURSELF?: NO
6. HAVE YOU EVER DONE ANYTHING, STARTED TO DO ANYTHING, OR PREPARED TO DO ANYTHING TO END YOUR LIFE?: NO
1. IN THE PAST MONTH, HAVE YOU WISHED YOU WERE DEAD OR WISHED YOU COULD GO TO SLEEP AND NOT WAKE UP?: NO

## 2025-04-15 ASSESSMENT — PAIN DESCRIPTION - FREQUENCY: FREQUENCY: CONSTANT/CONTINUOUS

## 2025-04-15 ASSESSMENT — PAIN DESCRIPTION - PAIN TYPE: TYPE: ACUTE PAIN

## 2025-04-15 ASSESSMENT — PAIN DESCRIPTION - DESCRIPTORS: DESCRIPTORS: ACHING

## 2025-04-15 ASSESSMENT — PAIN - FUNCTIONAL ASSESSMENT: PAIN_FUNCTIONAL_ASSESSMENT: 0-10

## 2025-04-15 ASSESSMENT — PAIN DESCRIPTION - LOCATION: LOCATION: CHEST

## 2025-04-15 ASSESSMENT — PAIN SCALES - GENERAL: PAINLEVEL_OUTOF10: 7

## 2025-04-15 NOTE — ED TRIAGE NOTES
Pt reports 7/10 chest pain that he has had for 2 weeks, he also endorse black stools and a few episodes of emesis that smelled like stool today. Pt also endorse tingling in his bilateral hands that also started today.

## 2025-04-16 ENCOUNTER — ANESTHESIA EVENT (OUTPATIENT)
Dept: GASTROENTEROLOGY | Facility: HOSPITAL | Age: 60
End: 2025-04-16
Payer: COMMERCIAL

## 2025-04-16 ENCOUNTER — APPOINTMENT (OUTPATIENT)
Dept: GASTROENTEROLOGY | Facility: HOSPITAL | Age: 60
DRG: 391 | End: 2025-04-16
Payer: COMMERCIAL

## 2025-04-16 ENCOUNTER — ANESTHESIA (OUTPATIENT)
Dept: GASTROENTEROLOGY | Facility: HOSPITAL | Age: 60
End: 2025-04-16
Payer: COMMERCIAL

## 2025-04-16 LAB
ACID FAST STN SPEC: NORMAL
ALBUMIN SERPL BCP-MCNC: 2.9 G/DL (ref 3.4–5)
ALP SERPL-CCNC: 84 U/L (ref 33–120)
ALT SERPL W P-5'-P-CCNC: 20 U/L (ref 10–52)
AMPHETAMINES UR QL SCN: ABNORMAL
ANION GAP SERPL CALC-SCNC: 18 MMOL/L (ref 10–20)
AST SERPL W P-5'-P-CCNC: 48 U/L (ref 9–39)
BARBITURATES UR QL SCN: ABNORMAL
BENZODIAZ UR QL SCN: ABNORMAL
BILIRUB SERPL-MCNC: 0.5 MG/DL (ref 0–1.2)
BUN SERPL-MCNC: 12 MG/DL (ref 6–23)
BZE UR QL SCN: ABNORMAL
C DIF TOX TCDA+TCDB STL QL NAA+PROBE: NOT DETECTED
CALCIUM SERPL-MCNC: 8 MG/DL (ref 8.6–10.6)
CANNABINOIDS UR QL SCN: ABNORMAL
CHLORIDE SERPL-SCNC: 91 MMOL/L (ref 98–107)
CO2 SERPL-SCNC: 32 MMOL/L (ref 21–32)
CREAT SERPL-MCNC: 0.8 MG/DL (ref 0.5–1.3)
EGFRCR SERPLBLD CKD-EPI 2021: >90 ML/MIN/1.73M*2
ERYTHROCYTE [DISTWIDTH] IN BLOOD BY AUTOMATED COUNT: 13.5 % (ref 11.5–14.5)
FENTANYL+NORFENTANYL UR QL SCN: ABNORMAL
FERRITIN SERPL-MCNC: 95 NG/ML (ref 20–300)
GLUCOSE BLD MANUAL STRIP-MCNC: 71 MG/DL (ref 74–99)
GLUCOSE SERPL-MCNC: 60 MG/DL (ref 74–99)
HAPTOGLOB SERPL NEPH-MCNC: 130 MG/DL (ref 30–200)
HCT VFR BLD AUTO: 26.6 % (ref 41–52)
HGB BLD-MCNC: 9.6 G/DL (ref 13.5–17.5)
HIV1 RNA # PLAS NAA DL=20: NOT DETECTED {COPIES}/ML
HIV1 RNA SPEC NAA+PROBE-LOG#: NORMAL {LOG_COPIES}/ML
HOLD SPECIMEN: NORMAL
HOLD SPECIMEN: NORMAL
IRON SATN MFR SERPL: 48 % (ref 25–45)
IRON SERPL-MCNC: 119 UG/DL (ref 35–150)
LDH SERPL L TO P-CCNC: 162 U/L (ref 84–246)
LIPASE SERPL-CCNC: 6 U/L (ref 9–82)
MCH RBC QN AUTO: 31.6 PG (ref 26–34)
MCHC RBC AUTO-ENTMCNC: 36.1 G/DL (ref 32–36)
MCV RBC AUTO: 88 FL (ref 80–100)
METHADONE UR QL SCN: ABNORMAL
MYCOBACTERIUM SPEC CULT: NORMAL
NRBC BLD-RTO: 0 /100 WBCS (ref 0–0)
OPIATES UR QL SCN: ABNORMAL
OXYCODONE+OXYMORPHONE UR QL SCN: ABNORMAL
PCP UR QL SCN: ABNORMAL
PLATELET # BLD AUTO: 258 X10*3/UL (ref 150–450)
POTASSIUM SERPL-SCNC: 3.7 MMOL/L (ref 3.5–5.3)
PROT SERPL-MCNC: 6.7 G/DL (ref 6.4–8.2)
RBC # BLD AUTO: 3.04 X10*6/UL (ref 4.5–5.9)
SODIUM SERPL-SCNC: 137 MMOL/L (ref 136–145)
TIBC SERPL-MCNC: 247 UG/DL (ref 240–445)
TTG IGA SER IA-ACNC: <1 U/ML
UIBC SERPL-MCNC: 128 UG/DL (ref 110–370)
WBC # BLD AUTO: 11.8 X10*3/UL (ref 4.4–11.3)

## 2025-04-16 PROCEDURE — 99233 SBSQ HOSP IP/OBS HIGH 50: CPT

## 2025-04-16 PROCEDURE — 84586 ASSAY OF VIP: CPT

## 2025-04-16 PROCEDURE — 2720000007 HC OR 272 NO HCPCS

## 2025-04-16 PROCEDURE — 0DJ08ZZ INSPECTION OF UPPER INTESTINAL TRACT, VIA NATURAL OR ARTIFICIAL OPENING ENDOSCOPIC: ICD-10-PCS | Performed by: INTERNAL MEDICINE

## 2025-04-16 PROCEDURE — 83993 ASSAY FOR CALPROTECTIN FECAL: CPT

## 2025-04-16 PROCEDURE — 80307 DRUG TEST PRSMV CHEM ANLYZR: CPT

## 2025-04-16 PROCEDURE — 80349 CANNABINOIDS NATURAL: CPT

## 2025-04-16 PROCEDURE — 82653 EL-1 FECAL QUANTITATIVE: CPT

## 2025-04-16 PROCEDURE — 43270 EGD LESION ABLATION: CPT | Performed by: INTERNAL MEDICINE

## 2025-04-16 PROCEDURE — 36415 COLL VENOUS BLD VENIPUNCTURE: CPT

## 2025-04-16 PROCEDURE — 2500000004 HC RX 250 GENERAL PHARMACY W/ HCPCS (ALT 636 FOR OP/ED): Mod: JZ

## 2025-04-16 PROCEDURE — 87449 NOS EACH ORGANISM AG IA: CPT

## 2025-04-16 PROCEDURE — 3700000002 HC GENERAL ANESTHESIA TIME - EACH INCREMENTAL 1 MINUTE

## 2025-04-16 PROCEDURE — S4991 NICOTINE PATCH NONLEGEND: HCPCS

## 2025-04-16 PROCEDURE — 2500000005 HC RX 250 GENERAL PHARMACY W/O HCPCS

## 2025-04-16 PROCEDURE — 2500000001 HC RX 250 WO HCPCS SELF ADMINISTERED DRUGS (ALT 637 FOR MEDICARE OP)

## 2025-04-16 PROCEDURE — 85027 COMPLETE CBC AUTOMATED: CPT

## 2025-04-16 PROCEDURE — 83690 ASSAY OF LIPASE: CPT | Performed by: STUDENT IN AN ORGANIZED HEALTH CARE EDUCATION/TRAINING PROGRAM

## 2025-04-16 PROCEDURE — 82306 VITAMIN D 25 HYDROXY: CPT

## 2025-04-16 PROCEDURE — 1100000001 HC PRIVATE ROOM DAILY

## 2025-04-16 PROCEDURE — 2500000004 HC RX 250 GENERAL PHARMACY W/ HCPCS (ALT 636 FOR OP/ED)

## 2025-04-16 PROCEDURE — 3700000001 HC GENERAL ANESTHESIA TIME - INITIAL BASE CHARGE

## 2025-04-16 PROCEDURE — 83516 IMMUNOASSAY NONANTIBODY: CPT

## 2025-04-16 PROCEDURE — 99222 1ST HOSP IP/OBS MODERATE 55: CPT | Performed by: STUDENT IN AN ORGANIZED HEALTH CARE EDUCATION/TRAINING PROGRAM

## 2025-04-16 PROCEDURE — 2500000002 HC RX 250 W HCPCS SELF ADMINISTERED DRUGS (ALT 637 FOR MEDICARE OP, ALT 636 FOR OP/ED)

## 2025-04-16 PROCEDURE — 82947 ASSAY GLUCOSE BLOOD QUANT: CPT

## 2025-04-16 PROCEDURE — 80053 COMPREHEN METABOLIC PANEL: CPT

## 2025-04-16 PROCEDURE — 87493 C DIFF AMPLIFIED PROBE: CPT

## 2025-04-16 PROCEDURE — 94640 AIRWAY INHALATION TREATMENT: CPT

## 2025-04-16 PROCEDURE — 7100000010 HC PHASE TWO TIME - EACH INCREMENTAL 1 MINUTE

## 2025-04-16 PROCEDURE — A43270 PR EDG ABLATE TUMOR POLYP/LESION W/DILATION AND WIRE

## 2025-04-16 PROCEDURE — 89125 SPECIMEN FAT STAIN: CPT

## 2025-04-16 PROCEDURE — 82941 ASSAY OF GASTRIN: CPT

## 2025-04-16 PROCEDURE — 83010 ASSAY OF HAPTOGLOBIN QUANT: CPT

## 2025-04-16 PROCEDURE — 7100000009 HC PHASE TWO TIME - INITIAL BASE CHARGE

## 2025-04-16 PROCEDURE — 87536 HIV-1 QUANT&REVRSE TRNSCRPJ: CPT

## 2025-04-16 PROCEDURE — 87328 CRYPTOSPORIDIUM AG IA: CPT

## 2025-04-16 PROCEDURE — A43270 PR EDG ABLATE TUMOR POLYP/LESION W/DILATION AND WIRE: Performed by: ANESTHESIOLOGY

## 2025-04-16 RX ORDER — SODIUM CHLORIDE, SODIUM LACTATE, POTASSIUM CHLORIDE, CALCIUM CHLORIDE 600; 310; 30; 20 MG/100ML; MG/100ML; MG/100ML; MG/100ML
100 INJECTION, SOLUTION INTRAVENOUS CONTINUOUS
OUTPATIENT
Start: 2025-04-16 | End: 2025-04-17

## 2025-04-16 RX ORDER — PROPOFOL 10 MG/ML
INJECTION, EMULSION INTRAVENOUS CONTINUOUS PRN
Status: DISCONTINUED | OUTPATIENT
Start: 2025-04-16 | End: 2025-04-16

## 2025-04-16 RX ORDER — FENTANYL CITRATE 50 UG/ML
INJECTION, SOLUTION INTRAMUSCULAR; INTRAVENOUS AS NEEDED
Status: DISCONTINUED | OUTPATIENT
Start: 2025-04-16 | End: 2025-04-16

## 2025-04-16 RX ORDER — LOPERAMIDE HYDROCHLORIDE 2 MG/1
4 CAPSULE ORAL 3 TIMES DAILY PRN
Status: DISCONTINUED | OUTPATIENT
Start: 2025-04-16 | End: 2025-04-19 | Stop reason: HOSPADM

## 2025-04-16 RX ORDER — DEXTROSE 50 % IN WATER (D50W) INTRAVENOUS SYRINGE
12.5
Status: DISCONTINUED | OUTPATIENT
Start: 2025-04-16 | End: 2025-04-19 | Stop reason: HOSPADM

## 2025-04-16 RX ORDER — DEXTROSE 50 % IN WATER (D50W) INTRAVENOUS SYRINGE
25
Status: DISCONTINUED | OUTPATIENT
Start: 2025-04-16 | End: 2025-04-19 | Stop reason: HOSPADM

## 2025-04-16 RX ORDER — LIDOCAINE HYDROCHLORIDE 10 MG/ML
0.1 INJECTION, SOLUTION INFILTRATION; PERINEURAL ONCE
OUTPATIENT
Start: 2025-04-16 | End: 2025-04-16

## 2025-04-16 RX ADMIN — Medication 5 MG: at 00:13

## 2025-04-16 RX ADMIN — ONDANSETRON 4 MG: 2 INJECTION INTRAMUSCULAR; INTRAVENOUS at 21:13

## 2025-04-16 RX ADMIN — TAMSULOSIN HYDROCHLORIDE 0.4 MG: 0.4 CAPSULE ORAL at 08:27

## 2025-04-16 RX ADMIN — OLANZAPINE 5 MG: 5 TABLET, FILM COATED ORAL at 00:13

## 2025-04-16 RX ADMIN — TRIAMCINOLONE ACETONIDE 1 APPLICATION: 1 OINTMENT TOPICAL at 08:36

## 2025-04-16 RX ADMIN — Medication 5 MG: at 21:13

## 2025-04-16 RX ADMIN — GABAPENTIN 800 MG: 400 CAPSULE ORAL at 00:12

## 2025-04-16 RX ADMIN — FOLIC ACID 1 MG: 1 TABLET ORAL at 08:27

## 2025-04-16 RX ADMIN — GABAPENTIN 800 MG: 400 CAPSULE ORAL at 08:27

## 2025-04-16 RX ADMIN — Medication 1 TABLET: at 08:27

## 2025-04-16 RX ADMIN — LORAZEPAM 1 MG: 2 INJECTION, SOLUTION INTRAMUSCULAR; INTRAVENOUS at 21:13

## 2025-04-16 RX ADMIN — LEVETIRACETAM 500 MG: 500 TABLET, FILM COATED ORAL at 21:13

## 2025-04-16 RX ADMIN — OLANZAPINE 5 MG: 5 TABLET, FILM COATED ORAL at 21:13

## 2025-04-16 RX ADMIN — PROPOFOL 30 MG: 10 INJECTION, EMULSION INTRAVENOUS at 14:30

## 2025-04-16 RX ADMIN — ROPINIROLE 0.5 MG: 0.5 TABLET, FILM COATED ORAL at 08:27

## 2025-04-16 RX ADMIN — ROPINIROLE 0.5 MG: 0.5 TABLET, FILM COATED ORAL at 15:57

## 2025-04-16 RX ADMIN — GABAPENTIN 800 MG: 400 CAPSULE ORAL at 21:13

## 2025-04-16 RX ADMIN — SODIUM CHLORIDE SOLN NEBU 3% 4 ML: 3 NEBU SOLN at 00:25

## 2025-04-16 RX ADMIN — FORMOTEROL FUMARATE DIHYDRATE 20 MCG: 20 SOLUTION RESPIRATORY (INHALATION) at 00:26

## 2025-04-16 RX ADMIN — PANTOPRAZOLE SODIUM 40 MG: 40 INJECTION, POWDER, FOR SOLUTION INTRAVENOUS at 15:57

## 2025-04-16 RX ADMIN — PROPOFOL 20 MG: 10 INJECTION, EMULSION INTRAVENOUS at 14:32

## 2025-04-16 RX ADMIN — PROPOFOL 200 MCG/KG/MIN: 10 INJECTION, EMULSION INTRAVENOUS at 14:26

## 2025-04-16 RX ADMIN — ATORVASTATIN CALCIUM 10 MG: 20 TABLET, FILM COATED ORAL at 00:12

## 2025-04-16 RX ADMIN — PANTOPRAZOLE SODIUM 40 MG: 40 TABLET, DELAYED RELEASE ORAL at 06:31

## 2025-04-16 RX ADMIN — MONTELUKAST 10 MG: 10 TABLET, FILM COATED ORAL at 21:13

## 2025-04-16 RX ADMIN — IPRATROPIUM BROMIDE AND ALBUTEROL SULFATE 3 ML: .5; 3 SOLUTION RESPIRATORY (INHALATION) at 15:56

## 2025-04-16 RX ADMIN — SODIUM CHLORIDE, SODIUM LACTATE, POTASSIUM CHLORIDE, AND CALCIUM CHLORIDE: 600; 310; 30; 20 INJECTION, SOLUTION INTRAVENOUS at 14:23

## 2025-04-16 RX ADMIN — ATORVASTATIN CALCIUM 10 MG: 20 TABLET, FILM COATED ORAL at 21:13

## 2025-04-16 RX ADMIN — SODIUM CHLORIDE SOLN NEBU 3% 4 ML: 3 NEBU SOLN at 15:58

## 2025-04-16 RX ADMIN — LEVETIRACETAM 500 MG: 500 TABLET, FILM COATED ORAL at 08:27

## 2025-04-16 RX ADMIN — MONTELUKAST 10 MG: 10 TABLET, FILM COATED ORAL at 00:13

## 2025-04-16 RX ADMIN — ROPINIROLE 0.5 MG: 0.5 TABLET, FILM COATED ORAL at 00:25

## 2025-04-16 RX ADMIN — FORMOTEROL FUMARATE DIHYDRATE 20 MCG: 20 SOLUTION RESPIRATORY (INHALATION) at 11:17

## 2025-04-16 RX ADMIN — IPRATROPIUM BROMIDE AND ALBUTEROL SULFATE 3 ML: .5; 3 SOLUTION RESPIRATORY (INHALATION) at 22:05

## 2025-04-16 RX ADMIN — TRIAMCINOLONE ACETONIDE: 1 OINTMENT TOPICAL at 00:25

## 2025-04-16 RX ADMIN — SODIUM CHLORIDE SOLN NEBU 3% 4 ML: 3 NEBU SOLN at 08:27

## 2025-04-16 RX ADMIN — THIAMINE HCL TAB 100 MG 100 MG: 100 TAB at 08:27

## 2025-04-16 RX ADMIN — ACETAMINOPHEN 650 MG: 325 TABLET, FILM COATED ORAL at 21:13

## 2025-04-16 RX ADMIN — LEVETIRACETAM 500 MG: 500 TABLET, FILM COATED ORAL at 00:13

## 2025-04-16 RX ADMIN — GABAPENTIN 800 MG: 400 CAPSULE ORAL at 15:57

## 2025-04-16 RX ADMIN — IPRATROPIUM BROMIDE AND ALBUTEROL SULFATE 3 ML: .5; 3 SOLUTION RESPIRATORY (INHALATION) at 08:29

## 2025-04-16 RX ADMIN — NICOTINE 1 PATCH: 14 PATCH, EXTENDED RELEASE TRANSDERMAL at 08:27

## 2025-04-16 RX ADMIN — ROPINIROLE 0.5 MG: 0.5 TABLET, FILM COATED ORAL at 21:19

## 2025-04-16 RX ADMIN — PROPOFOL 20 MG: 10 INJECTION, EMULSION INTRAVENOUS at 14:36

## 2025-04-16 RX ADMIN — SODIUM CHLORIDE SOLN NEBU 3% 4 ML: 3 NEBU SOLN at 22:05

## 2025-04-16 RX ADMIN — TRIAMCINOLONE ACETONIDE: 1 OINTMENT TOPICAL at 21:12

## 2025-04-16 RX ADMIN — FENTANYL CITRATE 100 MCG: 50 INJECTION, SOLUTION INTRAMUSCULAR; INTRAVENOUS at 14:24

## 2025-04-16 RX ADMIN — SODIUM CHLORIDE, SODIUM LACTATE, POTASSIUM CHLORIDE, AND CALCIUM CHLORIDE 1000 ML: .6; .31; .03; .02 INJECTION, SOLUTION INTRAVENOUS at 04:11

## 2025-04-16 RX ADMIN — ACETAMINOPHEN 650 MG: 325 TABLET, FILM COATED ORAL at 00:48

## 2025-04-16 SDOH — ECONOMIC STABILITY: HOUSING INSECURITY: IN THE LAST 12 MONTHS, WAS THERE A TIME WHEN YOU WERE NOT ABLE TO PAY THE MORTGAGE OR RENT ON TIME?: NO

## 2025-04-16 SDOH — SOCIAL STABILITY: SOCIAL INSECURITY
WITHIN THE LAST YEAR, HAVE YOU BEEN RAPED OR FORCED TO HAVE ANY KIND OF SEXUAL ACTIVITY BY YOUR PARTNER OR EX-PARTNER?: NO

## 2025-04-16 SDOH — ECONOMIC STABILITY: HOUSING INSECURITY: AT ANY TIME IN THE PAST 12 MONTHS, WERE YOU HOMELESS OR LIVING IN A SHELTER (INCLUDING NOW)?: NO

## 2025-04-16 SDOH — SOCIAL STABILITY: SOCIAL INSECURITY: ARE YOU OR HAVE YOU BEEN THREATENED OR ABUSED PHYSICALLY, EMOTIONALLY, OR SEXUALLY BY ANYONE?: NO

## 2025-04-16 SDOH — ECONOMIC STABILITY: INCOME INSECURITY: IN THE PAST 12 MONTHS HAS THE ELECTRIC, GAS, OIL, OR WATER COMPANY THREATENED TO SHUT OFF SERVICES IN YOUR HOME?: NO

## 2025-04-16 SDOH — ECONOMIC STABILITY: FOOD INSECURITY: HOW HARD IS IT FOR YOU TO PAY FOR THE VERY BASICS LIKE FOOD, HOUSING, MEDICAL CARE, AND HEATING?: NOT VERY HARD

## 2025-04-16 SDOH — ECONOMIC STABILITY: FOOD INSECURITY: WITHIN THE PAST 12 MONTHS, THE FOOD YOU BOUGHT JUST DIDN'T LAST AND YOU DIDN'T HAVE MONEY TO GET MORE.: NEVER TRUE

## 2025-04-16 SDOH — SOCIAL STABILITY: SOCIAL INSECURITY: WITHIN THE LAST YEAR, HAVE YOU BEEN HUMILIATED OR EMOTIONALLY ABUSED IN OTHER WAYS BY YOUR PARTNER OR EX-PARTNER?: NO

## 2025-04-16 SDOH — SOCIAL STABILITY: SOCIAL INSECURITY: WITHIN THE LAST YEAR, HAVE YOU BEEN AFRAID OF YOUR PARTNER OR EX-PARTNER?: NO

## 2025-04-16 SDOH — ECONOMIC STABILITY: FOOD INSECURITY: WITHIN THE PAST 12 MONTHS, YOU WORRIED THAT YOUR FOOD WOULD RUN OUT BEFORE YOU GOT THE MONEY TO BUY MORE.: NEVER TRUE

## 2025-04-16 SDOH — SOCIAL STABILITY: SOCIAL INSECURITY: DO YOU FEEL UNSAFE GOING BACK TO THE PLACE WHERE YOU ARE LIVING?: NO

## 2025-04-16 SDOH — ECONOMIC STABILITY: TRANSPORTATION INSECURITY: IN THE PAST 12 MONTHS, HAS LACK OF TRANSPORTATION KEPT YOU FROM MEDICAL APPOINTMENTS OR FROM GETTING MEDICATIONS?: NO

## 2025-04-16 SDOH — SOCIAL STABILITY: SOCIAL INSECURITY: DO YOU FEEL ANYONE HAS EXPLOITED OR TAKEN ADVANTAGE OF YOU FINANCIALLY OR OF YOUR PERSONAL PROPERTY?: NO

## 2025-04-16 SDOH — SOCIAL STABILITY: SOCIAL INSECURITY
WITHIN THE LAST YEAR, HAVE YOU BEEN KICKED, HIT, SLAPPED, OR OTHERWISE PHYSICALLY HURT BY YOUR PARTNER OR EX-PARTNER?: NO

## 2025-04-16 SDOH — ECONOMIC STABILITY: HOUSING INSECURITY: IN THE PAST 12 MONTHS, HOW MANY TIMES HAVE YOU MOVED WHERE YOU WERE LIVING?: 0

## 2025-04-16 SDOH — SOCIAL STABILITY: SOCIAL INSECURITY: ARE THERE ANY APPARENT SIGNS OF INJURIES/BEHAVIORS THAT COULD BE RELATED TO ABUSE/NEGLECT?: NO

## 2025-04-16 SDOH — SOCIAL STABILITY: SOCIAL INSECURITY: HAS ANYONE EVER THREATENED TO HURT YOUR FAMILY OR YOUR PETS?: NO

## 2025-04-16 SDOH — SOCIAL STABILITY: SOCIAL INSECURITY: DOES ANYONE TRY TO KEEP YOU FROM HAVING/CONTACTING OTHER FRIENDS OR DOING THINGS OUTSIDE YOUR HOME?: NO

## 2025-04-16 SDOH — SOCIAL STABILITY: SOCIAL INSECURITY: HAVE YOU HAD ANY THOUGHTS OF HARMING ANYONE ELSE?: NO

## 2025-04-16 SDOH — SOCIAL STABILITY: SOCIAL INSECURITY: HAVE YOU HAD THOUGHTS OF HARMING ANYONE ELSE?: NO

## 2025-04-16 ASSESSMENT — LIFESTYLE VARIABLES
PAROXYSMAL SWEATS: NO SWEAT VISIBLE
TOTAL SCORE: 0
SKIP TO QUESTIONS 9-10: 0
HEADACHE, FULLNESS IN HEAD: MODERATELY SEVERE
TREMOR: 3
TOTAL SCORE: 3
TACTILE DISTURBANCES: MILD ITCHING, PINS AND NEEDLES, BURNING OR NUMBNESS
ANXIETY: NO ANXIETY, AT EASE
VISUAL DISTURBANCES: NOT PRESENT
AGITATION: SOMEWHAT MORE THAN NORMAL ACTIVITY
PAROXYSMAL SWEATS: NO SWEAT VISIBLE
TREMOR: 2
ORIENTATION AND CLOUDING OF SENSORIUM: ORIENTED AND CAN DO SERIAL ADDITIONS
TACTILE DISTURBANCES: MILD ITCHING, PINS AND NEEDLES, BURNING OR NUMBNESS
AGITATION: NORMAL ACTIVITY
VISUAL DISTURBANCES: NOT PRESENT
HEADACHE, FULLNESS IN HEAD: NOT PRESENT
AGITATION: NORMAL ACTIVITY
PAROXYSMAL SWEATS: NO SWEAT VISIBLE
NAUSEA AND VOMITING: NO NAUSEA AND NO VOMITING
HEADACHE, FULLNESS IN HEAD: NOT PRESENT
AUDITORY DISTURBANCES: NOT PRESENT
HOW OFTEN DURING THE LAST YEAR HAVE YOU HAD A FEELING OF GUILT OR REMORSE AFTER DRINKING: NEVER
AGITATION: NORMAL ACTIVITY
HEADACHE, FULLNESS IN HEAD: NOT PRESENT
ORIENTATION AND CLOUDING OF SENSORIUM: ORIENTED AND CAN DO SERIAL ADDITIONS
ORIENTATION AND CLOUDING OF SENSORIUM: ORIENTED AND CAN DO SERIAL ADDITIONS
HEADACHE, FULLNESS IN HEAD: VERY MILD
ORIENTATION AND CLOUDING OF SENSORIUM: ORIENTED AND CAN DO SERIAL ADDITIONS
HEADACHE, FULLNESS IN HEAD: NOT PRESENT
TOTAL SCORE: 3
PAROXYSMAL SWEATS: NO SWEAT VISIBLE
HEADACHE, FULLNESS IN HEAD: NOT PRESENT
PAROXYSMAL SWEATS: NO SWEAT VISIBLE
NAUSEA AND VOMITING: MILD NAUSEA WITH NO VOMITING
ANXIETY: NO ANXIETY, AT EASE
PAROXYSMAL SWEATS: NO SWEAT VISIBLE
ANXIETY: NO ANXIETY, AT EASE
HOW OFTEN DURING THE LAST YEAR HAVE YOU FOUND THAT YOU WERE NOT ABLE TO STOP DRINKING ONCE YOU HAD STARTED: NEVER
ANXIETY: NO ANXIETY, AT EASE
TACTILE DISTURBANCES: MILD ITCHING, PINS AND NEEDLES, BURNING OR NUMBNESS
AGITATION: NORMAL ACTIVITY
AUDITORY DISTURBANCES: NOT PRESENT
AUDITORY DISTURBANCES: NOT PRESENT
VISUAL DISTURBANCES: NOT PRESENT
TREMOR: 3
TREMOR: 3
HEADACHE, FULLNESS IN HEAD: NOT PRESENT
AGITATION: SOMEWHAT MORE THAN NORMAL ACTIVITY
AUDITORY DISTURBANCES: NOT PRESENT
AUDIT-C TOTAL SCORE: 8
VISUAL DISTURBANCES: NOT PRESENT
PAROXYSMAL SWEATS: NO SWEAT VISIBLE
TOTAL SCORE: 8
HOW OFTEN DO YOU HAVE A DRINK CONTAINING ALCOHOL: 2-3 TIMES A WEEK
TACTILE DISTURBANCES: VERY MILD ITCHING, PINS AND NEEDLES, BURNING OR NUMBNESS
HAVE YOU OR SOMEONE ELSE BEEN INJURED AS A RESULT OF YOUR DRINKING: NO
TREMOR: NO TREMOR
VISUAL DISTURBANCES: NOT PRESENT
ANXIETY: NO ANXIETY, AT EASE
ANXIETY: MODERATELY ANXIOUS, OR GUARDED, SO ANXIETY IS INFERRED
ORIENTATION AND CLOUDING OF SENSORIUM: ORIENTED AND CAN DO SERIAL ADDITIONS
AUDITORY DISTURBANCES: NOT PRESENT
NAUSEA AND VOMITING: NO NAUSEA AND NO VOMITING
TREMOR: 3
VISUAL DISTURBANCES: NOT PRESENT
ANXIETY: NO ANXIETY, AT EASE
TREMOR: 3
HEADACHE, FULLNESS IN HEAD: VERY MILD
VISUAL DISTURBANCES: NOT PRESENT
HAS A RELATIVE, FRIEND, DOCTOR, OR ANOTHER HEALTH PROFESSIONAL EXPRESSED CONCERN ABOUT YOUR DRINKING OR SUGGESTED YOU CUT DOWN: NO
AUDITORY DISTURBANCES: NOT PRESENT
VISUAL DISTURBANCES: NOT PRESENT
TOTAL SCORE: 3
VISUAL DISTURBANCES: NOT PRESENT
ANXIETY: NO ANXIETY, AT EASE
AGITATION: NORMAL ACTIVITY
ORIENTATION AND CLOUDING OF SENSORIUM: ORIENTED AND CAN DO SERIAL ADDITIONS
TOTAL SCORE: 3
HEADACHE, FULLNESS IN HEAD: NOT PRESENT
HEADACHE, FULLNESS IN HEAD: VERY MILD
NAUSEA AND VOMITING: NO NAUSEA AND NO VOMITING
HOW MANY STANDARD DRINKS CONTAINING ALCOHOL DO YOU HAVE ON A TYPICAL DAY: 3 OR 4
ANXIETY: NO ANXIETY, AT EASE
HOW OFTEN DURING THE LAST YEAR HAVE YOU FAILED TO DO WHAT WAS NORMALLY EXPECTED FROM YOU BECAUSE OF DRINKING: NEVER
NAUSEA AND VOMITING: NO NAUSEA AND NO VOMITING
PAROXYSMAL SWEATS: NO SWEAT VISIBLE
HEADACHE, FULLNESS IN HEAD: NOT PRESENT
TREMOR: NO TREMOR
AUDITORY DISTURBANCES: NOT PRESENT
ANXIETY: 3
AGITATION: NORMAL ACTIVITY
AUDITORY DISTURBANCES: NOT PRESENT
AUDITORY DISTURBANCES: NOT PRESENT
TOTAL SCORE: 3
HEADACHE, FULLNESS IN HEAD: NOT PRESENT
AUDITORY DISTURBANCES: NOT PRESENT
PAROXYSMAL SWEATS: NO SWEAT VISIBLE
ORIENTATION AND CLOUDING OF SENSORIUM: ORIENTED AND CAN DO SERIAL ADDITIONS
TOTAL SCORE: 9
NAUSEA AND VOMITING: MILD NAUSEA WITH NO VOMITING
HOW OFTEN DURING THE LAST YEAR HAVE YOU BEEN UNABLE TO REMEMBER WHAT HAPPENED THE NIGHT BEFORE BECAUSE YOU HAD BEEN DRINKING: NEVER
PAROXYSMAL SWEATS: NO SWEAT VISIBLE
TOTAL SCORE: 11
TOTAL SCORE: 10
NAUSEA AND VOMITING: NO NAUSEA AND NO VOMITING
ORIENTATION AND CLOUDING OF SENSORIUM: ORIENTED AND CAN DO SERIAL ADDITIONS
NAUSEA AND VOMITING: MILD NAUSEA WITH NO VOMITING
TREMOR: 2
TREMOR: 3
NAUSEA AND VOMITING: MILD NAUSEA WITH NO VOMITING
ANXIETY: NO ANXIETY, AT EASE
ORIENTATION AND CLOUDING OF SENSORIUM: ORIENTED AND CAN DO SERIAL ADDITIONS
PAROXYSMAL SWEATS: NO SWEAT VISIBLE
BLOOD PRESSURE: 107/72
TOTAL SCORE: 10
AUDIT TOTAL SCORE: 8
NAUSEA AND VOMITING: NO NAUSEA AND NO VOMITING
VISUAL DISTURBANCES: NOT PRESENT
VISUAL DISTURBANCES: NOT PRESENT
AUDITORY DISTURBANCES: NOT PRESENT
HEADACHE, FULLNESS IN HEAD: VERY MILD
NAUSEA AND VOMITING: NO NAUSEA AND NO VOMITING
TREMOR: 2
TOTAL SCORE: 9
AUDITORY DISTURBANCES: NOT PRESENT
NAUSEA AND VOMITING: NO NAUSEA AND NO VOMITING
HEADACHE, FULLNESS IN HEAD: NOT PRESENT
NAUSEA AND VOMITING: NO NAUSEA AND NO VOMITING
ANXIETY: MODERATELY ANXIOUS, OR GUARDED, SO ANXIETY IS INFERRED
TREMOR: 2
ORIENTATION AND CLOUDING OF SENSORIUM: ORIENTED AND CAN DO SERIAL ADDITIONS
ORIENTATION AND CLOUDING OF SENSORIUM: ORIENTED AND CAN DO SERIAL ADDITIONS
TOTAL SCORE: 3
NAUSEA AND VOMITING: MILD NAUSEA WITH NO VOMITING
ORIENTATION AND CLOUDING OF SENSORIUM: ORIENTED AND CAN DO SERIAL ADDITIONS
AGITATION: NORMAL ACTIVITY
ANXIETY: NO ANXIETY, AT EASE
VISUAL DISTURBANCES: NOT PRESENT
ANXIETY: 3
PAROXYSMAL SWEATS: NO SWEAT VISIBLE
AUDITORY DISTURBANCES: NOT PRESENT
PAROXYSMAL SWEATS: NO SWEAT VISIBLE
TREMOR: 2
NAUSEA AND VOMITING: MILD NAUSEA WITH NO VOMITING
AUDIT TOTAL SCORE: 0
ORIENTATION AND CLOUDING OF SENSORIUM: ORIENTED AND CAN DO SERIAL ADDITIONS
TOTAL SCORE: 0
TOTAL SCORE: 3
ANXIETY: 3
PAROXYSMAL SWEATS: NO SWEAT VISIBLE
AUDIT-C TOTAL SCORE: 8
VISUAL DISTURBANCES: NOT PRESENT
ORIENTATION AND CLOUDING OF SENSORIUM: ORIENTED AND CAN DO SERIAL ADDITIONS
TREMOR: 3
PAROXYSMAL SWEATS: NO SWEAT VISIBLE
TACTILE DISTURBANCES: VERY MILD ITCHING, PINS AND NEEDLES, BURNING OR NUMBNESS
AGITATION: SOMEWHAT MORE THAN NORMAL ACTIVITY
AGITATION: NORMAL ACTIVITY
PAROXYSMAL SWEATS: NO SWEAT VISIBLE
NAUSEA AND VOMITING: NO NAUSEA AND NO VOMITING
TREMOR: NO TREMOR
VISUAL DISTURBANCES: NOT PRESENT
HEADACHE, FULLNESS IN HEAD: VERY MILD
VISUAL DISTURBANCES: NOT PRESENT
HOW OFTEN DURING THE LAST YEAR HAVE YOU NEEDED AN ALCOHOLIC DRINK FIRST THING IN THE MORNING TO GET YOURSELF GOING AFTER A NIGHT OF HEAVY DRINKING: NEVER
TREMOR: 3
ANXIETY: NO ANXIETY, AT EASE
VISUAL DISTURBANCES: NOT PRESENT
AGITATION: SOMEWHAT MORE THAN NORMAL ACTIVITY
AGITATION: NORMAL ACTIVITY
AUDITORY DISTURBANCES: NOT PRESENT
ORIENTATION AND CLOUDING OF SENSORIUM: ORIENTED AND CAN DO SERIAL ADDITIONS
HOW OFTEN DO YOU HAVE 6 OR MORE DRINKS ON ONE OCCASION: DAILY OR ALMOST DAILY
ORIENTATION AND CLOUDING OF SENSORIUM: ORIENTED AND CAN DO SERIAL ADDITIONS
AGITATION: NORMAL ACTIVITY
AGITATION: NORMAL ACTIVITY
AUDITORY DISTURBANCES: NOT PRESENT
TOTAL SCORE: 0
AUDITORY DISTURBANCES: NOT PRESENT
AGITATION: NORMAL ACTIVITY

## 2025-04-16 ASSESSMENT — COGNITIVE AND FUNCTIONAL STATUS - GENERAL
CLIMB 3 TO 5 STEPS WITH RAILING: A LOT
DAILY ACTIVITIY SCORE: 24
MOBILITY SCORE: 22
CLIMB 3 TO 5 STEPS WITH RAILING: A LOT
DAILY ACTIVITIY SCORE: 24
DAILY ACTIVITIY SCORE: 24
MOBILITY SCORE: 22
WALKING IN HOSPITAL ROOM: A LOT
CLIMB 3 TO 5 STEPS WITH RAILING: A LOT
PATIENT BASELINE BEDBOUND: NO
MOBILITY SCORE: 20

## 2025-04-16 ASSESSMENT — PATIENT HEALTH QUESTIONNAIRE - PHQ9
1. LITTLE INTEREST OR PLEASURE IN DOING THINGS: NOT AT ALL
SUM OF ALL RESPONSES TO PHQ9 QUESTIONS 1 & 2: 0
2. FEELING DOWN, DEPRESSED OR HOPELESS: NOT AT ALL

## 2025-04-16 ASSESSMENT — PAIN SCALES - GENERAL
PAINLEVEL_OUTOF10: 0 - NO PAIN

## 2025-04-16 ASSESSMENT — ACTIVITIES OF DAILY LIVING (ADL)
ADEQUATE_TO_COMPLETE_ADL: YES
PATIENT'S MEMORY ADEQUATE TO SAFELY COMPLETE DAILY ACTIVITIES?: YES
JUDGMENT_ADEQUATE_SAFELY_COMPLETE_DAILY_ACTIVITIES: YES
HEARING - RIGHT EAR: FUNCTIONAL
GROOMING: INDEPENDENT
WALKS IN HOME: INDEPENDENT
DRESSING YOURSELF: INDEPENDENT
HEARING - LEFT EAR: FUNCTIONAL
LACK_OF_TRANSPORTATION: NO
BATHING: INDEPENDENT
TOILETING: INDEPENDENT
LACK_OF_TRANSPORTATION: NO
FEEDING YOURSELF: INDEPENDENT

## 2025-04-16 ASSESSMENT — PAIN - FUNCTIONAL ASSESSMENT
PAIN_FUNCTIONAL_ASSESSMENT: 0-10

## 2025-04-16 NOTE — HOSPITAL COURSE
"Mr. Leigh is a 61 y/o M w/ a PMHx s/f COPD, MAC infection, alcohol use disorder, bronchiectasis, seizures, chronic perianal and scrotal abscesses here, chiquita-anal fistula s/p fistulotomy, and chronic diarrhea admitted on 4/15/2025 with melena and coffee ground emesis c/f UGIB.     On admission reported that every morning he coughs/vomits up phlegm however the day prior to admission he noticed that the phlegm was very dark. Later in the day he developed bouts of true emesis that was dark/coffee ground in nature and had diarrhea that was \"jet black\". He also noted epigastric abdominal pain for the past week. He did have some ibuprofen last week but only one day. He reports he is prescribed naproxen for his legs but this is not on his medication list and he denies taking medications besides the ibuprofen that is OTC.       Hgb on admission was 11.7 which is near his recent baseline, but on recheck was 9.6, c/f active bleed. Pt underwent EGD 4/16 that was significant for a 3 cm type I hiatal hernia and 2 small angioectasias in 1st part of duodenum; tissue was completely ablated with argon plasma coagulation. Given these findings, it was felt that the UGIB was secondary to the angiectasias.     Post EGD, patient tolerated a liquid diet but when progressed to a regular diet he had an episode of vomit. In the setting of a rising leukocytosis and underlying pulmonary structural changes, there was a concern for a bronchiectasis exacerbation or aspiration and the following day CXR was ordered and demonstrated concern for pneumonitis. Ceftriaxone and Azithromycin were begun for CAP vs bronchiectasis. Respiratory workup was negative for any active infection. Of note patient noted difficulty swallowing hard food during his admission and given the episode of vomit, there was concern for aspiration. SLP evaluated the patient and noted no overt signs of aspiration were noted. Esophagram was without any acute signs of " dysphagia.    At time of discharge, patient felt improved. His stool was brown and he denied any blood in his mucous. He denied any abdominal pain. Overall, he felt weak but comfortable to return home. He was discharged with a 5 day course of Levofloxacin for presumed bronchiectasis. CIWA was <4 for two days at time of discharge.

## 2025-04-16 NOTE — CONSULTS
"  [facility]   [facility]  INITIAL CONSULT NOTE       Reason For Consult  Anemia, melena, dark emesis    SUBJECTIVE     History Of Present Illness  Servando Leigh is a 60 y.o. male with a past medical history of COPD, MAC infection, alcohol use disorder, bronchiectasis, seizures, chronic perianal and scrotal abscesses here, chiquita-anal fistula s/p fistulotomy, chronic diarrhea admitted on 4/15/2025 with melena and coffee ground emesis. GI is consulted for the same.    Patient reports that every morning he coughs/vomits up phlegm however yesterday morning he noticed it was very dark. Later in the day he then developed bouts of true emesis that was dark/coffee ground in nature and then had diarrhea that was \"jet black\" because of all of this he came to the ED. Also has been having some abdominal pain for the past week epigastric area. He did have some ibuprofen last week but only one day. He reports he is prescribed naproxen for his legs but this is not on his medication list and he denies taking medications besides the ibuprofen that is OTC.      Hgb on admission was 11.7 which is near his recent baseline and on recheck 9.6. He did receive 2L IVF prior to this recheck. Iron 119, Ferritin 95. Lipase 6    He does have a strong alcohol use history with 3 tall boys (6 regular beers) a day. Reports has been drinking since age 11 yo. Denies any history of pancreatitis.      2017 Hpylori on EGD (unable to find records but noted that was treated with triple therapy in 2020), breath test 10/2020 negative and EGD 2021 negative biopsies  for h pylori.     Past Medical History:  Medical History[1]    Home Medications  Prescriptions Prior to Admission[2]      Surgical History:  Surgical History[3]    Allergies:  Allergies[4]    Social History:    Social History     Socioeconomic History    Marital status:      Spouse name: Not on file    Number of children: Not on file    " Years of education: Not on file    Highest education level: Not on file   Occupational History    Not on file   Tobacco Use    Smoking status: Every Day     Current packs/day: 0.25     Average packs/day: 0.3 packs/day for 24.3 years (6.1 ttl pk-yrs)     Types: Cigarettes     Start date:     Smokeless tobacco: Never   Vaping Use    Vaping status: Never Used   Substance and Sexual Activity    Alcohol use: Yes    Drug use: Yes     Types: Marijuana    Sexual activity: Yes   Other Topics Concern    Not on file   Social History Narrative    Not on file     Social Drivers of Health     Financial Resource Strain: Not on File (2019)    Received from [facility]    Financial Resource Strain     Financial Resource Strain: 0   Food Insecurity: Not on File (2024)    Received from [facility]    Food Insecurity     Food: 0   Transportation Needs: Not on File (2019)    Received from [facility]    Transportation Needs     Transportation: 0   Physical Activity: Not on File (2019)    Received from [facility]    Physical Activity     Physical Activity: 0   Stress: Not on File (2019)    Received from [facility]    Stress     Stress: 0   Social Connections: Not on File (2024)    Received from [facility]    Social Connections     Connectedness: 0   Intimate Partner Violence: Not on file   Housing Stability: Not on File (2019)    Received from [facility]    Housing Stability     Housin       Family History:  Family History[5]    EXAM     Vitals:    Vitals:    25 0400 25 0500 25 0519 25 0600   BP: 113/70 105/74 105/74 104/67   BP Location:   Right arm    Patient Position:   Lying    Pulse: 99 84 76 80   Resp:    12   Temp:       TempSrc:       SpO2: 96%  (!) 93% 97%   Weight:       Height:         Failed to redirect to the Timeline version of the [employer] SmartLink.  No intake or output data in the 24 hours ending 25 0759      Physical Exam  General: well-nourished, no acute distress  HEENT: WHITNEY BENNETT  intact, no scleral icterus, moist MM  Respiratory: CTA bilaterally, normal work of breathing  Cardiovascular: RRR, no murmurs/rubs/gallops  Abdomen: Soft, nontender, nondistended, bowel sounds present. No masses palpated  Extremities: no edema, no asterixis  Neuro: alert and oriented, CNII-XII grossly intact, moves all 4 extremities with no focal deficits    OBJECTIVE                                                                              Medications     Current Medications[6]                                                                            Labs     Results for orders placed or performed during the hospital encounter of 04/15/25 (from the past 24 hours)   ECG 12 lead   Result Value Ref Range    Ventricular Rate 97 BPM    Atrial Rate 97 BPM    NY Interval 126 ms    QRS Duration 84 ms    QT Interval 362 ms    QTC Calculation(Bazett) 459 ms    P Axis 76 degrees    R Axis 75 degrees    T Axis 74 degrees    QRS Count 16 beats    Q Onset 222 ms    P Onset 159 ms    P Offset 199 ms    T Offset 403 ms    QTC Fredericia 424 ms   CBC with Differential   Result Value Ref Range    WBC 10.8 4.4 - 11.3 x10*3/uL    nRBC 0.0 0.0 - 0.0 /100 WBCs    RBC 3.75 (L) 4.50 - 5.90 x10*6/uL    Hemoglobin 11.7 (L) 13.5 - 17.5 g/dL    Hematocrit 33.7 (L) 41.0 - 52.0 %    MCV 90 80 - 100 fL    MCH 31.2 26.0 - 34.0 pg    MCHC 34.7 32.0 - 36.0 g/dL    RDW 13.6 11.5 - 14.5 %    Platelets 320 150 - 450 x10*3/uL    Neutrophils % 81.2 40.0 - 80.0 %    Immature Granulocytes %, Automated 0.9 0.0 - 0.9 %    Lymphocytes % 11.4 13.0 - 44.0 %    Monocytes % 5.8 2.0 - 10.0 %    Eosinophils % 0.0 0.0 - 6.0 %    Basophils % 0.7 0.0 - 2.0 %    Neutrophils Absolute 8.76 (H) 1.20 - 7.70 x10*3/uL    Immature Granulocytes Absolute, Automated 0.10 0.00 - 0.70 x10*3/uL    Lymphocytes Absolute 1.23 1.20 - 4.80 x10*3/uL    Monocytes Absolute 0.63 0.10 - 1.00 x10*3/uL    Eosinophils Absolute 0.00 0.00 - 0.70 x10*3/uL    Basophils Absolute 0.08 0.00 -  0.10 x10*3/uL   Comprehensive Metabolic Panel   Result Value Ref Range    Glucose 87 74 - 99 mg/dL    Sodium 141 136 - 145 mmol/L    Potassium 3.5 3.5 - 5.3 mmol/L    Chloride 89 (L) 98 - 107 mmol/L    Bicarbonate 33 (H) 21 - 32 mmol/L    Anion Gap 23 (H) 10 - 20 mmol/L    Urea Nitrogen 17 6 - 23 mg/dL    Creatinine 0.71 0.50 - 1.30 mg/dL    eGFR >90 >60 mL/min/1.73m*2    Calcium 8.8 8.6 - 10.6 mg/dL    Albumin 3.7 3.4 - 5.0 g/dL    Alkaline Phosphatase 107 33 - 120 U/L    Total Protein 7.8 6.4 - 8.2 g/dL    AST 75 (H) 9 - 39 U/L    Bilirubin, Total 0.4 0.0 - 1.2 mg/dL    ALT 26 10 - 52 U/L   Protime-INR   Result Value Ref Range    Protime 12.4 9.8 - 12.4 seconds    INR 1.1 0.9 - 1.1   Type and screen   Result Value Ref Range    ABO TYPE O     Rh TYPE POS     ANTIBODY SCREEN NEG    Troponin I, High Sensitivity, Initial   Result Value Ref Range    Troponin I, High Sensitivity (CMC) <3 0 - 53 ng/L   Iron and TIBC   Result Value Ref Range    Iron 119 35 - 150 ug/dL    UIBC 128 110 - 370 ug/dL    TIBC 247 240 - 445 ug/dL    % Saturation 48 (H) 25 - 45 %   Ferritin   Result Value Ref Range    Ferritin 95 20 - 300 ng/mL   Lactate Dehydrogenase   Result Value Ref Range     84 - 246 U/L   Troponin, High Sensitivity, 1 Hour   Result Value Ref Range    Troponin I, High Sensitivity (CMC) <3 0 - 53 ng/L   Haptoglobin   Result Value Ref Range    Haptoglobin 130 30 - 200 mg/dL   CBC   Result Value Ref Range    WBC 11.8 (H) 4.4 - 11.3 x10*3/uL    nRBC 0.0 0.0 - 0.0 /100 WBCs    RBC 3.04 (L) 4.50 - 5.90 x10*6/uL    Hemoglobin 9.6 (L) 13.5 - 17.5 g/dL    Hematocrit 26.6 (L) 41.0 - 52.0 %    MCV 88 80 - 100 fL    MCH 31.6 26.0 - 34.0 pg    MCHC 36.1 (H) 32.0 - 36.0 g/dL    RDW 13.5 11.5 - 14.5 %    Platelets 258 150 - 450 x10*3/uL   Comprehensive metabolic panel   Result Value Ref Range    Glucose 60 (L) 74 - 99 mg/dL    Sodium 137 136 - 145 mmol/L    Potassium 3.7 3.5 - 5.3 mmol/L    Chloride 91 (L) 98 - 107 mmol/L     Bicarbonate 32 21 - 32 mmol/L    Anion Gap 18 10 - 20 mmol/L    Urea Nitrogen 12 6 - 23 mg/dL    Creatinine 0.80 0.50 - 1.30 mg/dL    eGFR >90 >60 mL/min/1.73m*2    Calcium 8.0 (L) 8.6 - 10.6 mg/dL    Albumin 2.9 (L) 3.4 - 5.0 g/dL    Alkaline Phosphatase 84 33 - 120 U/L    Total Protein 6.7 6.4 - 8.2 g/dL    AST 48 (H) 9 - 39 U/L    Bilirubin, Total 0.5 0.0 - 1.2 mg/dL    ALT 20 10 - 52 U/L   Light Blue Top   Result Value Ref Range    Extra Tube Hold for add-ons.    PST Top   Result Value Ref Range    Extra Tube Hold for add-ons.      *Note: Due to a large number of results and/or encounters for the requested time period, some results have not been displayed. A complete set of results can be found in Results Review.                                                                              Imaging           === 04/15/25 ===    CT CHEST ABDOMEN PELVIS W IV CONTRAST    - Impression -  CHEST:  1.  Emphysematous changes, cylindrical bronchiectatic changes, and  multifocal mucous plugging, similar to prior.    ABDOMEN-PELVIS:  1.  No acute abdominopelvic process.  2. Hepatic steatosis.  3. Osteonecrosis of the bilateral femoral heads. No significant  flattening deformity of the femoral heads.                                                                           GI Procedures     Colonoscopy 3/7/25  Impression  Hemorrhoids on perianal exam and retroflexion  There was decreased sphincter tone on SREEDHAR.  Colon was otherwise normal. Performed forceps biopsies to rule out microscopic colitis        Findings  External large, protruding hemorrhoids observed during perianal exam; no bleeding was observed  There was decreased sphincter tone on SREEDHAR.  The ileocecal valve, appendiceal orifice, cecum, ascending colon, transverse colon, descending colon and sigmoid colon appeared normal.  Performed multiple forceps biopsies to rule out microscopic colitis. Biopsies were taken from the right colon (Jar 1), transverse colon (Jar  2), left colon (Jar 3) and rectum (Jar 4).  Internal medium hemorrhoids observed during retroflexion; no bleeding was observed    A.  Cecum and right colon, biopsy:  - Colonic mucosa, no significant histopathological abnormalities.    B.  Transverse colon, biopsy:  - Colonic mucosa, no significant histopathological abnormalities.     C.  Descending and sigmoid colon, biopsies:  - Colonic mucosa, no significant histopathological abnormalities.     D.  Rectum, biopsies:  - Colonic mucosa, no significant histopathological abnormalities.     EGD 11/15/21  Impression:            - Normal esophagus.                         - 1 cm hiatal hernia.                         - Mildly erythematous mucosa in the antrum and                          prepyloric region of the stomach. Biopsied.                         - Mildly erythematous duodenopathy in the bulb.                         - Normal second portion of the duodenum. Biopsied.    FINAL DIAGNOSIS   A.  DUODENUM, 2ND PORTION, COLD BX:    -- SMALL INTESTINAL MUCOSA WITH INTACT VILLOUS ARCHITECTURE AND NO SIGNIFICANT   INCREASE IN INTRAEPITHELIAL LYMPHOCYTES     B.  GASTRIC, ANTRUM, COLD BX:    -- CHRONIC INACTIVE GASTRITIS   -- NEGATIVE FOR HELICOBACTER PYLORI LIKE ORGANISMS ON ROUTINE STAINING,   IMMUNOHISTOCHEMISTRY TO FOLLOW   -- NEGATIVE FOR INTESTINAL METAPLASIA OR DYSPLASIA     EGD 6/4/21  Impression:            - Normal esophagus.                         - Z-line regular, 42 cm from the incisors.                         - 1 cm hiatal hernia.                         - Gastroesophageal flap valve classified as Hill Grade                          III (minimal fold, loose to endoscope, hiatal hernia                          likely).                         - Erythematous mucosa in the gastric body and antrum.                         - Normal examined duodenum.                         - No specimens collected. No etiology for patient's                          nausea and  vomiting seen on examination today.  FINAL DIAGNOSIS   A.  RECTAL SCAR COLD BIOPSY:       - COLONIC MUCOSA WITH NO SIGNIFICANT PATHOLOGIC ABNORMALITY.     Flexible sigmoidoscopy 2/11/21  Impression:            - Decreased sphincter tone found on digital rectal                          exam.                         - Scar in the rectum at 10 cm from the anal verge.                          Biopsied.                         - Two scars in the distal rectum.                         - Moderate non-bleeding external and internal                          hemorrhoids.    Colonoscopy 8/27/20  Impression:            - A single yellow seton and evidence of a recent                          fistulotomy, found on perianal exam.                         - Moderate amount of semi-liquid stool in the entire                          examined colon.                         - The examined portion of the terminal ileum was                          normal.                         - One diminutive polyp in the distal rectum, removed                          with a cold biopsy forceps. Resected and retrieved.                         - The colon examination was otherwise normal.                         - Scar in the distal rectum.                         - Moderate non-bleeding external and internal                          hemorrhoids.                         - Random biopsies were taken with a cold forceps for                          histology in the rectum, in the sigmoid colon, in the                          descending colon, in the transverse colon, in the                          right colon and in the terminal ileum.    FINAL DIAGNOSIS  A.  TERMINAL ILEUM COLD BIOPSY:    -- SMALL BOWEL MUCOSA WITH NO SPECIFIC PATHOLOGIC CHANGES.  -- NO EVIDENCE OF GRANULOMAS OR DYSPLASIA.    B.  RIGHT COLON COLD BIOPSY:    -- COLONIC MUCOSA WITH NO SPECIFIC PATHOLOGIC CHANGES.  -- NO EVIDENCE OF GRANULOMAS OR DYSPLASIA.    C.  TRANSVERSE COLD  BIOPSY:    -- COLONIC MUCOSA WITH NO SPECIFIC PATHOLOGIC CHANGES.  -- SMALL BOWEL MUCOSA FRAGMENT WITH NO SPECIFIC PATHOLOGIC CHANGES. SEE NOTE.  -- NO EVIDENCE OF GRANULOMAS OR DYSPLASIA.    Note: A fragment of small bowel mucosa with no specific pathologic changes is  also present. This could represent a carryover from the terminal ileum biopsy.  Correlation with clinical and endoscopic findings is recommended.    D.  DESCENDING COLON COLD BIOPSY:    -- COLONIC MUCOSA WITH NO SPECIFIC PATHOLOGIC CHANGES.  -- NO EVIDENCE OF GRANULOMAS OR DYSPLASIA.    E.  SIGMOID COLD BIOPSY:    -- COLONIC MUCOSA WITH PROMINENT LYMPHOID AGGREGATES; NO SPECIFIC PATHOLOGIC  CHANGES SEEN.  -- NO EVIDENCE OF GRANULOMAS OR DYSPLASIA.    F.  RECTUM COLD BIOPSY:    -- COLONIC MUCOSA WITH NO SPECIFIC PATHOLOGIC CHANGES.  -- NO EVIDENCE OF GRANULOMAS OR DYSPLASIA.    G.  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 SEE  NOTE.    Note: The tumor measures 4.0 mm in greatest dimension; no necrosis,  lymphovascular invasion, or overt mitotic activity are identified. However, the  tumor extends to the deep tissue edge and its full extent cannot be assessed.  The Ki-67 proliferation index is less than 2% (19 cells positive out of 1010  counted), and no mitosis is identified. By immunohistochemistry, the tumor  cells are immunoreactive for synaptophysin, chromogranin (focal, weak),  confirming the morphologic diagnosis.       EGD & Colonoscopy 2/8/19  Small hiatal hernia without esophagitis. No ulcer was seen  Mild subtle changes in gastric mucosa indicating possible gastropathy. No varices were see and no bleeding seen.   Colonoscopy with no bleeding seen. Evidence of very mild subtle proctitis without any ulceration or bleeding. There appeared to be moderate size hemorrhoids which were seen at retroflexion, but no bleeding was seen.       ASSESSMENT / PLAN                   ASSESSMENT/PLAN:  Servando Leigh is a 60 y.o. male with a past medical history of COPD, MAC infection, alcohol use disorder, bronchiectasis, seizures, chronic perianal and scrotal abscesses here, chiquita-anal fistula s/p fistulotomy, chronic diarrhea admitted on 4/15/2025 with melena and coffee ground emesis. GI is consulted for the same.    Patient with Hgb 11.7 on admission 9.6 on recheck which is lower than expected despite 2L IVF and not all 3 cell lines decreased. Will proceed with EGD to evaluate for any esophagitis, gastritis, PUD, or other source of UGIB. He had recent 3/2025 colonoscopy and low concern for LGIB at this time. Patient previously has been worked up for chronic diarrhea and recent colonoscopy biopsies for microscopic colitis was negative. Fecal elastase is pending which given strong alcohol use will follow for possible pancreatic insufficiency.     Recommendations  -IV PPI BID   -EGD today   -Keep NPO pending EGD  -Follow up stool studies  -Maintain active T&S   -Goal Hgb >7, Plt >50    Patient was seen& discussed with Dr. Tang    Thank you for this interesting consult. Gastroenterology will continue to follow.  -During weekday hours of 7am-5pm please do not hesitate to contact me on [email or web address] Chat or page [phone] if there are any further questions between the weekday hours of 7 AM - 5 PM.   -After hours, on weekends, and on holidays, please page the [place] GI fellow at [phone]. Thank you.      -       [1]   Past Medical History:  Diagnosis Date    At risk for aspiration 04/28/2024    Brain tumor (benign) (Multi)     CKD (chronic kidney disease)     COPD (chronic obstructive pulmonary disease) (Multi)     Dermatitis     GERD (gastroesophageal reflux disease)     Incontinence of feces     Myalgia     Peripheral vascular disease, unspecified (CMS-HCC)     Tissue necrosis with gangrene in peripheral vascular disease    Personal history of other diseases of the digestive system     History of esophageal  reflux    Personal history of other diseases of the nervous system and sense organs     History of seizure disorder    Personal history of other diseases of the respiratory system     History of pulmonary emphysema    Personal history of other mental and behavioral disorders     History of depression    Seizure (Multi)     3 to 4 times a week. stopped his depakote. no script. new md per patient    Unsteady gait when walking     Vertigo    [2] (Not in a hospital admission)  [3]   Past Surgical History:  Procedure Laterality Date    CERVICAL FUSION      FOOT      NECK SURGERY  01/25/2017    Neck Surgery    OTHER SURGICAL HISTORY  07/13/2020    Perirectal abscess incision and drainage    OTHER SURGICAL HISTORY  01/07/2020    Shoulder replacement    TOTAL SHOULDER ARTHROPLASTY Right     right   [4]   Allergies  Allergen Reactions    Coconut Swelling    Shellfish Containing Products Anaphylaxis, Unknown and Swelling    Shellfish Derived Anaphylaxis, Unknown and Other     Other Reaction(s): Unknown    Other Other    Penicillin G Swelling    Cat Dander Other     itching    House Dust Other     Watery eyes.   [5]   Family History  Problem Relation Name Age of Onset    Diabetes Son      Glaucoma Other Grandfather    [6]   Current Facility-Administered Medications:     acetaminophen (Tylenol) tablet 650 mg, 650 mg, oral, q4h PRN, 650 mg at 04/16/25 0048 **OR** acetaminophen (Tylenol) oral liquid 650 mg, 650 mg, oral, q4h PRN **OR** acetaminophen (Tylenol) suppository 650 mg, 650 mg, rectal, q4h PRN, Murtaza Hutchison MD    atorvastatin (Lipitor) tablet 10 mg, 10 mg, oral, Nightly, Murtaza Hutchison MD, 10 mg at 04/16/25 0012    cholestyramine (Questran) 4 gram packet 4 g, 4 g, oral, BID, Murtaza Hutchison MD    folic acid (Folvite) tablet 1 mg, 1 mg, oral, Daily, Murtaza Hutchison MD    tiotropium (Spiriva Respimat) 2.5 mcg/actuation inhaler 2 puff, 2 puff, inhalation, Daily **AND** formoterol (Perforomist) 20 mcg/2 mL nebulizer solution 20 mcg,  20 mcg, nebulization, q12h, Murtaza Hutchison MD, 20 mcg at 04/16/25 0026    gabapentin (Neurontin) capsule 800 mg, 800 mg, oral, TID, Murtaza Hutchison MD, 800 mg at 04/16/25 0012    hydrocortisone 2.5 % cream, , Topical, BID PRN, Murtaza Hutchison MD    ipratropium-albuteroL (Duo-Neb) 0.5-2.5 mg/3 mL nebulizer solution 3 mL, 3 mL, nebulization, q6h while awake, Murtaza Hutchison MD    levETIRAcetam (Keppra) tablet 500 mg, 500 mg, oral, BID, Murtaza Hutchison MD, 500 mg at 04/16/25 0013    loperamide (Imodium) capsule 4 mg, 4 mg, oral, TID PRN, Murtaza Hutchison MD    LORazepam (Ativan) injection 0.5 mg, 0.5 mg, intravenous, q2h PRN **OR** LORazepam (Ativan) injection 1 mg, 1 mg, intravenous, q2h PRN **OR** LORazepam (Ativan) injection 2 mg, 2 mg, intravenous, q2h PRN, Murtaza Hutchison MD    melatonin tablet 5 mg, 5 mg, oral, Nightly PRN, Murtaza Hutchison MD, 5 mg at 04/16/25 0013    montelukast (Singulair) tablet 10 mg, 10 mg, oral, Nightly, Murtaza Hutchison MD, 10 mg at 04/16/25 0013    multivitamin with minerals 1 tablet, 1 tablet, oral, Daily, Murtaza Hutchison MD    nicotine (Nicoderm CQ) 14 mg/24 hr patch 1 patch, 1 patch, transdermal, Daily, Murtaza Hutchison MD    OLANZapine (ZyPREXA) tablet 5 mg, 5 mg, oral, Nightly, Murtaza Hutchison MD, 5 mg at 04/16/25 0013    ondansetron (Zofran) injection 4 mg, 4 mg, intravenous, q6h PRN, Murtaza Hutchison MD, 4 mg at 04/15/25 2332    pantoprazole (ProtoNix) EC tablet 40 mg, 40 mg, oral, BID AC, 40 mg at 04/16/25 0631 **OR** pantoprazole (Protonix) injection 40 mg, 40 mg, intravenous, BID AC, Murtaza Hutchison MD    rOPINIRole (Requip) tablet 0.5 mg, 0.5 mg, oral, TID, Murtaza Hutchison MD, 0.5 mg at 04/16/25 0025    sodium chloride 3 % nebulizer solution 4 mL, 4 mL, nebulization, TID, Murtaza Hutchison MD, 4 mL at 04/16/25 0025    tamsulosin (Flomax) 24 hr capsule 0.4 mg, 0.4 mg, oral, Daily, Murtaza Hutchison MD    thiamine (Vitamin B-1) tablet 100 mg, 100 mg, oral, Daily, Murtaza Hutchison MD    triamcinolone (Kenalog) 0.1 % ointment, , Topical, BID,  Murtaza Hutchison MD, Given at 04/16/25 0025    Current Outpatient Medications:     albuterol (Ventolin HFA) 90 mcg/actuation inhaler, Inhale 2 puffs every 4 hours if needed for wheezing or shortness of breath., Disp: 18 g, Rfl: 11    atorvastatin (Lipitor) 10 mg tablet, Take 1 tablet (10 mg) by mouth once daily at bedtime., Disp: 90 tablet, Rfl: 3    Bevespi Aerosphere 9-4.8 mcg HFA aerosol inhaler, Inhale 2 puffs 2 times a day., Disp: 32.1 g, Rfl: 3    cholestyramine (Questran) 4 gram packet, Take 1 packet (4 g) by mouth 2 times daily (morning and late afternoon). Take 3hrs before or after any meds. Take twice daily as needed for diarrhea, Disp: 60 packet, Rfl: 3    gabapentin (Neurontin) 800 mg tablet, Take 1 tablet (800 mg) by mouth 3 times a day. (Patient taking differently: Take 1 tablet (800 mg) by mouth 2 times a day.), Disp: 90 tablet, Rfl: 0    hydrocortisone 2.5 % cream, Apply topically 2 times a day as needed for irritation or rash., Disp: 30 g, Rfl: 11    ipratropium-albuteroL (Duo-Neb) 0.5-2.5 mg/3 mL nebulizer solution, Inhale 3 mL by nebulization every 6 hours while awake., Disp: 90 mL, Rfl: 3    levETIRAcetam (Keppra) 500 mg tablet, Take 1 tablet (500 mg) by mouth 2 times a day. (Patient taking differently: Take 1 tablet (500 mg) by mouth once daily at bedtime.), Disp: 60 tablet, Rfl: 11    loperamide (Imodium) 2 mg capsule, TAKE 1 CAPSULE FOUR TIMES A DAY AS NEEDED FOR DIARRHEA, Disp: 30 capsule, Rfl: 44    mirtazapine (Remeron) 15 mg tablet, TAKE ONE TABLET (15 MG) BY MOUTH DAILY AT 9PM AT BEDTIME, Disp: 90 tablet, Rfl: 3    montelukast (Singulair) 10 mg tablet, Take 1 tablet (10 mg) by mouth once daily at bedtime., Disp: 30 tablet, Rfl: 3    OLANZapine (ZyPREXA) 5 mg tablet, Take 1 tablet (5 mg) by mouth once daily at bedtime., Disp: 90 tablet, Rfl: 3    pantoprazole (ProtoNix) 40 mg EC tablet, Take 1 tablet (40 mg) by mouth once daily in the morning. Take before meals. Do not crush, chew, or split.  (Patient taking differently: Take 1 tablet (40 mg) by mouth once daily as needed. Do not crush, chew, or split.), Disp: 30 tablet, Rfl: 3    rOPINIRole (Requip) 0.5 mg tablet, Take 1 tablet (0.5 mg) by mouth 3 times a day. (Patient taking differently: Take 1 tablet (0.5 mg) by mouth 2 times a day.), Disp: 90 tablet, Rfl: 3    sodium chloride 3 % nebulizer solution, Take 4 mL by nebulization 3 times a day., Disp: 187 mL, Rfl: 2    tamsulosin (Flomax) 0.4 mg 24 hr capsule, Take 1 capsule (0.4 mg) by mouth once daily., Disp: 90 capsule, Rfl: 3    ammonium lactate (Amlactin) 12 % cream, Apply 1 application twice a day by topical route., Disp: , Rfl:     benzoyl peroxide (Acne Medication) 10 % lotion lotion, Apply 1 Application topically once daily at bedtime., Disp: 29.5 mL, Rfl: 3    diclofenac sodium (Voltaren) 1 % gel, APPLY FOUR AND ONE-HALF INCHES (4 GRAMS) TOPICALLY FOUR TIMES A DAY AS NEEDED FOR JOINT PAIN, Disp: 100 g, Rfl: 5    EPINEPHrine 0.3 mg/0.3 mL injection syringe, Inject 0.3 mL (0.3 mg) into the muscle 1 time if needed for anaphylaxis for up to 1 dose. Inject into upper leg. Call 911 after use., Disp: 1 each, Rfl: 0    food supplemt, lactose-reduced (Ensure Original) 0.04-1.05 gram-kcal/mL liquid, Take 3 bottles by mouth once daily. 2-3 cans, Disp: 75439 mL, Rfl: 3    mometasone (Elocon) 0.1 % ointment, Apply topically once daily., Disp: 15 g, Rfl: 1    multivitamin with minerals (Certavite-Antioxidant) tablet, Take 1 tablet by mouth once daily. (Patient not taking: Reported on 4/16/2025), Disp: 30 tablet, Rfl: 11    multivitamin with minerals tablet, Take 1 tablet by mouth once daily. (Patient not taking: Reported on 4/16/2025), Disp: 90 tablet, Rfl: 3    nicotine (Nicoderm CQ) 14 mg/24 hr patch, Place 1 patch over 24 hours on the skin once daily., Disp: 30 patch, Rfl: 0    thiamine (Vitamin B-1) 100 mg tablet, Take 1 tablet (100 mg) by mouth once daily. (Patient not taking: Reported on 4/16/2025),  Disp: 30 tablet, Rfl: 11    tiZANidine (Zanaflex) 2 mg capsule, Take 1 capsule (2 mg) by mouth 3 times a day., Disp: 90 capsule, Rfl: 0
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
## 2025-04-16 NOTE — ED PROVIDER NOTES
"History of Present Illness     History provided by: Patient ***  Limitations to History: None ***  External Records Reviewed with Brief Summary: ***    HPI:  Servando Leigh is a 60 y.o. male ***    Physical Exam   Triage vitals:  T 36.4 °C (97.5 °F)  HR (!) 109  /72  RR 18  O2 96 % None (Room air)    {ED Physical Exam:24073}    Medical Decision Making & ED Course   Medical Decision Makin y.o. male ***  ----    Differential diagnoses considered include but are not limited to: ***     Social Determinants of Health which Significantly Impact Care: {Social Determinants of Health which Significantly Impact Care:06624::\"None identified\"} {The following actions were taken to address these social determinants:39775}    EKG Independent Interpretation: See ED Course    Independent Result Review and Interpretation: See ED course    Chronic conditions affecting the patient's care: See HPI    The patient was discussed with the following consultants/services: {The patient was discussed with the following consultants/services:75491::\"None\"}    Care Considerations: See [facility]    ED Course:  ED Course as of 04/15/25 2209   Tue Apr 15, 2025   2201 60-year-old male with a history of alcohol use disorder, MAC infection, COPD, seizures, and chronic perianal and scrotal abscesses with a prior perianal fistula status post fistulotomy, history of H. pylori, who is presenting to the emergency department for abdominal pain.  Epigastric, radiating up into the chest, associated with coffee-ground emesis and melena.  Has been worsening over the past several days.  Presenting today because his vomiting was worsening.  On exam he is lying in bed in no acute distress.  Heart rate is tachycardic but regular, lungs are clear to auscultation bilaterally, belly is tender in the epigastric area without rebound or guarding.  Workup will be undertaken for concern for an upper GI bleed.  Patient was loaded with Protonix, fluids, and IV " antiemetics.  CTs will be ordered to look for evidence of any focal findings but the patient will likely require admission with GI consultation and potential endoscopy.  Patient was signed out pending his workup [NS]      ED Course User Index  [NS] Edy Mancera MD     Disposition   {ED Disposition:31307}    Seen and discussed with ED Attending  Georgette Summers DO, PGY-2  Emergency Medicine

## 2025-04-16 NOTE — H&P
"    HPI:  Servando Leigh is a 60 y.o. male COPD, MAC infection, alcohol use disorder, bronchiectasis, seizures, chronic perianal and scrotal abscesses here, chiquita-anal fistula s/p fistulotomy, history of H.pylori infection and chronic diarrhea who presents to the ED after noticing dark vomitus, dark stools and abdominal pain.     On interview, patient states that since he woke up earlier in the morning, he has had recurrent \"bouts\" of dark, black, bilious vomiting. He endorses 6-7 episodes and states that the contents were all dark. Accompanying these episodes, he also notes that he has been having increasingly dark stools; he notes that he has diarrhea at baseline, and has had it for years, but the color of his stools are now changes. He describes his motions as a mix of formed and liquid, dark stools that he has been noticing for the past 1-2 days. He notes that his appetite has been poor, and his early-morning nausea and vomiting are not new to him. He denies chest pain, shortness of breath. He endorses chronic subjective chills. He states that he drinks beer every day and has had withdrawal in the past. He denies any blood thinners at home.     Patient follows with Dr. Choe from Pulmonology for his COPD. Upon chart review, patient, patient has recently had a colonoscopy on 3/7/25. The colonoscopy showed hemorrhoids, and multiple biopsies were taken to rule out microscopic colitis. Biopsies were negative for microscopic colitis.    For his chronic diarrhea, patient has been worked up in the past and workup has been largely unrevealing, with overall impression being IBS-D. Per review \"He has had chronic symptoms of diarrhea and has had quite an extensive evaluation for this. This has included multiple CT scans colonoscopies as well as an MR enterography which did not reveal any evidence of inflammation or inflammatory bowel disease. Stool has been sent for fecal elastase and blood for gastrin level and VIP level. " "These have been normal. 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 Workup thereafter was negative for carcinoid. He had a flex sig in Feb 2021 that was negative for recurrence of neuroendocrine tumor.     Prior Pulmonology tests   PFT ([facility], 1/13/2021): Pre-BD FEV1 1.28 L (42%), pre-BD FVC 2.49 L (65%), pre-BD FEV1/FVC 51; (+) bronchodilator response based on 17% improvement in FVC; persistently reduced FEV1/FVC after bronchodilator (COPD); TLC 8.19 L (139%), RV 5.21 L (246%), RV/TLC 64; uncorrected DLCO 17.4 (60%): Impression: Severe airflow obstruction.  Significant bronchodilator response based on improvement in FVC.  Evidence of gas trapping.  Diffusion impairment.  6-MWT (1/13/2021): 335 meters (51%) based on predicted distance of 658 meters; SpO2 100% to 98% on room air.   Fraction of exhaled nitric oxide (FeNO) (1/13/2021): 5 ppb (low, argues against eosinophilic airway inflammation)   ED course:  T 36.4 °C (97.5 °F)  HR (!) 109  /72  RR 18  O2 96 % None (Room air)    Labs   WBC 10.8 Hb 11.7 MCV 90   Na 141 K 3.5 CL 89 HCO3 33 BUN 17 Cr 0.71  ALKp 107 ALT 26 AST 75 Tbili 0.4  Ferritin 95 Iron 119 TIBC 247    Trops x2 negative    Imaging   CXR: Hyperinflated lungs  CTAP: Negative for acute intraabdominal process    Medications prior to admission:  Prior to Admission medications    Medication Sig Start Date End Date Taking? Authorizing Provider   albuterol (Ventolin HFA) 90 mcg/actuation inhaler Inhale 2 puffs every 4 hours if needed for wheezing or shortness of breath.  Patient not taking: Reported on 3/7/2025 2/14/25   Sigifredo Sinha MD   ammonium lactate (Amlactin) 12 % cream Apply 1 application twice a day by topical route. 10/12/23   Historical " Provider, MD   atorvastatin (Lipitor) 10 mg tablet Take 1 tablet (10 mg) by mouth once daily at bedtime. 2/14/25   Sigifredo Sinha MD   benzoyl peroxide (Acne Medication) 10 % lotion lotion Apply 1 Application topically once daily at bedtime. 5/7/24   Sigifredo Sinha MD   Bevespi Aerosphere 9-4.8 mcg HFA aerosol inhaler Inhale 2 puffs 2 times a day. 2/27/25   Sigifredo Sinha MD   chlorhexidine (Peridex) 0.12 % solution RINSE WITH 15ML TWICE A DAY FOR 30 SECONDS AFTER TOOTH BRUSHING START USING 2 DAYS AFTER SURGERY 4/24/24   Historical Provider, MD   cholestyramine (Questran) 4 gram packet Take 1 packet (4 g) by mouth 2 times daily (morning and late afternoon). Take 3hrs before or after any meds. Take twice daily as needed for diarrhea 1/15/25 1/15/26  Gayle Palomares PA-C   diclofenac sodium (Voltaren) 1 % gel APPLY FOUR AND ONE-HALF INCHES (4 GRAMS) TOPICALLY FOUR TIMES A DAY AS NEEDED FOR JOINT PAIN 2/14/25   Sigifredo Sinha MD   EPINEPHrine 0.3 mg/0.3 mL injection syringe Inject 0.3 mL (0.3 mg) into the muscle 1 time if needed for anaphylaxis for up to 1 dose. Inject into upper leg. Call 911 after use. 2/14/25   Sigifredo Sinha MD   food supplemt, lactose-reduced (Ensure Original) 0.04-1.05 gram-kcal/mL liquid Take 3 bottles by mouth once daily. 2-3 cans 2/14/25   Sigifredo Sinha MD   gabapentin (Neurontin) 800 mg tablet Take 1 tablet (800 mg) by mouth 3 times a day. 2/14/25   Sigifredo Sinha MD   hydrocortisone 2.5 % cream Apply topically 2 times a day as needed for irritation or rash. 8/12/24 8/12/25  Sigifredo Sinha MD   ipratropium-albuteroL (Duo-Neb) 0.5-2.5 mg/3 mL nebulizer solution Inhale 3 mL by nebulization every 6 hours while awake. 2/27/25 4/8/25  Sigifredo Sinha MD   levETIRAcetam (Keppra) 500 mg tablet Take 1 tablet (500 mg) by mouth 2 times a day. 2/14/25   Sigifredo Sinha MD   loperamide (Imodium) 2 mg capsule TAKE 1 CAPSULE FOUR TIMES A DAY AS NEEDED FOR DIARRHEA 2/14/25   Sigifredo BENEDICT  MD Bharath   mirtazapine (Remeron) 15 mg tablet TAKE ONE TABLET (15 MG) BY MOUTH DAILY AT 9PM AT BEDTIME 2/14/25   Sigifredo Sinha MD   mometasone (Elocon) 0.1 % ointment Apply topically once daily. 2/29/24 2/28/25  Sigifredo Sinha MD   montelukast (Singulair) 10 mg tablet Take 1 tablet (10 mg) by mouth once daily at bedtime. 2/14/25   Sigifredo Sinha MD   multivitamin with minerals (Certavite-Antioxidant) tablet Take 1 tablet by mouth once daily. 2/14/25 2/14/26  Sigifredo Sinha MD   multivitamin with minerals tablet Take 1 tablet by mouth once daily. 2/29/24   Sigifredo Sinha MD   nicotine (Nicoderm CQ) 14 mg/24 hr patch Place 1 patch over 24 hours on the skin once daily. 2/14/25 3/16/25  Sigifredo Sinha MD   OLANZapine (ZyPREXA) 5 mg tablet Take 1 tablet (5 mg) by mouth once daily at bedtime. 2/14/25   Sigifredo Sinha MD   pantoprazole (ProtoNix) 40 mg EC tablet Take 1 tablet (40 mg) by mouth once daily in the morning. Take before meals. Do not crush, chew, or split. 2/14/25 2/14/26  Sigifredo Sinha MD   rOPINIRole (Requip) 0.5 mg tablet Take 1 tablet (0.5 mg) by mouth 3 times a day. 2/14/25   Sigifredo Sinha MD   sodium chloride 3 % nebulizer solution Take 4 mL by nebulization 3 times a day. 2/29/24   Jesus Manuel VIRGINIE Choe MD   tamsulosin (Flomax) 0.4 mg 24 hr capsule Take 1 capsule (0.4 mg) by mouth once daily. 2/14/25   Sigifredo Sinha MD   thiamine (Vitamin B-1) 100 mg tablet Take 1 tablet (100 mg) by mouth once daily. 2/14/25 2/14/26  Sigifredo Sinha MD   tiZANidine (Zanaflex) 2 mg capsule Take 1 capsule (2 mg) by mouth 3 times a day. 2/14/25 3/16/25  Sigifredo Sinha MD     Medication Documentation Review Audit       Reviewed by Jose España RN (Registered Nurse) on 04/15/25 at 1925      Medication Order Taking? Sig Documenting Provider Last Dose Status   albuterol (Ventolin HFA) 90 mcg/actuation inhaler [id number]  Inhale 2 puffs every 4 hours if needed for wheezing or shortness of breath.    Patient not taking: Reported on 3/7/2025    Sigifredo Sinha MD  Active   ammonium lactate (Amlactin) 12 % cream [id number]  Apply 1 application twice a day by topical route. Historical Provider, MD  Active   atorvastatin (Lipitor) 10 mg tablet [id number]  Take 1 tablet (10 mg) by mouth once daily at bedtime. Sigifredo Sinha MD  Active   benzoyl peroxide (Acne Medication) 10 % lotion lotion [id number]  Apply 1 Application topically once daily at bedtime. Sigifredo Sinha MD  Active   Bevespi Aerosphere 9-4.8 mcg HFA aerosol inhaler [id number]  Inhale 2 puffs 2 times a day. Sigifredo Sinha MD  Active   chlorhexidine (Peridex) 0.12 % solution [id number]  RINSE WITH 15ML TWICE A DAY FOR 30 SECONDS AFTER TOOTH BRUSHING START USING 2 DAYS AFTER SURGERY Historical Provider, MD  Active   cholestyramine (Questran) 4 gram packet [id number]  Take 1 packet (4 g) by mouth 2 times daily (morning and late afternoon). Take 3hrs before or after any meds. Take twice daily as needed for diarrhea Gayle Palomares PA-C  Active   diclofenac sodium (Voltaren) 1 % gel [id number]  APPLY FOUR AND ONE-HALF INCHES (4 GRAMS) TOPICALLY FOUR TIMES A DAY AS NEEDED FOR JOINT PAIN Sigifredo Sinha MD  Active   EPINEPHrine 0.3 mg/0.3 mL injection syringe [id number]  Inject 0.3 mL (0.3 mg) into the muscle 1 time if needed for anaphylaxis for up to 1 dose. Inject into upper leg. Call 911 after use. Sigifredo Sinha MD  Active   food supplemt, lactose-reduced (Ensure Original) 0.04-1.05 gram-kcal/mL liquid [id number]  Take 3 bottles by mouth once daily. 2-3 cans Sigifredo Sinha MD  Active   gabapentin (Neurontin) 800 mg tablet [id number]  Take 1 tablet (800 mg) by mouth 3 times a day. Sigifredo Sinha MD  Active   hydrocortisone 2.5 % cream [id number]  Apply topically 2 times a day as needed for irritation or rash. Sigifredo Sinha MD  Active   ipratropium-albuteroL (Duo-Neb) 0.5-2.5 mg/3 mL nebulizer solution [id number]  Inhale 3 mL by nebulization every 6  hours while awake. Sigifredo Sinha MD   25 235   levETIRAcetam (Keppra) 500 mg tablet [id number]  Take 1 tablet (500 mg) by mouth 2 times a day. Sigifredo Sinha MD  Active   loperamide (Imodium) 2 mg capsule [id number]  TAKE 1 CAPSULE FOUR TIMES A DAY AS NEEDED FOR DIARRHEA Sigifredo Sinha MD  Active   mirtazapine (Remeron) 15 mg tablet [id number]  TAKE ONE TABLET (15 MG) BY MOUTH DAILY AT 9PM AT BEDTIME Sigifredo Sinha MD  Active   mometasone (Elocon) 0.1 % ointment [id number]  Apply topically once daily. Sigifredo Sinha MD   25 235   montelukast (Singulair) 10 mg tablet [id number]  Take 1 tablet (10 mg) by mouth once daily at bedtime. Sigifredo Sinha MD  Active   multivitamin with minerals (Certavite-Antioxidant) tablet [id number]  Take 1 tablet by mouth once daily. Sigifredo Sinha MD  Active   multivitamin with minerals tablet [id number]  Take 1 tablet by mouth once daily. Sigifredo Sinha MD  Active   nicotine (Nicoderm CQ) 14 mg/24 hr patch [id number]  Place 1 patch over 24 hours on the skin once daily. Sigifredo Sinha MD   25 235   OLANZapine (ZyPREXA) 5 mg tablet [id number]  Take 1 tablet (5 mg) by mouth once daily at bedtime. Sigifredo Sinha MD  Active   pantoprazole (ProtoNix) 40 mg EC tablet [id number]  Take 1 tablet (40 mg) by mouth once daily in the morning. Take before meals. Do not crush, chew, or split. Sigifredo Sinha MD  Active   rOPINIRole (Requip) 0.5 mg tablet [id number]  Take 1 tablet (0.5 mg) by mouth 3 times a day. Sigifredo Sinha MD  Active   sodium chloride 3 % nebulizer solution [id number]  Take 4 mL by nebulization 3 times a day. Jesus Manuel Choe MD  Active   tamsulosin (Flomax) 0.4 mg 24 hr capsule [id number]  Take 1 capsule (0.4 mg) by mouth once daily. Sigifredo Sinha MD  Active   thiamine (Vitamin B-1) 100 mg tablet [id number]  Take 1 tablet (100 mg) by mouth once daily. Sigifredo Sinha MD  Active   tiZANidine (Zanaflex) 2 mg capsule [id number]   "Take 1 capsule (2 mg) by mouth 3 times a day. Sigifredo Sihna MD   25 7518                    Allergies:  RX Allergies[1]    Past medical history:  Medical History[2]    Surgical history:  Surgical History[3]    Family history:  Family History[4]    Social history:   reports that he has been smoking cigarettes. He started smoking about 24 years ago. He has a 6.1 pack-year smoking history. He has never used smokeless tobacco. He reports current alcohol use. He reports current drug use. Drug: Marijuana.    Review of systems:  ROS negative unless stated herein    Vitals:  /70   Pulse 99   Temp 36.9 °C (98.4 °F) (Temporal)   Resp 16   Ht 1.727 m (5' 8\")   Wt 49.9 kg (110 lb)   SpO2 96%   BMI 16.73 kg/m²       Physical exam:  Constitutional: Well-developed male in no acute distress.  HEENT: Normocephalic, atraumatic. PERRL. EOMI. No cervical lymphadenopathy.  Respiratory: Mildly decreased breath sounds, Normal respiratory effort. No wheezes or rhonchi/   Cardiovascular: RRR. No murmurs, gallops, or rubs. No JVD. Radial pulses 2+.  Abdominal: Soft, nondistended, nontender to palpation. Bowel sounds present. No hepatosplenomegaly or masses. No CVA tenderness.  Neuro: Alert and oriented x3, moving extremities equally.  MSK: No LE edema bilaterally.  Skin: Warm, dry. No rashes or wounds.  Psych: Appropriate mood and affect.    Labs:  Results for orders placed or performed during the hospital encounter of 04/15/25 (from the past 24 hours)   ECG 12 lead   Result Value Ref Range    Ventricular Rate 97 BPM    Atrial Rate 97 BPM    KS Interval 126 ms    QRS Duration 84 ms    QT Interval 362 ms    QTC Calculation(Bazett) 459 ms    P Axis 76 degrees    R Axis 75 degrees    T Axis 74 degrees    QRS Count 16 beats    Q Onset 222 ms    P Onset 159 ms    P Offset 199 ms    T Offset 403 ms    QTC Fredericia 424 ms   CBC with Differential   Result Value Ref Range    WBC 10.8 4.4 - 11.3 x10*3/uL    nRBC 0.0 0.0 " - 0.0 /100 WBCs    RBC 3.75 (L) 4.50 - 5.90 x10*6/uL    Hemoglobin 11.7 (L) 13.5 - 17.5 g/dL    Hematocrit 33.7 (L) 41.0 - 52.0 %    MCV 90 80 - 100 fL    MCH 31.2 26.0 - 34.0 pg    MCHC 34.7 32.0 - 36.0 g/dL    RDW 13.6 11.5 - 14.5 %    Platelets 320 150 - 450 x10*3/uL    Neutrophils % 81.2 40.0 - 80.0 %    Immature Granulocytes %, Automated 0.9 0.0 - 0.9 %    Lymphocytes % 11.4 13.0 - 44.0 %    Monocytes % 5.8 2.0 - 10.0 %    Eosinophils % 0.0 0.0 - 6.0 %    Basophils % 0.7 0.0 - 2.0 %    Neutrophils Absolute 8.76 (H) 1.20 - 7.70 x10*3/uL    Immature Granulocytes Absolute, Automated 0.10 0.00 - 0.70 x10*3/uL    Lymphocytes Absolute 1.23 1.20 - 4.80 x10*3/uL    Monocytes Absolute 0.63 0.10 - 1.00 x10*3/uL    Eosinophils Absolute 0.00 0.00 - 0.70 x10*3/uL    Basophils Absolute 0.08 0.00 - 0.10 x10*3/uL   Comprehensive Metabolic Panel   Result Value Ref Range    Glucose 87 74 - 99 mg/dL    Sodium 141 136 - 145 mmol/L    Potassium 3.5 3.5 - 5.3 mmol/L    Chloride 89 (L) 98 - 107 mmol/L    Bicarbonate 33 (H) 21 - 32 mmol/L    Anion Gap 23 (H) 10 - 20 mmol/L    Urea Nitrogen 17 6 - 23 mg/dL    Creatinine 0.71 0.50 - 1.30 mg/dL    eGFR >90 >60 mL/min/1.73m*2    Calcium 8.8 8.6 - 10.6 mg/dL    Albumin 3.7 3.4 - 5.0 g/dL    Alkaline Phosphatase 107 33 - 120 U/L    Total Protein 7.8 6.4 - 8.2 g/dL    AST 75 (H) 9 - 39 U/L    Bilirubin, Total 0.4 0.0 - 1.2 mg/dL    ALT 26 10 - 52 U/L   Protime-INR   Result Value Ref Range    Protime 12.4 9.8 - 12.4 seconds    INR 1.1 0.9 - 1.1   Type and screen   Result Value Ref Range    ABO TYPE O     Rh TYPE POS     ANTIBODY SCREEN NEG    Troponin I, High Sensitivity, Initial   Result Value Ref Range    Troponin I, High Sensitivity (CMC) <3 0 - 53 ng/L   Iron and TIBC   Result Value Ref Range    Iron 119 35 - 150 ug/dL    UIBC 128 110 - 370 ug/dL    TIBC 247 240 - 445 ug/dL    % Saturation 48 (H) 25 - 45 %   Ferritin   Result Value Ref Range    Ferritin 95 20 - 300 ng/mL   Lactate  Dehydrogenase   Result Value Ref Range     84 - 246 U/L   Troponin, High Sensitivity, 1 Hour   Result Value Ref Range    Troponin I, High Sensitivity (CMC) <3 0 - 53 ng/L     *Note: Due to a large number of results and/or encounters for the requested time period, some results have not been displayed. A complete set of results can be found in Results Review.       Assessment and Plan:  Servando Leigh is a 60 y.o. male COPD, MAC infection, alcohol use disorder, bronchiectasis, seizures, chronic perianal and scrotal abscesses here, chiquita-anal fistula s/p fistulotomy, history of H.pylori infection and chronic diarrhea who presents to the ED with melena and hematochezia lasting 1 day. Notably, he has an extensive history of chronic diarrhea that, despite an extensive workup, has turned out negative for an organic cause. He recently underwent a colonoscopy, which was essentially unremarkable except rectal hemorrhoids. He does not take any blood thinners, and CTAP obtained in the ED was negative for any acute process. Ultimately, I believe he will benefit from undergoing a repeat EGD to truly elucidate the etiology underlying his melena. His chronic diarrhea is likely due to IBS-D. Nevertheless, additional workup as stated below has been ordered to rule out any underlying organic cause    #Melena  #Bilious vomiting  #Possible UGIB, stable   #Chronic diarrhea, secretory vs infectious vs inflammatory   # IBS- D  : : Hb 11.7, stable   : : GI workup as above negative  : : Colonoscopy 2025: rectal hemorrhoids, bx negative for microscopic colitis   : : Prior GI workup negative for any organic cause to diarrhea  : : S/p double strength PPI in the ED    Plan  - PPI BID  - Possible EGD to localize source of bleed, made NPO  - Active T&S, transfuse to goal Hb >7  - If active bleeding recurs, would favor CTA as imaging modality of choice  - For further eval, MR enterography can be considered   - Loperamide for symptomatic  control   - For workup, added fecal elastase, VIP levels, gastrin, fecal fat, HIV, cryptosporidium, calprotectin, 5-HIAA, follow    #COPD GOLD stage III  #Asthma  #Wheezing  #Bronchiectasis   : : Follows with Dr Choe  : : Most recent PFTs: Pre-BD FEV1 1.28 L (42%), pre-BD FVC 2.49 L (65%), pre-BD FEV1/FVC 51; (+) bronchodilator response based on 17% improvement in FVC; persistently reduced FEV1/FVC after bronchodilator (COPD); TLC 8.19 L (139%), RV 5.21 L (246%), RV/TLC 64; uncorrected DLCO 17.4 (60%):    Plan  - No c/f CAP (no leukocytosis, O2 requirement, fevers). CXR negative for acute infectious process  - C/w home duo nebs, singulair, spiriva +perforomist  - RT consulted to help with bronchopulmonary hygiene   - Hypertonic saline to mobilize secretions    #AUD  - c/w CIWA  - Folic acid  -Thiamine    #Seizure disorder  - C/w keppra    #GERD  -C/w PPI    #Perirectal abscess   - No active concerns     #Malnourishment   - Nutrition consulted    Diet: NPO for possible EGD   DVT prophylaxis: SCDs ordered (no chemo ppx iso possible bleeding)  Code status: FULL CODE   NOK: [phone] - Lisha (spouse)    To be officially staffed in the     Murtaza Hutchison MD  Internal Medicine PGY-2         [1]   Allergies  Allergen Reactions    Coconut Swelling    Shellfish Containing Products Anaphylaxis, Unknown and Swelling    Shellfish Derived Anaphylaxis, Unknown and Other     Other Reaction(s): Unknown    Other Other    Penicillin G Swelling    Cat Dander Other     itching    House Dust Other     Watery eyes.   [2]   Past Medical History:  Diagnosis Date    At risk for aspiration 04/28/2024    Brain tumor (benign) (Multi)     CKD (chronic kidney disease)     COPD (chronic obstructive pulmonary disease) (Multi)     Dermatitis     GERD (gastroesophageal reflux disease)     Incontinence of feces     Myalgia     Peripheral vascular disease, unspecified (CMS-HCC)     Tissue necrosis with gangrene in peripheral vascular disease     Personal history of other diseases of the digestive system     History of esophageal reflux    Personal history of other diseases of the nervous system and sense organs     History of seizure disorder    Personal history of other diseases of the respiratory system     History of pulmonary emphysema    Personal history of other mental and behavioral disorders     History of depression    Seizure (Multi)     3 to 4 times a week. stopped his depakote. no script. new md per patient    Unsteady gait when walking     Vertigo    [3]   Past Surgical History:  Procedure Laterality Date    CERVICAL FUSION      FOOT      NECK SURGERY  01/25/2017    Neck Surgery    OTHER SURGICAL HISTORY  07/13/2020    Perirectal abscess incision and drainage    OTHER SURGICAL HISTORY  01/07/2020    Shoulder replacement    TOTAL SHOULDER ARTHROPLASTY Right     right   [4]   Family History  Problem Relation Name Age of Onset    Diabetes Son      Glaucoma Other Grandfather

## 2025-04-16 NOTE — ED PROVIDER NOTES
History of Present Illness     History provided by: Patient   Limitations to History: None   External Records Reviewed with Brief Summary: I reviewed the patient's H&P from gastroenterology on 3/7/2025 which was done prior to his colonoscopy.    HPI:  Servando Leigh is a 60 y.o. male COPD, MAC infection, alcohol use disorder, bronchiectasis, seizures, chronic perianal and scrotal abscesses here, chiquita-anal fistula s/p fistulotomy, history of H.pylori infection and chronic diarrhea presenting to the emergency department today with concern for continuous vomiting, dark stools, and abdominal pain.  He notes that he has been vomiting once first thing in the morning for the last several weeks to months however today symptoms where he has been continuously vomiting throughout the day is new.  He describes it as bright red plus dark emesis.  Additionally notes he has been having the same consistency of stool that has been ongoing throughout the day with abdominal pain.  He notes that he has been having some chest pain initially told me it started about a week ago and then told me it has been about a month that he for started to notice this chest pain.  Denies any fever or chills.  Denies any cough.  Has been having some shortness of breath with this.  He states that albuterol tends to work well for him.  No other associated signs or symptoms report at this time.  Additionally notes that he drinks alcohol every day, has gone through withdrawal previously, last drink was earlier this morning.    Physical Exam   Triage vitals:  T 36.4 °C (97.5 °F)  HR (!) 109  /72  RR 18  O2 96 % None (Room air)    General: Awake, alert, in no acute distress  Eyes: Gaze conjugate.  No scleral icterus or injection  HENT: Normo-cephalic, atraumatic. No stridor  CV: Regular rate, regular rhythm. Radial pulses 2+ bilaterally  Resp: Breathing non-labored, speaking in full sentences.  Clear to auscultation bilaterally  GI: Soft, epigastric  tender to palpation without rebound or guarding.  MSK/Extremities: No gross bony deform  Skin: Warm. Appropriate color  Neuro: Alert. Oriented. Face symmetric. Speech is fluent.  Gross strength and sensation intact in b/l UE and LEs  Psych: Appropriate mood and affect    Medical Decision Making & ED Course   Medical Decision Makin y.o. male with the above past medical who presented to the emergency department today with concern for epigastric abdominal pain, melena, hematochezia.  On examination he has epigastric abdominal pain without rebound or guarding.  Due to his extensive history CT abdomen pelvis was obtained to further evaluate.  CT did not show any intra-abdominal abnormalities that could explain the patient's symptoms.  Due to the continued symptoms labs were also obtained labs otherwise reassuring, hemoglobin stable compared to 8 months ago. Slight hypochloremia was noted, he does have an anion gap that is noted, elevated AST as well.  Due to the patient's continued symptoms the patient was admitted to gastroenterology in stable condition for further workup for his GI bleed.  BUN was not elevated less concerning for upper GI bleed.  Was given Protonix 80 mg one-time.  I do not have concern for varices or cirrhosis based off his workup, will defer ceftriaxone as he does not have ascites.  Additionally will defer octreotide as there is no indication for it currently.  Was given Zofran for the nausea.  Admitted in stable condition.  ----    Differential diagnoses considered include but are not limited to: Melena, hematochezia, upper GI bleed, varices     Social Determinants of Health which Significantly Impact Care: None identified     EKG Independent Interpretation: See ED Course    Independent Result Review and Interpretation: See ED course    Chronic conditions affecting the patient's care: See HPI    The patient was discussed with the following consultants/services: Admission Coordinator who  accepted the patient for admission    Care Considerations: See [facility]    ED Course:  ED Course as of 04/16/25 2051   Tue Apr 15, 2025   2201 60-year-old male with a history of alcohol use disorder, MAC infection, COPD, seizures, and chronic perianal and scrotal abscesses with a prior perianal fistula status post fistulotomy, history of H. pylori, who is presenting to the emergency department for abdominal pain.  Epigastric, radiating up into the chest, associated with coffee-ground emesis and melena.  Has been worsening over the past several days.  Presenting today because his vomiting was worsening.  On exam he is lying in bed in no acute distress.  Heart rate is tachycardic but regular, lungs are clear to auscultation bilaterally, belly is tender in the epigastric area without rebound or guarding.  Workup will be undertaken for concern for an upper GI bleed.  Patient was loaded with Protonix, fluids, and IV antiemetics.  CTs will be ordered to look for evidence of any focal findings but the patient will likely require admission with GI consultation and potential endoscopy.  Patient was signed out pending his workup [NS]   6555 IMPRESSION:  CHEST:  1.  Emphysematous changes, cylindrical bronchiectatic changes, and  multifocal mucous plugging, similar to prior.      ABDOMEN-PELVIS:  1.  No acute abdominopelvic process.  2. Hepatic steatosis.  3. Osteonecrosis of the bilateral femoral heads. No significant  flattening deformity of the femoral heads. [KD]      ED Course User Index  [KD] Georgette Summers DO  [NS] Edy Mancera MD         Diagnoses as of 04/16/25 2051   Hemoptysis     Disposition   As a result of their workup, the patient will require admission to the hospital.  The patient was informed of his diagnosis.  The patient was given the opportunity to ask questions and I answered them. The patient agreed to be admitted to the hospital.    Seen and discussed with ED Attending  Georgette Summers  , PGY-2  Emergency Medicine     Georgette CALE Summers DO  Resident  04/16/25 9211

## 2025-04-16 NOTE — CARE PLAN
The patient's goals for the shift include  to get better and go home soon.    The clinical goals for the shift include    Problem: Pain - Adult  Goal: Verbalizes/displays adequate comfort level or baseline comfort level  Outcome: Progressing     Problem: Safety - Adult  Goal: Free from fall injury  Outcome: Progressing     Problem: Discharge Planning  Goal: Discharge to home or other facility with appropriate resources  Outcome: Progressing     Problem: Chronic Conditions and Co-morbidities  Goal: Patient's chronic conditions and co-morbidity symptoms are monitored and maintained or improved  Outcome: Progressing     Problem: Nutrition  Goal: Nutrient intake appropriate for maintaining nutritional needs  Outcome: Progressing     Problem: Fall/Injury  Goal: Not fall by end of shift  Outcome: Progressing  Goal: Be free from injury by end of the shift  Outcome: Progressing  Goal: Verbalize understanding of personal risk factors for fall in the hospital  Outcome: Progressing  Goal: Verbalize understanding of risk factor reduction measures to prevent injury from fall in the home  Outcome: Progressing  Goal: Use assistive devices by end of the shift  Outcome: Progressing  Goal: Pace activities to prevent fatigue by end of the shift  Outcome: Progressing     Problem: Skin  Goal: Decreased wound size/increased tissue granulation at next dressing change  Outcome: Progressing  Flowsheets (Taken 4/16/2025 1815)  Decreased wound size/increased tissue granulation at next dressing change: Promote sleep for wound healing  Goal: Participates in plan/prevention/treatment measures  Outcome: Progressing  Flowsheets (Taken 4/16/2025 1815)  Participates in plan/prevention/treatment measures:   Discuss with provider PT/OT consult   Increase activity/out of bed for meals   Elevate heels  Goal: Prevent/manage excess moisture  Outcome: Progressing  Flowsheets (Taken 4/16/2025 1815)  Prevent/manage excess moisture: Moisturize dry skin  Goal:  Prevent/minimize sheer/friction injuries  Outcome: Progressing  Flowsheets (Taken 4/16/2025 1815)  Prevent/minimize sheer/friction injuries: Increase activity/out of bed for meals  Goal: Promote/optimize nutrition  Outcome: Progressing  Flowsheets (Taken 4/16/2025 1815)  Promote/optimize nutrition:   Consume > 50% meals/supplements   Monitor/record intake including meals   Offer water/supplements/favorite foods  Goal: Promote skin healing  Outcome: Progressing  Flowsheets (Taken 4/16/2025 1815)  Promote skin healing: Assess skin/pad under line(s)/device(s)     Problem: Respiratory  Goal: Clear secretions with interventions this shift  Outcome: Progressing  Goal: Minimize anxiety/maximize coping throughout shift  Outcome: Progressing  Goal: Minimal/no exertional discomfort or dyspnea this shift  Outcome: Progressing  Goal: No signs of respiratory distress (eg. Use of accessory muscles. Peds grunting)  Outcome: Progressing  Goal: Patent airway maintained this shift  Outcome: Progressing  Goal: Tolerate mechanical ventilation evidenced by VS/agitation level this shift  Outcome: Progressing  Goal: Tolerate pulmonary toileting this shift  Outcome: Progressing  Goal: Verbalize decreased shortness of breath this shift  Outcome: Progressing  Goal: Wean oxygen to maintain O2 saturation per order/standard this shift  Outcome: Progressing  Goal: Increase self care and/or family involvement in next 24 hours  Outcome: Progressing

## 2025-04-16 NOTE — ANESTHESIA PREPROCEDURE EVALUATION
Patient: Servando Leigh    Procedure Information       Anesthesia Start Date/Time: 04/16/25 1424    Scheduled providers: Yoselin Vazquez MD    Procedure: EGD    Location: [facility] ([facility]            Relevant Problems   Cardiac   (+) Chest pain      Pulmonary   (+) Asthma   (+) COPD exacerbation (Multi)      Neuro   (+) Anxiety   (+) Seizures (Multi)   (+) Small fiber neuropathy      /Renal   (+) Benign prostatic hyperplasia with urinary obstruction      Musculoskeletal   (+) Chronic neck pain      ID   (+) Helicobacter pylori (H. pylori) infection       Clinical information reviewed:   Tobacco  Allergies  Meds   Med Hx  Surg Hx   Fam Hx  Soc Hx        NPO Detail:  NPO/Void Status  Carbohydrate Drink Given Prior to Surgery? : N  Date of Last Liquid: 04/15/25  Time of Last Liquid: 2100  Date of Last Solid: 04/15/25  Time of Last Solid: 2100  Last Intake Type: Light meal  Time of Last Void: 1220         Physical Exam    Airway  Mallampati: II  TM distance: >3 FB  Neck ROM: full  Mouth opening: 3 or more finger widths     Cardiovascular    Dental     (+) upper dentures     Pulmonary    Abdominal            Anesthesia Plan    History of general anesthesia?: yes  History of complications of general anesthesia?: no    ASA 3     MAC     intravenous induction   Anesthetic plan and risks discussed with patient.

## 2025-04-16 NOTE — PROGRESS NOTES
Pharmacy Medication History Review    Servando Leigh is a 60 y.o. male admitted for Hemoptysis. Pharmacy reviewed the patient's [id number] medications and allergies for accuracy.    The list below reflects the updated PTA list.   Prior to Admission Medications   Prescriptions Last Dose Informant   Bevespi Aerosphere 9-4.8 mcg HFA aerosol inhaler 4/15/2025 Self   Sig: Inhale 2 puffs 2 times a day.   EPINEPHrine 0.3 mg/0.3 mL injection syringe  Self   Sig: Inject 0.3 mL (0.3 mg) into the muscle 1 time if needed for anaphylaxis for up to 1 dose. Inject into upper leg. Call 911 after use.   OLANZapine (ZyPREXA) 5 mg tablet 4/15/2025 Bedtime Self   Sig: Take 1 tablet (5 mg) by mouth once daily at bedtime.   albuterol (Ventolin HFA) 90 mcg/actuation inhaler 4/15/2025 Self   Sig: Inhale 2 puffs every 4 hours if needed for wheezing or shortness of breath.   ammonium lactate (Amlactin) 12 % cream Unknown Self   Sig: Apply 1 application twice a day by topical route.   atorvastatin (Lipitor) 10 mg tablet 4/15/2025 Bedtime Self   Sig: Take 1 tablet (10 mg) by mouth once daily at bedtime.   benzoyl peroxide (Acne Medication) 10 % lotion lotion Unknown Self   Sig: Apply 1 Application topically once daily at bedtime.   cholestyramine (Questran) 4 gram packet Past Week Self   Sig: Take 1 packet (4 g) by mouth 2 times daily (morning and late afternoon). Take 3hrs before or after any meds. Take twice daily as needed for diarrhea   diclofenac sodium (Voltaren) 1 % gel Unknown Self   Sig: APPLY FOUR AND ONE-HALF INCHES (4 GRAMS) TOPICALLY FOUR TIMES A DAY AS NEEDED FOR JOINT PAIN   food supplemt, lactose-reduced (Ensure Original) 0.04-1.05 gram-kcal/mL liquid Unknown Self   Sig: Take 3 bottles by mouth once daily. 2-3 cans   gabapentin (Neurontin) 800 mg tablet 4/15/2025 Bedtime Self   Sig: Take 1 tablet (800 mg) by mouth 3 times a day.   Patient taking differently: Take 1 tablet (800 mg) by mouth 2 times a day.   hydrocortisone  2.5 % cream Unknown Self   Sig: Apply topically 2 times a day as needed for irritation or rash.   ipratropium-albuteroL (Duo-Neb) 0.5-2.5 mg/3 mL nebulizer solution Past Week Self   Sig: Inhale 3 mL by nebulization every 6 hours while awake.   levETIRAcetam (Keppra) 500 mg tablet 4/15/2025 Bedtime Self   Sig: Take 1 tablet (500 mg) by mouth 2 times a day.   Patient taking differently: Take 1 tablet (500 mg) by mouth once daily at bedtime.   loperamide (Imodium) 2 mg capsule Unknown Self   Sig: TAKE 1 CAPSULE FOUR TIMES A DAY AS NEEDED FOR DIARRHEA   mirtazapine (Remeron) 15 mg tablet Past Week Self   Sig: TAKE ONE TABLET (15 MG) BY MOUTH DAILY AT 9PM AT BEDTIME   mometasone (Elocon) 0.1 % ointment Unknown    Sig: Apply topically once daily.   montelukast (Singulair) 10 mg tablet 4/15/2025 Bedtime Self   Sig: Take 1 tablet (10 mg) by mouth once daily at bedtime.   multivitamin with minerals (Certavite-Antioxidant) tablet Not Taking Self   Sig: Take 1 tablet by mouth once daily.   Patient not taking: Reported on 4/16/2025   multivitamin with minerals tablet Not Taking Self   Sig: Take 1 tablet by mouth once daily.   Patient not taking: Reported on 4/16/2025   nicotine (Nicoderm CQ) 14 mg/24 hr patch     Sig: Place 1 patch over 24 hours on the skin once daily.   pantoprazole (ProtoNix) 40 mg EC tablet 4/15/2025 Bedtime Self   Sig: Take 1 tablet (40 mg) by mouth once daily in the morning. Take before meals. Do not crush, chew, or split.   Patient taking differently: Take 1 tablet (40 mg) by mouth once daily as needed. Do not crush, chew, or split.   rOPINIRole (Requip) 0.5 mg tablet 4/15/2025 Bedtime Self   Sig: Take 1 tablet (0.5 mg) by mouth 3 times a day.   Patient taking differently: Take 1 tablet (0.5 mg) by mouth 2 times a day.   sodium chloride 3 % nebulizer solution Unknown Self   Sig: Take 4 mL by nebulization 3 times a day.   tamsulosin (Flomax) 0.4 mg 24 hr capsule 4/15/2025 Bedtime Self   Sig: Take 1 capsule  "(0.4 mg) by mouth once daily.   thiamine (Vitamin B-1) 100 mg tablet Not Taking Self   Sig: Take 1 tablet (100 mg) by mouth once daily.   Patient not taking: Reported on 4/16/2025   tiZANidine (Zanaflex) 2 mg capsule Unknown Self   Sig: Take 1 capsule (2 mg) by mouth 3 times a day.      Facility-Administered Medications: None        The list below reflects the updated allergy list. Please review each documented allergy for additional clarification and justification.  Allergies  Reviewed by Jose España RN on 4/15/2025        Severity Reactions Comments    Coconut High Swelling     Shellfish Containing Products High Anaphylaxis, Unknown, Swelling     Shellfish Derived High Anaphylaxis, Unknown, Other Other Reaction(s): Unknown    Other Not Specified Other     Penicillin G Not Specified Swelling     Cat Dander Low Other itching    House Dust Low Other Watery eyes.            Patient accepts M2B at discharge.     Sources:   Patient Interview - moderate historian, able to confirm medications with name prompting, was unsure of some frequencies of administration  Admission MedRec Grid  [facility] - gabapentin 800mg LF: 2/14/25, #90, 30DS  [facility] medication dispense report    Medications ADDED:  None  Medications CHANGED:  Gabapentin - changed frequency from TID to BID - patient taking differently from prescribed   Levetiracetam - changed frequency from BID to at bedtime - patient taking differently from prescribed   Pantoprazole - changed frequency from daily to PRN - patient taking differently from prescribed   Ropinirole - changed frequency from TID to BID - patient taking differently from prescribed   Medications REMOVED/MARKED NOT TAKING:   MVI  Thiamine     Additional Comments:  Patient reports Bevespi inhaler is not effective    Fatmata Rudolph, PharmD  Transitions of Care Pharmacist  04/16/25     Secure Chat preferred   If no response call [phone] or Vocera \"Med Rec\"    "

## 2025-04-16 NOTE — ANESTHESIA POSTPROCEDURE EVALUATION
Patient: Servando Leigh    Procedure Summary       Date: 04/16/25 Room / Location: [facility]    Anesthesia Start: 1424 Anesthesia Stop: 1448    Procedure: EGD Diagnosis: Anemia, unspecified type    Scheduled Providers: Yoselin Vazquez MD Responsible Provider: Aniceto Knott MD    Anesthesia Type: MAC ASA Status: 3            Anesthesia Type: MAC    Vitals Value Taken Time   /74 04/16/25 14:46   Temp 36 °C (96.8 °F) 04/16/25 14:46   Pulse 94 04/16/25 14:46   Resp 18 04/16/25 14:46   SpO2 100 % 04/16/25 14:46       Anesthesia Post Evaluation    Patient location during evaluation: PACU  Patient participation: complete - patient participated  Level of consciousness: awake  Pain management: adequate  Airway patency: patent  Cardiovascular status: acceptable  Respiratory status: acceptable  Hydration status: acceptable  Postoperative Nausea and Vomiting: none        There were no known notable events for this encounter.

## 2025-04-17 ENCOUNTER — APPOINTMENT (OUTPATIENT)
Dept: RADIOLOGY | Facility: HOSPITAL | Age: 60
DRG: 391 | End: 2025-04-17
Payer: COMMERCIAL

## 2025-04-17 ENCOUNTER — APPOINTMENT (OUTPATIENT)
Dept: GASTROENTEROLOGY | Facility: HOSPITAL | Age: 60
DRG: 391 | End: 2025-04-17
Payer: COMMERCIAL

## 2025-04-17 LAB
ALBUMIN SERPL BCP-MCNC: 3 G/DL (ref 3.4–5)
ANION GAP SERPL CALC-SCNC: 13 MMOL/L (ref 10–20)
BUN SERPL-MCNC: 5 MG/DL (ref 6–23)
CALCIUM SERPL-MCNC: 8.5 MG/DL (ref 8.6–10.6)
CHLORIDE SERPL-SCNC: 96 MMOL/L (ref 98–107)
CO2 SERPL-SCNC: 32 MMOL/L (ref 21–32)
CREAT SERPL-MCNC: 0.73 MG/DL (ref 0.5–1.3)
EGFRCR SERPLBLD CKD-EPI 2021: >90 ML/MIN/1.73M*2
ERYTHROCYTE [DISTWIDTH] IN BLOOD BY AUTOMATED COUNT: 14.3 % (ref 11.5–14.5)
GLUCOSE BLD MANUAL STRIP-MCNC: 133 MG/DL (ref 74–99)
GLUCOSE BLD MANUAL STRIP-MCNC: 136 MG/DL (ref 74–99)
GLUCOSE BLD MANUAL STRIP-MCNC: 150 MG/DL (ref 74–99)
GLUCOSE BLD MANUAL STRIP-MCNC: 187 MG/DL (ref 74–99)
GLUCOSE SERPL-MCNC: 73 MG/DL (ref 74–99)
HCT VFR BLD AUTO: 28.6 % (ref 41–52)
HGB BLD-MCNC: 9.7 G/DL (ref 13.5–17.5)
LEGIONELLA AG UR QL: NEGATIVE
MAGNESIUM SERPL-MCNC: 1.61 MG/DL (ref 1.6–2.4)
MCH RBC QN AUTO: 31.9 PG (ref 26–34)
MCHC RBC AUTO-ENTMCNC: 33.9 G/DL (ref 32–36)
MCV RBC AUTO: 94 FL (ref 80–100)
NRBC BLD-RTO: 0 /100 WBCS (ref 0–0)
PHOSPHATE SERPL-MCNC: 3.3 MG/DL (ref 2.5–4.9)
PLATELET # BLD AUTO: 213 X10*3/UL (ref 150–450)
POTASSIUM SERPL-SCNC: 3.4 MMOL/L (ref 3.5–5.3)
RBC # BLD AUTO: 3.04 X10*6/UL (ref 4.5–5.9)
S PNEUM AG UR QL: NEGATIVE
SODIUM SERPL-SCNC: 138 MMOL/L (ref 136–145)
WBC # BLD AUTO: 15.7 X10*3/UL (ref 4.4–11.3)

## 2025-04-17 PROCEDURE — 99232 SBSQ HOSP IP/OBS MODERATE 35: CPT | Performed by: STUDENT IN AN ORGANIZED HEALTH CARE EDUCATION/TRAINING PROGRAM

## 2025-04-17 PROCEDURE — 87899 AGENT NOS ASSAY W/OPTIC: CPT

## 2025-04-17 PROCEDURE — 80069 RENAL FUNCTION PANEL: CPT

## 2025-04-17 PROCEDURE — 71045 X-RAY EXAM CHEST 1 VIEW: CPT

## 2025-04-17 PROCEDURE — 36415 COLL VENOUS BLD VENIPUNCTURE: CPT

## 2025-04-17 PROCEDURE — 94640 AIRWAY INHALATION TREATMENT: CPT

## 2025-04-17 PROCEDURE — 99233 SBSQ HOSP IP/OBS HIGH 50: CPT

## 2025-04-17 PROCEDURE — 2500000004 HC RX 250 GENERAL PHARMACY W/ HCPCS (ALT 636 FOR OP/ED): Mod: JZ

## 2025-04-17 PROCEDURE — 83735 ASSAY OF MAGNESIUM: CPT

## 2025-04-17 PROCEDURE — 2500000005 HC RX 250 GENERAL PHARMACY W/O HCPCS: Performed by: INTERNAL MEDICINE

## 2025-04-17 PROCEDURE — 2500000001 HC RX 250 WO HCPCS SELF ADMINISTERED DRUGS (ALT 637 FOR MEDICARE OP)

## 2025-04-17 PROCEDURE — 97162 PT EVAL MOD COMPLEX 30 MIN: CPT | Mod: GP | Performed by: PHYSICAL THERAPIST

## 2025-04-17 PROCEDURE — 2500000002 HC RX 250 W HCPCS SELF ADMINISTERED DRUGS (ALT 637 FOR MEDICARE OP, ALT 636 FOR OP/ED)

## 2025-04-17 PROCEDURE — 97165 OT EVAL LOW COMPLEX 30 MIN: CPT | Mod: GO

## 2025-04-17 PROCEDURE — 82947 ASSAY GLUCOSE BLOOD QUANT: CPT

## 2025-04-17 PROCEDURE — 1100000001 HC PRIVATE ROOM DAILY

## 2025-04-17 PROCEDURE — 87449 NOS EACH ORGANISM AG IA: CPT

## 2025-04-17 PROCEDURE — 71045 X-RAY EXAM CHEST 1 VIEW: CPT | Performed by: RADIOLOGY

## 2025-04-17 PROCEDURE — 2500000002 HC RX 250 W HCPCS SELF ADMINISTERED DRUGS (ALT 637 FOR MEDICARE OP, ALT 636 FOR OP/ED): Performed by: INTERNAL MEDICINE

## 2025-04-17 PROCEDURE — 2500000005 HC RX 250 GENERAL PHARMACY W/O HCPCS

## 2025-04-17 PROCEDURE — S4991 NICOTINE PATCH NONLEGEND: HCPCS

## 2025-04-17 PROCEDURE — 87070 CULTURE OTHR SPECIMN AEROBIC: CPT

## 2025-04-17 PROCEDURE — 85027 COMPLETE CBC AUTOMATED: CPT

## 2025-04-17 RX ORDER — POTASSIUM CHLORIDE 20 MEQ/1
40 TABLET, EXTENDED RELEASE ORAL ONCE
Status: COMPLETED | OUTPATIENT
Start: 2025-04-17 | End: 2025-04-17

## 2025-04-17 RX ORDER — ALBUTEROL SULFATE 0.83 MG/ML
2.5 SOLUTION RESPIRATORY (INHALATION) 3 TIMES DAILY
Status: DISCONTINUED | OUTPATIENT
Start: 2025-04-17 | End: 2025-04-19 | Stop reason: HOSPADM

## 2025-04-17 RX ORDER — IPRATROPIUM BROMIDE AND ALBUTEROL SULFATE 2.5; .5 MG/3ML; MG/3ML
3 SOLUTION RESPIRATORY (INHALATION) EVERY 6 HOURS PRN
Status: DISCONTINUED | OUTPATIENT
Start: 2025-04-17 | End: 2025-04-19 | Stop reason: HOSPADM

## 2025-04-17 RX ORDER — ACETAMINOPHEN 325 MG/1
975 TABLET ORAL EVERY 8 HOURS PRN
Status: DISCONTINUED | OUTPATIENT
Start: 2025-04-17 | End: 2025-04-19 | Stop reason: HOSPADM

## 2025-04-17 RX ORDER — AZITHROMYCIN 500 MG/1
500 TABLET, FILM COATED ORAL
Status: COMPLETED | OUTPATIENT
Start: 2025-04-17 | End: 2025-04-19

## 2025-04-17 RX ORDER — CEFTRIAXONE 1 G/50ML
1 INJECTION, SOLUTION INTRAVENOUS EVERY 24 HOURS
Status: DISCONTINUED | OUTPATIENT
Start: 2025-04-17 | End: 2025-04-19

## 2025-04-17 RX ADMIN — SODIUM CHLORIDE SOLN NEBU 3% 4 ML: 3 NEBU SOLN at 09:02

## 2025-04-17 RX ADMIN — CHOLESTYRAMINE POWDER FOR SUSPENSION 4 G: 4 POWDER, FOR SUSPENSION ORAL at 09:48

## 2025-04-17 RX ADMIN — TRIAMCINOLONE ACETONIDE: 1 OINTMENT TOPICAL at 08:12

## 2025-04-17 RX ADMIN — CEFTRIAXONE SODIUM 1 G: 1 INJECTION, SOLUTION INTRAVENOUS at 13:15

## 2025-04-17 RX ADMIN — GABAPENTIN 800 MG: 400 CAPSULE ORAL at 08:09

## 2025-04-17 RX ADMIN — FOLIC ACID 1 MG: 1 TABLET ORAL at 08:09

## 2025-04-17 RX ADMIN — ALBUTEROL SULFATE 2.5 MG: 2.5 SOLUTION RESPIRATORY (INHALATION) at 15:10

## 2025-04-17 RX ADMIN — POTASSIUM CHLORIDE 40 MEQ: 1500 TABLET, EXTENDED RELEASE ORAL at 11:15

## 2025-04-17 RX ADMIN — MONTELUKAST 10 MG: 10 TABLET, FILM COATED ORAL at 20:13

## 2025-04-17 RX ADMIN — GABAPENTIN 800 MG: 400 CAPSULE ORAL at 15:35

## 2025-04-17 RX ADMIN — ALBUTEROL SULFATE 2.5 MG: 2.5 SOLUTION RESPIRATORY (INHALATION) at 20:04

## 2025-04-17 RX ADMIN — GABAPENTIN 800 MG: 400 CAPSULE ORAL at 20:12

## 2025-04-17 RX ADMIN — Medication 1 TABLET: at 08:09

## 2025-04-17 RX ADMIN — ACETAMINOPHEN 650 MG: 325 TABLET, FILM COATED ORAL at 08:01

## 2025-04-17 RX ADMIN — ROPINIROLE 0.5 MG: 0.5 TABLET, FILM COATED ORAL at 08:12

## 2025-04-17 RX ADMIN — IPRATROPIUM BROMIDE AND ALBUTEROL SULFATE 3 ML: .5; 3 SOLUTION RESPIRATORY (INHALATION) at 09:01

## 2025-04-17 RX ADMIN — ONDANSETRON 4 MG: 2 INJECTION INTRAMUSCULAR; INTRAVENOUS at 08:00

## 2025-04-17 RX ADMIN — SODIUM CHLORIDE SOLN NEBU 3% 4 ML: 3 NEBU SOLN at 15:10

## 2025-04-17 RX ADMIN — OLANZAPINE 5 MG: 5 TABLET, FILM COATED ORAL at 20:13

## 2025-04-17 RX ADMIN — THIAMINE HCL TAB 100 MG 100 MG: 100 TAB at 08:09

## 2025-04-17 RX ADMIN — LEVETIRACETAM 500 MG: 500 TABLET, FILM COATED ORAL at 08:03

## 2025-04-17 RX ADMIN — TAMSULOSIN HYDROCHLORIDE 0.4 MG: 0.4 CAPSULE ORAL at 08:09

## 2025-04-17 RX ADMIN — AZITHROMYCIN DIHYDRATE 500 MG: 500 TABLET, FILM COATED ORAL at 11:20

## 2025-04-17 RX ADMIN — Medication 5 MG: at 20:13

## 2025-04-17 RX ADMIN — FORMOTEROL FUMARATE DIHYDRATE 20 MCG: 20 SOLUTION RESPIRATORY (INHALATION) at 09:02

## 2025-04-17 RX ADMIN — SODIUM CHLORIDE SOLN NEBU 3% 4 ML: 3 NEBU SOLN at 20:04

## 2025-04-17 RX ADMIN — ROPINIROLE 0.5 MG: 0.5 TABLET, FILM COATED ORAL at 23:11

## 2025-04-17 RX ADMIN — FORMOTEROL FUMARATE DIHYDRATE 20 MCG: 20 SOLUTION RESPIRATORY (INHALATION) at 20:04

## 2025-04-17 RX ADMIN — PANTOPRAZOLE SODIUM 40 MG: 40 TABLET, DELAYED RELEASE ORAL at 15:35

## 2025-04-17 RX ADMIN — ROPINIROLE 0.5 MG: 0.5 TABLET, FILM COATED ORAL at 15:35

## 2025-04-17 RX ADMIN — LORAZEPAM 0.5 MG: 2 INJECTION, SOLUTION INTRAMUSCULAR; INTRAVENOUS at 08:37

## 2025-04-17 RX ADMIN — NICOTINE 1 PATCH: 14 PATCH, EXTENDED RELEASE TRANSDERMAL at 08:08

## 2025-04-17 RX ADMIN — ATORVASTATIN CALCIUM 10 MG: 20 TABLET, FILM COATED ORAL at 20:13

## 2025-04-17 RX ADMIN — LEVETIRACETAM 500 MG: 500 TABLET, FILM COATED ORAL at 20:12

## 2025-04-17 RX ADMIN — PANTOPRAZOLE SODIUM 40 MG: 40 TABLET, DELAYED RELEASE ORAL at 06:51

## 2025-04-17 ASSESSMENT — COGNITIVE AND FUNCTIONAL STATUS - GENERAL
WALKING IN HOSPITAL ROOM: A LITTLE
STANDING UP FROM CHAIR USING ARMS: A LITTLE
DAILY ACTIVITIY SCORE: 23
CLIMB 3 TO 5 STEPS WITH RAILING: A LITTLE
STANDING UP FROM CHAIR USING ARMS: A LITTLE
MOBILITY SCORE: 21
DAILY ACTIVITIY SCORE: 21
MOVING TO AND FROM BED TO CHAIR: A LITTLE
TOILETING: A LITTLE
MOBILITY SCORE: 20
DRESSING REGULAR UPPER BODY CLOTHING: A LITTLE
MOBILITY SCORE: 20
TOILETING: A LITTLE
CLIMB 3 TO 5 STEPS WITH RAILING: A LITTLE
STANDING UP FROM CHAIR USING ARMS: A LITTLE
WALKING IN HOSPITAL ROOM: A LITTLE
HELP NEEDED FOR BATHING: A LITTLE
CLIMB 3 TO 5 STEPS WITH RAILING: A LITTLE
MOVING TO AND FROM BED TO CHAIR: A LITTLE
MOVING TO AND FROM BED TO CHAIR: A LITTLE
DAILY ACTIVITIY SCORE: 24

## 2025-04-17 ASSESSMENT — LIFESTYLE VARIABLES
ORIENTATION AND CLOUDING OF SENSORIUM: ORIENTED AND CAN DO SERIAL ADDITIONS
NAUSEA AND VOMITING: NO NAUSEA AND NO VOMITING
ANXIETY: MILDLY ANXIOUS
ANXIETY: MILDLY ANXIOUS
NAUSEA AND VOMITING: NO NAUSEA AND NO VOMITING
HEADACHE, FULLNESS IN HEAD: NOT PRESENT
TREMOR: NOT VISIBLE, BUT CAN BE FELT FINGERTIP TO FINGERTIP
TREMOR: NO TREMOR
TREMOR: 3
PAROXYSMAL SWEATS: NO SWEAT VISIBLE
ORIENTATION AND CLOUDING OF SENSORIUM: ORIENTED AND CAN DO SERIAL ADDITIONS
VISUAL DISTURBANCES: VERY MILD SENSITIVITY
PAROXYSMAL SWEATS: NO SWEAT VISIBLE
VISUAL DISTURBANCES: NOT PRESENT
PAROXYSMAL SWEATS: NO SWEAT VISIBLE
AGITATION: NORMAL ACTIVITY
AUDITORY DISTURBANCES: NOT PRESENT
VISUAL DISTURBANCES: NOT PRESENT
AUDITORY DISTURBANCES: NOT PRESENT
AUDITORY DISTURBANCES: NOT PRESENT
AGITATION: NORMAL ACTIVITY
TREMOR: NOT VISIBLE, BUT CAN BE FELT FINGERTIP TO FINGERTIP
TREMOR: NOT VISIBLE, BUT CAN BE FELT FINGERTIP TO FINGERTIP
AGITATION: NORMAL ACTIVITY
AGITATION: NORMAL ACTIVITY
ORIENTATION AND CLOUDING OF SENSORIUM: ORIENTED AND CAN DO SERIAL ADDITIONS
TOTAL SCORE: 2
TOTAL SCORE: 2
HEADACHE, FULLNESS IN HEAD: NOT PRESENT
PAROXYSMAL SWEATS: NO SWEAT VISIBLE
ORIENTATION AND CLOUDING OF SENSORIUM: ORIENTED AND CAN DO SERIAL ADDITIONS
PAROXYSMAL SWEATS: NO SWEAT VISIBLE
HEADACHE, FULLNESS IN HEAD: NOT PRESENT
ANXIETY: MILDLY ANXIOUS
TOTAL SCORE: 3
HEADACHE, FULLNESS IN HEAD: MILD
ANXIETY: NO ANXIETY, AT EASE
VISUAL DISTURBANCES: NOT PRESENT
HEADACHE, FULLNESS IN HEAD: NOT PRESENT
VISUAL DISTURBANCES: NOT PRESENT
AUDITORY DISTURBANCES: NOT PRESENT
NAUSEA AND VOMITING: NO NAUSEA AND NO VOMITING
AUDITORY DISTURBANCES: NOT PRESENT
TOTAL SCORE: 1
TOTAL SCORE: 6
AGITATION: NORMAL ACTIVITY
NAUSEA AND VOMITING: NO NAUSEA AND NO VOMITING
ORIENTATION AND CLOUDING OF SENSORIUM: ORIENTED AND CAN DO SERIAL ADDITIONS
NAUSEA AND VOMITING: MILD NAUSEA WITH NO VOMITING
ANXIETY: MILDLY ANXIOUS

## 2025-04-17 ASSESSMENT — PAIN SCALES - GENERAL
PAINLEVEL_OUTOF10: 6
PAINLEVEL_OUTOF10: 0 - NO PAIN
PAINLEVEL_OUTOF10: 4
PAINLEVEL_OUTOF10: 6

## 2025-04-17 ASSESSMENT — PAIN - FUNCTIONAL ASSESSMENT
PAIN_FUNCTIONAL_ASSESSMENT: 0-10
PAIN_FUNCTIONAL_ASSESSMENT: 0-10

## 2025-04-17 ASSESSMENT — PAIN DESCRIPTION - LOCATION: LOCATION: HEAD

## 2025-04-17 ASSESSMENT — ACTIVITIES OF DAILY LIVING (ADL)
LACK_OF_TRANSPORTATION: NO
ADL_ASSISTANCE: INDEPENDENT
ADLS_ADDRESSED: NO

## 2025-04-17 NOTE — PROGRESS NOTES
04/17/25 1323   Discharge Planning   Living Arrangements Children;Other (Comment)  (7 y.o. son)   Support Systems Spouse/significant other   Assistance Needed none   Type of Residence Private residence   Expected Discharge Disposition Home H   Does the patient need discharge transport arranged? Yes   RoundTrip coordination needed? Yes   Financial Resource Strain   How hard is it for you to pay for the very basics like food, housing, medical care, and heating? Not very   Housing Stability   In the last 12 months, was there a time when you were not able to pay the mortgage or rent on time? N   At any time in the past 12 months, were you homeless or living in a shelter (including now)? N   Transportation Needs   In the past 12 months, has lack of transportation kept you from medical appointments or from getting medications? no   In the past 12 months, has lack of transportation kept you from meetings, work, or from getting things needed for daily living? No   Patient Choice   Provider Choice list and CMS website (https://medicare.gov/care-compare#search) for post-acute Quality and Resource Measure Data were provided and reviewed with: Patient   Intensity of Service   Intensity of Service 0-30 min     PCP: Sigifredo Sinha   DATE OF LAST VISIT: about 1 month ago  PHARMACY: [facility]-would like M[facility] on DC  RECENT FALLS: yes    EQUIPMENT USED IN HOME: shower chair/pt states he had a cane before but does not know where it is  HOME O2/CPAP/NEBS: Nebulizer machine   TRANSPORT HOME: brother or spouse  CURRENT HC: N/A     Address, phone and emergency contact information verified. Pt states prior to admission he was independent with ADLs. TCC discussed care team reccs for LOW intensity and pt agreeable, A[facility] is [facility]-will make medical team aware. TCC also discussed chem dep resources and pt states he would be agreeable to receiving outpt resources and requesting info on food pantries and MOW if he qualifies-made aware will notify  [facility]. All questions and concerns answered. Will continue to follow.     Transitional Care Coordination Progress Note:  Patient discussed during interdisciplinary rounds.   Team members present: MD SOLO  Plan per medical/surgical team: Pt adm with melena-s/p EGD on 4/16. WBC elevated-plan for CXR and start IV abx.   Payer: [employer]/[facility]   Status: inpatient  Discharge disposition: HC PT/OT   Potential Barriers: none   ADOD: 4/18

## 2025-04-17 NOTE — PROGRESS NOTES
Physical Therapy    Physical Therapy Evaluation    Patient Name: Servando Leigh  MRN: [id number]  Department: [place] [id number]  Room: 2015/2015-A  Today's Date: 4/17/2025   Time Calculation  Start Time: 0828  Stop Time: 0848  Time Calculation (min): 20 min    Assessment/Plan   PT Assessment  PT Assessment Results: Decreased strength, Decreased endurance, Impaired balance, Decreased mobility, Pain  Rehab Prognosis: Good  Barriers to Discharge Home: No anticipated barriers  Evaluation/Treatment Tolerance: Other (Comment) (slightly limited by pain and dizziness at rest)  Medical Staff Made Aware: Yes  Strengths: Ability to acquire knowledge, Attitude of self, Premorbid level of function, Living arrangement secure, Housing layout  Barriers to Participation: Other (Comment) (nausea, dizziness at rest)  End of Session Communication: Bedside nurse (present and aware; informed TCC/RN pt may need SW consult)  Assessment Comment: 59 yo male presents with Right LQ pain, emesis, dizziness at rest, dyspnea on exertion (RN put on O2), mild tachycardia, balance deficits and decreased activity tolerance overall with subtle decreased safety awareness. Once medically stable, recommend home with low intensity home PT, may need issue of cane or wheeled walker (TBD at next visit)  End of Session Patient Position: Bed, 2 rail up, Alarm on (pt working with RN/nursing student who will put on alarm when done)  IP OR SWING BED PT PLAN  Inpatient or Swing Bed: Inpatient  PT Plan  Treatment/Interventions: Bed mobility, Transfer training, Gait training, Balance training, Neuromuscular re-education, Strengthening, Endurance training, Therapeutic exercise, Therapeutic activity, Home exercise program, Postural re-education  PT Plan: Ongoing PT  PT Frequency: 3 times per week  PT Discharge Recommendations: Low intensity level of continued care, Other (comment) (home PT)  Equipment Recommended upon Discharge: Other (comment) (cane or WW- TBD at next  "visit)  PT Recommended Transfer Status: Assist x1, Contact guard, Assistive device (to min A with no device)  PT - OK to Discharge: Yes (PT eval completed & DC recs made)    Subjective   General Visit Information:  General  Reason for Referral:  (Admitted 4/15 with concern for continuous vomiting, dark stools, and abdominal pain   dx: melena, IBS-D, anemia, osteonecrosis of bilateral femoral heads; 4/16 s/p EGD)  Past Medical History Relevant to Rehab:  (COPD, MAC infection, alcohol use disorder, bronchiectasis, seizures, chronic perianal & scrotal abscesses, perianal fistula s/p fistulotomy, H.pylori infection , chronic LBP, chronic diarrhea, asthma, nicotine use disorder, BPH, chronic neck pain)  Family/Caregiver Present: No  Prior to Session Communication: Bedside nurse  Patient Position Received: Bed, 2 rail up, Alarm off, not on at start of session  Preferred Learning Style: verbal, kinesthetic  General Comment:  (Pt reports nausea (RN gave meds) & dizziness at rest (vitals noted). Pt walked to/from door assisted, mild tachycardia (RN put on O2 mid session). Pt reports that he takes care of his 6yo son who lives with him. Pt concerned about bloody stools & O2 sat.)  Home Living:  Home Living  Type of Home: Apartment  Lives With:  (8 yo son)  Home Adaptive Equipment:  (shower equipment as noted: \"I'm supposed to be using a cane but I'm too hard-headed and never got one\")  Home Layout: One level (level entry on 1st floor, bed/tub shower/laundry in apt on 1st floor)  Home Access: Level entry  Bathroom Shower/Tub: Tub/shower unit  Bathroom Toilet: Standard  Bathroom Equipment: Grab bars in shower, Shower chair with back (said grab bar is in the wrong place)  Bathroom Accessibility: 1st floor  Prior Level of Function:  Prior Function Per Pt/Caregiver Report  Level of [place]:  (ind ambulation in/outdoors no device, doesn't need to do stairs, 2 falls in last 12 mos.)  ADL Assistance: Independent  Homemaking " Assistance: Independent  Ambulatory Assistance: Independent  Vocational: On disability  Prior Function Comments: drives  Precautions:  Precautions  Medical Precautions: Fall precautions, Seizure precautions, Oxygen therapy device and L/min (CIWA, anemia, tachycardia, DICKINSON, hypotension, hx seizures, osteonecrosis of bilateral femoral heads)      Date/Time Vitals Session Patient Position Pulse Resp SpO2 BP MAP (mmHg)    04/17/25 0828 During PT  Sitting  115  --  --  --  --     04/17/25 0829 Post PT  Sitting  113  --  90 %  108/73  --    Vital Signs Comment: post gait; RN informed; O2 sat up to 95% after 2-3 min sitting rest, pt on O2 as noted     Objective   Pain:  Pain Assessment  Pain Assessment: 0-10  0-10 (Numeric) Pain Score:  (pre: unrated Right LQ pain and esophageal pain/discomfort, emesis prior to PT arrival (pt still wanted to do PT), RN gave meds)  Pain Interventions: Ambulation/increased activity, Emotional support, Rest, Therapeutic presence, Therapeutic touch  Response to Interventions:  (pt comfortable sitting EOB with RN/hector student at end of session)  Cognition:  Cognition  Orientation Level: Oriented X4  Following Commands: Follows all commands and directions without difficulty  Processing Speed: Within funtional limits    General Assessments:     Activity Tolerance  Endurance: Other (Comment) (limited by dizziness and DICKINSON today)    Sensation  Light Touch: Not tested (reports numbness in feet)       Postural Control  Postural Control: Impaired  Posture Comment:  (standing no device: wider MAI, rounded shoulders)    Static Sitting Balance  Static Sitting-Balance Support: Feet supported, No upper extremity supported  Static Sitting-Level of Assistance: Modified independent  Dynamic Sitting Balance  Dynamic Sitting-Balance Support: Feet supported, No upper extremity supported  Dynamic Sitting-Level of Assistance: Close supervision  Dynamic Sitting-Balance: Reaching for objects    Static Standing  Balance  Static Standing-Balance Support: No upper extremity supported (no device per pt request)  Static Standing-Level of Assistance: Contact guard  Static Standing-Comment/Number of Minutes: cues for safety  Dynamic Standing Balance  Dynamic Standing-Balance Support: No upper extremity supported (no device per pt request)  Dynamic Standing-Level of Assistance: Contact guard, Minimum assistance  Dynamic Standing-Balance:  (gait including turns, transfers)  Functional Assessments:  ADL  ADL's Addressed: No    Bed Mobility  Bed Mobility: Yes  Bed Mobility 1  Bed Mobility 1: Supine to sitting  Level of Assistance 1: Modified independent  Bed Mobility Comments 1: HOB elevated 45 degrees, use of rail    Transfers  Transfer: Yes  Transfer 1  Transfer From 1: Bed to, Stand to  Transfer to 1: Bed, Stand  Technique 1: Sit to stand, Stand to sit, Stand pivot  Transfer Device 1:  (no device per pt request)  Transfer Level of Assistance 1: Contact guard, Minimum assistance, Minimal verbal cues, Minimal tactile cues    Ambulation/Gait Training  Ambulation/Gait Training Performed: Yes  Ambulation/Gait Training 1  Surface 1: Level tile  Device 1: No device (per pt request)  Assistance 1: Minimum assistance, Contact guard, Minimal verbal cues, Minimal tactile cues  Quality of Gait 1:  (wider MAI, unsteady overall, increased sway, path deviation)  Comments/Distance (ft) 1: 16 (PT limited d/t dizziness and DICKINSON today)    Stairs  Stairs: No (N/A)  Extremity/Trunk Assessments:  ROBERTO MEJIA :  (AAROM grossly: grasp, elbow flex/ext WFL, shoulder elevaiton to 90 (discomfort and reports hx of TSR); strength grossly: grasp 4+, elbow flex/ext 4, shoulder elevation >3 in limited ROM with pain as noted)  BLANKA MOSCOSO:  (AAROM grossly: grasp, elbow flex/ext & shoulder elevation WFL; strength grossly: grasp 4+, elbow flex/ext 4, shoulder elevation 4)  RLE   RLE :  (AROM grossly WFL (ankle DF/PF, knee flex/ext, hip flex); strength grossly: ankle DF  5, knee ext 4+, hip flex >3)  LLE   LLE :  (AROM grossly WFL (ankle DF/PF, knee flex/ext, hip flex); strength grossly: ankle DF 5, knee ext 4+, hip flex >3)  Outcome Measures:  [facility] Basic Mobility  Turning from your back to your side while in a flat bed without using bedrails: None  Moving from lying on your back to sitting on the side of a flat bed without using bedrails: None  Moving to and from bed to chair (including a wheelchair): A little  Standing up from a chair using your arms (e.g. wheelchair or bedside chair): A little  To walk in hospital room: A little  Climbing 3-5 steps with railing: A little  Basic Mobility - Total Score: 20    Encounter Problems       Encounter Problems (Active)       General Goals       Pt will be independent supine to/from sit (HOB flat, no rail), no dizziness, stable vitals (Progressing)       Start:  04/17/25    Expected End:  05/01/25            Pt will score >45 on the Bose Balance Scale test indicating low fall risk (Not Progressing)       Start:  04/17/25    Expected End:  05/01/25            Pt will come sit to/from stand and bed to/from chair with LRAD or no device independently, no LOB, no dizziness and stable vitals (Progressing)       Start:  04/17/25    Expected End:  05/01/25               Mobility       Pt will ambulate 500' with LRAD or no device at gait speed of >1.0 meters/second independently, no LOB, no dizziness and stable vitals (Progressing)       Start:  04/17/25    Expected End:  05/01/25            Pt will be supervision with HEP focusing on standing balance exercises/activities (Not Progressing)       Start:  04/17/25    Expected End:  05/01/25               Pain - Adult              Education Documentation  Body Mechanics, taught by Adenike Sierra, PT at 4/17/2025  9:37 AM.  Learner: Patient  Readiness: Acceptance  Method: Explanation, Demonstration  Response: Needs Reinforcement, Verbalizes Understanding  Comment: reoriented fully, PT Purpose/POC/DC  recommendations (Home PT, potential issue of cane or WW- TBD), vitals, need for assist with all in-hospital mobility, safe transfers and gait    Precautions, taught by Adenike Sierra PT at 4/17/2025  9:37 AM.  Learner: Patient  Readiness: Acceptance  Method: Explanation, Demonstration  Response: Needs Reinforcement, Verbalizes Understanding  Comment: reoriented fully, PT Purpose/POC/DC recommendations (Home PT, potential issue of cane or WW- TBD), vitals, need for assist with all in-hospital mobility, safe transfers and gait    Mobility Training, taught by Adenike Sierra PT at 4/17/2025  9:37 AM.  Learner: Patient  Readiness: Acceptance  Method: Explanation, Demonstration  Response: Needs Reinforcement, Verbalizes Understanding  Comment: reoriented fully, PT Purpose/POC/DC recommendations (Home PT, potential issue of cane or WW- TBD), vitals, need for assist with all in-hospital mobility, safe transfers and gait    Education Comments  No comments found.

## 2025-04-17 NOTE — CARE PLAN
The patient's goals for the shift include      The clinical goals for the shift include pt free from injury.  remains on  CIWA a Q 4hrs Patient free from falls/injury

## 2025-04-17 NOTE — DISCHARGE INSTRUCTIONS
To whom this may concern,    You were admitted for an Upper GI Bleed. During your stay, we performed an upper GI scope (EGD) that demonstrated two angiectasias that were completely ablated with argon plasma coagulation. The scope also showed a hiatal hernia. Due to the fevers, there was concern for a bronchiectasis exacerbation for which you are being sent with 5 days of antibiotics.    Please follow up with your primary care provider. They have been sent a referral for you to follow up with them.    Please follow up with your Scheduled Appointments as listed below:  4/29/2025- Pulmonology  5/5/2025- Gastroenterology  Please Note: These are set appointments that you are scheduled to attend. You can see the times, details, and doctor's information at the top of this After Visit Summary and in your MyChart.    Please continue taking all your home medications with the changes listed below:  Please resume taking your Multivitamin and Thiamine  Please take Levofloxacin daily for 5 days  Please Note: All other medications are to be taken as they were prescribed before you were in the hospital. Please see the medication section of your discharge instructions above for more information, and reach out to a doctor with any questions.    I also strongly urge all of my patients to register for [facility] by going to: [email or web address].    It was our pleasure taking care of you!     Sincerely,  Your  Care Team

## 2025-04-17 NOTE — PROGRESS NOTES
"[facility]  [facility] [place]  CONSULT FOLLOW-UP     Reason For Consult  Melena, coffee ground emesis, anemia       SUBJECTIVE     Patient Had BM today and observe it was dark but it was brown NOT black     EXAM     Last Recorded Vitals  Blood pressure 115/76, pulse 108, temperature 36.3 °C (97.3 °F), resp. rate 18, height 1.727 m (5' 8\"), weight 49.9 kg (110 lb), SpO2 96%.      Intake/Output Summary (Last 24 hours) at 4/17/2025 1631  Last data filed at 4/17/2025 1600  Gross per 24 hour   Intake 50 ml   Output 500 ml   Net -450 ml          Physical Exam  General: well-nourished, no acute distress  HEENT: PERRLA, EOM intact, no scleral icterus, moist MM  Respiratory: CTA bilaterally, normal work of breathing  Cardiovascular: RRR, no murmurs/rubs/gallops  Abdomen: Soft, nontender, nondistended, bowel sounds present, no masses palpated, no organomeagly  Extremities: no edema, no asterixis  Neuro: alert and oriented, CNII-XII grossly intact, moves all 4 extremities with no focal deficits      OBJECTIVE                                                                              Medications           Current Medications[1]                                                                            Labs     CBC RFP   Lab Results   Component Value Date    WBC 15.7 (H) 04/17/2025    HGB 9.7 (L) 04/17/2025    HCT 28.6 (L) 04/17/2025    MCV 94 04/17/2025     04/17/2025    NEUTROABS 8.76 (H) 04/15/2025    Lab Results   Component Value Date     04/17/2025    K 3.4 (L) 04/17/2025    CL 96 (L) 04/17/2025    CO2 32 04/17/2025    BUN 5 (L) 04/17/2025    CREATININE 0.73 04/17/2025     Lab Results   Component Value Date    MG 1.61 04/17/2025    PHOS 3.3 04/17/2025    CALCIUM 8.5 (L) 04/17/2025    ALBUMIN 3.0 (L) 04/17/2025         Hepatic Function ABG/VBG   Lab Results   Component Value Date    ALT 20 04/16/2025    AST 48 (H) 04/16/2025    ALKPHOS 84 04/16/2025     Lab Results "   Component Value Date    BILIDIR 0.1 11/17/2019      Lab Results   Component Value Date    PROTIME 12.4 04/15/2025    APTT 34 10/05/2023    INR 1.1 04/15/2025    ALBUMINELP 3.7 10/18/2023    Lab Results   Component Value Date    LACTATE 1.7 11/09/2019                                                                               Imaging   === 04/15/25 ===     CT CHEST ABDOMEN PELVIS W IV CONTRAST     - Impression -  CHEST:  1.  Emphysematous changes, cylindrical bronchiectatic changes, and  multifocal mucous plugging, similar to prior.     ABDOMEN-PELVIS:  1.  No acute abdominopelvic process.  2. Hepatic steatosis.  3. Osteonecrosis of the bilateral femoral heads. No significant  flattening deformity of the femoral heads.                                                                         GI Procedures       EGD 4/16/25  Impression  The esophagus appeared normal.  3 cm type I hiatal hernia  The stomach appeared normal.  2 small angioectasias in the 1st part of the duodenum; tissue was completely ablated with argon plasma coagulation    Colonoscopy 3/7/25  Impression  Hemorrhoids on perianal exam and retroflexion  There was decreased sphincter tone on SREEDHAR.  Colon was otherwise normal. Performed forceps biopsies to rule out microscopic colitis        Findings  External large, protruding hemorrhoids observed during perianal exam; no bleeding was observed  There was decreased sphincter tone on SREEDHAR.  The ileocecal valve, appendiceal orifice, cecum, ascending colon, transverse colon, descending colon and sigmoid colon appeared normal.  Performed multiple forceps biopsies to rule out microscopic colitis. Biopsies were taken from the right colon (Jar 1), transverse colon (Jar 2), left colon (Jar 3) and rectum (Jar 4).  Internal medium hemorrhoids observed during retroflexion; no bleeding was observed     A.  Cecum and right colon, biopsy:  - Colonic mucosa, no significant histopathological abnormalities.     B.   Transverse colon, biopsy:  - Colonic mucosa, no significant histopathological abnormalities.     C.  Descending and sigmoid colon, biopsies:  - Colonic mucosa, no significant histopathological abnormalities.     D.  Rectum, biopsies:  - Colonic mucosa, no significant histopathological abnormalities.     EGD 11/15/21  Impression:            - Normal esophagus.                         - 1 cm hiatal hernia.                         - Mildly erythematous mucosa in the antrum and                          prepyloric region of the stomach. Biopsied.                         - Mildly erythematous duodenopathy in the bulb.                         - Normal second portion of the duodenum. Biopsied.     FINAL DIAGNOSIS   A.  DUODENUM, 2ND PORTION, COLD BX:    -- SMALL INTESTINAL MUCOSA WITH INTACT VILLOUS ARCHITECTURE AND NO SIGNIFICANT   INCREASE IN INTRAEPITHELIAL LYMPHOCYTES     B.  GASTRIC, ANTRUM, COLD BX:    -- CHRONIC INACTIVE GASTRITIS   -- NEGATIVE FOR HELICOBACTER PYLORI LIKE ORGANISMS ON ROUTINE STAINING,   IMMUNOHISTOCHEMISTRY TO FOLLOW   -- NEGATIVE FOR INTESTINAL METAPLASIA OR DYSPLASIA      EGD 6/4/21  Impression:            - Normal esophagus.                         - Z-line regular, 42 cm from the incisors.                         - 1 cm hiatal hernia.                         - Gastroesophageal flap valve classified as Hill Grade                          III (minimal fold, loose to endoscope, hiatal hernia                          likely).                         - Erythematous mucosa in the gastric body and antrum.                         - Normal examined duodenum.                         - No specimens collected. No etiology for patient's                          nausea and vomiting seen on examination today.  FINAL DIAGNOSIS   A.  RECTAL SCAR COLD BIOPSY:       - COLONIC MUCOSA WITH NO SIGNIFICANT PATHOLOGIC ABNORMALITY.      Flexible sigmoidoscopy 2/11/21  Impression:            - Decreased sphincter tone  found on digital rectal                          exam.                         - Scar in the rectum at 10 cm from the anal verge.                          Biopsied.                         - Two scars in the distal rectum.                         - Moderate non-bleeding external and internal                          hemorrhoids.     Colonoscopy 8/27/20  Impression:            - A single yellow seton and evidence of a recent                          fistulotomy, found on perianal exam.                         - Moderate amount of semi-liquid stool in the entire                          examined colon.                         - The examined portion of the terminal ileum was                          normal.                         - One diminutive polyp in the distal rectum, removed                          with a cold biopsy forceps. Resected and retrieved.                         - The colon examination was otherwise normal.                         - Scar in the distal rectum.                         - Moderate non-bleeding external and internal                          hemorrhoids.                         - Random biopsies were taken with a cold forceps for                          histology in the rectum, in the sigmoid colon, in the                          descending colon, in the transverse colon, in the                          right colon and in the terminal ileum.     FINAL DIAGNOSIS  A.  TERMINAL ILEUM COLD BIOPSY:    -- SMALL BOWEL MUCOSA WITH NO SPECIFIC PATHOLOGIC CHANGES.  -- NO EVIDENCE OF GRANULOMAS OR DYSPLASIA.    B.  RIGHT COLON COLD BIOPSY:    -- COLONIC MUCOSA WITH NO SPECIFIC PATHOLOGIC CHANGES.  -- NO EVIDENCE OF GRANULOMAS OR DYSPLASIA.    C.  TRANSVERSE COLD BIOPSY:    -- COLONIC MUCOSA WITH NO SPECIFIC PATHOLOGIC CHANGES.  -- SMALL BOWEL MUCOSA FRAGMENT WITH NO SPECIFIC PATHOLOGIC CHANGES. SEE NOTE.  -- NO EVIDENCE OF GRANULOMAS OR DYSPLASIA.    Note: A fragment of small bowel mucosa with no  specific pathologic changes is  also present. This could represent a carryover from the terminal ileum biopsy.  Correlation with clinical and endoscopic findings is recommended.    D.  DESCENDING COLON COLD BIOPSY:    -- COLONIC MUCOSA WITH NO SPECIFIC PATHOLOGIC CHANGES.  -- NO EVIDENCE OF GRANULOMAS OR DYSPLASIA.    E.  SIGMOID COLD BIOPSY:    -- COLONIC MUCOSA WITH PROMINENT LYMPHOID AGGREGATES; NO SPECIFIC PATHOLOGIC  CHANGES SEEN.  -- NO EVIDENCE OF GRANULOMAS OR DYSPLASIA.    F.  RECTUM COLD BIOPSY:    -- COLONIC MUCOSA WITH NO SPECIFIC PATHOLOGIC CHANGES.  -- NO EVIDENCE OF GRANULOMAS OR DYSPLASIA.    G.  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 SEE  NOTE.    Note: The tumor measures 4.0 mm in greatest dimension; no necrosis,  lymphovascular invasion, or overt mitotic activity are identified. However, the  tumor extends to the deep tissue edge and its full extent cannot be assessed.  The Ki-67 proliferation index is less than 2% (19 cells positive out of 1010  counted), and no mitosis is identified. By immunohistochemistry, the tumor  cells are immunoreactive for synaptophysin, chromogranin (focal, weak),  confirming the morphologic diagnosis.        EGD & Colonoscopy 2/8/19  Small hiatal hernia without esophagitis. No ulcer was seen  Mild subtle changes in gastric mucosa indicating possible gastropathy. No varices were see and no bleeding seen.   Colonoscopy with no bleeding seen. Evidence of very mild subtle proctitis without any ulceration or bleeding. There appeared to be moderate size hemorrhoids which were seen at retroflexion, but no bleeding was seen    ASSESSMENT / PLAN     ASSESSMENT/PLAN:    Servando Leigh is a 60 y.o. male with a past medical history of COPD, MAC infection, alcohol use disorder, bronchiectasis, seizures, chronic perianal and scrotal abscesses here, chiquita-anal fistula s/p fistulotomy, chronic diarrhea admitted on  4/15/2025 with melena and coffee ground emesis. GI is consulted for the same.     Patient with Hgb 11.7 on admission 9.6 on recheck which is lower than expected despite 2L IVF and not all 3 cell lines decreased. S/p EGD on 4/16/25 with two duodenal AVMs observed. Suspect this was source of patients melena and anemia. Since EGD patient Hgb is stable. He still reports coughing up black material and advise team to workup pulmonary sources. Would recommend following up diarrhea workup and if low fecal elastase can supplement with creon per nutrition.     Recommendations  -No need for PPI if patient not previously on one   -follow up diarrhea workup and if low fecal elastase can supplement with creon per nutrition.   -Maintain active T&S   -Goal Hgb >7, Plt >50    Patient was seen and discussed with Dr. Tang    Thank you for this interesting consult. Gastroenterology SIGN OFF.  -During weekday hours of 7am-5pm please do not hesitate to contact me on [employer] or page [phone] if there are any further questions between the weekday hours of 7 AM - 5 PM.   -After hours, on weekends, and on holidays, please page the [place]-call GI fellow at [phone]. Thank you.      Betsy Gutierrez MD         [1]   Current Facility-Administered Medications:     acetaminophen (Tylenol) tablet 975 mg, 975 mg, oral, q8h PRN **OR** [DISCONTINUED] acetaminophen (Tylenol) oral liquid 650 mg, 650 mg, oral, q4h PRN **OR** [DISCONTINUED] acetaminophen (Tylenol) suppository 650 mg, 650 mg, rectal, q4h PRN, Murtaza Hutchison MD    albuterol 2.5 mg /3 mL (0.083 %) nebulizer solution 2.5 mg, 2.5 mg, nebulization, TID, Max Jovel MD, 2.5 mg at 04/17/25 1510    atorvastatin (Lipitor) tablet 10 mg, 10 mg, oral, Nightly, Murtaza Hutchison MD, 10 mg at 04/16/25 2113    azithromycin (Zithromax) tablet 500 mg, 500 mg, oral, q24h JIMMIE, Sarina Abreu MD MPH, 500 mg at 04/17/25 1120    cefTRIAXone (Rocephin) 1 g in dextrose (iso) IV 50 mL, 1 g, intravenous, q24h, Sarina  MD Lois MPH, Stopped at 04/17/25 1354    cholestyramine (Questran) 4 gram packet 4 g, 4 g, oral, BID, Murtaza Hutchison MD, 4 g at 04/17/25 0948    dextrose 50 % injection 12.5 g, 12.5 g, intravenous, q15 min PRN, Juan Ventura MD    dextrose 50 % injection 25 g, 25 g, intravenous, q15 min PRN, Juan Ventura MD    folic acid (Folvite) tablet 1 mg, 1 mg, oral, Daily, Murtaza Hutchison MD, 1 mg at 04/17/25 0809    tiotropium (Spiriva Respimat) 2.5 mcg/actuation inhaler 2 puff, 2 puff, inhalation, Daily **AND** formoterol (Perforomist) 20 mcg/2 mL nebulizer solution 20 mcg, 20 mcg, nebulization, q12h, Murtaza Hutchison MD, 20 mcg at 04/17/25 0902    gabapentin (Neurontin) capsule 800 mg, 800 mg, oral, TID, Murtaza Hutchison MD, 800 mg at 04/17/25 1535    glucagon (Glucagen) injection 1 mg, 1 mg, intramuscular, q15 min PRN, Juan Ventura MD    glucagon (Glucagen) injection 1 mg, 1 mg, intramuscular, q15 min PRN, Juan Ventura MD    hydrocortisone 2.5 % cream, , Topical, BID PRN, Murtaza Hutchison MD    ipratropium-albuteroL (Duo-Neb) 0.5-2.5 mg/3 mL nebulizer solution 3 mL, 3 mL, nebulization, q6h PRN, Max Jovel MD    levETIRAcetam (Keppra) tablet 500 mg, 500 mg, oral, BID, Murtaza Hutchison MD, 500 mg at 04/17/25 0803    loperamide (Imodium) capsule 4 mg, 4 mg, oral, TID PRN, Murtaza Hutchison MD    LORazepam (Ativan) injection 0.5 mg, 0.5 mg, intravenous, q2h PRN, 0.5 mg at 04/17/25 0837 **OR** LORazepam (Ativan) injection 1 mg, 1 mg, intravenous, q2h PRN, 1 mg at 04/16/25 2113 **OR** LORazepam (Ativan) injection 2 mg, 2 mg, intravenous, q2h PRN, Murtaza Hutchison MD    melatonin tablet 5 mg, 5 mg, oral, Nightly PRN, Murtaza Hutchison MD, 5 mg at 04/16/25 2113    montelukast (Singulair) tablet 10 mg, 10 mg, oral, Nightly, Murtaza Hutchison MD, 10 mg at 04/16/25 2113    multivitamin with minerals 1 tablet, 1 tablet, oral, Daily, Murtaza Hutchison MD, 1 tablet at 04/17/25 0809    nicotine (Nicoderm CQ) 14 mg/24 hr patch 1 patch, 1 patch, transdermal,  Daily, Murtaza Hutchison MD, 1 patch at 04/17/25 0808    OLANZapine (ZyPREXA) tablet 5 mg, 5 mg, oral, Nightly, Murtaza Hutchison MD, 5 mg at 04/16/25 2113    ondansetron (Zofran) injection 4 mg, 4 mg, intravenous, q6h PRN, Murtaza Hutchison MD, 4 mg at 04/17/25 0800    pantoprazole (ProtoNix) EC tablet 40 mg, 40 mg, oral, BID AC, 40 mg at 04/17/25 1535 **OR** [DISCONTINUED] pantoprazole (Protonix) injection 40 mg, 40 mg, intravenous, BID AC, Murtaza Hutchison MD, 40 mg at 04/16/25 1557    rOPINIRole (Requip) tablet 0.5 mg, 0.5 mg, oral, TID, Murtaza Hutchison MD, 0.5 mg at 04/17/25 1535    sodium chloride 3 % nebulizer solution 4 mL, 4 mL, nebulization, TID, Max Jovel MD, 4 mL at 04/17/25 1510    tamsulosin (Flomax) 24 hr capsule 0.4 mg, 0.4 mg, oral, Daily, Murtaza Hutchison MD, 0.4 mg at 04/17/25 0809    thiamine (Vitamin B-1) tablet 100 mg, 100 mg, oral, Daily, Murtaza Hutchison MD, 100 mg at 04/17/25 0809    triamcinolone (Kenalog) 0.1 % ointment, , Topical, BID, Murtaza Hutchison MD, Given at 04/17/25 0812

## 2025-04-17 NOTE — CONSULTS
"Nutrition Assessment      Reason for Assessment: Provider consult order    Servando Leigh is a 60 y.o. male COPD, MAC infection, alcohol use disorder, bronchiectasis, seizures, chronic perianal and scrotal abscesses here, chiquita-anal fistula s/p fistulotomy, history of H.pylori infection and chronic diarrhea who presents to the ED after noticing dark vomitus, dark stools and abdominal pain.     Medical history of chronic alcohol use since age 10, ~40 year smoking history with COPD and asthma, history of suspected alcohol withdrawal seizures, chronic neck pain with C4-6 anterior fusion prior to 2017 and 2 subsequent revisions,  MAC infection, , bronchiectasis, chronic perianal and scrotal abscesses here, chiquita-anal fistula s/p fistulotomy, history of H.pylori infection and chronic diarrhea.    Nutrition History:  Energy Intake: Poor < 50 % (Patient reports eating \"two (percent)\" of breakfast this morning.  Lunch tray on bedside table is barely touched.)  Food and Nutrient History: Met with patient who reports not having an appetite \"for years\". Per patient, \"I can make a four course meal and only eat bites\". In addition to lack of appetite, patient reports N/V and diarrhea for years. Per chart review, patient has had extensive GI work-up for diarrhea in the past with no definitive reason for diarrhea. Thought to be IBS-D. Patient reports drinking 1-2 Ensure a day, but has a Doctor's rx to drink 4 servings a day. \"It gives me diarrhea\". Reports drinking beer on a daily basis. Patient has abdominal pain that is independent of eating. Agrees to Ensure Plus BID and Gelatein Plus BID.  Vitamin/Herbal Supplement Use: Reports taking a MVI. Has a rx for Thiamine, but doesn't take it per home med list.  Food Allergy: Shellfish (Coconut)       Anthropometrics:  Height: 172.7 cm (5' 8\")   Weight: 49.9 kg (110 lb)   BMI (Calculated): 16.73  IBW/kg (Dietitian Calculated): 70 kg  Percent of IBW: 71.4 %                      Weight " History:   Wt Readings from Last 15 Encounters:   04/15/25 49.9 kg (110 lb) - Stated    03/07/25 51.3 kg (113 lb)   02/05/25 55.2 kg (121 lb 12.8 oz)   07/31/24 54 kg (119 lb)   06/11/24 57.7 kg (127 lb 3.2 oz)   04/29/24 54.4 kg (120 lb)   04/17/24 54.4 kg (120 lb)   03/30/24 54.9 kg (121 lb)   03/18/24 54.9 kg (121 lb)   02/08/24 53 kg (116 lb 14.4 oz)   02/06/24 52.2 kg (115 lb)   01/22/24 50.3 kg (111 lb)   11/06/23 53.6 kg (118 lb 3.2 oz)   10/23/23 57.2 kg (126 lb)   10/18/23 58.1 kg (128 lb)       Weight Change %:  Weight History / % Weight Change: 11% weight loss x 9 months. 7% weight loss x 1 month.  Significant Weight Loss: Yes  Interpretation of Weight Loss: >5% in 1 month    Nutrition Focused Physical Exam Findings:    Subcutaneous Fat Loss:   Orbital Fat Pads: Severe (dark circles, hollowing and loose skin)  Buccal Fat Pads: Severe (hollow, sunken and narrow face)  Triceps: Severe (negligible fat tissue)  Ribs: Defer  Muscle Wasting:  Temporalis: Severe (hollowed scooping depression)  Pectoralis (Clavicular Region): Severe (protruding prominent clavicle)  Deltoid/Trapezius: Severe (squared shoulders, acromion process prominent)  Interosseous: Severe (depressed area between thumb and forefinger)  Trapezius/Infraspinatus/Supraspinatus (Scapular Region): Severe (prominent visual scapula, depression between ribs, scapula or shoulder)  Quadriceps: Severe (depressions on inner and outer thigh)  Gastrocnemius: Severe (minimal muscle definition)  Edema:  Edema:  (B/L LE + 1)  Physical Findings:  Hair: Negative  Eyes: Negative  Nails: Negative  Skin: Negative  Digestive System Findings: Nausea, Vomiting, Diarrhea, Abdominal pain (chronic diarrhea, patient has been worked up in the past and workup has been largely unrevealing, with overall impression being IBS-D.)  Mouth Findings: Chewing difficulty, Odynophagia (Reports swallowing problems and having an MBSS in the past with no findings of  dysphagia.)    Nutrition Significant Labs:  CBC Trend:   Results from last 7 days   Lab Units 04/17/25  0756 04/16/25  0427 04/15/25  2009   WBC AUTO x10*3/uL 15.7* 11.8* 10.8   RBC AUTO x10*6/uL 3.04* 3.04* 3.75*   HEMOGLOBIN g/dL 9.7* 9.6* 11.7*   HEMATOCRIT % 28.6* 26.6* 33.7*   MCV fL 94 88 90   PLATELETS AUTO x10*3/uL 213 258 320    , BMP Trend:   Results from last 7 days   Lab Units 04/17/25  0756 04/16/25  0427 04/15/25  2009   GLUCOSE mg/dL 73* 60* 87   CALCIUM mg/dL 8.5* 8.0* 8.8   SODIUM mmol/L 138 137 141   POTASSIUM mmol/L 3.4* 3.7 3.5   CO2 mmol/L 32 32 33*   CHLORIDE mmol/L 96* 91* 89*   BUN mg/dL 5* 12 17   CREATININE mg/dL 0.73 0.80 0.71    , A1C:  Lab Results   Component Value Date    HGBA1C 5.1 06/11/2024   , BG POCT trend:   Results from last 7 days   Lab Units 04/17/25  1152 04/16/25  1546   POCT GLUCOSE mg/dL 187* 71*    , Renal Lab Trend:   Results from last 7 days   Lab Units 04/17/25  0756 04/16/25  0427 04/15/25  2009   POTASSIUM mmol/L 3.4* 3.7 3.5   PHOSPHORUS mg/dL 3.3  --   --    SODIUM mmol/L 138 137 141   MAGNESIUM mg/dL 1.61  --   --    EGFR mL/min/1.73m*2 >90 >90 >90   BUN mg/dL 5* 12 17   CREATININE mg/dL 0.73 0.80 0.71    , Vit D:   Lab Results   Component Value Date    VITD25 28 (L) 10/05/2023    , Vit B12:   Lab Results   Component Value Date    HYEGLOGF95 849 06/11/2024        Nutrition Specific Medications:    Scheduled medications  albuterol, 2.5 mg, nebulization, TID  atorvastatin, 10 mg, oral, Nightly  azithromycin, 500 mg, oral, q24h JIMMIE  cefTRIAXone, 1 g, intravenous, q24h  cholestyramine, 4 g, oral, BID  folic acid, 1 mg, oral, Daily  tiotropium, 2 puff, inhalation, Daily   And  formoterol, 20 mcg, nebulization, q12h  gabapentin, 800 mg, oral, TID  levETIRAcetam, 500 mg, oral, BID  montelukast, 10 mg, oral, Nightly  multivitamin with minerals, 1 tablet, oral, Daily  nicotine, 1 patch, transdermal, Daily  OLANZapine, 5 mg, oral, Nightly  pantoprazole, 40 mg, oral, BID  AC  rOPINIRole, 0.5 mg, oral, TID  sodium chloride, 4 mL, nebulization, TID  tamsulosin, 0.4 mg, oral, Daily  thiamine, 100 mg, oral, Daily  triamcinolone, , Topical, BID    Continuous medications     PRN medications  PRN medications: acetaminophen **OR** [DISCONTINUED] acetaminophen **OR** [DISCONTINUED] acetaminophen, dextrose, dextrose, glucagon, glucagon, hydrocortisone, ipratropium-albuteroL, loperamide, LORazepam **OR** LORazepam **OR** LORazepam, melatonin, ondansetron    I/O:   Last BM Date: 04/16/25 (per pt); Stool Appearance: Loose, Watery (04/16/25 1757)    Dietary Orders (From admission, onward)       Start     Ordered    04/16/25 1829  May Participate in Room Service  ( ROOM SERVICE MAY PARTICIPATE)  Once        Question:  .  Answer:  Yes    04/16/25 1828 04/16/25 1815  Adult diet Regular  Diet effective now        Question:  Diet type  Answer:  Regular    04/16/25 1815                     Estimated Needs:   Total Energy Estimated Needs in 24 hours (kCal): 1750 kCal  Method for Estimating Needs: 49.9 kg / 35 kcal  Total Protein Estimated Needs in 24 Hours (g): 75 g  Method for Estimating 24 Hour Protein Needs: 49.9 kg / 1.5              Nutrition Diagnosis   Malnutrition Diagnosis  Patient has Malnutrition Diagnosis: Yes  Diagnosis Status: New  Malnutrition Diagnosis: Severe malnutrition related to chronic disease or condition  As Evidenced by: meeting < 50% of EER for > 1 month, severe muscle wasting and fat loss per physical exam and 7% weight loss x 1 month with a BMI of 16.7    Nutrition Diagnosis  Patient has Nutrition Diagnosis: Yes  Diagnosis Status (1): New  Nutrition Diagnosis 1: Altered GI function  Related to (1): IBS-D ?  As Evidenced by (1): report of chronic N/V and diarrhea       Nutrition Interventions/Recommendations   Nutrition prescription for oral nutrition    Nutrition Recommendations:  Individualized Nutrition Prescription Provided for : Regular diet. Ensure Plus (350 kcal and  13 g protein) ordered BID; Gelatein Plus (160 kcal and 20 g protein) ordered BID. Continue Thiamine, Folic Acid and MVI. Obtain Vitamin D level. Swallowing eval? (patient reports difficulty swallowing for > 1 year).    Nutrition Interventions/Goals:   Medical Food Supplement: Commercial beverage medical food supplement therapy  Goal: Consume 2 Ensure and 2 Gelatein Plus daily      Education Documentation  No documentation found.            Nutrition Monitoring and Evaluation   Food/Nutrient Related History Monitoring  Monitoring and Evaluation Plan: Intake / amount of food  Intake / Amount of food: Consumes at least 50% or more of meals/snacks/supplements    Anthropometric Measurements  Monitoring and Evaluation Plan: Body weight  Body Weight: Body weight - Maintain stable weight    Biochemical Data, Medical Tests and Procedures  Monitoring and Evaluation Plan: Electrolyte/renal panel  Electrolyte and Renal Panel: Electrolytes within normal limits         Goal Status: New goal(s) identified    Time Spent (min): 60 minutes

## 2025-04-17 NOTE — PROGRESS NOTES
Occupational Therapy    Evaluation    Patient Name: Servando Leigh  MRN: [id number]  Today's Date: 4/17/2025  Time Calculation  Start Time: 0959  Stop Time: 1012  Time Calculation (min): 13 min    Assessment  IP OT Assessment  OT Assessment: Pt required SBA with bed mobility, required CGA with transfers, pt with an acute LOB in standing. Pt is at high risk for falls. Pt completed donning socks with SBA.  Prognosis: Good  Barriers to Discharge Home: No anticipated barriers  Evaluation/Treatment Tolerance: Patient tolerated treatment well  Medical Staff Made Aware: Yes  End of Session Communication: Bedside nurse  End of Session Patient Position: Bed, 3 rail up, Alarm on  Plan:  Treatment Interventions: ADL retraining, Functional transfer training, Endurance training  OT Frequency: 3 times per week  OT Discharge Recommendations: Low intensity level of continued care  Equipment Recommended upon Discharge:  (shower chair)  OT Recommended Transfer Status:  (CGA)  OT - OK to Discharge: Yes    Subjective   Current Problem:  1. Hemoptysis  Referral to Home Care      2. Anemia, unspecified type  Esophagogastroduodenoscopy (EGD)    Esophagogastroduodenoscopy (EGD)      3. Chronic neck pain          General:  Reason for Referral: admitted on 4/15/2025, for 2 days of melena and hematochezia.  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 Pylori infection and chronic diarrhea  Prior to Session Communication: Bedside nurse  Patient Position Received: Bed, 3 rail up, Alarm off, not on at start of session  Family/Caregiver Present: No  General Comment: Supine with HOB elevated at the start of the session.   Precautions:  Medical Precautions: Fall precautions, Seizure precautions    Pain:  Pain Assessment  Pain Assessment: 0-10  0-10 (Numeric) Pain Score: 6  Pain Type: Acute pain  Pain Location:  Abdomen  Pain Interventions: Repositioned        Objective   Cognition:  Overall Cognitive Status: Within Functional Limits  Orientation Level: Oriented X4  Following Commands: Follows all commands and directions without difficulty  Safety/Judgement: Exceptions to WFL           Home Living:  Type of Home: Apartment  Lives With:  (7 year-old son)  Home Adaptive Equipment: None  Home Layout: One level  Home Access: Level entry  Bathroom Shower/Tub: Tub/shower unit  Bathroom Toilet: Standard  Bathroom Equipment: Grab bars in shower, Shower chair with back  Bathroom Accessibility: 1st floor   Prior Function:  Level of [place]: Independent with homemaking with ambulation, Independent with ADLs and functional transfers  Homemaking Assistance: Independent  Ambulatory Assistance: Independent  Vocational: On disability  Prior Function Comments: drives, 2 falls in the past 3 weeks due to weakness     ADL:  Eating Assistance: Independent  Grooming Assistance: Independent  Grooming Deficit:  (anticipate)  LE Dressing Assistance: Modified independent (Device)  LE Dressing Deficit: Don/doff R sock, Don/doff L sock (sitting at the side of the bed)  Activity Tolerance:  Endurance: Decreased tolerance for upright activites  Balance:  Dynamic Standing Balance  Dynamic Standing-Level of Assistance: Contact guard  Dynamic Standing-Balance:  (able to take 2-3 side steps.)  Bed Mobility/Transfers: Bed Mobility  Bed Mobility: Yes  Bed Mobility 1  Bed Mobility 1: Supine to sitting, Sitting to supine  Level of Assistance 1: Close supervision  Bed Mobility Comments 1: HOB elevated   and Transfers  Transfer: Yes  Transfer 1  Transfer From 1: Bed to  Transfer to 1: Stand  Technique 1: Sit to stand, Stand to sit  Transfer Device 1: Walker  Transfer Level of Assistance 1: Contact guard, Minimum assistance, Minimal verbal cues     Vision:     and Vision - Complex Assessment  Ocular Range of Motion: Within Functional Limits  Sensation:  Light  Touch: No apparent deficits     Perception:  Inattention/Neglect: Appears intact  Coordination:  Movements are Fluid and Coordinated: Yes   Hand Function:  Hand Function  Gross Grasp: Functional  Coordination: Functional  Extremities: RUE   RUE : Within Functional Limits, LUE   LUE: Within Functional Limits,      Outcome Measures: [facility] Daily Activity  Putting on and taking off regular lower body clothing: None  Bathing (including washing, rinsing, drying): A little  Putting on and taking off regular upper body clothing: A little  Toileting, which includes using toilet, bedpan or urinal: A little  Taking care of personal grooming such as brushing teeth: None  Eating Meals: None  Daily Activity - Total Score: 21         ,     OT Adult Other Outcome Measures  4AT: negative    Education Documentation  Body Mechanics, taught by Areli Moore OT at 4/17/2025  4:36 PM.  Learner: Patient  Readiness: Acceptance  Method: Explanation  Response: Verbalizes Understanding    Precautions, taught by Areli Moore OT at 4/17/2025  4:36 PM.  Learner: Patient  Readiness: Acceptance  Method: Explanation  Response: Verbalizes Understanding    ADL Training, taught by Areli Moore OT at 4/17/2025  4:36 PM.  Learner: Patient  Readiness: Acceptance  Method: Explanation  Response: Verbalizes Understanding    Education Comments  No comments found.        Goals:   Encounter Problems       Encounter Problems (Active)       ADLs       Patient will perform UB and LB bathing  with modified independent level of assistance and grab bars. (Progressing)       Start:  04/17/25    Expected End:  05/08/25            Patient with complete lower body dressing with modified independent level of assistance donning and doffing all LE clothes  with PRN adaptive equipment while edge of bed  (Progressing)       Start:  04/17/25    Expected End:  05/08/25            Patient will complete toileting including hygiene clothing management/hygiene with  modified independent level of assistance and grab bars. (Progressing)       Start:  04/17/25    Expected End:  05/08/25               BALANCE       Pt will maintain dynamic standing balance during ADL task with modified independent level of assistance in order to demonstrate decreased risk of falling and improved postural control. (Progressing)       Start:  04/17/25    Expected End:  05/08/25               EXERCISE/STRENGTHENING       Patient will complete BUE exercises for 15 reps in order to improve strength and activity for ADL performance.  (Progressing)       Start:  04/17/25    Expected End:  05/15/25               MOBILITY       Patient will perform Functional mobility min Household distances/Community Distances with modified independent level of assistance and least restrictive device in order to improve safety and functional mobility. (Progressing)       Start:  04/17/25    Expected End:  05/08/25               TRANSFERS       Patient will perform bed mobility modified independent level of assistance and bed rails in order to improve safety and independence with mobility (Progressing)       Start:  04/17/25    Expected End:  05/08/25            Patient will complete sit to stand transfer with modified independent level of assistance and least restrictive device in order to improve safety and prepare for out of bed mobility. (Progressing)       Start:  04/17/25    Expected End:  05/08/25 04/17/25 at 4:37 PM   Areli Moore OTR/OTD  Rehab Office: [phone]

## 2025-04-17 NOTE — CARE PLAN
The clinical goals for the shift include pt will remain free from falls/injuries throughout this shift.      Problem: Pain - Adult  Goal: Verbalizes/displays adequate comfort level or baseline comfort level  4/17/2025 0015 by Kandy Lew RN  Outcome: Progressing  4/17/2025 0003 by Kandy Lew RN  Outcome: Progressing     Problem: Safety - Adult  Goal: Free from fall injury  4/17/2025 0015 by Kandy Lew RN  Outcome: Progressing  4/17/2025 0003 by Kandy Lew RN  Outcome: Progressing     Problem: Discharge Planning  Goal: Discharge to home or other facility with appropriate resources  4/17/2025 0015 by Kandy Lew RN  Outcome: Progressing  4/17/2025 0003 by Kandy Lew RN  Outcome: Progressing     Problem: Chronic Conditions and Co-morbidities  Goal: Patient's chronic conditions and co-morbidity symptoms are monitored and maintained or improved  4/17/2025 0015 by Kandy Lew RN  Outcome: Progressing  4/17/2025 0003 by Kandy Lew RN  Outcome: Progressing     Problem: Nutrition  Goal: Nutrient intake appropriate for maintaining nutritional needs  4/17/2025 0015 by Kandy Lew RN  Outcome: Progressing  4/17/2025 0003 by Kandy Lew RN  Outcome: Progressing     Problem: Fall/Injury  Goal: Not fall by end of shift  4/17/2025 0015 by Kandy Lew RN  Outcome: Progressing  4/17/2025 0003 by Kandy Lew RN  Outcome: Progressing  Goal: Be free from injury by end of the shift  4/17/2025 0015 by Kandy Lew RN  Outcome: Progressing  4/17/2025 0003 by Kandy Lew RN  Outcome: Progressing  Goal: Verbalize understanding of personal risk factors for fall in the hospital  4/17/2025 0015 by Kandy Lew RN  Outcome: Progressing  4/17/2025 0003 by Kandy Lew RN  Outcome: Progressing  Goal: Verbalize understanding of risk factor reduction measures to prevent injury from fall in the home  4/17/2025 0015 by Kandy Lew  RN  Outcome: Progressing  4/17/2025 0003 by Kandy Lew RN  Outcome: Progressing  Goal: Use assistive devices by end of the shift  4/17/2025 0015 by Kandy Lew RN  Outcome: Progressing  4/17/2025 0003 by Kandy Lew RN  Outcome: Progressing  Goal: Pace activities to prevent fatigue by end of the shift  4/17/2025 0015 by Kandy Lew RN  Outcome: Progressing  4/17/2025 0003 by Kandy Lew RN  Outcome: Progressing     Problem: Skin  Goal: Decreased wound size/increased tissue granulation at next dressing change  4/17/2025 0015 by Kandy Lew RN  Outcome: Progressing  4/17/2025 0003 by Kandy Lew RN  Outcome: Progressing  Goal: Participates in plan/prevention/treatment measures  4/17/2025 0015 by Kanyd Lew RN  Outcome: Progressing  4/17/2025 0003 by Kandy Lew RN  Outcome: Progressing  Goal: Prevent/manage excess moisture  4/17/2025 0015 by Kandy Lew RN  Outcome: Progressing  4/17/2025 0003 by Kandy Lew RN  Outcome: Progressing  Goal: Prevent/minimize sheer/friction injuries  4/17/2025 0015 by Kandy Lew RN  Outcome: Progressing  4/17/2025 0003 by Kandy Lew RN  Outcome: Progressing  Goal: Promote/optimize nutrition  4/17/2025 0015 by Kandy Lew RN  Outcome: Progressing  4/17/2025 0003 by Kandy Lew RN  Outcome: Progressing  Goal: Promote skin healing  4/17/2025 0015 by Kandy Lew RN  Outcome: Progressing  4/17/2025 0003 by Kandy Lew RN  Outcome: Progressing     Problem: Respiratory  Goal: Clear secretions with interventions this shift  4/17/2025 0015 by Kandy Lew RN  Outcome: Progressing  4/17/2025 0003 by Kandy Lew RN  Outcome: Progressing  Goal: Minimize anxiety/maximize coping throughout shift  4/17/2025 0015 by Kandy Lew RN  Outcome: Progressing  4/17/2025 0003 by Kandy Lew RN  Outcome: Progressing  Goal: Minimal/no exertional discomfort or  dyspnea this shift  4/17/2025 0015 by Kandy Lew RN  Outcome: Progressing  4/17/2025 0003 by Kandy Lew RN  Outcome: Progressing  Goal: No signs of respiratory distress (eg. Use of accessory muscles. Peds grunting)  4/17/2025 0015 by Kandy Lew RN  Outcome: Progressing  4/17/2025 0003 by Kandy Lew RN  Outcome: Progressing  Goal: Patent airway maintained this shift  4/17/2025 0015 by Kandy Lew RN  Outcome: Progressing  4/17/2025 0003 by Kandy Lew RN  Outcome: Progressing  Goal: Tolerate mechanical ventilation evidenced by VS/agitation level this shift  4/17/2025 0015 by Kandy Lew RN  Outcome: Progressing  4/17/2025 0003 by Kandy Lew RN  Outcome: Progressing  Goal: Tolerate pulmonary toileting this shift  4/17/2025 0015 by Kandy Lew RN  Outcome: Progressing  4/17/2025 0003 by Kandy Lew RN  Outcome: Progressing  Goal: Verbalize decreased shortness of breath this shift  4/17/2025 0015 by Kandy Lew RN  Outcome: Progressing  4/17/2025 0003 by Kandy Lew RN  Outcome: Progressing  Goal: Wean oxygen to maintain O2 saturation per order/standard this shift  4/17/2025 0015 by Kandy Lew RN  Outcome: Progressing  4/17/2025 0003 by Kandy Lew RN  Outcome: Progressing  Goal: Increase self care and/or family involvement in next 24 hours  4/17/2025 0015 by Kandy Lew RN  Outcome: Progressing  4/17/2025 0003 by Kandy Lew RN  Outcome: Progressing
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
## 2025-04-18 ENCOUNTER — APPOINTMENT (OUTPATIENT)
Dept: RADIOLOGY | Facility: HOSPITAL | Age: 60
DRG: 391 | End: 2025-04-18
Payer: COMMERCIAL

## 2025-04-18 ENCOUNTER — HOME HEALTH ADMISSION (OUTPATIENT)
Dept: HOME HEALTH SERVICES | Facility: HOME HEALTH | Age: 60
End: 2025-04-18
Payer: COMMERCIAL

## 2025-04-18 LAB
25(OH)D3 SERPL-MCNC: 22 NG/ML (ref 30–100)
ABO GROUP (TYPE) IN BLOOD: NORMAL
ALBUMIN SERPL BCP-MCNC: 3.2 G/DL (ref 3.4–5)
ANION GAP SERPL CALC-SCNC: 12 MMOL/L (ref 10–20)
ANTIBODY SCREEN: NORMAL
BUN SERPL-MCNC: 2 MG/DL (ref 6–23)
CALCIUM SERPL-MCNC: 8.9 MG/DL (ref 8.6–10.6)
CHLORIDE SERPL-SCNC: 101 MMOL/L (ref 98–107)
CO2 SERPL-SCNC: 29 MMOL/L (ref 21–32)
CREAT SERPL-MCNC: 0.72 MG/DL (ref 0.5–1.3)
CRYPTOSP AG STL QL IA: NEGATIVE
EGFRCR SERPLBLD CKD-EPI 2021: >90 ML/MIN/1.73M*2
ERYTHROCYTE [DISTWIDTH] IN BLOOD BY AUTOMATED COUNT: 14.3 % (ref 11.5–14.5)
FAT STL QL: NORMAL
GASTRIN SERPL-MCNC: 113 PG/ML (ref 0–100)
GLUCOSE BLD MANUAL STRIP-MCNC: 101 MG/DL (ref 74–99)
GLUCOSE BLD MANUAL STRIP-MCNC: 149 MG/DL (ref 74–99)
GLUCOSE BLD MANUAL STRIP-MCNC: 88 MG/DL (ref 74–99)
GLUCOSE BLD MANUAL STRIP-MCNC: 92 MG/DL (ref 74–99)
GLUCOSE SERPL-MCNC: 86 MG/DL (ref 74–99)
HCT VFR BLD AUTO: 30.1 % (ref 41–52)
HGB BLD-MCNC: 9.7 G/DL (ref 13.5–17.5)
MAGNESIUM SERPL-MCNC: 1.62 MG/DL (ref 1.6–2.4)
MCH RBC QN AUTO: 31.7 PG (ref 26–34)
MCHC RBC AUTO-ENTMCNC: 32.2 G/DL (ref 32–36)
MCV RBC AUTO: 98 FL (ref 80–100)
NEUTRAL FAT STL QL: NORMAL
NRBC BLD-RTO: 0 /100 WBCS (ref 0–0)
PHOSPHATE SERPL-MCNC: 3.2 MG/DL (ref 2.5–4.9)
PLATELET # BLD AUTO: 229 X10*3/UL (ref 150–450)
POTASSIUM SERPL-SCNC: 3.8 MMOL/L (ref 3.5–5.3)
RBC # BLD AUTO: 3.06 X10*6/UL (ref 4.5–5.9)
RH FACTOR (ANTIGEN D): NORMAL
SODIUM SERPL-SCNC: 138 MMOL/L (ref 136–145)
WBC # BLD AUTO: 15.4 X10*3/UL (ref 4.4–11.3)

## 2025-04-18 PROCEDURE — 2500000005 HC RX 250 GENERAL PHARMACY W/O HCPCS: Performed by: INTERNAL MEDICINE

## 2025-04-18 PROCEDURE — 82947 ASSAY GLUCOSE BLOOD QUANT: CPT

## 2025-04-18 PROCEDURE — A9698 NON-RAD CONTRAST MATERIALNOC: HCPCS | Performed by: INTERNAL MEDICINE

## 2025-04-18 PROCEDURE — 2500000004 HC RX 250 GENERAL PHARMACY W/ HCPCS (ALT 636 FOR OP/ED): Mod: JZ

## 2025-04-18 PROCEDURE — 74220 X-RAY XM ESOPHAGUS 1CNTRST: CPT

## 2025-04-18 PROCEDURE — 2500000001 HC RX 250 WO HCPCS SELF ADMINISTERED DRUGS (ALT 637 FOR MEDICARE OP)

## 2025-04-18 PROCEDURE — RXMED WILLOW AMBULATORY MEDICATION CHARGE

## 2025-04-18 PROCEDURE — 80069 RENAL FUNCTION PANEL: CPT

## 2025-04-18 PROCEDURE — 99232 SBSQ HOSP IP/OBS MODERATE 35: CPT

## 2025-04-18 PROCEDURE — 85027 COMPLETE CBC AUTOMATED: CPT

## 2025-04-18 PROCEDURE — 83735 ASSAY OF MAGNESIUM: CPT

## 2025-04-18 PROCEDURE — 92610 EVALUATE SWALLOWING FUNCTION: CPT | Mod: GN | Performed by: SPEECH-LANGUAGE PATHOLOGIST

## 2025-04-18 PROCEDURE — 82306 VITAMIN D 25 HYDROXY: CPT

## 2025-04-18 PROCEDURE — 36415 COLL VENOUS BLD VENIPUNCTURE: CPT

## 2025-04-18 PROCEDURE — 2500000002 HC RX 250 W HCPCS SELF ADMINISTERED DRUGS (ALT 637 FOR MEDICARE OP, ALT 636 FOR OP/ED): Performed by: INTERNAL MEDICINE

## 2025-04-18 PROCEDURE — 94640 AIRWAY INHALATION TREATMENT: CPT

## 2025-04-18 PROCEDURE — 86901 BLOOD TYPING SEROLOGIC RH(D): CPT

## 2025-04-18 PROCEDURE — 2500000002 HC RX 250 W HCPCS SELF ADMINISTERED DRUGS (ALT 637 FOR MEDICARE OP, ALT 636 FOR OP/ED)

## 2025-04-18 PROCEDURE — S4991 NICOTINE PATCH NONLEGEND: HCPCS

## 2025-04-18 PROCEDURE — 1100000001 HC PRIVATE ROOM DAILY

## 2025-04-18 RX ORDER — PANTOPRAZOLE SODIUM 40 MG/1
40 TABLET, DELAYED RELEASE ORAL
Status: DISCONTINUED | OUTPATIENT
Start: 2025-04-18 | End: 2025-04-19 | Stop reason: HOSPADM

## 2025-04-18 RX ORDER — LEVOFLOXACIN 750 MG/1
750 TABLET, FILM COATED ORAL DAILY
Qty: 5 TABLET | Refills: 0 | Status: SHIPPED | OUTPATIENT
Start: 2025-04-18 | End: 2025-04-24

## 2025-04-18 RX ORDER — ERGOCALCIFEROL 1.25 MG/1
1.25 CAPSULE ORAL WEEKLY
Status: DISCONTINUED | OUTPATIENT
Start: 2025-04-18 | End: 2025-04-19 | Stop reason: HOSPADM

## 2025-04-18 RX ORDER — CHOLECALCIFEROL (VITAMIN D3) 25 MCG
25 TABLET ORAL DAILY
Status: DISCONTINUED | OUTPATIENT
Start: 2025-04-18 | End: 2025-04-19 | Stop reason: HOSPADM

## 2025-04-18 RX ORDER — ERGOCALCIFEROL 1.25 MG/1
1.25 CAPSULE ORAL WEEKLY
Qty: 4 CAPSULE | Refills: 1 | Status: SHIPPED | OUTPATIENT
Start: 2025-04-18 | End: 2025-06-07

## 2025-04-18 RX ADMIN — TIOTROPIUM BROMIDE INHALATION SPRAY 2 PUFF: 3.12 SPRAY, METERED RESPIRATORY (INHALATION) at 07:59

## 2025-04-18 RX ADMIN — FORMOTEROL FUMARATE DIHYDRATE 20 MCG: 20 SOLUTION RESPIRATORY (INHALATION) at 20:38

## 2025-04-18 RX ADMIN — MONTELUKAST 10 MG: 10 TABLET, FILM COATED ORAL at 20:28

## 2025-04-18 RX ADMIN — ALBUTEROL SULFATE 2.5 MG: 2.5 SOLUTION RESPIRATORY (INHALATION) at 08:00

## 2025-04-18 RX ADMIN — ALBUTEROL SULFATE 2.5 MG: 2.5 SOLUTION RESPIRATORY (INHALATION) at 17:07

## 2025-04-18 RX ADMIN — GABAPENTIN 800 MG: 400 CAPSULE ORAL at 08:54

## 2025-04-18 RX ADMIN — OLANZAPINE 5 MG: 5 TABLET, FILM COATED ORAL at 20:28

## 2025-04-18 RX ADMIN — ERGOCALCIFEROL 1.25 MG: 1.25 CAPSULE ORAL at 12:56

## 2025-04-18 RX ADMIN — GABAPENTIN 800 MG: 400 CAPSULE ORAL at 15:20

## 2025-04-18 RX ADMIN — ROPINIROLE 0.5 MG: 0.5 TABLET, FILM COATED ORAL at 20:28

## 2025-04-18 RX ADMIN — ONDANSETRON 4 MG: 2 INJECTION INTRAMUSCULAR; INTRAVENOUS at 05:50

## 2025-04-18 RX ADMIN — ACETAMINOPHEN 975 MG: 325 TABLET, FILM COATED ORAL at 13:13

## 2025-04-18 RX ADMIN — THIAMINE HCL TAB 100 MG 100 MG: 100 TAB at 08:54

## 2025-04-18 RX ADMIN — AZITHROMYCIN DIHYDRATE 500 MG: 500 TABLET, FILM COATED ORAL at 08:54

## 2025-04-18 RX ADMIN — CEFTRIAXONE SODIUM 1 G: 1 INJECTION, SOLUTION INTRAVENOUS at 12:09

## 2025-04-18 RX ADMIN — BARIUM SULFATE 75 ML: 980 POWDER, FOR SUSPENSION ORAL at 15:48

## 2025-04-18 RX ADMIN — FOLIC ACID 1 MG: 1 TABLET ORAL at 08:54

## 2025-04-18 RX ADMIN — SODIUM CHLORIDE SOLN NEBU 3% 4 ML: 3 NEBU SOLN at 17:06

## 2025-04-18 RX ADMIN — TAMSULOSIN HYDROCHLORIDE 0.4 MG: 0.4 CAPSULE ORAL at 08:54

## 2025-04-18 RX ADMIN — ALBUTEROL SULFATE 2.5 MG: 2.5 SOLUTION RESPIRATORY (INHALATION) at 20:37

## 2025-04-18 RX ADMIN — LEVETIRACETAM 500 MG: 500 TABLET, FILM COATED ORAL at 08:54

## 2025-04-18 RX ADMIN — ACETAMINOPHEN 975 MG: 325 TABLET, FILM COATED ORAL at 20:44

## 2025-04-18 RX ADMIN — GABAPENTIN 800 MG: 400 CAPSULE ORAL at 20:28

## 2025-04-18 RX ADMIN — TRIAMCINOLONE ACETONIDE: 1 OINTMENT TOPICAL at 09:02

## 2025-04-18 RX ADMIN — PANTOPRAZOLE SODIUM 40 MG: 40 TABLET, DELAYED RELEASE ORAL at 11:30

## 2025-04-18 RX ADMIN — ROPINIROLE 0.5 MG: 0.5 TABLET, FILM COATED ORAL at 08:55

## 2025-04-18 RX ADMIN — NICOTINE 1 PATCH: 14 PATCH, EXTENDED RELEASE TRANSDERMAL at 08:54

## 2025-04-18 RX ADMIN — BARIUM SULFATE 100 ML: 960 POWDER, FOR SUSPENSION ORAL at 15:49

## 2025-04-18 RX ADMIN — CHOLESTYRAMINE POWDER FOR SUSPENSION 4 G: 4 POWDER, FOR SUSPENSION ORAL at 08:54

## 2025-04-18 RX ADMIN — LEVETIRACETAM 500 MG: 500 TABLET, FILM COATED ORAL at 20:28

## 2025-04-18 RX ADMIN — TRIAMCINOLONE ACETONIDE: 1 OINTMENT TOPICAL at 20:30

## 2025-04-18 RX ADMIN — FORMOTEROL FUMARATE DIHYDRATE 20 MCG: 20 SOLUTION RESPIRATORY (INHALATION) at 07:56

## 2025-04-18 RX ADMIN — ATORVASTATIN CALCIUM 10 MG: 20 TABLET, FILM COATED ORAL at 20:28

## 2025-04-18 RX ADMIN — Medication 1 TABLET: at 08:54

## 2025-04-18 RX ADMIN — Medication 25 MCG: at 11:34

## 2025-04-18 RX ADMIN — ROPINIROLE 0.5 MG: 0.5 TABLET, FILM COATED ORAL at 15:20

## 2025-04-18 RX ADMIN — SODIUM CHLORIDE SOLN NEBU 3% 4 ML: 3 NEBU SOLN at 20:37

## 2025-04-18 RX ADMIN — SODIUM CHLORIDE SOLN NEBU 3% 4 ML: 3 NEBU SOLN at 08:04

## 2025-04-18 ASSESSMENT — LIFESTYLE VARIABLES
ANXIETY: MILDLY ANXIOUS
AGITATION: NORMAL ACTIVITY
TREMOR: 2
NAUSEA AND VOMITING: NO NAUSEA AND NO VOMITING
ANXIETY: MILDLY ANXIOUS
HEADACHE, FULLNESS IN HEAD: NOT PRESENT
NAUSEA AND VOMITING: 2
TREMOR: 2
VISUAL DISTURBANCES: NOT PRESENT
AGITATION: NORMAL ACTIVITY
TREMOR: 2
TOTAL SCORE: 4
HEADACHE, FULLNESS IN HEAD: VERY MILD
TREMOR: NOT VISIBLE, BUT CAN BE FELT FINGERTIP TO FINGERTIP
AUDITORY DISTURBANCES: NOT PRESENT
ORIENTATION AND CLOUDING OF SENSORIUM: ORIENTED AND CAN DO SERIAL ADDITIONS
TREMOR: NOT VISIBLE, BUT CAN BE FELT FINGERTIP TO FINGERTIP
AUDITORY DISTURBANCES: NOT PRESENT
HEADACHE, FULLNESS IN HEAD: NOT PRESENT
NAUSEA AND VOMITING: MILD NAUSEA WITH NO VOMITING
AUDITORY DISTURBANCES: NOT PRESENT
ANXIETY: MILDLY ANXIOUS
PAROXYSMAL SWEATS: NO SWEAT VISIBLE
PAROXYSMAL SWEATS: NO SWEAT VISIBLE
ORIENTATION AND CLOUDING OF SENSORIUM: ORIENTED AND CAN DO SERIAL ADDITIONS
ORIENTATION AND CLOUDING OF SENSORIUM: ORIENTED AND CAN DO SERIAL ADDITIONS
TOTAL SCORE: 4
AGITATION: NORMAL ACTIVITY
PAROXYSMAL SWEATS: 2
NAUSEA AND VOMITING: NO NAUSEA AND NO VOMITING
TOTAL SCORE: 4
AUDITORY DISTURBANCES: NOT PRESENT
PAROXYSMAL SWEATS: NO SWEAT VISIBLE
VISUAL DISTURBANCES: NOT PRESENT
HEADACHE, FULLNESS IN HEAD: NOT PRESENT
AUDITORY DISTURBANCES: NOT PRESENT
VISUAL DISTURBANCES: NOT PRESENT
ORIENTATION AND CLOUDING OF SENSORIUM: ORIENTED AND CAN DO SERIAL ADDITIONS
VISUAL DISTURBANCES: NOT PRESENT
TOTAL SCORE: 4
TOTAL SCORE: 3
NAUSEA AND VOMITING: NO NAUSEA AND NO VOMITING
AGITATION: NORMAL ACTIVITY
ANXIETY: MILDLY ANXIOUS
AGITATION: NORMAL ACTIVITY
ORIENTATION AND CLOUDING OF SENSORIUM: ORIENTED AND CAN DO SERIAL ADDITIONS
VISUAL DISTURBANCES: NOT PRESENT
PAROXYSMAL SWEATS: NO SWEAT VISIBLE
ANXIETY: MILDLY ANXIOUS
HEADACHE, FULLNESS IN HEAD: NOT PRESENT

## 2025-04-18 ASSESSMENT — PAIN SCALES - GENERAL
PAINLEVEL_OUTOF10: 2
PAINLEVEL_OUTOF10: 0 - NO PAIN
PAINLEVEL_OUTOF10: 3
PAINLEVEL_OUTOF10: 5 - MODERATE PAIN
PAINLEVEL_OUTOF10: 0 - NO PAIN
PAINLEVEL_OUTOF10: 1

## 2025-04-18 ASSESSMENT — COGNITIVE AND FUNCTIONAL STATUS - GENERAL
CLIMB 3 TO 5 STEPS WITH RAILING: A LITTLE
STANDING UP FROM CHAIR USING ARMS: A LITTLE
DAILY ACTIVITIY SCORE: 24
MOBILITY SCORE: 22
MOBILITY SCORE: 22
CLIMB 3 TO 5 STEPS WITH RAILING: A LITTLE
DAILY ACTIVITIY SCORE: 24
STANDING UP FROM CHAIR USING ARMS: A LITTLE

## 2025-04-18 ASSESSMENT — PAIN DESCRIPTION - LOCATION: LOCATION: HEAD

## 2025-04-18 ASSESSMENT — PAIN - FUNCTIONAL ASSESSMENT
PAIN_FUNCTIONAL_ASSESSMENT: 0-10
PAIN_FUNCTIONAL_ASSESSMENT: 0-10

## 2025-04-18 NOTE — PROGRESS NOTES
ADOD 4/19. [facility] reviewing, pending acceptance.   1316-Per [facility] they will accept pt for HC PT/OT but pt will need to be aware that he will need to refrain from drinking ETOH and if pt is not sober when PT/OT visits pt then they will have to discharge him for HC services d/t safety concerns for the therapists. TCC made medical team aware.   5034-TCC met with pt and updated him on the above and pt verbalized his understanding and per pt he plans to stop drinking ETOH.

## 2025-04-18 NOTE — CARE PLAN
The patient's goals for the shift include      The clinical goals for the shift include pt free from injury.  remains on  CIWA a Q 4hrs    Problem: Pain - Adult  Goal: Verbalizes/displays adequate comfort level or baseline comfort level  Outcome: Progressing     Problem: Safety - Adult  Goal: Free from fall injury  Outcome: Progressing     Problem: Discharge Planning  Goal: Discharge to home or other facility with appropriate resources  Outcome: Progressing     Problem: Chronic Conditions and Co-morbidities  Goal: Patient's chronic conditions and co-morbidity symptoms are monitored and maintained or improved  Outcome: Progressing     Problem: Nutrition  Goal: Nutrient intake appropriate for maintaining nutritional needs  Outcome: Progressing     Problem: Fall/Injury  Goal: Not fall by end of shift  Outcome: Progressing  Goal: Be free from injury by end of the shift  Outcome: Progressing  Goal: Verbalize understanding of personal risk factors for fall in the hospital  Outcome: Progressing  Goal: Verbalize understanding of risk factor reduction measures to prevent injury from fall in the home  Outcome: Progressing  Goal: Use assistive devices by end of the shift  Outcome: Progressing  Goal: Pace activities to prevent fatigue by end of the shift  Outcome: Progressing     Problem: Skin  Goal: Decreased wound size/increased tissue granulation at next dressing change  Outcome: Progressing  Goal: Participates in plan/prevention/treatment measures  Outcome: Progressing  Goal: Prevent/manage excess moisture  Outcome: Progressing  Goal: Prevent/minimize sheer/friction injuries  Outcome: Progressing  Goal: Promote/optimize nutrition  Outcome: Progressing  Goal: Promote skin healing  Outcome: Progressing     Problem: Respiratory  Goal: Clear secretions with interventions this shift  Outcome: Progressing  Goal: Minimize anxiety/maximize coping throughout shift  Outcome: Progressing  Goal: Minimal/no exertional discomfort or  dyspnea this shift  Outcome: Progressing  Goal: No signs of respiratory distress (eg. Use of accessory muscles. Peds grunting)  Outcome: Progressing  Goal: Patent airway maintained this shift  Outcome: Progressing  Goal: Tolerate mechanical ventilation evidenced by VS/agitation level this shift  Outcome: Progressing  Goal: Tolerate pulmonary toileting this shift  Outcome: Progressing  Goal: Verbalize decreased shortness of breath this shift  Outcome: Progressing  Goal: Wean oxygen to maintain O2 saturation per order/standard this shift  Outcome: Progressing  Goal: Increase self care and/or family involvement in next 24 hours  Outcome: Progressing
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
## 2025-04-18 NOTE — CARE PLAN
The patient's goals for the shift include      The clinical goals for the shift include   Problem: Pain - Adult  Goal: Verbalizes/displays adequate comfort level or baseline comfort level  Outcome: Progressing     Problem: Safety - Adult  Goal: Free from fall injury  Outcome: Progressing     Problem: Discharge Planning  Goal: Discharge to home or other facility with appropriate resources  Outcome: Progressing     Problem: Chronic Conditions and Co-morbidities  Goal: Patient's chronic conditions and co-morbidity symptoms are monitored and maintained or improved  Outcome: Progressing     Problem: Nutrition  Goal: Nutrient intake appropriate for maintaining nutritional needs  Outcome: Progressing     Problem: Fall/Injury  Goal: Not fall by end of shift  Outcome: Progressing  Goal: Be free from injury by end of the shift  Outcome: Progressing  Goal: Verbalize understanding of personal risk factors for fall in the hospital  Outcome: Progressing  Goal: Verbalize understanding of risk factor reduction measures to prevent injury from fall in the home  Outcome: Progressing  Goal: Use assistive devices by end of the shift  Outcome: Progressing  Goal: Pace activities to prevent fatigue by end of the shift  Outcome: Progressing     Problem: Skin  Goal: Decreased wound size/increased tissue granulation at next dressing change  Outcome: Progressing  Goal: Participates in plan/prevention/treatment measures  Outcome: Progressing  Flowsheets (Taken 4/18/2025 1122)  Participates in plan/prevention/treatment measures:   Discuss with provider PT/OT consult   Increase activity/out of bed for meals  Goal: Prevent/manage excess moisture  Outcome: Progressing  Flowsheets (Taken 4/18/2025 1122)  Prevent/manage excess moisture: Moisturize dry skin  Goal: Prevent/minimize sheer/friction injuries  Outcome: Progressing  Flowsheets (Taken 4/18/2025 1122)  Prevent/minimize sheer/friction injuries: Increase activity/out of bed for meals  Goal:  Promote/optimize nutrition  Outcome: Progressing  Flowsheets (Taken 4/18/2025 1122)  Promote/optimize nutrition:   Consume > 50% meals/supplements   Monitor/record intake including meals   Offer water/supplements/favorite foods  Goal: Promote skin healing  Outcome: Progressing  Flowsheets (Taken 4/18/2025 1122)  Promote skin healing: Assess skin/pad under line(s)/device(s)     Problem: Respiratory  Goal: Clear secretions with interventions this shift  Outcome: Progressing  Goal: Minimize anxiety/maximize coping throughout shift  Outcome: Progressing  Goal: Minimal/no exertional discomfort or dyspnea this shift  Outcome: Progressing  Goal: No signs of respiratory distress (eg. Use of accessory muscles. Peds grunting)  Outcome: Progressing  Goal: Patent airway maintained this shift  Outcome: Progressing  Goal: Tolerate mechanical ventilation evidenced by VS/agitation level this shift  Outcome: Progressing  Goal: Tolerate pulmonary toileting this shift  Outcome: Progressing  Goal: Verbalize decreased shortness of breath this shift  Outcome: Progressing  Goal: Wean oxygen to maintain O2 saturation per order/standard this shift  Outcome: Progressing  Goal: Increase self care and/or family involvement in next 24 hours  Outcome: Progressing

## 2025-04-18 NOTE — DOCUMENTATION CLARIFICATION NOTE
PATIENT:               MIKAL MIRANDA  ACCT #:                  [id number]  MRN:                       [id number]  :                       1965  ADMIT DATE:       4/15/2025 8:40 PM  DISCH DATE:  RESPONDING PROVIDER #:        [id number]          PROVIDER RESPONSE TEXT:    I agree with dietician diagnosis of severe malnutrition on 25    CDI QUERY TEXT:    Clarification      Instruction:    Based on your assessment of the patient and the clinical information, please provide the requested documentation by clicking on the appropriate radio button and enter any additional information if prompted.    Question: Please further clarify this patient nutritional status as    When answering this query, please exercise your independent professional judgment. The fact that a question is being asked, does not imply that any particular answer is desired or expected.    The patient's clinical indicators include:  Clinical Information:  60 y.o. male COPD, chronic alcohol use since age 10, chronic neck pain with C4-6 anterior fusion, perianal and scrotal abscesses and chronic diarrhea who presents for dark vomitus, dark stools and abdominal pain.    Clinical Indicators:   Nutrition consult   BMI 16.73  Diagnosis Status: New  Malnutrition Diagnosis: Severe malnutrition related to chronic disease or condition  As Evidenced by: meeting < 50% of EER for > 1 month, severe muscle wasting and fat loss per physical exam and 7% weight loss x 1 month with a BMI of 16.7    Treatment:  Regular diet, Ensure Plus (350 kcal and 13 g protein) ordered BID  Gelatein Plus (160 kcal and 20 g protein) BID.  Continue Thiamine, Folic Acid and MVI.  Risk Factors:  chronic alcohol abuse, chronic pain, chronic diarrhea  Options provided:  -- I agree with dietician diagnosis of severe malnutrition on 25  -- Other - I will add my own diagnosis  -- Refer to Clinical Documentation Reviewer    Query created by: Aleja Spear on 2025 9:11  AM      Electronically signed by:  COLE FIELD MD MPH 4/18/2025 11:25 AM

## 2025-04-18 NOTE — DOCUMENTATION CLARIFICATION NOTE
PATIENT:               MIKAL MIRANDA  ACCT #:                  [id number]  MRN:                       [id number]  :                       1965  ADMIT DATE:       4/15/2025 8:40 PM  DISCH DATE:  RESPONDING PROVIDER #:        [id number]          PROVIDER RESPONSE TEXT:    Alcohol abuse with withdrawal    CDI QUERY TEXT:    Clarification      Instruction:    Based on your assessment of the patient and the clinical information, please provide the requested documentation by clicking on the appropriate radio button and enter any additional information if prompted.    Question: Please further clarify the patient pattern of alcohol use as    When answering this query, please exercise your independent professional judgment. The fact that a question is being asked, does not imply that any particular answer is desired or expected.    The patient's clinical indicators include:  Clinical Information:  60 y.o. male with alcohol use disorder who presents to the ED after noticing dark vomitus, dark stools and abdominal pain.    Clinical Indicators:  4/15 ED:  ? drinks alcohol every day, has gone through withdrawal previously, last drink was earlier this morning. ?     H&P:  ? He states that he drinks beer every day and has had withdrawal in the past. ?     GI consult:  ? Reports has been drinking since age 11 yo. ?     Medicine:  ? AUD  - Drinks around 6 cans of beer a day  - C/w CIWA ?     Medicine:  ? - CIWA 8 yesterday and required Lorazepam this morning for CIWA 6. ?    Treatment:  4/15 IV lorazepam 0.5 mg given x1   IV lorazepam 1 mg given x1  Risk Factors:  daily alcohol abuse with hx withdrawal  Options provided:  -- Alcohol use  -- Alcohol abuse with withdrawal  -- Alcohol dependence  -- Alcohol dependence with withdrawal  -- Other - I will add my own diagnosis  -- Refer to Clinical Documentation Reviewer    Query created by: Aleja Spear on 2025 1:30 PM      Electronically signed by:  COLE  RASHIDA PARK MPH 4/18/2025 7:38 AM

## 2025-04-18 NOTE — PROGRESS NOTES
SLP Adult Inpatient Speech-Language Pathology Clinical Swallow Evaluation    Patient Name: Servando Leigh  MRN: [id number]  Today's Date: 4/18/2025   Time Calculation  Start Time: 1020  Stop Time: 1035  Time Calculation (min): 15 min       Assessment/Plan:  #dysphagia   -Clinical bedside swallow evaluation completed in setting of hemoptysis. Pt reported frequent occurrences of emesis following consumption of meals and recent weight loss. Pt endorsed significant effort to keep food down. Pt consumed and tolerated numerous food and liquids, including the Shantel Protocol which has high predictive value in detecting aspiration at bedside, without overt s/sx of aspiration. Results of bedside examination recommend continuation with diet of regular solids and thin liquids, as tolerated by pt. SLP provided pt with education on importance of oral care, s/sx of aspiration, and strategies to utilize while consuming meals. Pt  demonstrated understanding and reiterated information with high accuracy.   -No further speech therapy warranted at this time. Reach out to speech department if concerns persists        Recommendations:  Regular Solids & thin liquids  Upright for all PO intake  Remain upright for 20-30 min after eating  Small bites/sips  Slow rate of consumption  Pills per pt preference  If pt presents with any changes to mental, medical, or respiratory status, please make NPO and alert SLP  Consult to GI for esophageal concerns. Esophagram may provided additional information on esophageal peristalsis and overall physiology to better investigate.          Inpatient/Swing Bed or Outpatient: Inpatient  SLP Plan: No skilled SLP  No Skilled SLP: No acute SLP goals identified  SLP - OK to Discharge: Yes      Subjective   Pt received resting in bed. Pt alert and cooperative throughout session. A&O x4. Breathing room air.   Baseline Assessment:  Respiratory Status: Room air  History of Intubation: No  Behavior/Cognition: Alert,  Allergies   1. No Known Drug Allergies    Message   Recorded as Task   Date: 02/21/2017 07:03 AM, Created By: Doug Kohli   Task Name: Aravind Scott   Assigned To: Denisse Samuel   Regarding Patient: Nova Glez, Status: Active   CommentDoug Kohli - 21 Feb 2017 7:03 AM     ****Auto-Generated [employer] Patient Portal Delivery Status Notification** The current [employer] message delivered/sent to the following recipient: Nova Glez has exceeded 72 hours and remains unopened by the patient. At this time please call patient and notify he or she of message/details and document actions taken to deliver message before completing this task. DO NOT RE-SEND PORTAL MESSAGE. Discussed Hello! Here is a copy of your normal pap smear. Call with questions. Take care, Maria De Jesus Ryder . Denisse Samuel - 21 Feb 2017 9:47 AM     TASK EDITED   Denisse Samuel - 21 Feb 2017 1:56 PM     TASK EDITED  Rn spoke with her and she is aware her pap is normal.     Signatures   Electronically signed by :  Denisse Samuel R.N.; Feb 21 2017  1:56PM CST Will discuss with Dr. Wolfe   "Cooperative, Pleasant mood  Vision: Functional for self-feeding  Hearing: Within Functional Limits  Patient Positioning: Upright in Bed    History and Physical:    Per H&P:  \" Servando Leigh is a 60 y.o. 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 Pylori infection and chronic diarrhea, admitted on 4/15/2025, for 2 days of melena and hematochezia. Patient underwent EGD 4/16 and two small angiectasias were ablated with argon plasma coagulation. Course complicated by concern for infection 2/2 COPD vs aspiration.  \"    Medical History[1]  Family History[2]    Allergies[3]      Relevant Results  XR chest 1 view 04/17/2025    Narrative  Interpreted By:  Tha Moseley and Booth Cameron  STUDY:  XR CHEST 1 VIEW;  4/17/2025 10:35 am    INDICATION:  Signs/Symptoms:new leukocytosis, hx of bronchiectasis.      COMPARISON:  CT CHEST ABDOMEN PELVIS W IV CONTRAST 4/15/2025, XR CHEST 2 VIEWS  4/15/2025    ACCESSION NUMBER(S):  [id number]    ORDERING CLINICIAN:  ANDREA CHOWDHURY    FINDINGS:  AP radiograph of the chest was provided.        CARDIOMEDIASTINAL SILHOUETTE:  Cardiomediastinal silhouette is stable in size and configuration.    LUNGS:  Similarly hyper expanded appearance of the lungs, compatible with  underlying emphysematous and bronchiectatic changes. There is  increased prominence of the interstitial markings on today's study  that may reflect a superimposed pneumonitis. Minimal linear  subsegmental atelectasis is noted at the costophrenic angle on the  left. There is no sizable pneumothorax.    ABDOMEN:  No remarkable upper abdominal findings.    BONES:  No acute osseous changes. Right glenohumeral total arthroplasty.    Impression  1. Slightly increased prominence of interstitial opacities that may  reflect edema or developing pneumonitis.  2. Hyperexpanded lungs consistent with underlying " emphysema.  Bronchiectatic changes are also apparent.    I personally reviewed the image(s)/study and interpretation by  Jimmy Lopes MD (resident).    MACRO:  None    Signed by: Tha Moseley 4/17/2025 1:04 PM  Dictation workstation:   [id number]          Objective   Pt independently self fed. Noted tremor of right hand when feeding himself teaspoons of puree.   Oral/Motor Assessment:  Oral Hygiene:  (WNL)  Dentition: Some Missing Teeth (Missing all top dentition)  Oral Motor: Within Functional Limits  Intelligibility: Intelligible  Breath Support: Adequate for speech  Hearing: Within Functional Limits      Clinical Observations:    The 3 oz sequential drinks of thin liquid water was utilized as a reliable, evidence based test to rule out silent aspiration and determine need for additional testing, such as the MBS or FEES (fiberoptic endoscopic evaluation of swallow), if the test is equivocal, incomplete or pt shows s/sx of aspiration,  prior to recommending a oral diet    Patient Positioning: Upright in Bed  Was The 3 oz Swallow Protocol Completed: Yes    Consistencies Trialed: Yes  Consistencies Trialed: Thin (IDDSI Level 0) - Straw, Pureed/extremely thick (IDDSI Level 4)    Oral phase: Mastication not assessed. Pt presented with anterior labial seal WNL. No obvious spillage or pocketing observed. No oral residues. Complete bolus extraction.    Pharyngeal Phase: Subjectively timely swallow onset appreciated with all PO presentations. No overt indicators of penetration and/or aspiration observed during evaluation.      Inpatient:  Education Documentation  Modified Diet Training, taught by AVINASH Morrissey-SLP at 4/18/2025 12:16 PM.  Learner: Patient  Readiness: Acceptance  Method: Explanation  Response: Verbalizes Understanding  Comment: Reviewed pt's results of bedside swallow evaluation and safe PO recommendations. Futher reviewed no c/f aspiration but would recommend a consult for GI. Verbal agreement on  all accounts.    Education Comments  No comments found.      Supervising SLP was present, actively participated, and made all clinical decisions during session.  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 stopped his depakote. no script. new md per patient    Unsteady gait when walking     Vertigo    [2]   Family History  Problem Relation Name Age of Onset    Diabetes Son      Glaucoma Other Grandfather    [3]   Allergies  Allergen Reactions    Coconut Swelling    Shellfish Containing Products Anaphylaxis, Unknown and Swelling    Shellfish Derived Anaphylaxis, Unknown and Other     Other Reaction(s): Unknown    Other Other    Penicillin G Swelling    Cat Dander Other     itching    House Dust Other     Watery eyes.

## 2025-04-19 ENCOUNTER — PHARMACY VISIT (OUTPATIENT)
Dept: PHARMACY | Facility: CLINIC | Age: 60
End: 2025-04-19
Payer: COMMERCIAL

## 2025-04-19 ENCOUNTER — DOCUMENTATION (OUTPATIENT)
Dept: HOME HEALTH SERVICES | Facility: HOME HEALTH | Age: 60
End: 2025-04-19
Payer: COMMERCIAL

## 2025-04-19 VITALS
HEIGHT: 68 IN | DIASTOLIC BLOOD PRESSURE: 66 MMHG | RESPIRATION RATE: 16 BRPM | OXYGEN SATURATION: 97 % | WEIGHT: 110 LBS | BODY MASS INDEX: 16.67 KG/M2 | HEART RATE: 95 BPM | TEMPERATURE: 98.4 F | SYSTOLIC BLOOD PRESSURE: 95 MMHG

## 2025-04-19 PROBLEM — R04.2 HEMOPTYSIS: Status: RESOLVED | Noted: 2025-04-15 | Resolved: 2025-04-19

## 2025-04-19 PROBLEM — K59.1 FUNCTIONAL DIARRHEA: Status: ACTIVE | Noted: 2025-04-19

## 2025-04-19 PROBLEM — K59.1 FUNCTIONAL DIARRHEA: Status: RESOLVED | Noted: 2025-04-19 | Resolved: 2025-04-19

## 2025-04-19 LAB
ALBUMIN SERPL BCP-MCNC: 3.1 G/DL (ref 3.4–5)
ANION GAP SERPL CALC-SCNC: 13 MMOL/L (ref 10–20)
BACTERIA SPEC RESP CULT: NORMAL
BUN SERPL-MCNC: 3 MG/DL (ref 6–23)
CALCIUM SERPL-MCNC: 8.9 MG/DL (ref 8.6–10.6)
CARBOXYTHC UR-MCNC: 362 NG/ML
CHLORIDE SERPL-SCNC: 105 MMOL/L (ref 98–107)
CO2 SERPL-SCNC: 25 MMOL/L (ref 21–32)
CREAT SERPL-MCNC: 0.67 MG/DL (ref 0.5–1.3)
EGFRCR SERPLBLD CKD-EPI 2021: >90 ML/MIN/1.73M*2
ERYTHROCYTE [DISTWIDTH] IN BLOOD BY AUTOMATED COUNT: 14.3 % (ref 11.5–14.5)
GLUCOSE BLD MANUAL STRIP-MCNC: 83 MG/DL (ref 74–99)
GLUCOSE BLD MANUAL STRIP-MCNC: 92 MG/DL (ref 74–99)
GLUCOSE SERPL-MCNC: 89 MG/DL (ref 74–99)
GRAM STN SPEC: NORMAL
GRAM STN SPEC: NORMAL
HCT VFR BLD AUTO: 27.1 % (ref 41–52)
HGB BLD-MCNC: 9.2 G/DL (ref 13.5–17.5)
MAGNESIUM SERPL-MCNC: 1.63 MG/DL (ref 1.6–2.4)
MCH RBC QN AUTO: 32.4 PG (ref 26–34)
MCHC RBC AUTO-ENTMCNC: 33.9 G/DL (ref 32–36)
MCV RBC AUTO: 95 FL (ref 80–100)
NRBC BLD-RTO: 0 /100 WBCS (ref 0–0)
PHOSPHATE SERPL-MCNC: 4.1 MG/DL (ref 2.5–4.9)
PLATELET # BLD AUTO: 232 X10*3/UL (ref 150–450)
POTASSIUM SERPL-SCNC: 3.8 MMOL/L (ref 3.5–5.3)
RBC # BLD AUTO: 2.84 X10*6/UL (ref 4.5–5.9)
SODIUM SERPL-SCNC: 139 MMOL/L (ref 136–145)
WBC # BLD AUTO: 17.3 X10*3/UL (ref 4.4–11.3)

## 2025-04-19 PROCEDURE — 2500000004 HC RX 250 GENERAL PHARMACY W/ HCPCS (ALT 636 FOR OP/ED)

## 2025-04-19 PROCEDURE — 2500000001 HC RX 250 WO HCPCS SELF ADMINISTERED DRUGS (ALT 637 FOR MEDICARE OP)

## 2025-04-19 PROCEDURE — 82947 ASSAY GLUCOSE BLOOD QUANT: CPT

## 2025-04-19 PROCEDURE — 2500000002 HC RX 250 W HCPCS SELF ADMINISTERED DRUGS (ALT 637 FOR MEDICARE OP, ALT 636 FOR OP/ED): Performed by: INTERNAL MEDICINE

## 2025-04-19 PROCEDURE — 2500000005 HC RX 250 GENERAL PHARMACY W/O HCPCS: Performed by: INTERNAL MEDICINE

## 2025-04-19 PROCEDURE — 2500000002 HC RX 250 W HCPCS SELF ADMINISTERED DRUGS (ALT 637 FOR MEDICARE OP, ALT 636 FOR OP/ED)

## 2025-04-19 PROCEDURE — S4991 NICOTINE PATCH NONLEGEND: HCPCS

## 2025-04-19 PROCEDURE — 85027 COMPLETE CBC AUTOMATED: CPT

## 2025-04-19 PROCEDURE — 80069 RENAL FUNCTION PANEL: CPT

## 2025-04-19 PROCEDURE — 83735 ASSAY OF MAGNESIUM: CPT

## 2025-04-19 PROCEDURE — 99239 HOSP IP/OBS DSCHRG MGMT >30: CPT

## 2025-04-19 PROCEDURE — 94640 AIRWAY INHALATION TREATMENT: CPT

## 2025-04-19 PROCEDURE — 36415 COLL VENOUS BLD VENIPUNCTURE: CPT

## 2025-04-19 RX ORDER — LEVOFLOXACIN 750 MG/1
750 TABLET, FILM COATED ORAL EVERY 24 HOURS
Status: DISCONTINUED | OUTPATIENT
Start: 2025-04-19 | End: 2025-04-19 | Stop reason: HOSPADM

## 2025-04-19 RX ORDER — POTASSIUM CHLORIDE 20 MEQ/1
20 TABLET, EXTENDED RELEASE ORAL ONCE
Status: COMPLETED | OUTPATIENT
Start: 2025-04-19 | End: 2025-04-19

## 2025-04-19 RX ORDER — MAGNESIUM SULFATE HEPTAHYDRATE 40 MG/ML
2 INJECTION, SOLUTION INTRAVENOUS ONCE
Status: COMPLETED | OUTPATIENT
Start: 2025-04-19 | End: 2025-04-19

## 2025-04-19 RX ADMIN — PANTOPRAZOLE SODIUM 40 MG: 40 TABLET, DELAYED RELEASE ORAL at 05:47

## 2025-04-19 RX ADMIN — Medication 1 TABLET: at 08:03

## 2025-04-19 RX ADMIN — ALBUTEROL SULFATE 2.5 MG: 2.5 SOLUTION RESPIRATORY (INHALATION) at 08:27

## 2025-04-19 RX ADMIN — TAMSULOSIN HYDROCHLORIDE 0.4 MG: 0.4 CAPSULE ORAL at 08:03

## 2025-04-19 RX ADMIN — AZITHROMYCIN DIHYDRATE 500 MG: 500 TABLET, FILM COATED ORAL at 08:05

## 2025-04-19 RX ADMIN — THIAMINE HCL TAB 100 MG 100 MG: 100 TAB at 08:04

## 2025-04-19 RX ADMIN — TIOTROPIUM BROMIDE INHALATION SPRAY 2 PUFF: 3.12 SPRAY, METERED RESPIRATORY (INHALATION) at 08:27

## 2025-04-19 RX ADMIN — MAGNESIUM SULFATE HEPTAHYDRATE 2 G: 40 INJECTION, SOLUTION INTRAVENOUS at 10:53

## 2025-04-19 RX ADMIN — FORMOTEROL FUMARATE DIHYDRATE 20 MCG: 20 SOLUTION RESPIRATORY (INHALATION) at 08:29

## 2025-04-19 RX ADMIN — NICOTINE 1 PATCH: 14 PATCH, EXTENDED RELEASE TRANSDERMAL at 08:03

## 2025-04-19 RX ADMIN — ACETAMINOPHEN 975 MG: 325 TABLET, FILM COATED ORAL at 08:11

## 2025-04-19 RX ADMIN — SODIUM CHLORIDE SOLN NEBU 3% 4 ML: 3 NEBU SOLN at 08:27

## 2025-04-19 RX ADMIN — LEVETIRACETAM 500 MG: 500 TABLET, FILM COATED ORAL at 08:03

## 2025-04-19 RX ADMIN — GABAPENTIN 800 MG: 400 CAPSULE ORAL at 08:03

## 2025-04-19 RX ADMIN — LEVOFLOXACIN 750 MG: 750 TABLET, FILM COATED ORAL at 13:56

## 2025-04-19 RX ADMIN — POTASSIUM CHLORIDE 20 MEQ: 1500 TABLET, EXTENDED RELEASE ORAL at 10:53

## 2025-04-19 RX ADMIN — ROPINIROLE 0.5 MG: 0.5 TABLET, FILM COATED ORAL at 08:04

## 2025-04-19 RX ADMIN — TRIAMCINOLONE ACETONIDE: 1 OINTMENT TOPICAL at 08:03

## 2025-04-19 RX ADMIN — FOLIC ACID 1 MG: 1 TABLET ORAL at 08:04

## 2025-04-19 ASSESSMENT — COGNITIVE AND FUNCTIONAL STATUS - GENERAL
DAILY ACTIVITIY SCORE: 24
MOBILITY SCORE: 23
CLIMB 3 TO 5 STEPS WITH RAILING: A LITTLE

## 2025-04-19 ASSESSMENT — ACTIVITIES OF DAILY LIVING (ADL): LACK_OF_TRANSPORTATION: NO

## 2025-04-19 ASSESSMENT — PAIN SCALES - GENERAL: PAINLEVEL_OUTOF10: 6

## 2025-04-19 ASSESSMENT — PAIN DESCRIPTION - LOCATION: LOCATION: HEAD

## 2025-04-19 NOTE — PROGRESS NOTES
04/19/25 1300   Discharge Planning   Living Arrangements Children;Other (Comment)  (7 y.o. son)   Support Systems Spouse/significant other   Type of Residence Private residence   Expected Discharge Disposition Home H   Financial Resource Strain   How hard is it for you to pay for the very basics like food, housing, medical care, and heating? Not very   Housing Stability   In the last 12 months, was there a time when you were not able to pay the mortgage or rent on time? N   At any time in the past 12 months, were you homeless or living in a shelter (including now)? N   Transportation Needs   In the past 12 months, has lack of transportation kept you from medical appointments or from getting medications? no   In the past 12 months, has lack of transportation kept you from meetings, work, or from getting things needed for daily living? No     SW following to assist with discharge planning.  Pt medically ready for discharge home with [facility]. SW messaged [facility] for confirmed SOC.  SOC confirmed for 24-48 hours.  Med team advised.  Elizabeth Gunsalus LISW-S  Care Transitions Supervisor

## 2025-04-19 NOTE — CARE PLAN
The patient's goals for the shift include      The clinical goals for the shift include Patient will remain safe and stable throughout this shift     Patient discharged to home with Meds to Bed from [facility]  RN discussed discharge instructions with Patient  Patient verbalized understanding and coy of AVS was given to the Patient

## 2025-04-19 NOTE — CARE PLAN
The patient's goals for the shift include      The clinical goals for the shift include Patient to remain safe and stable throughout this shift    Patient remained safe and stable this shift

## 2025-04-19 NOTE — DISCHARGE SUMMARY
Discharge Diagnosis  Hemoptysis    Issues Requiring Follow-Up  - Stool Studies: At time of discharge, Calprotectin, H. Pylori, Fecal Elastase, and VIP pending. While workup for diarrhea has been negative in past, workup re-sent during admission to assess for any changes. Results should be followed up with patient.  - Angiectasias- No bleeding and stable Hgb at time of discharge. Patient with GI appointment 5/5/25 where at time symptoms of recurrence should be assessed  - Bronchiectasis exacerbation- Improved breathing and on RA at time of discharge with clear sputum. Patient discharged with 5 days of Levofloxacin PO for Bronchiectasis with pseudomonas coverage. Patient with Pulmonology appointment 4/29/25.   - Medication changes: Restarted home Thiamine, MVI, and D2 as patient noted on admission was not taking.    Discharge Meds     Medication List      START taking these medications     ergocalciferol 1250 mcg (50,000 units) capsule; Commonly known as:   Vitamin D-2; Take 1 capsule (1.25 mg) by mouth 1 (one) time per week for 8   doses.   levoFLOXacin 750 mg tablet; Commonly known as: Levaquin; Take 1 tablet   (750 mg) by mouth once daily for 5 days.   multivitamin with minerals tablet; Take 1 tablet by mouth once daily.   thiamine 100 mg tablet; Commonly known as: Vitamin B-1; Take 1 tablet   (100 mg) by mouth once daily.     CHANGE how you take these medications     levETIRAcetam 500 mg tablet; Commonly known as: Keppra; Take 1 tablet   (500 mg) by mouth 2 times a day.; What changed: when to take this     CONTINUE taking these medications     Acne Medication 10 % lotion lotion; Generic drug: benzoyl peroxide;   Apply 1 Application topically once daily at bedtime.   albuterol 90 mcg/actuation inhaler; Commonly known as: Ventolin HFA;   Inhale 2 puffs every 4 hours if needed for wheezing or shortness of   breath.   ammonium lactate 12 % cream; Commonly known as: Amlactin   atorvastatin 10 mg tablet; Commonly known  as: Lipitor; Take 1 tablet (10   mg) by mouth once daily at bedtime.   Bevespi Aerosphere 9-4.8 mcg HFA aerosol inhaler; Generic drug:   glycopyrrolate-formoteroL; Inhale 2 puffs 2 times a day.   cholestyramine 4 gram packet; Commonly known as: Questran; Take 1 packet   (4 g) by mouth 2 times daily (morning and late afternoon). Take 3hrs   before or after any meds. Take twice daily as needed for diarrhea   diclofenac sodium 1 % gel; Commonly known as: Voltaren; APPLY FOUR AND   ONE-HALF INCHES (4 GRAMS) TOPICALLY FOUR TIMES A DAY AS NEEDED FOR JOINT   PAIN   Ensure Original 0.04-1.05 gram-kcal/mL liquid; Generic drug: food   supplemt, lactose-reduced; Take 3 bottles by mouth once daily. 2-3 cans   EPINEPHrine 0.3 mg/0.3 mL injection syringe; Commonly known as: Epipen;   Inject 0.3 mL (0.3 mg) into the muscle 1 time if needed for anaphylaxis   for up to 1 dose. Inject into upper leg. Call 911 after use.   gabapentin 800 mg tablet; Commonly known as: Neurontin; Take 1 tablet   (800 mg) by mouth 3 times a day.   hydrocortisone 2.5 % cream; Apply topically 2 times a day as needed for   irritation or rash.   ipratropium-albuteroL 0.5-2.5 mg/3 mL nebulizer solution; Commonly known   as: Duo-Neb; Inhale 3 mL by nebulization every 6 hours while awake.   loperamide 2 mg capsule; Commonly known as: Imodium; TAKE 1 CAPSULE FOUR   TIMES A DAY AS NEEDED FOR DIARRHEA   mirtazapine 15 mg tablet; Commonly known as: Remeron; TAKE ONE TABLET   (15 MG) BY MOUTH DAILY AT 9PM AT BEDTIME   mometasone 0.1 % ointment; Commonly known as: Elocon; Apply topically   once daily.   montelukast 10 mg tablet; Commonly known as: Singulair; Take 1 tablet   (10 mg) by mouth once daily at bedtime.   nicotine 14 mg/24 hr patch; Commonly known as: Nicoderm CQ; Place 1   patch over 24 hours on the skin once daily.   OLANZapine 5 mg tablet; Commonly known as: ZyPREXA; Take 1 tablet (5 mg)   by mouth once daily at bedtime.   pantoprazole 40 mg EC tablet;  "Commonly known as: ProtoNix; Take 1 tablet   (40 mg) by mouth once daily in the morning. Take before meals. Do not   crush, chew, or split.   rOPINIRole 0.5 mg tablet; Commonly known as: Requip; Take 1 tablet (0.5   mg) by mouth 3 times a day.   sodium chloride 3 % nebulizer solution; Take 4 mL by nebulization 3   times a day.   tamsulosin 0.4 mg 24 hr capsule; Commonly known as: Flomax; Take 1   capsule (0.4 mg) by mouth once daily.     STOP taking these medications     Certavite-Antioxidant  mg-mcg tablet; Generic drug: multivitamin   with minerals   tiZANidine 2 mg capsule; Commonly known as: Zanaflex       Test Results Pending At Discharge  Pending Labs       Order Current Status    Calprotectin Stool In process    H. pylori antigen, stool In process    Pancreatic elastase, fecal In process    Vasoactive intestinal peptide (VIP) In process          Hospital Course  Mr. Leigh is a 61 y/o M w/ a PMHx s/f COPD, MAC infection, alcohol use disorder, bronchiectasis, seizures, chronic perianal and scrotal abscesses here, chiquita-anal fistula s/p fistulotomy, and chronic diarrhea admitted on 4/15/2025 with melena and coffee ground emesis c/f UGIB.     On admission reported that every morning he coughs/vomits up phlegm however the day prior to admission he noticed that the phlegm was very dark. Later in the day he developed bouts of true emesis that was dark/coffee ground in nature and had diarrhea that was \"jet black\". He also noted epigastric abdominal pain for the past week. He did have some ibuprofen last week but only one day. He reports he is prescribed naproxen for his legs but this is not on his medication list and he denies taking medications besides the ibuprofen that is OTC.       Hgb on admission was 11.7 which is near his recent baseline, but on recheck was 9.6, c/f active bleed. Pt underwent EGD 4/16 that was significant for a 3 cm type I hiatal hernia and 2 small angioectasias in 1st part of duodenum; " [FreeTextEntry1] : Well 11-12 year old \par Discussed growth and development: normal \par Discussed safety/anticipatory guidance \par Discussed puberty/body changes including breast buds \par Discussed need for vaccines, reviewed side effects and VIS Next PE: 1 year\par \par Discussed and/or provided information on the following:\par PHYSICAL GROWTH AND DEVELOPMENT: Physical and oral health, body image, healthy eating, physical activity\par SOCIAL AND ACADEMIC COMPETENCE: Connectedness with family, peers, and community; interpersonal relationships; school performance\par EMOTIONAL WELL-BEING: Coping, mood regulation and mental health (depression), sexuality\par RISK REDUCTION: Tobacco, alcohol, or other drugs; pregnancy; STIs\par VIOLENCE AND INJURY PREVENTION: Safety belt and helmet use; substance abuse and riding in a vehicle; guns; interpersonal violence (fights); bullying\par PHYSICAL ACTIVITY: Adequate physical activity in organized sports, after-school programs, fun activities; limits on screen time\par \par  tissue was completely ablated with argon plasma coagulation. Given these findings, it was felt that the UGIB was secondary to the angiectasias.     Post EGD, patient tolerated a liquid diet but when progressed to a regular diet he had an episode of vomit. In the setting of a rising leukocytosis and underlying pulmonary structural changes, there was a concern for a bronchiectasis exacerbation or aspiration and the following day CXR was ordered and demonstrated concern for pneumonitis. Ceftriaxone and Azithromycin were begun for CAP vs bronchiectasis. Respiratory workup was negative for any active infection. Of note patient noted difficulty swallowing hard food during his admission and given the episode of vomit, there was concern for aspiration. SLP evaluated the patient and noted no overt signs of aspiration were noted. Esophagram was without any acute signs of dysphagia.    At time of discharge, patient felt improved. His stool was brown and he denied any blood in his mucous. He denied any abdominal pain. Overall, he felt weak but comfortable to return home. He was discharged with a 5 day course of Levofloxacin for presumed bronchiectasis. CIWA was <4 for two days at time of discharge.     Pertinent Physical Exam At Time of Discharge  General: Awake, alert, conversant  HEENT: Pupils equal and round, no scleral icterus or conjunctivitis  Chest: Exp wheeze, normal respiratory effort, not on supplemental oxygen  Cardiac: Regular rhythm and rate, normal s1, s2, no M/R/G  Abdomen: Soft, ND, NT, no involuntary guarding or rebound  EXT: No peripheral edema, no asymmetry noted  Neuro: AOx4, moving all limbs spontaneously, follows commands, fine tremor, increased DTR  Psych: Coherent thought process, appropriate mood and affect    Outpatient Follow-Up  Future Appointments   Date Time Provider Department Center   4/29/2025 11:30 AM Justina DALTON MD [id number] [facility]   5/5/2025  1:40 PM Gayle Palomares PA-C  [id number] [facility]       Sarina Abreu MD MPH

## 2025-04-19 NOTE — HH CARE COORDINATION
Home Care received a Referral for Physical Therapy and Occupational Therapy. We have processed the referral for a Start of Care on 4/20-4/21/25.     If you have any questions or concerns, please feel free to contact us at [phone]. Follow the prompts, enter your five digit zip code, and you will be directed to your care team on CENTL 3.

## 2025-04-20 LAB
CALPROTECTIN STL-MCNT: 205 UG/G
ELASTASE PANC STL-MCNT: 412 UG/G

## 2025-04-21 ENCOUNTER — APPOINTMENT (OUTPATIENT)
Dept: HOME HEALTH SERVICES | Facility: HOME HEALTH | Age: 60
End: 2025-04-21
Payer: COMMERCIAL

## 2025-04-21 ENCOUNTER — DOCUMENTATION (OUTPATIENT)
Dept: CASE MANAGEMENT | Facility: HOSPITAL | Age: 60
End: 2025-04-21
Payer: COMMERCIAL

## 2025-04-21 LAB — VIP SERPL-MCNC: 35.4 PG/ML (ref 0–89.1)

## 2025-04-21 NOTE — PROGRESS NOTES
Patient referred by [facility]- pt request resources for Chem Dep...([facility]...[facility],  Food, Food Pantries, Meals on Wheels, Global Meals...resources provided.        Discussed the following topics on behalf of the patient:  []  Behavioral Health Assistance     []  Case Management  []   Assistance  []  Digital Equity Assistance  []  Dental Health Assistance  []  Education Assistance  []  Employment Assistance  [x]  Financial Strain Relief Assistance  [x]  Food Insecurity Assistance  []  Healthcare Coverage Assistance  []  Housing Stability Assistance  []  IP Violence Relief Assistance  []  Legal Assistance  []  Physical Activity Assistance  [x]  Social Connection Assistance  [x]  Stress Relief Assistance   [x]  Substance Abuse Assistance  []  Transportation Assistance  []  Utility Assistance  []  Other: [insert comment here]    Next Steps:         David Tian MA

## 2025-04-22 ENCOUNTER — HOME CARE VISIT (OUTPATIENT)
Dept: HOME HEALTH SERVICES | Facility: HOME HEALTH | Age: 60
End: 2025-04-22
Payer: COMMERCIAL

## 2025-04-22 LAB — H PYLORI AG STL QL IA: NEGATIVE

## 2025-04-22 PROCEDURE — G0151 HHCP-SERV OF PT,EA 15 MIN: HCPCS | Mod: HHH

## 2025-04-22 PROCEDURE — 169592 NO-PAY CLAIM PROCEDURE

## 2025-04-23 VITALS
TEMPERATURE: 98.6 F | HEIGHT: 68 IN | HEART RATE: 98 BPM | OXYGEN SATURATION: 98 % | WEIGHT: 109 LBS | BODY MASS INDEX: 16.52 KG/M2 | RESPIRATION RATE: 16 BRPM

## 2025-04-23 LAB
ACID FAST STN SPEC: NORMAL
MYCOBACTERIUM SPEC CULT: NORMAL

## 2025-04-23 SDOH — HEALTH STABILITY: PHYSICAL HEALTH: EXERCISE TYPE: NONE PRESENTLY

## 2025-04-23 ASSESSMENT — ACTIVITIES OF DAILY LIVING (ADL)
ENTERING_EXITING_HOME: SUPERVISION
OASIS_M1830: 01
AMBULATION ASSISTANCE ON FLAT SURFACES: 1

## 2025-04-23 ASSESSMENT — PAIN SCALES - PAIN ASSESSMENT IN ADVANCED DEMENTIA (PAINAD)
FACIALEXPRESSION: 0
CONSOLABILITY: 0 - NO NEED TO CONSOLE.
FACIALEXPRESSION: 0 - SMILING OR INEXPRESSIVE.
BREATHING: 0
NEGVOCALIZATION: 0 - NONE.
BODYLANGUAGE: 0
TOTALSCORE: 0
BODYLANGUAGE: 0 - RELAXED.
CONSOLABILITY: 0
NEGVOCALIZATION: 0

## 2025-04-23 ASSESSMENT — ENCOUNTER SYMPTOMS
PERSON REPORTING PAIN: PATIENT
FATIGUES EASILY: 1
DYSPNEA ON EXERTION: 1
SHORTNESS OF BREATH: T
LOSS OF SENSATION IN FEET: 1
DEPRESSION: 0
MUSCLE WEAKNESS: 1
OCCASIONAL FEELINGS OF UNSTEADINESS: 0

## 2025-04-24 ASSESSMENT — ENCOUNTER SYMPTOMS
SUBJECTIVE PAIN PROGRESSION: UNCHANGED
PAIN: 1
PAIN LOCATION - PAIN QUALITY: FEET
PAIN LOCATION - PAIN SEVERITY: 3/10
PAIN LOCATION - EXACERBATING FACTORS: ACTIVITY
PAIN LOCATION - PAIN FREQUENCY: INTERMITTENT
LOWEST PAIN SEVERITY IN PAST 24 HOURS: 2/10
PAIN LOCATION: GENERALIZED
HIGHEST PAIN SEVERITY IN PAST 24 HOURS: 5/10
PAIN SEVERITY GOAL: 0/10

## 2025-04-24 NOTE — CASE COMMUNICATION
Patient was seen in home for PT SOC VISIT  for sp recent hospitalization for respiratory failure and COPD.Initiated HEP teaching,meds were reconciled,safety assessment conduted.Patient stated he feels he needs therapy to improve his function,but not exactly receptive to teaching.

## 2025-04-29 ENCOUNTER — APPOINTMENT (OUTPATIENT)
Dept: PULMONOLOGY | Facility: HOSPITAL | Age: 60
End: 2025-04-29
Payer: COMMERCIAL

## 2025-04-29 ENCOUNTER — HOME CARE VISIT (OUTPATIENT)
Dept: HOME HEALTH SERVICES | Facility: HOME HEALTH | Age: 60
End: 2025-04-29
Payer: COMMERCIAL

## 2025-04-29 DIAGNOSIS — B37.0 ORAL CANDIDIASIS: Primary | ICD-10-CM

## 2025-04-29 PROCEDURE — G0152 HHCP-SERV OF OT,EA 15 MIN: HCPCS | Mod: HHH

## 2025-04-29 RX ORDER — NYSTATIN 100000 [USP'U]/ML
5 SUSPENSION ORAL 4 TIMES DAILY
Qty: 280 ML | Refills: 0 | Status: SHIPPED | OUTPATIENT
Start: 2025-04-29 | End: 2025-06-28

## 2025-04-29 ASSESSMENT — ENCOUNTER SYMPTOMS
PAIN: 1
HIGHEST PAIN SEVERITY IN PAST 24 HOURS: 10/10
PAIN LOCATION - PAIN SEVERITY: 7/10
PAIN SEVERITY GOAL: 0/10
PAIN LOCATION: LEFT FOOT
PAIN LOCATION - PAIN SEVERITY: 7/10
PAIN LOCATION: RIGHT FOOT
LOWEST PAIN SEVERITY IN PAST 24 HOURS: 6/10
PERSON REPORTING PAIN: PATIENT

## 2025-04-29 NOTE — PROGRESS NOTES
[facility] concur[facility] most likely [facility]. Nystatin swish/swallow sent in. Patient to follow with me in a month already.

## 2025-04-30 LAB
ACID FAST STN SPEC: NORMAL
MYCOBACTERIUM SPEC CULT: NORMAL

## 2025-05-01 ENCOUNTER — TELEPHONE (OUTPATIENT)
Facility: HOSPITAL | Age: 60
End: 2025-05-01
Payer: COMMERCIAL

## 2025-05-01 DIAGNOSIS — J69.0 ASPIRATION PNEUMONITIS (MULTI): ICD-10-CM

## 2025-05-01 RX ORDER — ALBUTEROL SULFATE 90 UG/1
2 INHALANT RESPIRATORY (INHALATION) EVERY 4 HOURS PRN
Qty: 18 G | Refills: 11 | Status: SHIPPED | OUTPATIENT
Start: 2025-05-01

## 2025-05-02 ENCOUNTER — HOME CARE VISIT (OUTPATIENT)
Dept: HOME HEALTH SERVICES | Facility: HOME HEALTH | Age: 60
End: 2025-05-02
Payer: COMMERCIAL

## 2025-05-02 VITALS
SYSTOLIC BLOOD PRESSURE: 98 MMHG | RESPIRATION RATE: 16 BRPM | OXYGEN SATURATION: 98 % | DIASTOLIC BLOOD PRESSURE: 60 MMHG | HEART RATE: 72 BPM | TEMPERATURE: 98.6 F

## 2025-05-02 PROCEDURE — G0151 HHCP-SERV OF PT,EA 15 MIN: HCPCS | Mod: HHH

## 2025-05-02 SDOH — HEALTH STABILITY: PHYSICAL HEALTH: EXERCISE COMMENTS: PATIENT NOT REALLY INTERESTED IN ENDURANCE TRAINING

## 2025-05-02 SDOH — HEALTH STABILITY: PHYSICAL HEALTH: EXERCISE TYPE: INSTRUCTED PATIENT IN BREATHING EXERCISES,ENDURANCE TRAINING

## 2025-05-02 ASSESSMENT — ACTIVITIES OF DAILY LIVING (ADL)
AMBULATION ASSISTANCE ON FLAT SURFACES: 1
HOME_HEALTH_OASIS: 00
AMBULATION_DISTANCE/DURATION_TOLERATED: INDEP WITHOUT DEVICE
OASIS_M1830: 00
AMBULATION ASSISTANCE ON UNEVEN SURFACES: 1

## 2025-05-02 ASSESSMENT — ENCOUNTER SYMPTOMS
PERSON REPORTING PAIN: PATIENT
DENIES PAIN: 1
MUSCLE WEAKNESS: 1
OCCASIONAL FEELINGS OF UNSTEADINESS: 0

## 2025-05-02 ASSESSMENT — PAIN SCALES - PAIN ASSESSMENT IN ADVANCED DEMENTIA (PAINAD)
FACIALEXPRESSION: 1
TOTALSCORE: 3
BREATHING: 1
NEGVOCALIZATION: 0 - NONE.
BODYLANGUAGE: 1
FACIALEXPRESSION: 1 - SAD. FRIGHTENED. FROWN.
BODYLANGUAGE: 1 - TENSE. DISTRESSED PACING. FIDGETING.
NEGVOCALIZATION: 0
CONSOLABILITY: 0 - NO NEED TO CONSOLE.
CONSOLABILITY: 0

## 2025-05-02 NOTE — CASE COMMUNICATION
Patient was seen today for agency Discharge.Patient not really appropriate for home care,not receptive.Patient would be appropriate for pulmonary rehab as outpatient

## 2025-05-23 NOTE — TELEPHONE ENCOUNTER
Pt called requesting refill of Bevespi inhaler and tizanidine. Message sent r/t tizanidine request. MyChart message sent r/t Bevespi refill availability.

## 2025-06-27 ENCOUNTER — APPOINTMENT (OUTPATIENT)
Dept: RADIOLOGY | Facility: HOSPITAL | Age: 60
End: 2025-06-27
Payer: COMMERCIAL

## 2025-06-27 ENCOUNTER — HOSPITAL ENCOUNTER (INPATIENT)
Facility: HOSPITAL | Age: 60
End: 2025-06-27
Attending: EMERGENCY MEDICINE | Admitting: INTERNAL MEDICINE
Payer: COMMERCIAL

## 2025-06-27 ENCOUNTER — APPOINTMENT (OUTPATIENT)
Dept: CARDIOLOGY | Facility: HOSPITAL | Age: 60
End: 2025-06-27
Payer: COMMERCIAL

## 2025-06-27 DIAGNOSIS — R19.7 NAUSEA, VOMITING AND DIARRHEA: ICD-10-CM

## 2025-06-27 DIAGNOSIS — E87.1 HYPONATREMIA: Primary | ICD-10-CM

## 2025-06-27 DIAGNOSIS — J44.1 COPD EXACERBATION (MULTI): ICD-10-CM

## 2025-06-27 DIAGNOSIS — K59.1 FUNCTIONAL DIARRHEA: ICD-10-CM

## 2025-06-27 DIAGNOSIS — J47.9 BRONCHIECTASIS WITHOUT COMPLICATION (MULTI): ICD-10-CM

## 2025-06-27 DIAGNOSIS — E86.0 DEHYDRATION: ICD-10-CM

## 2025-06-27 DIAGNOSIS — R56.9 SEIZURE (MULTI): ICD-10-CM

## 2025-06-27 DIAGNOSIS — J44.9 COPD WITHOUT EXACERBATION (MULTI): ICD-10-CM

## 2025-06-27 DIAGNOSIS — K29.20 ACUTE ALCOHOLIC GASTRITIS WITHOUT HEMORRHAGE: ICD-10-CM

## 2025-06-27 DIAGNOSIS — F10.90 ALCOHOL USE DISORDER: ICD-10-CM

## 2025-06-27 DIAGNOSIS — R11.10 REGURGITATION OF FOOD: ICD-10-CM

## 2025-06-27 DIAGNOSIS — R11.2 NAUSEA, VOMITING AND DIARRHEA: ICD-10-CM

## 2025-06-27 LAB
ALBUMIN SERPL BCP-MCNC: 3.4 G/DL (ref 3.4–5)
ALBUMIN SERPL BCP-MCNC: 3.7 G/DL (ref 3.4–5)
ALP SERPL-CCNC: 96 U/L (ref 33–120)
ALT SERPL W P-5'-P-CCNC: 22 U/L (ref 10–52)
ANION GAP BLDV CALCULATED.4IONS-SCNC: 9 MMOL/L (ref 10–25)
ANION GAP SERPL CALC-SCNC: 13 MMOL/L (ref 10–20)
ANION GAP SERPL CALC-SCNC: 17 MMOL/L (ref 10–20)
APAP SERPL-MCNC: <10 UG/ML (ref ?–30)
APPEARANCE UR: CLEAR
AST SERPL W P-5'-P-CCNC: 60 U/L (ref 9–39)
BASE EXCESS BLDV CALC-SCNC: 12 MMOL/L (ref -2–3)
BASOPHILS # BLD AUTO: 0.01 X10*3/UL (ref 0–0.1)
BASOPHILS NFR BLD AUTO: 0.1 %
BILIRUB SERPL-MCNC: 0.9 MG/DL (ref 0–1.2)
BILIRUB UR STRIP.AUTO-MCNC: NEGATIVE MG/DL
BODY TEMPERATURE: 37 DEGREES CELSIUS
BUN SERPL-MCNC: 13 MG/DL (ref 6–23)
BUN SERPL-MCNC: 16 MG/DL (ref 6–23)
CA-I BLDV-SCNC: 0.96 MMOL/L (ref 1.1–1.33)
CALCIUM SERPL-MCNC: 7.4 MG/DL (ref 8.6–10.6)
CALCIUM SERPL-MCNC: 7.8 MG/DL (ref 8.6–10.6)
CARDIAC TROPONIN I PNL SERPL HS: 3 NG/L (ref 0–53)
CARDIAC TROPONIN I PNL SERPL HS: 3 NG/L (ref 0–53)
CHLORIDE BLDV-SCNC: 73 MMOL/L (ref 98–107)
CHLORIDE SERPL-SCNC: 71 MMOL/L (ref 98–107)
CHLORIDE SERPL-SCNC: 77 MMOL/L (ref 98–107)
CO2 SERPL-SCNC: 33 MMOL/L (ref 21–32)
CO2 SERPL-SCNC: 35 MMOL/L (ref 21–32)
COLOR UR: YELLOW
CREAT SERPL-MCNC: 1.73 MG/DL (ref 0.5–1.3)
CREAT SERPL-MCNC: 2.02 MG/DL (ref 0.5–1.3)
EGFRCR SERPLBLD CKD-EPI 2021: 38 ML/MIN/1.73M*2
EGFRCR SERPLBLD CKD-EPI 2021: 45 ML/MIN/1.73M*2
EOSINOPHIL # BLD AUTO: 0 X10*3/UL (ref 0–0.7)
EOSINOPHIL NFR BLD AUTO: 0 %
ERYTHROCYTE [DISTWIDTH] IN BLOOD BY AUTOMATED COUNT: 14.5 % (ref 11.5–14.5)
ETHANOL SERPL-MCNC: <10 MG/DL
GLUCOSE BLD MANUAL STRIP-MCNC: 119 MG/DL (ref 74–99)
GLUCOSE BLDV-MCNC: 148 MG/DL (ref 74–99)
GLUCOSE SERPL-MCNC: 150 MG/DL (ref 74–99)
GLUCOSE SERPL-MCNC: 95 MG/DL (ref 74–99)
GLUCOSE UR STRIP.AUTO-MCNC: NORMAL MG/DL
HCO3 BLDV-SCNC: 38.9 MMOL/L (ref 22–26)
HCT VFR BLD AUTO: 32.5 % (ref 41–52)
HCT VFR BLD EST: 38 % (ref 41–52)
HGB BLD-MCNC: 11.9 G/DL (ref 13.5–17.5)
HGB BLDV-MCNC: 12.6 G/DL (ref 13.5–17.5)
IMM GRANULOCYTES # BLD AUTO: 0.13 X10*3/UL (ref 0–0.7)
IMM GRANULOCYTES NFR BLD AUTO: 1 % (ref 0–0.9)
KETONES UR STRIP.AUTO-MCNC: NEGATIVE MG/DL
LACTATE BLDV-SCNC: 4.6 MMOL/L (ref 0.4–2)
LEUKOCYTE ESTERASE UR QL STRIP.AUTO: NEGATIVE
LEVETIRACETAM SERPL-MCNC: 26 UG/ML (ref 10–40)
LIPASE SERPL-CCNC: 18 U/L (ref 9–82)
LYMPHOCYTES # BLD AUTO: 0.7 X10*3/UL (ref 1.2–4.8)
LYMPHOCYTES NFR BLD AUTO: 5.4 %
MAGNESIUM SERPL-MCNC: 1.6 MG/DL (ref 1.6–2.4)
MCH RBC QN AUTO: 29.9 PG (ref 26–34)
MCHC RBC AUTO-ENTMCNC: 36.6 G/DL (ref 32–36)
MCV RBC AUTO: 82 FL (ref 80–100)
MONOCYTES # BLD AUTO: 0.53 X10*3/UL (ref 0.1–1)
MONOCYTES NFR BLD AUTO: 4.1 %
NEUTROPHILS # BLD AUTO: 11.64 X10*3/UL (ref 1.2–7.7)
NEUTROPHILS NFR BLD AUTO: 89.4 %
NITRITE UR QL STRIP.AUTO: NEGATIVE
NRBC BLD-RTO: 0 /100 WBCS (ref 0–0)
OSMOLALITY SERPL: 247 MOSM/KG (ref 280–300)
OXYHGB MFR BLDV: 26 % (ref 45–75)
PCO2 BLDV: 60 MM HG (ref 41–51)
PH BLDV: 7.42 PH (ref 7.33–7.43)
PH UR STRIP.AUTO: 6.5 [PH]
PHOSPHATE SERPL-MCNC: 2.1 MG/DL (ref 2.5–4.9)
PHOSPHATE SERPL-MCNC: 2.3 MG/DL (ref 2.5–4.9)
PLATELET # BLD AUTO: 142 X10*3/UL (ref 150–450)
PO2 BLDV: 25 MM HG (ref 35–45)
POTASSIUM BLDV-SCNC: 2.6 MMOL/L (ref 3.5–5.3)
POTASSIUM SERPL-SCNC: 2.6 MMOL/L (ref 3.5–5.3)
POTASSIUM SERPL-SCNC: 4.7 MMOL/L (ref 3.5–5.3)
PROT SERPL-MCNC: 7.5 G/DL (ref 6.4–8.2)
PROT UR STRIP.AUTO-MCNC: NEGATIVE MG/DL
RBC # BLD AUTO: 3.98 X10*6/UL (ref 4.5–5.9)
RBC # UR STRIP.AUTO: NEGATIVE MG/DL
SALICYLATES SERPL-MCNC: <3 MG/DL (ref ?–20)
SAO2 % BLDV: 27 % (ref 45–75)
SODIUM BLDV-SCNC: 118 MMOL/L (ref 136–145)
SODIUM SERPL-SCNC: 118 MMOL/L (ref 136–145)
SODIUM SERPL-SCNC: 120 MMOL/L (ref 136–145)
SP GR UR STRIP.AUTO: 1.01
UROBILINOGEN UR STRIP.AUTO-MCNC: NORMAL MG/DL
WBC # BLD AUTO: 13 X10*3/UL (ref 4.4–11.3)

## 2025-06-27 PROCEDURE — 84132 ASSAY OF SERUM POTASSIUM: CPT

## 2025-06-27 PROCEDURE — 82435 ASSAY OF BLOOD CHLORIDE: CPT

## 2025-06-27 PROCEDURE — 80307 DRUG TEST PRSMV CHEM ANLYZR: CPT

## 2025-06-27 PROCEDURE — 80177 DRUG SCRN QUAN LEVETIRACETAM: CPT | Performed by: EMERGENCY MEDICINE

## 2025-06-27 PROCEDURE — 94762 N-INVAS EAR/PLS OXIMTRY CONT: CPT

## 2025-06-27 PROCEDURE — 83690 ASSAY OF LIPASE: CPT

## 2025-06-27 PROCEDURE — 71046 X-RAY EXAM CHEST 2 VIEWS: CPT

## 2025-06-27 PROCEDURE — 87637 SARSCOV2&INF A&B&RSV AMP PRB: CPT

## 2025-06-27 PROCEDURE — 99285 EMERGENCY DEPT VISIT HI MDM: CPT | Mod: 25 | Performed by: EMERGENCY MEDICINE

## 2025-06-27 PROCEDURE — 82746 ASSAY OF FOLIC ACID SERUM: CPT

## 2025-06-27 PROCEDURE — 36415 COLL VENOUS BLD VENIPUNCTURE: CPT

## 2025-06-27 PROCEDURE — 83930 ASSAY OF BLOOD OSMOLALITY: CPT

## 2025-06-27 PROCEDURE — 82570 ASSAY OF URINE CREATININE: CPT

## 2025-06-27 PROCEDURE — 87040 BLOOD CULTURE FOR BACTERIA: CPT

## 2025-06-27 PROCEDURE — 85025 COMPLETE CBC W/AUTO DIFF WBC: CPT | Performed by: EMERGENCY MEDICINE

## 2025-06-27 PROCEDURE — 84439 ASSAY OF FREE THYROXINE: CPT

## 2025-06-27 PROCEDURE — 83605 ASSAY OF LACTIC ACID: CPT

## 2025-06-27 PROCEDURE — 84443 ASSAY THYROID STIM HORMONE: CPT

## 2025-06-27 PROCEDURE — 80321 ALCOHOLS BIOMARKERS 1OR 2: CPT

## 2025-06-27 PROCEDURE — 2500000001 HC RX 250 WO HCPCS SELF ADMINISTERED DRUGS (ALT 637 FOR MEDICARE OP)

## 2025-06-27 PROCEDURE — 80320 DRUG SCREEN QUANTALCOHOLS: CPT

## 2025-06-27 PROCEDURE — 87631 RESP VIRUS 3-5 TARGETS: CPT

## 2025-06-27 PROCEDURE — 82435 ASSAY OF BLOOD CHLORIDE: CPT | Performed by: EMERGENCY MEDICINE

## 2025-06-27 PROCEDURE — 84481 FREE ASSAY (FT-3): CPT

## 2025-06-27 PROCEDURE — 99285 EMERGENCY DEPT VISIT HI MDM: CPT | Performed by: EMERGENCY MEDICINE

## 2025-06-27 PROCEDURE — 2500000004 HC RX 250 GENERAL PHARMACY W/ HCPCS (ALT 636 FOR OP/ED)

## 2025-06-27 PROCEDURE — 2500000002 HC RX 250 W HCPCS SELF ADMINISTERED DRUGS (ALT 637 FOR MEDICARE OP, ALT 636 FOR OP/ED)

## 2025-06-27 PROCEDURE — 84133 ASSAY OF URINE POTASSIUM: CPT

## 2025-06-27 PROCEDURE — 93005 ELECTROCARDIOGRAM TRACING: CPT

## 2025-06-27 PROCEDURE — 96375 TX/PRO/DX INJ NEW DRUG ADDON: CPT

## 2025-06-27 PROCEDURE — 96365 THER/PROPH/DIAG IV INF INIT: CPT

## 2025-06-27 PROCEDURE — 83735 ASSAY OF MAGNESIUM: CPT

## 2025-06-27 PROCEDURE — 84295 ASSAY OF SERUM SODIUM: CPT

## 2025-06-27 PROCEDURE — 82077 ASSAY SPEC XCP UR&BREATH IA: CPT

## 2025-06-27 PROCEDURE — 87899 AGENT NOS ASSAY W/OPTIC: CPT

## 2025-06-27 PROCEDURE — 71046 X-RAY EXAM CHEST 2 VIEWS: CPT | Mod: FOREIGN READ | Performed by: RADIOLOGY

## 2025-06-27 PROCEDURE — 1200000002 HC GENERAL ROOM WITH TELEMETRY DAILY

## 2025-06-27 PROCEDURE — 96366 THER/PROPH/DIAG IV INF ADDON: CPT

## 2025-06-27 PROCEDURE — 80053 COMPREHEN METABOLIC PANEL: CPT

## 2025-06-27 PROCEDURE — 84484 ASSAY OF TROPONIN QUANT: CPT

## 2025-06-27 PROCEDURE — 84100 ASSAY OF PHOSPHORUS: CPT

## 2025-06-27 PROCEDURE — 93010 ELECTROCARDIOGRAM REPORT: CPT | Performed by: EMERGENCY MEDICINE

## 2025-06-27 PROCEDURE — 82947 ASSAY GLUCOSE BLOOD QUANT: CPT

## 2025-06-27 PROCEDURE — 87075 CULTR BACTERIA EXCEPT BLOOD: CPT

## 2025-06-27 PROCEDURE — 83935 ASSAY OF URINE OSMOLALITY: CPT

## 2025-06-27 PROCEDURE — 82607 VITAMIN B-12: CPT

## 2025-06-27 PROCEDURE — 96367 TX/PROPH/DG ADDL SEQ IV INF: CPT

## 2025-06-27 PROCEDURE — 87449 NOS EACH ORGANISM AG IA: CPT

## 2025-06-27 PROCEDURE — 84132 ASSAY OF SERUM POTASSIUM: CPT | Performed by: EMERGENCY MEDICINE

## 2025-06-27 PROCEDURE — 81003 URINALYSIS AUTO W/O SCOPE: CPT

## 2025-06-27 PROCEDURE — 80143 DRUG ASSAY ACETAMINOPHEN: CPT

## 2025-06-27 PROCEDURE — 36415 COLL VENOUS BLD VENIPUNCTURE: CPT | Performed by: EMERGENCY MEDICINE

## 2025-06-27 RX ORDER — LEVETIRACETAM 10 MG/ML
1000 INJECTION INTRAVASCULAR ONCE
Status: COMPLETED | OUTPATIENT
Start: 2025-06-27 | End: 2025-06-27

## 2025-06-27 RX ORDER — POTASSIUM CHLORIDE 14.9 MG/ML
20 INJECTION INTRAVENOUS ONCE
Status: COMPLETED | OUTPATIENT
Start: 2025-06-27 | End: 2025-06-27

## 2025-06-27 RX ORDER — LANOLIN ALCOHOL/MO/W.PET/CERES
100 CREAM (GRAM) TOPICAL DAILY
Status: ACTIVE | OUTPATIENT
Start: 2025-07-01

## 2025-06-27 RX ORDER — ATORVASTATIN CALCIUM 10 MG/1
10 TABLET, FILM COATED ORAL NIGHTLY
Status: DISPENSED | OUTPATIENT
Start: 2025-06-27

## 2025-06-27 RX ORDER — INSULIN LISPRO 100 [IU]/ML
0-5 INJECTION, SOLUTION INTRAVENOUS; SUBCUTANEOUS EVERY 4 HOURS
Status: DISCONTINUED | OUTPATIENT
Start: 2025-06-27 | End: 2025-06-29

## 2025-06-27 RX ORDER — IBUPROFEN 200 MG
1 TABLET ORAL DAILY
Status: DISPENSED | OUTPATIENT
Start: 2025-06-28

## 2025-06-27 RX ORDER — FORMOTEROL FUMARATE 20 UG/2ML
20 SOLUTION RESPIRATORY (INHALATION)
Status: DISPENSED | OUTPATIENT
Start: 2025-06-27

## 2025-06-27 RX ORDER — TAMSULOSIN HYDROCHLORIDE 0.4 MG/1
0.4 CAPSULE ORAL DAILY
Status: DISPENSED | OUTPATIENT
Start: 2025-06-28

## 2025-06-27 RX ORDER — LANOLIN ALCOHOL/MO/W.PET/CERES
100 CREAM (GRAM) TOPICAL DAILY
Status: DISCONTINUED | OUTPATIENT
Start: 2025-06-30 | End: 2025-06-27

## 2025-06-27 RX ORDER — LEVETIRACETAM 500 MG/1
500 TABLET ORAL 2 TIMES DAILY
Status: DISPENSED | OUTPATIENT
Start: 2025-06-27

## 2025-06-27 RX ORDER — PANTOPRAZOLE SODIUM 40 MG/1
40 TABLET, DELAYED RELEASE ORAL
Status: DISPENSED | OUTPATIENT
Start: 2025-06-28

## 2025-06-27 RX ORDER — MAGNESIUM SULFATE HEPTAHYDRATE 40 MG/ML
2 INJECTION, SOLUTION INTRAVENOUS ONCE
Status: COMPLETED | OUTPATIENT
Start: 2025-06-27 | End: 2025-06-27

## 2025-06-27 RX ORDER — SODIUM CHLORIDE FOR INHALATION 3 %
4 VIAL, NEBULIZER (ML) INHALATION 3 TIMES DAILY
Status: DISPENSED | OUTPATIENT
Start: 2025-06-28

## 2025-06-27 RX ORDER — NYSTATIN 100000 [USP'U]/ML
5 SUSPENSION ORAL 4 TIMES DAILY
Status: DISPENSED | OUTPATIENT
Start: 2025-06-27

## 2025-06-27 RX ORDER — DEXTROSE 50 % IN WATER (D50W) INTRAVENOUS SYRINGE
12.5
Status: ACTIVE | OUTPATIENT
Start: 2025-06-27

## 2025-06-27 RX ORDER — POTASSIUM CHLORIDE 1.5 G/1.58G
40 POWDER, FOR SOLUTION ORAL ONCE
Status: COMPLETED | OUTPATIENT
Start: 2025-06-27 | End: 2025-06-27

## 2025-06-27 RX ORDER — DIAZEPAM 5 MG/1
10 TABLET ORAL EVERY 2 HOUR PRN
Status: DISPENSED | OUTPATIENT
Start: 2025-06-27

## 2025-06-27 RX ORDER — MONTELUKAST SODIUM 10 MG/1
10 TABLET ORAL NIGHTLY
Status: DISPENSED | OUTPATIENT
Start: 2025-06-27

## 2025-06-27 RX ORDER — MULTIVIT-MIN/IRON FUM/FOLIC AC 7.5 MG-4
1 TABLET ORAL DAILY
Status: DISPENSED | OUTPATIENT
Start: 2025-06-27

## 2025-06-27 RX ORDER — ALBUTEROL SULFATE 90 UG/1
2 INHALANT RESPIRATORY (INHALATION) EVERY 4 HOURS PRN
Status: DISPENSED | OUTPATIENT
Start: 2025-06-27

## 2025-06-27 RX ORDER — HYDROCORTISONE 25 MG/G
CREAM TOPICAL 2 TIMES DAILY PRN
Status: ACTIVE | OUTPATIENT
Start: 2025-06-27

## 2025-06-27 RX ORDER — THIAMINE HYDROCHLORIDE 100 MG/ML
100 INJECTION, SOLUTION INTRAMUSCULAR; INTRAVENOUS DAILY
Status: COMPLETED | OUTPATIENT
Start: 2025-06-27 | End: 2025-06-29

## 2025-06-27 RX ORDER — CHOLESTYRAMINE 4 G/9G
4 POWDER, FOR SUSPENSION ORAL
Status: DISPENSED | OUTPATIENT
Start: 2025-06-28

## 2025-06-27 RX ORDER — ENOXAPARIN SODIUM 100 MG/ML
30 INJECTION SUBCUTANEOUS EVERY 24 HOURS
Status: DISPENSED | OUTPATIENT
Start: 2025-06-27

## 2025-06-27 RX ORDER — ROPINIROLE 0.5 MG/1
0.5 TABLET, FILM COATED ORAL 2 TIMES DAILY
Status: DISPENSED | OUTPATIENT
Start: 2025-06-28

## 2025-06-27 RX ORDER — IPRATROPIUM BROMIDE AND ALBUTEROL SULFATE 2.5; .5 MG/3ML; MG/3ML
3 SOLUTION RESPIRATORY (INHALATION)
Status: DISCONTINUED | OUTPATIENT
Start: 2025-06-28 | End: 2025-06-28

## 2025-06-27 RX ORDER — FOLIC ACID 1 MG/1
1 TABLET ORAL DAILY
Status: DISPENSED | OUTPATIENT
Start: 2025-06-27

## 2025-06-27 RX ORDER — AMMONIUM LACTATE 12 G/100G
CREAM TOPICAL AS NEEDED
Status: DISPENSED | OUTPATIENT
Start: 2025-06-27

## 2025-06-27 RX ORDER — DEXTROSE 50 % IN WATER (D50W) INTRAVENOUS SYRINGE
25
Status: ACTIVE | OUTPATIENT
Start: 2025-06-27

## 2025-06-27 RX ORDER — CALCIUM GLUCONATE 20 MG/ML
1 INJECTION, SOLUTION INTRAVENOUS ONCE
Status: COMPLETED | OUTPATIENT
Start: 2025-06-27 | End: 2025-06-27

## 2025-06-27 RX ADMIN — DIAZEPAM 10 MG: 5 TABLET ORAL at 22:44

## 2025-06-27 RX ADMIN — POTASSIUM CHLORIDE 20 MEQ: 14.9 INJECTION, SOLUTION INTRAVENOUS at 19:48

## 2025-06-27 RX ADMIN — DIBASIC SODIUM PHOSPHATE, MONOBASIC POTASSIUM PHOSPHATE AND MONOBASIC SODIUM PHOSPHATE 250 MG: 852; 155; 130 TABLET ORAL at 21:45

## 2025-06-27 RX ADMIN — SODIUM CHLORIDE 1000 ML: 0.9 INJECTION, SOLUTION INTRAVENOUS at 20:50

## 2025-06-27 RX ADMIN — POTASSIUM CHLORIDE 40 MEQ: 1.5 POWDER, FOR SOLUTION ORAL at 19:51

## 2025-06-27 RX ADMIN — MAGNESIUM SULFATE HEPTAHYDRATE 2 G: 40 INJECTION, SOLUTION INTRAVENOUS at 19:47

## 2025-06-27 RX ADMIN — FOLIC ACID 1 MG: 1 TABLET ORAL at 18:26

## 2025-06-27 RX ADMIN — THIAMINE HYDROCHLORIDE 100 MG: 100 INJECTION, SOLUTION INTRAMUSCULAR; INTRAVENOUS at 18:26

## 2025-06-27 RX ADMIN — SODIUM CHLORIDE 1000 ML: 0.9 INJECTION, SOLUTION INTRAVENOUS at 18:19

## 2025-06-27 RX ADMIN — LEVETIRACETAM 1000 MG: 10 INJECTION INTRAVENOUS at 18:02

## 2025-06-27 RX ADMIN — LEVETIRACETAM 1000 MG: 10 INJECTION INTRAVENOUS at 18:16

## 2025-06-27 RX ADMIN — CALCIUM GLUCONATE 1 G: 20 INJECTION, SOLUTION INTRAVENOUS at 20:50

## 2025-06-27 RX ADMIN — Medication 1 TABLET: at 18:26

## 2025-06-27 RX ADMIN — DIAZEPAM 10 MG: 5 TABLET ORAL at 18:26

## 2025-06-27 SDOH — SOCIAL STABILITY: SOCIAL INSECURITY: ARE YOU OR HAVE YOU BEEN THREATENED OR ABUSED PHYSICALLY, EMOTIONALLY, OR SEXUALLY BY ANYONE?: NO

## 2025-06-27 SDOH — SOCIAL STABILITY: SOCIAL INSECURITY: DO YOU FEEL UNSAFE GOING BACK TO THE PLACE WHERE YOU ARE LIVING?: NO

## 2025-06-27 SDOH — SOCIAL STABILITY: SOCIAL INSECURITY: ARE THERE ANY APPARENT SIGNS OF INJURIES/BEHAVIORS THAT COULD BE RELATED TO ABUSE/NEGLECT?: NO

## 2025-06-27 SDOH — SOCIAL STABILITY: SOCIAL INSECURITY: HAS ANYONE EVER THREATENED TO HURT YOUR FAMILY OR YOUR PETS?: NO

## 2025-06-27 SDOH — SOCIAL STABILITY: SOCIAL INSECURITY: ABUSE: ADULT

## 2025-06-27 SDOH — SOCIAL STABILITY: SOCIAL INSECURITY: DOES ANYONE TRY TO KEEP YOU FROM HAVING/CONTACTING OTHER FRIENDS OR DOING THINGS OUTSIDE YOUR HOME?: NO

## 2025-06-27 SDOH — SOCIAL STABILITY: SOCIAL INSECURITY: HAVE YOU HAD THOUGHTS OF HARMING ANYONE ELSE?: NO

## 2025-06-27 SDOH — SOCIAL STABILITY: SOCIAL INSECURITY: DO YOU FEEL ANYONE HAS EXPLOITED OR TAKEN ADVANTAGE OF YOU FINANCIALLY OR OF YOUR PERSONAL PROPERTY?: NO

## 2025-06-27 SDOH — SOCIAL STABILITY: SOCIAL INSECURITY: HAVE YOU HAD ANY THOUGHTS OF HARMING ANYONE ELSE?: NO

## 2025-06-27 ASSESSMENT — LIFESTYLE VARIABLES
BLOOD PRESSURE: 105/93
AGITATION: NORMAL ACTIVITY
ANXIETY: NO ANXIETY, AT EASE
AUDITORY DISTURBANCES: NOT PRESENT
BLOOD PRESSURE: 104/73
HEADACHE, FULLNESS IN HEAD: VERY MILD
PAROXYSMAL SWEATS: BARELY PERCEPTIBLE SWEATING, PALMS MOIST
PAROXYSMAL SWEATS: BARELY PERCEPTIBLE SWEATING, PALMS MOIST
TREMOR: MODERATE, WITH PATIENT'S ARMS EXTENDED
HOW OFTEN DO YOU HAVE 6 OR MORE DRINKS ON ONE OCCASION: DAILY OR ALMOST DAILY
TACTILE DISTURBANCES: MILD ITCHING, PINS AND NEEDLES, BURNING OR NUMBNESS
ANXIETY: NO ANXIETY, AT EASE
ORIENTATION AND CLOUDING OF SENSORIUM: ORIENTED AND CAN DO SERIAL ADDITIONS
PRESCIPTION_ABUSE_PAST_12_MONTHS: NO
NAUSEA AND VOMITING: MILD NAUSEA WITH NO VOMITING
NAUSEA AND VOMITING: MILD NAUSEA WITH NO VOMITING
HOW OFTEN DURING THE LAST YEAR HAVE YOU BEEN UNABLE TO REMEMBER WHAT HAPPENED THE NIGHT BEFORE BECAUSE YOU HAD BEEN DRINKING: DAILY OR ALMOST DAILY
AUDIT TOTAL SCORE: 30
ANXIETY: NO ANXIETY, AT EASE
TACTILE DISTURBANCES: MILD ITCHING, PINS AND NEEDLES, BURNING OR NUMBNESS
TOTAL SCORE: 12
HEADACHE, FULLNESS IN HEAD: VERY MILD
TREMOR: MODERATE, WITH PATIENT'S ARMS EXTENDED
TOTAL SCORE: 10
HOW MANY STANDARD DRINKS CONTAINING ALCOHOL DO YOU HAVE ON A TYPICAL DAY: 3 OR 4
VISUAL DISTURBANCES: VERY MILD SENSITIVITY
VISUAL DISTURBANCES: VERY MILD SENSITIVITY
ANXIETY: NO ANXIETY, AT EASE
HAS A RELATIVE, FRIEND, DOCTOR, OR ANOTHER HEALTH PROFESSIONAL EXPRESSED CONCERN ABOUT YOUR DRINKING OR SUGGESTED YOU CUT DOWN: YES, DURING THE LAST YEAR
ORIENTATION AND CLOUDING OF SENSORIUM: ORIENTED AND CAN DO SERIAL ADDITIONS
SKIP TO QUESTIONS 9-10: 0
HEADACHE, FULLNESS IN HEAD: VERY MILD
HAVE YOU OR SOMEONE ELSE BEEN INJURED AS A RESULT OF YOUR DRINKING: NO
PULSE: 88
TOTAL SCORE: 10
AUDITORY DISTURBANCES: NOT PRESENT
PULSE: 84
TREMOR: MODERATE, WITH PATIENT'S ARMS EXTENDED
AUDIT-C TOTAL SCORE: 9
TACTILE DISTURBANCES: MILD ITCHING, PINS AND NEEDLES, BURNING OR NUMBNESS
NAUSEA AND VOMITING: 2
HOW OFTEN DO YOU HAVE A DRINK CONTAINING ALCOHOL: 4 OR MORE TIMES A WEEK
TREMOR: MODERATE, WITH PATIENT'S ARMS EXTENDED
TOTAL SCORE: 11
AUDITORY DISTURBANCES: NOT PRESENT
AUDIT TOTAL SCORE: 21
HOW OFTEN DURING THE LAST YEAR HAVE YOU NEEDED AN ALCOHOLIC DRINK FIRST THING IN THE MORNING TO GET YOURSELF GOING AFTER A NIGHT OF HEAVY DRINKING: DAILY OR ALMOST DAILY
PAROXYSMAL SWEATS: BARELY PERCEPTIBLE SWEATING, PALMS MOIST
AGITATION: NORMAL ACTIVITY
AGITATION: NORMAL ACTIVITY
ORIENTATION AND CLOUDING OF SENSORIUM: ORIENTED AND CAN DO SERIAL ADDITIONS
HOW OFTEN DURING THE LAST YEAR HAVE YOU HAD A FEELING OF GUILT OR REMORSE AFTER DRINKING: DAILY OR ALMOST DAILY
HOW OFTEN DURING THE LAST YEAR HAVE YOU FAILED TO DO WHAT WAS NORMALLY EXPECTED FROM YOU BECAUSE OF DRINKING: LESS THAN MONTHLY
ORIENTATION AND CLOUDING OF SENSORIUM: ORIENTED AND CAN DO SERIAL ADDITIONS
HOW OFTEN DURING THE LAST YEAR HAVE YOU FOUND THAT YOU WERE NOT ABLE TO STOP DRINKING ONCE YOU HAD STARTED: DAILY OR ALMOST DAILY
PAROXYSMAL SWEATS: BARELY PERCEPTIBLE SWEATING, PALMS MOIST
HEADACHE, FULLNESS IN HEAD: VERY MILD
AUDIT-C TOTAL SCORE: 9
NAUSEA AND VOMITING: MILD NAUSEA WITH NO VOMITING
TACTILE DISTURBANCES: MILD ITCHING, PINS AND NEEDLES, BURNING OR NUMBNESS
BLOOD PRESSURE: 106/83
AGITATION: NORMAL ACTIVITY
AUDITORY DISTURBANCES: NOT PRESENT
SUBSTANCE_ABUSE_PAST_12_MONTHS: YES
VISUAL DISTURBANCES: MODERATE SENSITIVITY
VISUAL DISTURBANCES: VERY MILD SENSITIVITY

## 2025-06-27 ASSESSMENT — ENCOUNTER SYMPTOMS
SORE THROAT: 1
DIARRHEA: 1
NAUSEA: 1
SEIZURES: 1
ABDOMINAL PAIN: 1
CHEST TIGHTNESS: 1
CHILLS: 1
DIZZINESS: 1
ACTIVITY CHANGE: 1
VOMITING: 1
DIFFICULTY URINATING: 1
APPETITE CHANGE: 1
FATIGUE: 1

## 2025-06-27 ASSESSMENT — PATIENT HEALTH QUESTIONNAIRE - PHQ9
1. LITTLE INTEREST OR PLEASURE IN DOING THINGS: NOT AT ALL
2. FEELING DOWN, DEPRESSED OR HOPELESS: NOT AT ALL
SUM OF ALL RESPONSES TO PHQ9 QUESTIONS 1 & 2: 0

## 2025-06-27 ASSESSMENT — ACTIVITIES OF DAILY LIVING (ADL)
FEEDING YOURSELF: INDEPENDENT
HEARING - RIGHT EAR: FUNCTIONAL
GROOMING: INDEPENDENT
ADEQUATE_TO_COMPLETE_ADL: YES
DRESSING YOURSELF: INDEPENDENT
JUDGMENT_ADEQUATE_SAFELY_COMPLETE_DAILY_ACTIVITIES: YES
HEARING - LEFT EAR: FUNCTIONAL
BATHING: INDEPENDENT
PATIENT'S MEMORY ADEQUATE TO SAFELY COMPLETE DAILY ACTIVITIES?: YES
TOILETING: NEEDS ASSISTANCE
WALKS IN HOME: INDEPENDENT

## 2025-06-27 ASSESSMENT — COGNITIVE AND FUNCTIONAL STATUS - GENERAL
TOILETING: A LITTLE
PERSONAL GROOMING: A LITTLE
DRESSING REGULAR LOWER BODY CLOTHING: A LITTLE
DAILY ACTIVITIY SCORE: 18
HELP NEEDED FOR BATHING: A LITTLE
MOBILITY SCORE: 24
DRESSING REGULAR UPPER BODY CLOTHING: A LITTLE
EATING MEALS: A LITTLE
PATIENT BASELINE BEDBOUND: NO

## 2025-06-27 ASSESSMENT — PAIN - FUNCTIONAL ASSESSMENT
PAIN_FUNCTIONAL_ASSESSMENT: 0-10
PAIN_FUNCTIONAL_ASSESSMENT: 0-10

## 2025-06-27 ASSESSMENT — PAIN DESCRIPTION - LOCATION: LOCATION: SACRUM

## 2025-06-27 ASSESSMENT — PAIN SCALES - GENERAL
PAINLEVEL_OUTOF10: 10 - WORST POSSIBLE PAIN
PAINLEVEL_OUTOF10: 5 - MODERATE PAIN

## 2025-06-27 ASSESSMENT — PAIN DESCRIPTION - PROGRESSION: CLINICAL_PROGRESSION: NOT CHANGED

## 2025-06-27 ASSESSMENT — PAIN DESCRIPTION - PAIN TYPE: TYPE: ACUTE PAIN

## 2025-06-27 NOTE — ED PROVIDER NOTES
History of Present Illness     History provided by: Patient  Limitations to History: None  External Records Reviewed with Brief Summary: Discharge Summary from 4/19/25 which showed melena and coffee ground emesis c/f UGIB    HPI:  Servando Leigh is a 60 y.o. male PMHx s/f COPD, MAC infection, alcohol use disorder, bronchiectasis, seizures, chronic perianal and scrotal abscesses here, chiquita-anal fistula s/p fistulotomy, and chronic diarrhea, presenting to ED today due to persistent non-bloody  N&V where he cannot keep his Keppra or other home medications down.      Per interview, pt describes persistently vomiting despite not eating much over last 48 hours.  He decided to stop drinking alcohol, last drinking 3 cans of 25 fl oz beer on Tues 6/24 with no drinking on 6/23-6/22 after about 1 month of drinking daily. He reports first drink at age 10, and that he's gone some periods with no alcohol use, but most of the time he drinks daily drinking beer first thing in the morning. He describes having 4 seizures in the past 2 days where he blacks out and and is confused upon regaining consciousness.  New cough with yellow sputum.  Endorses burning midline abdominal pain and very sore throat. Abd and epigastric pain spreads into his chest. Denies blood in stools, hematuria, or blood in emesis or sputum. Endorses frequent hot and cold sweats.  Cannot remember the last time he had substantial po intake. Comments is more difficult to urinate over last few days. He thinks he fell during each of his seizures complaining of severe coccyx pain. He reports struggling to stand up due to intense fatigue.     Other social history, 5 cigarettes daily, smokes THC unknown amount daily no edibles, denies all other drug use. Lives in public housing building with his 6 yo son, who is currently being taken care of by the pt's sister.       Physical Exam   Triage vitals:  T    HR 87  /66  RR 18  O2 95 % None (Room air)    Physical  Exam  Vitals and nursing note reviewed.   Constitutional:       Appearance: He is ill-appearing and diaphoretic.      Comments: Incredibly cachectic with deep depressions in b/l supraclavicular region   HENT:      Head: Normocephalic and atraumatic.      Nose: Nose normal. No congestion or rhinorrhea.      Mouth/Throat:      Mouth: Mucous membranes are dry.      Pharynx: Oropharynx is clear. No posterior oropharyngeal erythema.   Eyes:      General: No scleral icterus.     Extraocular Movements: Extraocular movements intact.      Conjunctiva/sclera: Conjunctivae normal.      Pupils: Pupils are equal, round, and reactive to light.   Cardiovascular:      Rate and Rhythm: Normal rate and regular rhythm.      Heart sounds: Normal heart sounds. No murmur heard.     No friction rub. No gallop.   Pulmonary:      Effort: Accessory muscle usage present.      Breath sounds: Normal air entry. Examination of the right-lower field reveals rhonchi. Examination of the left-lower field reveals rhonchi. Rhonchi present.   Abdominal:      General: Abdomen is flat.      Palpations: There is hepatomegaly. There is no mass or pulsatile mass.      Tenderness: There is abdominal tenderness in the right upper quadrant, epigastric area and left upper quadrant. There is guarding.      Hernia: No hernia is present.   Musculoskeletal:         General: No deformity.      Cervical back: Neck supple.      Right lower leg: No edema.      Left lower leg: No edema.   Skin:     General: Skin is warm.      Capillary Refill: Capillary refill takes less than 2 seconds.      Coloration: Skin is not jaundiced.      Findings: No bruising or rash.   Neurological:      Mental Status: He is oriented to person, place, and time. Mental status is at baseline.      Comments: moving all limbs spontaneously, follows commands, fine tremor in b/l hands   Psychiatric:         Mood and Affect: Mood normal.         Behavior: Behavior normal.         Judgment: Judgment  normal.          Medical Decision Making & ED Course   Medical Decision Makin y.o. male PMHx s/f COPD, MAC infection, alcohol use disorder, bronchiectasis, seizures, chronic perianal and scrotal abscesses here, chiquita-anal fistula s/p fistulotomy, and chronic diarrhea, presenting to ED today due to persistent nonbloody  N&V where he cannot keep his Keppra or other home medications down.  VBG shows critical values of Na 118, K or 2.6, and lactate of 4.6.  Hypovolemic, administered 2L NS IV in ED.  Restarted keppra IV. Replenished sodium, potassium, calcium, magnesium, and gave folic acid, thiamine, and multivitamins. Initiated CIWAs with valium 10mg as PRN based on hx of COPD. UA with urine osmolality, reflex culture, and electrolytes pending.  POCUS revealed no signed of AAA. Pending CT head &cervical spine wo contrast due to unwitnessed fall in setting of heavy alcohol use disorder and withdrawal.  CT chest abd pel w contrast pending to assess abdominal anatomy given epigastric pain and history of intraabdominal, perianal, and groin of abscesses. RFP to be collected Q4H. PIV established in b/l arms. Admitted to MICU, accepted by Attending Dr. Gayle Espinosa.   ----  Scoring Tools Utilized:   Leah Coma Scale Score: 15     Differential diagnoses considered include but are not limited to: severe electrolyte disturbance, cachexia, alcohol withdrawal, recurrent seizure, infection of perianal fistula, aspiration pneumonia, CAP     Social Determinants of Health which Significantly Impact Care: Substance use disorder The following actions were taken to address these social determinants: is being admitted to ICU, prioritizing correcting his electrolyte and EKG abnormalities while in the ED    EKG Independent Interpretation: The EKG obtained at 19:01 was independently interpreted by myself. It demonstrates prolonged QTC: 506/587. Normal sinus rhythm with a ventricular rate of 81 bpm.  TX intervals 142ms. ST segments showed  no elevations or abnormalities compared to prior EKG from 4/15/25.    Independent Result Review and Interpretation: Relevant laboratory and radiographic results were reviewed and independently interpreted by myself.  As necessary, they are commented on in the ED Course.    Chronic conditions affecting the patient's care: As documented above in [facility]    The patient was discussed with the following consultants/services:    ICU attending who accepted the patient for admission , Dr. Gayle Gomez    Care Considerations: As documented above in [facility]    ED Course:  ED Course as of 06/27/25 2259 Fri Jun 27, 2025 1757 Blood Gas Venous Full Panel Unsolicited(!!) [SC]   2044 Supervising Resident Summary:   Patient is a 60 y.o. male PMHx s/f COPD, MAC infection, alcohol use disorder, bronchiectasis, seizures, chronic perianal and scrotal abscesses here, chiquita-anal fistula s/p fistulotomy, and chronic diarrhea presenting after a seizure. He has been having profound nausea vomiting and diarrhea over the last week. He had stopped drinking over the weekend and attempt to quit. He had had a single beer on Tuesday that did not help. He also did not take his Keppra for the last 3 days. We Keppra loaded him, he has no acute neurodeficits, is alert and oriented. I believe that his seizure was more likely secondary to his missed Keppra and then the hyponatremia, however he has profound electrolyte derangements including hyponatremia (120) hypophosphatemia (2.1) hypocalcemia (0.96) and hypokalemia (2.4) - likely related to hypovolemia and GI losses. QTC prolonged on EKG. We have placed him on CIWA protocol incase this was withdrawal related. He has not withdrawn in the past. Given patient's history of recurrent infections, chart reviewed and HIV neg in last year. Hep C neg. S/p tx for Hep B. We're obtaining Cts for further work up, but given his significant electrolyte derangements patient be admitted to the ICU for close neuromonitoring and  cardiac monitoring in the setting of electrolyte derangements. [SC]   2057 ECG 12 lead  EKG independently interpreted with rate of 81 sinus rhythm SD interval 142 MS QRS 88 MS within normal limits QTc significantly prolonged at 587.  No acute ST segment elevations or T wave inversions concerning for acute ischemia.  Regular axis. [SC]      ED Course User Index  [SC] Tanna Bob DO         Diagnoses as of 06/27/25 2259   Hyponatremia   Seizure (Multi)   Alcohol use disorder   Nausea, vomiting and diarrhea   Dehydration     Disposition   Admitted to MICU, accepted by Attending Dr. Gayle Gomez     Procedures   none    Patient seen and discussed with ED attending physician. Senior Resident Dr. Tanna Bob supervised by Attending Dr. Ignacio Dickens.    Vu Deshpande MD PGY-1  Emergency Medicine       Vu Deshpande MD  Resident  06/27/25 6932

## 2025-06-27 NOTE — ED TRIAGE NOTES
Pt coming in today for nausea and vomiting that has been happening for last 2-3 days pt unable to keep any medications down including anti seizure meds. Pt reported off having 4 seizure in last 2 days with the last being approx 1-2 hours ago. Pt reports when falling onto ground and obtaining wound on bottom. Pt at triage A&Ox4. Pt unable to walk from ems cot to beside without violence shaking/tremors/weakness.

## 2025-06-28 ENCOUNTER — APPOINTMENT (OUTPATIENT)
Dept: RADIOLOGY | Facility: HOSPITAL | Age: 60
End: 2025-06-28
Payer: COMMERCIAL

## 2025-06-28 ENCOUNTER — APPOINTMENT (OUTPATIENT)
Dept: NEUROLOGY | Facility: HOSPITAL | Age: 60
End: 2025-06-28
Payer: COMMERCIAL

## 2025-06-28 LAB
ALBUMIN SERPL BCP-MCNC: 3 G/DL (ref 3.4–5)
ALBUMIN SERPL BCP-MCNC: 3.1 G/DL (ref 3.4–5)
AMPHETAMINES UR QL SCN: ABNORMAL
ANION GAP SERPL CALC-SCNC: 10 MMOL/L (ref 10–20)
ANION GAP SERPL CALC-SCNC: 13 MMOL/L (ref 10–20)
ATRIAL RATE: 81 BPM
BARBITURATES UR QL SCN: ABNORMAL
BASOPHILS # BLD AUTO: 0.02 X10*3/UL (ref 0–0.1)
BASOPHILS NFR BLD AUTO: 0.2 %
BENZODIAZ UR QL SCN: ABNORMAL
BUN SERPL-MCNC: 11 MG/DL (ref 6–23)
BUN SERPL-MCNC: 12 MG/DL (ref 6–23)
BZE UR QL SCN: ABNORMAL
C COLI+JEJ+UPSA DNA STL QL NAA+PROBE: NOT DETECTED
C DIF TOX TCDA+TCDB STL QL NAA+PROBE: NOT DETECTED
CALCIUM SERPL-MCNC: 7.4 MG/DL (ref 8.6–10.6)
CALCIUM SERPL-MCNC: 7.8 MG/DL (ref 8.6–10.6)
CANNABINOIDS UR QL SCN: ABNORMAL
CHLORIDE SERPL-SCNC: 81 MMOL/L (ref 98–107)
CHLORIDE SERPL-SCNC: 88 MMOL/L (ref 98–107)
CHLORIDE UR-SCNC: <15 MMOL/L
CHLORIDE/CREATININE (MMOL/G) IN URINE: NORMAL
CO2 SERPL-SCNC: 31 MMOL/L (ref 21–32)
CO2 SERPL-SCNC: 31 MMOL/L (ref 21–32)
CREAT SERPL-MCNC: 1.34 MG/DL (ref 0.5–1.3)
CREAT SERPL-MCNC: 1.51 MG/DL (ref 0.5–1.3)
CREAT UR-MCNC: 100.7 MG/DL (ref 20–370)
EC STX1 GENE STL QL NAA+PROBE: NOT DETECTED
EC STX2 GENE STL QL NAA+PROBE: NOT DETECTED
EGFRCR SERPLBLD CKD-EPI 2021: 53 ML/MIN/1.73M*2
EGFRCR SERPLBLD CKD-EPI 2021: 61 ML/MIN/1.73M*2
EOSINOPHIL # BLD AUTO: 0.04 X10*3/UL (ref 0–0.7)
EOSINOPHIL NFR BLD AUTO: 0.4 %
ERYTHROCYTE [DISTWIDTH] IN BLOOD BY AUTOMATED COUNT: 14.9 % (ref 11.5–14.5)
ETHANOL SERPL-MCNC: <10 MG/DL
FENTANYL+NORFENTANYL UR QL SCN: ABNORMAL
FLUAV RNA RESP QL NAA+PROBE: NOT DETECTED
FLUBV RNA RESP QL NAA+PROBE: NOT DETECTED
FOLATE SERPL-MCNC: >24 NG/ML
GLUCOSE BLD MANUAL STRIP-MCNC: 121 MG/DL (ref 74–99)
GLUCOSE BLD MANUAL STRIP-MCNC: 141 MG/DL (ref 74–99)
GLUCOSE BLD MANUAL STRIP-MCNC: 173 MG/DL (ref 74–99)
GLUCOSE BLD MANUAL STRIP-MCNC: 90 MG/DL (ref 74–99)
GLUCOSE SERPL-MCNC: 110 MG/DL (ref 74–99)
GLUCOSE SERPL-MCNC: 92 MG/DL (ref 74–99)
HADV DNA SPEC QL NAA+PROBE: NOT DETECTED
HCT VFR BLD AUTO: 29.4 % (ref 41–52)
HGB BLD-MCNC: 10 G/DL (ref 13.5–17.5)
HMPV RNA SPEC QL NAA+PROBE: NOT DETECTED
HPIV1 RNA SPEC QL NAA+PROBE: NOT DETECTED
HPIV2 RNA SPEC QL NAA+PROBE: NOT DETECTED
HPIV3 RNA SPEC QL NAA+PROBE: NOT DETECTED
HPIV4 RNA SPEC QL NAA+PROBE: NOT DETECTED
IMM GRANULOCYTES # BLD AUTO: 0.05 X10*3/UL (ref 0–0.7)
IMM GRANULOCYTES NFR BLD AUTO: 0.5 % (ref 0–0.9)
LEGIONELLA AG UR QL: NEGATIVE
LYMPHOCYTES # BLD AUTO: 1.79 X10*3/UL (ref 1.2–4.8)
LYMPHOCYTES NFR BLD AUTO: 17.4 %
MAGNESIUM SERPL-MCNC: 2.39 MG/DL (ref 1.6–2.4)
MCH RBC QN AUTO: 28.8 PG (ref 26–34)
MCHC RBC AUTO-ENTMCNC: 34 G/DL (ref 32–36)
MCV RBC AUTO: 85 FL (ref 80–100)
METHADONE UR QL SCN: ABNORMAL
MONOCYTES # BLD AUTO: 0.41 X10*3/UL (ref 0.1–1)
MONOCYTES NFR BLD AUTO: 4 %
NEUTROPHILS # BLD AUTO: 7.95 X10*3/UL (ref 1.2–7.7)
NEUTROPHILS NFR BLD AUTO: 77.5 %
NOROVIRUS GI + GII RNA STL NAA+PROBE: NOT DETECTED
NRBC BLD-RTO: 0 /100 WBCS (ref 0–0)
OPIATES UR QL SCN: ABNORMAL
OSMOLALITY UR: 151 MOSM/KG (ref 200–1200)
OXYCODONE+OXYMORPHONE UR QL SCN: ABNORMAL
P AXIS: 75 DEGREES
P OFFSET: 192 MS
P ONSET: 148 MS
PCP UR QL SCN: ABNORMAL
PHOSPHATE SERPL-MCNC: 2 MG/DL (ref 2.5–4.9)
PHOSPHATE SERPL-MCNC: 2.7 MG/DL (ref 2.5–4.9)
PLATELET # BLD AUTO: 115 X10*3/UL (ref 150–450)
POTASSIUM SERPL-SCNC: 3.2 MMOL/L (ref 3.5–5.3)
POTASSIUM SERPL-SCNC: 3.4 MMOL/L (ref 3.5–5.3)
POTASSIUM UR-SCNC: 20 MMOL/L
POTASSIUM/CREAT UR-RTO: 20 MMOL/G CREAT
PR INTERVAL: 142 MS
Q ONSET: 219 MS
QRS COUNT: 13 BEATS
QRS DURATION: 88 MS
QT INTERVAL: 506 MS
QTC CALCULATION(BAZETT): 587 MS
QTC FREDERICIA: 559 MS
R AXIS: 82 DEGREES
RBC # BLD AUTO: 3.47 X10*6/UL (ref 4.5–5.9)
RHINOVIRUS RNA UPPER RESP QL NAA+PROBE: NOT DETECTED
RSV RNA RESP QL NAA+PROBE: NOT DETECTED
RV RNA STL NAA+PROBE: NOT DETECTED
S PNEUM AG UR QL: NEGATIVE
SALMONELLA DNA STL QL NAA+PROBE: NOT DETECTED
SARS-COV-2 RNA RESP QL NAA+PROBE: NOT DETECTED
SHIGELLA DNA SPEC QL NAA+PROBE: NOT DETECTED
SODIUM SERPL-SCNC: 122 MMOL/L (ref 136–145)
SODIUM SERPL-SCNC: 126 MMOL/L (ref 136–145)
SODIUM UR-SCNC: <10 MMOL/L
SODIUM/CREAT UR-RTO: NORMAL
T AXIS: 83 DEGREES
T OFFSET: 472 MS
T4 FREE SERPL-MCNC: 1.1 NG/DL (ref 0.78–1.48)
TSH SERPL-ACNC: 0.41 MIU/L (ref 0.44–3.98)
V CHOLERAE DNA STL QL NAA+PROBE: NOT DETECTED
VENTRICULAR RATE: 81 BPM
VIT B12 SERPL-MCNC: 1491 PG/ML (ref 211–911)
WBC # BLD AUTO: 10.3 X10*3/UL (ref 4.4–11.3)
Y ENTEROCOL DNA STL QL NAA+PROBE: NOT DETECTED

## 2025-06-28 PROCEDURE — 2500000004 HC RX 250 GENERAL PHARMACY W/ HCPCS (ALT 636 FOR OP/ED)

## 2025-06-28 PROCEDURE — 36415 COLL VENOUS BLD VENIPUNCTURE: CPT

## 2025-06-28 PROCEDURE — S4991 NICOTINE PATCH NONLEGEND: HCPCS

## 2025-06-28 PROCEDURE — 74177 CT ABD & PELVIS W/CONTRAST: CPT | Performed by: RADIOLOGY

## 2025-06-28 PROCEDURE — 82947 ASSAY GLUCOSE BLOOD QUANT: CPT

## 2025-06-28 PROCEDURE — 2500000002 HC RX 250 W HCPCS SELF ADMINISTERED DRUGS (ALT 637 FOR MEDICARE OP, ALT 636 FOR OP/ED)

## 2025-06-28 PROCEDURE — 72125 CT NECK SPINE W/O DYE: CPT

## 2025-06-28 PROCEDURE — 72125 CT NECK SPINE W/O DYE: CPT | Performed by: RADIOLOGY

## 2025-06-28 PROCEDURE — 74177 CT ABD & PELVIS W/CONTRAST: CPT

## 2025-06-28 PROCEDURE — 2500000001 HC RX 250 WO HCPCS SELF ADMINISTERED DRUGS (ALT 637 FOR MEDICARE OP)

## 2025-06-28 PROCEDURE — 87493 C DIFF AMPLIFIED PROBE: CPT

## 2025-06-28 PROCEDURE — 84100 ASSAY OF PHOSPHORUS: CPT

## 2025-06-28 PROCEDURE — 80069 RENAL FUNCTION PANEL: CPT

## 2025-06-28 PROCEDURE — 1100000001 HC PRIVATE ROOM DAILY

## 2025-06-28 PROCEDURE — 2500000002 HC RX 250 W HCPCS SELF ADMINISTERED DRUGS (ALT 637 FOR MEDICARE OP, ALT 636 FOR OP/ED): Performed by: STUDENT IN AN ORGANIZED HEALTH CARE EDUCATION/TRAINING PROGRAM

## 2025-06-28 PROCEDURE — 2500000005 HC RX 250 GENERAL PHARMACY W/O HCPCS

## 2025-06-28 PROCEDURE — 95715 VEEG EA 12-26HR INTMT MNTR: CPT

## 2025-06-28 PROCEDURE — 85025 COMPLETE CBC W/AUTO DIFF WBC: CPT

## 2025-06-28 PROCEDURE — 70450 CT HEAD/BRAIN W/O DYE: CPT

## 2025-06-28 PROCEDURE — 95720 EEG PHY/QHP EA INCR W/VEEG: CPT | Performed by: STUDENT IN AN ORGANIZED HEALTH CARE EDUCATION/TRAINING PROGRAM

## 2025-06-28 PROCEDURE — 2500000001 HC RX 250 WO HCPCS SELF ADMINISTERED DRUGS (ALT 637 FOR MEDICARE OP): Performed by: STUDENT IN AN ORGANIZED HEALTH CARE EDUCATION/TRAINING PROGRAM

## 2025-06-28 PROCEDURE — 70450 CT HEAD/BRAIN W/O DYE: CPT | Performed by: RADIOLOGY

## 2025-06-28 PROCEDURE — 71260 CT THORAX DX C+: CPT | Performed by: RADIOLOGY

## 2025-06-28 PROCEDURE — 2550000001 HC RX 255 CONTRASTS: Performed by: STUDENT IN AN ORGANIZED HEALTH CARE EDUCATION/TRAINING PROGRAM

## 2025-06-28 PROCEDURE — 94640 AIRWAY INHALATION TREATMENT: CPT

## 2025-06-28 PROCEDURE — 87506 IADNA-DNA/RNA PROBE TQ 6-11: CPT

## 2025-06-28 PROCEDURE — 95700 EEG CONT REC W/VID EEG TECH: CPT

## 2025-06-28 RX ORDER — HYDROXYZINE HYDROCHLORIDE 25 MG/1
25 TABLET, FILM COATED ORAL ONCE
Status: COMPLETED | OUTPATIENT
Start: 2025-06-28 | End: 2025-06-29

## 2025-06-28 RX ORDER — TRAMADOL HYDROCHLORIDE 50 MG/1
50 TABLET, FILM COATED ORAL EVERY 6 HOURS PRN
Status: DISPENSED | OUTPATIENT
Start: 2025-06-28 | End: 2025-06-29

## 2025-06-28 RX ORDER — IPRATROPIUM BROMIDE AND ALBUTEROL SULFATE 2.5; .5 MG/3ML; MG/3ML
3 SOLUTION RESPIRATORY (INHALATION)
Status: DISPENSED | OUTPATIENT
Start: 2025-06-28

## 2025-06-28 RX ORDER — POTASSIUM CHLORIDE 1.5 G/1.58G
40 POWDER, FOR SOLUTION ORAL ONCE
Status: COMPLETED | OUTPATIENT
Start: 2025-06-28 | End: 2025-06-28

## 2025-06-28 RX ORDER — ACETAMINOPHEN 500 MG
5 TABLET ORAL ONCE
Status: COMPLETED | OUTPATIENT
Start: 2025-06-28 | End: 2025-06-28

## 2025-06-28 RX ORDER — ACETAMINOPHEN 325 MG/1
650 TABLET ORAL EVERY 6 HOURS PRN
Status: DISPENSED | OUTPATIENT
Start: 2025-06-28

## 2025-06-28 RX ADMIN — IOHEXOL 80 ML: 350 INJECTION, SOLUTION INTRAVENOUS at 09:06

## 2025-06-28 RX ADMIN — ENOXAPARIN SODIUM 30 MG: 100 INJECTION SUBCUTANEOUS at 00:18

## 2025-06-28 RX ADMIN — ROPINIROLE 0.5 MG: 0.5 TABLET, FILM COATED ORAL at 03:36

## 2025-06-28 RX ADMIN — FOLIC ACID 1 MG: 1 TABLET ORAL at 08:32

## 2025-06-28 RX ADMIN — LEVETIRACETAM 500 MG: 500 TABLET, FILM COATED ORAL at 00:19

## 2025-06-28 RX ADMIN — SODIUM CHLORIDE SOLN NEBU 3% 4 ML: 3 NEBU SOLN at 20:16

## 2025-06-28 RX ADMIN — Medication 5 MG: at 22:55

## 2025-06-28 RX ADMIN — DIAZEPAM 10 MG: 5 TABLET ORAL at 04:28

## 2025-06-28 RX ADMIN — CHOLESTYRAMINE POWDER FOR SUSPENSION 4 G: 4 POWDER, FOR SUSPENSION ORAL at 21:25

## 2025-06-28 RX ADMIN — ENOXAPARIN SODIUM 30 MG: 100 INJECTION SUBCUTANEOUS at 21:45

## 2025-06-28 RX ADMIN — TRAMADOL HYDROCHLORIDE 50 MG: 50 TABLET, COATED ORAL at 22:02

## 2025-06-28 RX ADMIN — DIAZEPAM 10 MG: 5 TABLET ORAL at 22:56

## 2025-06-28 RX ADMIN — TRIMETHOBENZAMIDE HYDROCHLORIDE 200 MG: 100 INJECTION INTRAMUSCULAR at 08:32

## 2025-06-28 RX ADMIN — TAMSULOSIN HYDROCHLORIDE 0.4 MG: 0.4 CAPSULE ORAL at 08:32

## 2025-06-28 RX ADMIN — MONTELUKAST 10 MG: 10 TABLET, FILM COATED ORAL at 21:28

## 2025-06-28 RX ADMIN — TRIMETHOBENZAMIDE HYDROCHLORIDE 200 MG: 100 INJECTION INTRAMUSCULAR at 21:29

## 2025-06-28 RX ADMIN — CHOLESTYRAMINE POWDER FOR SUSPENSION 4 G: 4 POWDER, FOR SUSPENSION ORAL at 08:32

## 2025-06-28 RX ADMIN — IPRATROPIUM BROMIDE AND ALBUTEROL SULFATE 3 ML: .5; 3 SOLUTION RESPIRATORY (INHALATION) at 15:03

## 2025-06-28 RX ADMIN — NYSTATIN 500000 UNITS: 100000 SUSPENSION ORAL at 21:27

## 2025-06-28 RX ADMIN — NYSTATIN 500000 UNITS: 100000 SUSPENSION ORAL at 13:56

## 2025-06-28 RX ADMIN — ATORVASTATIN CALCIUM 10 MG: 20 TABLET, FILM COATED ORAL at 00:20

## 2025-06-28 RX ADMIN — NICOTINE 1 PATCH: 14 PATCH, EXTENDED RELEASE TRANSDERMAL at 08:32

## 2025-06-28 RX ADMIN — SODIUM CHLORIDE 1000 ML: 0.9 INJECTION, SOLUTION INTRAVENOUS at 00:27

## 2025-06-28 RX ADMIN — NYSTATIN 500000 UNITS: 100000 SUSPENSION ORAL at 06:11

## 2025-06-28 RX ADMIN — SODIUM CHLORIDE SOLN NEBU 3% 4 ML: 3 NEBU SOLN at 15:11

## 2025-06-28 RX ADMIN — ROPINIROLE 0.5 MG: 0.5 TABLET, FILM COATED ORAL at 08:35

## 2025-06-28 RX ADMIN — PANTOPRAZOLE SODIUM 40 MG: 40 TABLET, DELAYED RELEASE ORAL at 06:11

## 2025-06-28 RX ADMIN — DIBASIC SODIUM PHOSPHATE, MONOBASIC POTASSIUM PHOSPHATE AND MONOBASIC SODIUM PHOSPHATE 250 MG: 852; 155; 130 TABLET ORAL at 06:11

## 2025-06-28 RX ADMIN — DIAZEPAM 10 MG: 5 TABLET ORAL at 08:33

## 2025-06-28 RX ADMIN — ATORVASTATIN CALCIUM 10 MG: 20 TABLET, FILM COATED ORAL at 21:28

## 2025-06-28 RX ADMIN — LEVETIRACETAM 500 MG: 500 TABLET, FILM COATED ORAL at 08:33

## 2025-06-28 RX ADMIN — IPRATROPIUM BROMIDE AND ALBUTEROL SULFATE 3 ML: .5; 3 SOLUTION RESPIRATORY (INHALATION) at 20:16

## 2025-06-28 RX ADMIN — POTASSIUM PHOSPHATE, MONOBASIC POTASSIUM PHOSPHATE, DIBASIC 15 MMOL: 224; 236 INJECTION, SOLUTION, CONCENTRATE INTRAVENOUS at 02:28

## 2025-06-28 RX ADMIN — POTASSIUM CHLORIDE 40 MEQ: 1.5 POWDER, FOR SOLUTION ORAL at 05:09

## 2025-06-28 RX ADMIN — POTASSIUM CHLORIDE 40 MEQ: 1.5 POWDER, FOR SOLUTION ORAL at 00:33

## 2025-06-28 RX ADMIN — NYSTATIN 500000 UNITS: 100000 SUSPENSION ORAL at 03:36

## 2025-06-28 RX ADMIN — LEVETIRACETAM 500 MG: 500 TABLET, FILM COATED ORAL at 21:28

## 2025-06-28 RX ADMIN — Medication 1 TABLET: at 08:32

## 2025-06-28 RX ADMIN — FORMOTEROL FUMARATE DIHYDRATE 20 MCG: 20 SOLUTION RESPIRATORY (INHALATION) at 20:15

## 2025-06-28 RX ADMIN — ROPINIROLE 0.5 MG: 0.5 TABLET, FILM COATED ORAL at 23:16

## 2025-06-28 RX ADMIN — POTASSIUM PHOSPHATE, MONOBASIC AND POTASSIUM PHOSPHATE, DIBASIC 15 MMOL: 224; 236 INJECTION, SOLUTION, CONCENTRATE INTRAVENOUS at 06:09

## 2025-06-28 RX ADMIN — THIAMINE HYDROCHLORIDE 100 MG: 100 INJECTION, SOLUTION INTRAMUSCULAR; INTRAVENOUS at 08:32

## 2025-06-28 SDOH — SOCIAL STABILITY: SOCIAL INSECURITY: WITHIN THE LAST YEAR, HAVE YOU BEEN AFRAID OF YOUR PARTNER OR EX-PARTNER?: NO

## 2025-06-28 SDOH — ECONOMIC STABILITY: FOOD INSECURITY: WITHIN THE PAST 12 MONTHS, THE FOOD YOU BOUGHT JUST DIDN'T LAST AND YOU DIDN'T HAVE MONEY TO GET MORE.: NEVER TRUE

## 2025-06-28 SDOH — ECONOMIC STABILITY: INCOME INSECURITY: IN THE PAST 12 MONTHS HAS THE ELECTRIC, GAS, OIL, OR WATER COMPANY THREATENED TO SHUT OFF SERVICES IN YOUR HOME?: NO

## 2025-06-28 SDOH — ECONOMIC STABILITY: FOOD INSECURITY
WITHIN THE PAST 12 MONTHS, YOU WORRIED THAT YOUR FOOD WOULD RUN OUT BEFORE YOU GOT THE MONEY TO BUY MORE.: SOMETIMES TRUE

## 2025-06-28 SDOH — SOCIAL STABILITY: SOCIAL INSECURITY: WITHIN THE LAST YEAR, HAVE YOU BEEN HUMILIATED OR EMOTIONALLY ABUSED IN OTHER WAYS BY YOUR PARTNER OR EX-PARTNER?: NO

## 2025-06-28 ASSESSMENT — LIFESTYLE VARIABLES
ORIENTATION AND CLOUDING OF SENSORIUM: ORIENTED AND CAN DO SERIAL ADDITIONS
HEADACHE, FULLNESS IN HEAD: NOT PRESENT
VISUAL DISTURBANCES: NOT PRESENT
TREMOR: MODERATE, WITH PATIENT'S ARMS EXTENDED
ANXIETY: NO ANXIETY, AT EASE
PAROXYSMAL SWEATS: NO SWEAT VISIBLE
HEADACHE, FULLNESS IN HEAD: VERY MILD
AGITATION: NORMAL ACTIVITY
AUDITORY DISTURBANCES: VERY MILD HARSHNESS OR ABILITY TO FRIGHTEN
TOTAL SCORE: 1
NAUSEA AND VOMITING: NO NAUSEA AND NO VOMITING
TOTAL SCORE: 15
TREMOR: NO TREMOR
VISUAL DISTURBANCES: NOT PRESENT
TOTAL SCORE: 4
PULSE: 81
TREMOR: MODERATE, WITH PATIENT'S ARMS EXTENDED
HEADACHE, FULLNESS IN HEAD: NOT PRESENT
TOTAL SCORE: 14
PAROXYSMAL SWEATS: NO SWEAT VISIBLE
NAUSEA AND VOMITING: MILD NAUSEA WITH NO VOMITING
AUDITORY DISTURBANCES: NOT PRESENT
HEADACHE, FULLNESS IN HEAD: MODERATE
PAROXYSMAL SWEATS: NO SWEAT VISIBLE
HEADACHE, FULLNESS IN HEAD: VERY MILD
TREMOR: NO TREMOR
AGITATION: MODERATELY FIDGETY AND RESTLESS
ANXIETY: MILDLY ANXIOUS
AUDITORY DISTURBANCES: NOT PRESENT
ANXIETY: NO ANXIETY, AT EASE
NAUSEA AND VOMITING: MILD NAUSEA WITH NO VOMITING
VISUAL DISTURBANCES: NOT PRESENT
TOTAL SCORE: 0
PAROXYSMAL SWEATS: NO SWEAT VISIBLE
TOTAL SCORE: 0
ANXIETY: MILDLY ANXIOUS
VISUAL DISTURBANCES: NOT PRESENT
NAUSEA AND VOMITING: NO NAUSEA AND NO VOMITING
VISUAL DISTURBANCES: NOT PRESENT
TREMOR: NO TREMOR
NAUSEA AND VOMITING: NO NAUSEA AND NO VOMITING
ORIENTATION AND CLOUDING OF SENSORIUM: ORIENTED AND CAN DO SERIAL ADDITIONS
ORIENTATION AND CLOUDING OF SENSORIUM: ORIENTED AND CAN DO SERIAL ADDITIONS
TREMOR: NOT VISIBLE, BUT CAN BE FELT FINGERTIP TO FINGERTIP
VISUAL DISTURBANCES: MODERATELY SEVERE HALLUCINATIONS
HEADACHE, FULLNESS IN HEAD: MILD
ANXIETY: NO ANXIETY, AT EASE
TOTAL SCORE: 6
BLOOD PRESSURE: 96/66
PAROXYSMAL SWEATS: 3
AUDITORY DISTURBANCES: NOT PRESENT
ORIENTATION AND CLOUDING OF SENSORIUM: ORIENTED AND CAN DO SERIAL ADDITIONS
AUDITORY DISTURBANCES: NOT PRESENT
TACTILE DISTURBANCES: VERY MILD ITCHING, PINS AND NEEDLES, BURNING OR NUMBNESS
TOTAL SCORE: 0
AGITATION: NORMAL ACTIVITY
ORIENTATION AND CLOUDING OF SENSORIUM: ORIENTED AND CAN DO SERIAL ADDITIONS
PAROXYSMAL SWEATS: NO SWEAT VISIBLE
AUDITORY DISTURBANCES: NOT PRESENT
TOTAL SCORE: 8
BLOOD PRESSURE: 105/78
AGITATION: NORMAL ACTIVITY
NAUSEA AND VOMITING: MILD NAUSEA WITH NO VOMITING
PAROXYSMAL SWEATS: NO SWEAT VISIBLE
ANXIETY: NO ANXIETY, AT EASE
ANXIETY: MILDLY ANXIOUS
NAUSEA AND VOMITING: NO NAUSEA AND NO VOMITING
ANXIETY: NO ANXIETY, AT EASE
TACTILE DISTURBANCES: VERY MILD ITCHING, PINS AND NEEDLES, BURNING OR NUMBNESS
AUDITORY DISTURBANCES: NOT PRESENT
BLOOD PRESSURE: 97/67
PULSE: 94
ANXIETY: MILDLY ANXIOUS
HEADACHE, FULLNESS IN HEAD: NOT PRESENT
AUDITORY DISTURBANCES: NOT PRESENT
PAROXYSMAL SWEATS: NO SWEAT VISIBLE
AGITATION: NORMAL ACTIVITY
PAROXYSMAL SWEATS: NO SWEAT VISIBLE
TREMOR: NO TREMOR
TOTAL SCORE: 1
NAUSEA AND VOMITING: MILD NAUSEA WITH NO VOMITING
AGITATION: NORMAL ACTIVITY
HEADACHE, FULLNESS IN HEAD: NOT PRESENT
ORIENTATION AND CLOUDING OF SENSORIUM: ORIENTED AND CAN DO SERIAL ADDITIONS
AGITATION: NORMAL ACTIVITY
PAROXYSMAL SWEATS: NO SWEAT VISIBLE
ORIENTATION AND CLOUDING OF SENSORIUM: ORIENTED AND CAN DO SERIAL ADDITIONS
NAUSEA AND VOMITING: NO NAUSEA AND NO VOMITING
TREMOR: NO TREMOR
AGITATION: SOMEWHAT MORE THAN NORMAL ACTIVITY
TREMOR: NO TREMOR
ORIENTATION AND CLOUDING OF SENSORIUM: CANNOT DO SERIAL ADDITIONS OR IS UNCERTAIN ABOUT DATE
BLOOD PRESSURE: 101/70
TREMOR: NO TREMOR
VISUAL DISTURBANCES: NOT PRESENT
AGITATION: NORMAL ACTIVITY
ANXIETY: MILDLY ANXIOUS
TACTILE DISTURBANCES: VERY MILD ITCHING, PINS AND NEEDLES, BURNING OR NUMBNESS
AUDITORY DISTURBANCES: NOT PRESENT
VISUAL DISTURBANCES: NOT PRESENT
ORIENTATION AND CLOUDING OF SENSORIUM: ORIENTED AND CAN DO SERIAL ADDITIONS
NAUSEA AND VOMITING: 3
VISUAL DISTURBANCES: NOT PRESENT
VISUAL DISTURBANCES: NOT PRESENT
ORIENTATION AND CLOUDING OF SENSORIUM: ORIENTED AND CAN DO SERIAL ADDITIONS
AUDITORY DISTURBANCES: NOT PRESENT
HEADACHE, FULLNESS IN HEAD: MILD
AGITATION: SOMEWHAT MORE THAN NORMAL ACTIVITY
HEADACHE, FULLNESS IN HEAD: VERY MILD

## 2025-06-28 ASSESSMENT — PAIN - FUNCTIONAL ASSESSMENT
PAIN_FUNCTIONAL_ASSESSMENT: 0-10

## 2025-06-28 ASSESSMENT — PAIN DESCRIPTION - DESCRIPTORS: DESCRIPTORS: ACHING

## 2025-06-28 ASSESSMENT — PAIN SCALES - GENERAL
PAINLEVEL_OUTOF10: 4
PAINLEVEL_OUTOF10: 8
PAINLEVEL_OUTOF10: 5 - MODERATE PAIN
PAINLEVEL_OUTOF10: 4
PAINLEVEL_OUTOF10: 5 - MODERATE PAIN

## 2025-06-28 ASSESSMENT — COGNITIVE AND FUNCTIONAL STATUS - GENERAL
STANDING UP FROM CHAIR USING ARMS: A LITTLE
CLIMB 3 TO 5 STEPS WITH RAILING: A LITTLE
PERSONAL GROOMING: A LITTLE
MOVING TO AND FROM BED TO CHAIR: A LITTLE
MOBILITY SCORE: 20
DAILY ACTIVITIY SCORE: 22
TOILETING: A LITTLE
WALKING IN HOSPITAL ROOM: A LITTLE

## 2025-06-28 ASSESSMENT — ACTIVITIES OF DAILY LIVING (ADL): LACK_OF_TRANSPORTATION: NO

## 2025-06-28 ASSESSMENT — PAIN DESCRIPTION - LOCATION: LOCATION: OTHER (COMMENT)

## 2025-06-28 NOTE — SIGNIFICANT EVENT
[id number] Transfer Note    This is a 60-year-old male with past medical history of COPD, MAC, alcohol use disorder, seizures, chronic perianal and scrotal abscesses complicated by fistula, and chronic diarrhea, who was admitted to the ICU on 6/27 with nausea and vomiting for 2 to 3 days (inability to tolerate PO, could not keep anti-epileptic medication down). Patient with recent attempt to quit alcohol, last drink 6/24. Previously drinking daily (three 25oz beers per day). Patient had had 4 seizures in the 2 days prior to admission. Was found to be hyponatremic (Na 118), and hypokalemic (K 2.6). Keppra levels were therapeutic at 26. Electrolytes improved s/p 3L NS, now Na 126. Stable for floor. Does not need tele.    Patient will be followed by Hospitalist team RENETTA Paniagua DO  Family Medicine

## 2025-06-28 NOTE — SIGNIFICANT EVENT
"Preliminary EEG Report     This vEEG is normal. No epileptiform activity or lateralizing signs seen.     This EEG was read from 10:03 to 10:14 on 06/28/25 . The final impression will be available tomorrow under Chart Review in the Media tab. To discontinue video EEG, place \"Discontinue Continuous VEEG\" order.      Keith Ovalle DO  Adult Epilepsy Fellow   [facility]    "

## 2025-06-28 NOTE — NURSING NOTE
During shift change pt complained about the eeg on his head and stated he want them off.Pt has been educated about the purpose but still complains.

## 2025-06-28 NOTE — H&P
Medical Intensive Care - History and Physical   Subjective    Servando Leigh is a 60 y.o. 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 Patient attempted to quit alcohol, last drink 6/24, was drinking daily (3 25oz beers per day). Patient reports having 4 seizures in the last 2 days, last one was 1 hour prior to presenting to the emergency department.  Patient endorsing fall onto ground. Endorsing sputum production with sore throat. Endorsing midline abdominal pain, spreads to chest. Endorsing hot and cold sweats. Endorsing difficulty with urinating, pain in coccyx. Endorsing fatigue.     Patient states that he has been having seizures for years, began when he tried to quit alcohol 20 years ago.  He has been on Keppra but still endorses having 1 seizure per week.    Patient endorses chronic diarrhea, nausea, vomiting.  Per GI note from 2023, patient has had extensive evaluation for his diarrhea, multiple CT scans and colonoscopies as well as MR enterography which did not reveal any evidence of inflammation or IBD.  Stool samples have been sent for fecal elastase and blood for gastrin level and VIP level all of which have been normal.  Patient has undergone multiple endoscopies without etiology, normal gastric emptying scan as well.  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  Patient was evaluated by colorectal surgery at one point without surgical intervention.  Patient has been unable to take cholestyramine due to insurance reasons.  This most recent  episode he states that he filled up 3 small garbage cans with nonbloody vomit.  Patient tried drinking water and fruit juice but was unable to keep anything down.      Patient lives at home with his son, denies any sick contacts at home.  Patient endorses tobacco use, 5 cigarettes/day.  Patient denying recreational drug use including marijuana.    Emergency dept data  Vitals: no temp recorded, HDS, satting well on 2L NC  Labs: VBG showing hyponatremia, hypokalemia, elevated lactate, CBC showing mild leukocytosis with chronic anemia, keppra level therapeutic, CMP showing hyponatremia/hypokalemia, GREGORIA, serum osm low  EKG showing NSR with prolonged qt (587)  Imaging: cxr nonacute  Interventions: given 10mg diazepam, 2L NS, 60mEq Kcl, 2g Mg, Keppra, 1g Ca, thaimine, MVI, folic acid     Review of Systems:  Review of Systems   Constitutional:  Positive for activity change, appetite change, chills and fatigue.   HENT:  Positive for sore throat.    Respiratory:  Positive for chest tightness.    Cardiovascular:  Positive for chest pain.   Gastrointestinal:  Positive for abdominal pain, diarrhea, nausea and vomiting.   Genitourinary:  Positive for difficulty urinating.   Neurological:  Positive for dizziness and seizures.          Meds    Home medications:  Current Outpatient Medications   Medication Instructions    albuterol (Ventolin HFA) 90 mcg/actuation inhaler 2 puffs, inhalation, Every 4 hours PRN    ammonium lactate (Amlactin) 12 % cream Apply 1 application twice a day by topical route.    atorvastatin (LIPITOR) 10 mg, oral, Nightly    benzoyl peroxide (Acne Medication) 10 % lotion lotion 1 Application, Topical, Nightly    Bevespi Aerosphere 9-4.8 mcg HFA aerosol inhaler 2 puffs, inhalation, 2 times daily RT    cholestyramine (Questran) 4 gram packet 4 g, oral, 2 times daily (morning and late afternoon), Take 3hrs before or after any meds. Take twice daily as needed for diarrhea    diclofenac sodium (Voltaren) 1 % gel  "APPLY FOUR AND ONE-HALF INCHES (4 GRAMS) TOPICALLY FOUR TIMES A DAY AS NEEDED FOR JOINT PAIN    EPINEPHrine (EPIPEN) 0.3 mg, intramuscular, Once as needed, Inject into upper leg. Call 911 after use.    food supplemt, lactose-reduced (Ensure Original) 0.04-1.05 gram-kcal/mL liquid 3 bottles, oral, Daily, 2-3 cans    gabapentin (NEURONTIN) 800 mg, oral, 3 times daily    hydrocortisone 2.5 % cream Topical, 2 times daily PRN    ipratropium-albuteroL (Duo-Neb) 0.5-2.5 mg/3 mL nebulizer solution Inhale 3 mL by nebulization every 6 hours while awake.    levETIRAcetam (KEPPRA) 500 mg, oral, 2 times daily    loperamide (Imodium) 2 mg capsule TAKE 1 CAPSULE FOUR TIMES A DAY AS NEEDED FOR DIARRHEA    mirtazapine (Remeron) 15 mg tablet TAKE ONE TABLET (15 MG) BY MOUTH DAILY AT 9PM AT BEDTIME    mometasone (Elocon) 0.1 % ointment Topical, Daily    montelukast (SINGULAIR) 10 mg, oral, Nightly    multivitamin with minerals tablet 1 tablet, oral, Daily RT    nicotine (Nicoderm CQ) 14 mg/24 hr patch 1 patch, transdermal, Daily    nystatin (MYCOSTATIN) 500,000 Units, oral, 4 times daily, Swish in mouth and spit out.    OLANZapine (ZYPREXA) 5 mg, oral, Nightly    pantoprazole (PROTONIX) 40 mg, oral, Daily before breakfast, Do not crush, chew, or split.    rOPINIRole (REQUIP) 0.5 mg, oral, 3 times daily    sodium chloride 3 % nebulizer solution 4 mL, nebulization, 3 times daily    tamsulosin (FLOMAX) 0.4 mg, oral, Daily    thiamine (VITAMIN B-1) 100 mg, oral, Daily        Inpatient medications:  Scheduled medications  Scheduled Medications[1]  Continuous medications  Continuous Medications[2]  PRN medications  PRN Medications[3]     Objective    Blood pressure (!) 105/93, pulse 84, resp. rate 18, height 1.727 m (5' 8\"), weight 49.9 kg (110 lb), SpO2 98%.     Physical Exam  Constitutional:       Appearance: Normal appearance.      Comments: Tremulous, resting comfortably in bed   HENT:      Head: Normocephalic.   Cardiovascular:      " "Rate and Rhythm: Normal rate and regular rhythm.   Pulmonary:      Effort: Pulmonary effort is normal.      Breath sounds: Normal breath sounds.   Abdominal:      General: Abdomen is flat. Bowel sounds are normal.      Palpations: Abdomen is soft.   Genitourinary:     Comments: Fistula present in right buttock  Musculoskeletal:         General: Normal range of motion.   Skin:     General: Skin is warm and dry.   Neurological:      General: No focal deficit present.      Mental Status: He is alert and oriented to person, place, and time.      Comments: tremulous   Psychiatric:         Mood and Affect: Mood normal.          No intake or output data in the 24 hours ending 06/27/25 2057  Labs:   Results from last 72 hours   Lab Units 06/27/25  1746   SODIUM mmol/L 120*   POTASSIUM mmol/L 2.6*   CHLORIDE mmol/L 71*   CO2 mmol/L 35*   BUN mg/dL 16   CREATININE mg/dL 2.02*   GLUCOSE mg/dL 150*   CALCIUM mg/dL 7.8*   ANION GAP mmol/L 17   EGFR mL/min/1.73m*2 38*   PHOSPHORUS mg/dL 2.1*      Results from last 72 hours   Lab Units 06/27/25  1746   WBC AUTO x10*3/uL 13.0*   HEMOGLOBIN g/dL 11.9*   HEMATOCRIT % 32.5*   PLATELETS AUTO x10*3/uL 142*   NEUTROS PCT AUTO % 89.4   LYMPHS PCT AUTO % 5.4   MONOS PCT AUTO % 4.1   EOS PCT AUTO % 0.0          Results from last 72 hours   Lab Units 06/27/25  1738   POCT PH, VENOUS pH 7.42   POCT PCO2, VENOUS mm Hg 60*   POCT PO2, VENOUS mm Hg 25*        Micro/ID:     No results found for: \"URINECULTURE\", \"BLOODCULT\", \"CSFCULTSMEAR\"    Summary of Key Imaging Results  XR chest 2 views  Result Date: 6/27/2025  STUDY: Chest Radiographs;  06/27/2025 6:15 pm INDICATION: Cough.  Shortness of breath. COMPARISON: XR Chest 04/15/2025, 02/05/2025. 10/05/2023 ACCESSION NUMBER(S): [id number] ORDERING CLINICIAN: ZACKARY PARKINSON TECHNIQUE:  Frontal and lateral chest.  Lateral view was repeated. FINDINGS: CARDIOMEDIASTINAL SILHOUETTE: Cardiomediastinal silhouette is normal in size and configuration.  " LUNGS: No consolidation or effusion.  No pneumothorax.  Large lung volumes. Suspect benign granuloma on the left.  ABDOMEN: No remarkable upper abdominal findings.  BONES: No acute osseous changes.  Right shoulder prosthesis noted.  This appears in satisfactory position.    No acute infiltrate or effusion. Large lung volumes suggesting emphysema. Signed by Edilson Braden MD        Assessment and Plan     Assessment:  Servando Leigh is a 60 y.o. year old male patient admitted to the MICU on 06/27/25 for severe hyponatremia.  Patient endorses acute on chronic nausea, vomiting, diarrhea.  Patient endorses seizure, likely in the setting of hyponatremia and alcohol withdrawal rather than Keppra level as Keppra level was therapeutic.  Hyponatremia appears to by hypovolemic in nature however further workup pending. Unclear etiology of acute on chronic diarrhea and vomiting, will workup with lab studies.  In the meantime we will continue supportive care and frequent monitoring of electrolytes.      Plan:  NEUROLOGY/PSYCH:  #Seizure  #Alcohol use dx  Although n/v and unable to keep keppra down, keppra level therapeutic. Sz likely iso alcohol withdrawal versus hyponatremia, less c/f meningitis, need to rule out structural causes  Last drink 6/24  -Refer to renal section for treatment and workup of hyponatremia   -C/w CIWA (valium w h/o COPD)  -C/w keppra  -Follow up ct head  -Follow up utox  -continue thiamine, folic acid  -Consider neurology/epilepsy consult       CARDIOVASCULAR:  #Prolonged qt  EKG showing prolonged qt 587, has not been prolonged in the past  Has been on chronic loperamide, olanzapine, may be related  -will obtain repeat EKG once in micu  -avoid qt prolonging medications   -Would hold further loperamide    PULMONARY:  #COPD  #MAC  Follows with Dr. Choe  PFTs: Pre-BD FEV1 1.28 L (42%), pre-BD FVC 2.49 L (65%), pre-BD FEV1/FVC 51; (+) bronchodilator response based on 17% improvement in FVC; persistently  reduced FEV1/FVC after bronchodilator (COPD); TLC 8.19 L (139%), RV 5.21 L (246%), RV/TLC 64; uncorrected DLCO 17.4 (60%)  AFB last positive 10/2023, has been negative x2 most recently 3/2025  -Follow up chest ct  -Follow up resp viral panel   -C/w home duo neb, Singulair, Spiriva, performist   -Hypertonic saline prn     RENAL/GENITOURINARY:  #GREGORIA  #Hyponatremia  #Hypokalemia  #Lactic acidosis  Likely pre-renal iso hypovolemia from n/v/d  Hyponatremia likely 2/2 hypovolemia but need to rule out 2/2 causes including hypothyroid, adrenal insufficiency  No evidence of cardiac, kidney or liver disease to suggest secondary SIADH  -Will continue fluid resuscitation as appropriate   -Will replete electrolytes prn  -Will trend lactate to normal  -Follow urine studies  -Follow up TSH  -Avoid nephrotoxins  -Renally dose medications  -Strict I/Os      GASTROENTEROLOGY:  #Abdominal pain  #Chronic diarrhea  #Vomiting   Patient has h/o chronic diarrhea  Lipase WNL   Colonoscopy 3/2025 with hemorrhoids, otherwise wnl, biopsies to rule out microscopic colitis negative  Per chart review, has diagnosis of H pylori in 2017, breath test 10/2020 negative, egd 2021 with biopsies negative  Has been on loperamide chronically  -Continue fluid resuscitation as appropriate  -Encourage PO inake  -Tigan for nausea with prolonged qt  -Follow up ct ap  -Follow up stool pathogen panel and c diff pcr  -Consider GI consult for workup of vomiting with diarrhea although have been consulted before and extensive evaluation has been negative   -Hold further loperamide with prolonged qt     ENDOCRINOLOGY:  #Hyponatremia  Refer to renal section    HEMATOLOGY:  #Chronic normocytic anemia  Iron studies WNL 4/2025  -Follow up folate and b12 level  -Continue folic acid, mvi     SKIN:  hanh    MUSCULOSKELETAL:  #Generalized weakness  Likely iso dehydration and poor nutrition  -PT/OT  -Consider nutrition consult    INFECTIOUS DISEASE:  #Mild  leukocytosis  #Chronic perianal and scrotal abscess with fistula  Likely reactive to dehydration, fistula appears non-infected  -follow up CT CAP  -follow up blood cx  -no role for abx at this time, hds    #H/o MAC  Negative x2 since 2024  -NTD    #h/o H pylori   EGD with biopsy in 2021 negative  -NTD      ICU Check List     ICU Liberation: Intervention:   Assess, Prevent, Manage Pain 10 - Worst possible pain    Both SAT and SBT [] SAT  [] SBT 30-60 min [] Extubate to NC  [] Extubate to NIV  [] Discuss Trach   Choice of analgesia and sedation   none     Delirium: Assess, prevent and manage  CAM ICU      Early Mobility and Exercise   [x] PT /OT consult   Family Engagement and Empowerment [x]Family updated []SW consult     FEN  Fluids: continue resuscitation  Electrolytes: prn  Nutrition: reg  Prophylaxis:  DVT ppx: lovenox  GI ppx: pantoprazole  Bowel care: none, diarrhea  Hardware:                   Social:  Code: Full Code    HPOA:     Lisha Leigh (Spouse)  [phone] (Mobile)     Disposition: MICU                  Shy Valencia MD   06/27/25 at 8:57 PM     Disclaimer: Documentation completed with the information available at the time of input. The times in the chart may not be reflective of actual patient care times, interventions, or procedures. Documentation occurs after the physical care of the patient.              [1] calcium gluconate, 1 g, intravenous, Once  folic acid, 1 mg, oral, Daily  magnesium sulfate, 2 g, intravenous, Once  multivitamin with minerals, 1 tablet, oral, Daily  potassium chloride, 20 mEq, intravenous, Once  sod phos di, mono-K phos mono, 250 mg, oral, 4x daily  sodium chloride, 1,000 mL, intravenous, Once  [START ON 6/30/2025] thiamine, 100 mg, oral, Daily  thiamine, 100 mg, intravenous, Daily  [2]    [3] PRN medications: diazePAM

## 2025-06-28 NOTE — CARE PLAN
The patient's goals for the shift include      The clinical goals for the shift include pt will remain HDS for duration of the shift      Problem: Pain - Adult  Goal: Verbalizes/displays adequate comfort level or baseline comfort level  Outcome: Progressing     Problem: Safety - Adult  Goal: Free from fall injury  Outcome: Progressing     Problem: Discharge Planning  Goal: Discharge to home or other facility with appropriate resources  Outcome: Progressing     Problem: Chronic Conditions and Co-morbidities  Goal: Patient's chronic conditions and co-morbidity symptoms are monitored and maintained or improved  Outcome: Progressing     Problem: Nutrition  Goal: Nutrient intake appropriate for maintaining nutritional needs  Outcome: Progressing     Problem: Skin  Goal: Decreased wound size/increased tissue granulation at next dressing change  Outcome: Progressing  Flowsheets (Taken 6/28/2025 0254)  Decreased wound size/increased tissue granulation at next dressing change:   Promote sleep for wound healing   Protective dressings over bony prominences   Utilize specialty bed per algorithm  Goal: Participates in plan/prevention/treatment measures  Outcome: Progressing  Flowsheets (Taken 6/28/2025 0254)  Participates in plan/prevention/treatment measures:   Discuss with provider PT/OT consult   Elevate heels  Goal: Prevent/manage excess moisture  Outcome: Progressing  Flowsheets (Taken 6/28/2025 0254)  Prevent/manage excess moisture:   Monitor for/manage infection if present   Follow provider orders for dressing changes   Moisturize dry skin   Cleanse incontinence/protect with barrier cream  Goal: Prevent/minimize sheer/friction injuries  Outcome: Progressing  Flowsheets (Taken 6/28/2025 0254)  Prevent/minimize sheer/friction injuries:   Use pull sheet   Increase activity/out of bed for meals   Complete micro-shifts as needed if patient unable. Adjust patient position to relieve pressure points, not a full turn   HOB 30  degrees or less   Turn/reposition every 2 hours/use positioning/transfer devices   Utilize specialty bed per algorithm  Goal: Promote/optimize nutrition  Outcome: Progressing  Flowsheets (Taken 6/28/2025 0254)  Promote/optimize nutrition: Consume > 50% meals/supplements  Goal: Promote skin healing  Outcome: Progressing  Flowsheets (Taken 6/28/2025 0254)  Promote skin healing:   Turn/reposition every 2 hours/use positioning/transfer devices   Protective dressings over bony prominences   Assess skin/pad under line(s)/device(s)   Rotate device position/do not position patient on device   Ensure correct size (line/device) and apply per  instructions        Mucosal Advancement Flap Text: Given the location of the defect, shape of the defect and the proximity to free margins a mucosal advancement flap was deemed most appropriate. Incisions were made with a 15 blade scalpel in the appropriate fashion along the cutaneous vermilion border and the mucosal lip. The remaining actinically damaged mucosal tissue was excised.  The mucosal advancement flap was then elevated to the gingival sulcus with care taken to preserve the neurovascular structures and advanced into the primary defect. Care was taken to ensure that precise realignment of the vermilion border was achieved.

## 2025-06-28 NOTE — NURSING NOTE
Patient transferred to [place] [id number] from the MICU this morning. He was oriented to the call light system and the environment. He stated he has all his belongings including his cell phone, , clothes, shoes, book bag, and wallet.

## 2025-06-28 NOTE — PROGRESS NOTES
Floor Readiness Note       I, personally, evaluated Servando Leigh prior to transfer to the floor, including reviewing all current laboratory and imaging studies. The patient remains appropriate for transfer to the floor. Bedside nurse and respiratory therapy are also in agreement of patient's readiness for the floor.     Brief summary:  Servando Leigh is a 60 y.o. male who was admitted to the MICU on 6/27/25 with hyponatremia (initial Na of 118) and seizures in setting of known seizure disorder. Hyponatremia was presumed to be from hypovolemic etiology given patient endorsed several days of vomiting and diarrhea prior to presentation. Patient received 3 L of NS (2 L in ED and 1 L in MICU). Keppra level was WNL and EEG was ordered with final read pending. On repeat RFP, sodium was 126. Patient had normal blood pressure and 1L nasal canula prior to transfer to floor.     Updated focused Physical Exam:  Constitutional:       Appearance: Normal appearance. Resting comfortably in bed  HENT:      Head: Normocephalic.   Cardiovascular:      Rate and Rhythm: Normal rate and regular rhythm.   Pulmonary:      Effort: Pulmonary effort is normal.      Breath sounds: Normal breath sounds.   Abdominal:      General: Abdomen is flat. Bowel sounds are normal.      Palpations: Abdomen is soft.   Musculoskeletal:         General: Normal range of motion.   Skin:     General: Skin is warm and dry.   Neurological:      General: No focal deficit present.      Mental Status: He is alert and oriented to person, place, and time.   Psychiatric:         Mood and Affect: Mood normal    Current Vital Signs:  Heart Rate: 86 (06/28/25 1043 : Interface, Masimo Data In)  BP: 107/63 (06/28/25 1043 : Interface, Masimo Data In)  Temp: 36.3 °C (97.3 °F) (06/28/25 1043 : Interface, Masimo Data In)  Resp: 16 (06/28/25 1043 : Interface, Masimo Data In)  SpO2: 95 % (06/28/25 1043 : Interface, Masimo Data In)    Relevant updates since rounds:  - Preliminary  vEEG shows no seizure activity    Accepting team, Hospitalist, received verbal sign out and the Provider Care team/Attending has been updated. Bedside nurse will now call accepting nurse for report and patient will be transferred to [place] [address].    Ferdinand Avila MD

## 2025-06-28 NOTE — PROGRESS NOTES
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 Hyponatremia was presumed to be from hypovolemic etiology given patient endorsed several days of vomiting and diarrhea prior to presentation. Patient received 3 L of NS (2 L in ED and 1 L in MICU). Keppra level was WNL and EEG was ordered with final read pending. On repeat RFP, sodium was 126. Patient had normal blood pressure and 1L nasal canula prior to transfer to floor.    C: Code Status/DPOA Info/Goals of Care/ACP Note    Full Code  DPOA/Contact Number: [phone]    U: Unprescribing & Pertinent High-Risk Medications    Changes to home meds: None     Anticoagulation: Lovenox for DVT Prophylaxis    Antibiotics:   [x] N/A - no current planned antimicrobioals    P: Pending Tests at the Time of Transfer   Preliminary read of vEEG showed no seizure activity. Final read pending.    A: Active consultants, including Rehab:   [x]  Social work    U: Uncertainty Measure/Diagnostic Pause:    Working diagnosis at the time of transfer hyponatremia due to hypovolemia in setting of diarrhea and vomiting.     Diagnosis Degree of Certainty: 1. High degree of certainty about the clinical diagnosis.     S: Summary of Major Problems and To-Dos:   To-do list prior to transfer:  [x] NTD      E: Exam, including Lines/Drains/Airways & Data Review:   Difficult airway? Unable to assess. Patient was never intubated.  Lines/drains assessed for removal? N/A, patient had no lines or drains    Within 30 minutes of the patient physically leaving the floor, a Floor Readiness Note needs to be placed with updated vitals.

## 2025-06-28 NOTE — NURSING NOTE
Pt is complaining of fluid restriction diet since the start of my shift at 1500. Pt is now stating he fell and hit head. Then pt stated it was his knee. Sitter across the pierre stated that pt did not fall. I was in pierre and heard nothing either. Pt wants to see MD.

## 2025-06-28 NOTE — NURSING NOTE
Pt is now stating he fell and hit head. Then pt stated it was his knee. Sitter across the pierre stated that pt did not fall. I was in pierre and heard nothing either. MD notified after assessment done. Pt states he bumped his knee.

## 2025-06-29 LAB
ALBUMIN SERPL BCP-MCNC: 3.3 G/DL (ref 3.4–5)
ANION GAP SERPL CALC-SCNC: 12 MMOL/L (ref 10–20)
BACTERIA BLD CULT: NORMAL
BACTERIA BLD CULT: NORMAL
BUN SERPL-MCNC: 4 MG/DL (ref 6–23)
CALCIUM SERPL-MCNC: 8.4 MG/DL (ref 8.6–10.6)
CHLORIDE SERPL-SCNC: 97 MMOL/L (ref 98–107)
CO2 SERPL-SCNC: 30 MMOL/L (ref 21–32)
CREAT SERPL-MCNC: 0.98 MG/DL (ref 0.5–1.3)
EGFRCR SERPLBLD CKD-EPI 2021: 89 ML/MIN/1.73M*2
GLUCOSE BLD MANUAL STRIP-MCNC: 101 MG/DL (ref 74–99)
GLUCOSE BLD MANUAL STRIP-MCNC: 106 MG/DL (ref 74–99)
GLUCOSE BLD MANUAL STRIP-MCNC: 85 MG/DL (ref 74–99)
GLUCOSE BLD MANUAL STRIP-MCNC: 86 MG/DL (ref 74–99)
GLUCOSE BLD MANUAL STRIP-MCNC: 96 MG/DL (ref 74–99)
GLUCOSE SERPL-MCNC: 74 MG/DL (ref 74–99)
LACTATE BLDV-SCNC: 1.1 MMOL/L (ref 0.4–2)
MAGNESIUM SERPL-MCNC: 2.01 MG/DL (ref 1.6–2.4)
PHOSPHATE SERPL-MCNC: 1.9 MG/DL (ref 2.5–4.9)
POTASSIUM SERPL-SCNC: 3.8 MMOL/L (ref 3.5–5.3)
SODIUM SERPL-SCNC: 135 MMOL/L (ref 136–145)

## 2025-06-29 PROCEDURE — 2500000004 HC RX 250 GENERAL PHARMACY W/ HCPCS (ALT 636 FOR OP/ED)

## 2025-06-29 PROCEDURE — 2500000001 HC RX 250 WO HCPCS SELF ADMINISTERED DRUGS (ALT 637 FOR MEDICARE OP)

## 2025-06-29 PROCEDURE — 80069 RENAL FUNCTION PANEL: CPT

## 2025-06-29 PROCEDURE — 2500000002 HC RX 250 W HCPCS SELF ADMINISTERED DRUGS (ALT 637 FOR MEDICARE OP, ALT 636 FOR OP/ED): Performed by: STUDENT IN AN ORGANIZED HEALTH CARE EDUCATION/TRAINING PROGRAM

## 2025-06-29 PROCEDURE — 94640 AIRWAY INHALATION TREATMENT: CPT

## 2025-06-29 PROCEDURE — 2500000002 HC RX 250 W HCPCS SELF ADMINISTERED DRUGS (ALT 637 FOR MEDICARE OP, ALT 636 FOR OP/ED)

## 2025-06-29 PROCEDURE — 99232 SBSQ HOSP IP/OBS MODERATE 35: CPT | Performed by: STUDENT IN AN ORGANIZED HEALTH CARE EDUCATION/TRAINING PROGRAM

## 2025-06-29 PROCEDURE — 82947 ASSAY GLUCOSE BLOOD QUANT: CPT

## 2025-06-29 PROCEDURE — 1100000001 HC PRIVATE ROOM DAILY

## 2025-06-29 PROCEDURE — 36415 COLL VENOUS BLD VENIPUNCTURE: CPT | Performed by: STUDENT IN AN ORGANIZED HEALTH CARE EDUCATION/TRAINING PROGRAM

## 2025-06-29 PROCEDURE — S4991 NICOTINE PATCH NONLEGEND: HCPCS

## 2025-06-29 PROCEDURE — 2500000005 HC RX 250 GENERAL PHARMACY W/O HCPCS

## 2025-06-29 PROCEDURE — 83735 ASSAY OF MAGNESIUM: CPT | Performed by: STUDENT IN AN ORGANIZED HEALTH CARE EDUCATION/TRAINING PROGRAM

## 2025-06-29 PROCEDURE — 2500000001 HC RX 250 WO HCPCS SELF ADMINISTERED DRUGS (ALT 637 FOR MEDICARE OP): Performed by: STUDENT IN AN ORGANIZED HEALTH CARE EDUCATION/TRAINING PROGRAM

## 2025-06-29 RX ORDER — INSULIN LISPRO 100 [IU]/ML
0-5 INJECTION, SOLUTION INTRAVENOUS; SUBCUTANEOUS EVERY 4 HOURS
Status: ACTIVE | OUTPATIENT
Start: 2025-06-29

## 2025-06-29 RX ADMIN — Medication 1 TABLET: at 08:13

## 2025-06-29 RX ADMIN — LEVETIRACETAM 500 MG: 500 TABLET, FILM COATED ORAL at 08:13

## 2025-06-29 RX ADMIN — ROPINIROLE 0.5 MG: 0.5 TABLET, FILM COATED ORAL at 08:14

## 2025-06-29 RX ADMIN — SODIUM CHLORIDE SOLN NEBU 3% 4 ML: 3 NEBU SOLN at 14:18

## 2025-06-29 RX ADMIN — CHOLESTYRAMINE POWDER FOR SUSPENSION 4 G: 4 POWDER, FOR SUSPENSION ORAL at 08:13

## 2025-06-29 RX ADMIN — FOLIC ACID 1 MG: 1 TABLET ORAL at 08:14

## 2025-06-29 RX ADMIN — CHOLESTYRAMINE POWDER FOR SUSPENSION 4 G: 4 POWDER, FOR SUSPENSION ORAL at 17:14

## 2025-06-29 RX ADMIN — THIAMINE HYDROCHLORIDE 100 MG: 100 INJECTION, SOLUTION INTRAMUSCULAR; INTRAVENOUS at 08:14

## 2025-06-29 RX ADMIN — SODIUM CHLORIDE SOLN NEBU 3% 4 ML: 3 NEBU SOLN at 19:00

## 2025-06-29 RX ADMIN — HYDROXYZINE HYDROCHLORIDE 25 MG: 25 TABLET, FILM COATED ORAL at 01:36

## 2025-06-29 RX ADMIN — ACETAMINOPHEN 650 MG: 325 TABLET ORAL at 01:36

## 2025-06-29 RX ADMIN — ENOXAPARIN SODIUM 30 MG: 100 INJECTION SUBCUTANEOUS at 22:32

## 2025-06-29 RX ADMIN — FORMOTEROL FUMARATE DIHYDRATE 20 MCG: 20 SOLUTION RESPIRATORY (INHALATION) at 08:46

## 2025-06-29 RX ADMIN — TAMSULOSIN HYDROCHLORIDE 0.4 MG: 0.4 CAPSULE ORAL at 08:13

## 2025-06-29 RX ADMIN — SODIUM CHLORIDE SOLN NEBU 3% 4 ML: 3 NEBU SOLN at 08:47

## 2025-06-29 RX ADMIN — PANTOPRAZOLE SODIUM 40 MG: 40 TABLET, DELAYED RELEASE ORAL at 07:13

## 2025-06-29 RX ADMIN — IPRATROPIUM BROMIDE AND ALBUTEROL SULFATE 3 ML: .5; 3 SOLUTION RESPIRATORY (INHALATION) at 08:45

## 2025-06-29 RX ADMIN — MONTELUKAST 10 MG: 10 TABLET, FILM COATED ORAL at 22:32

## 2025-06-29 RX ADMIN — NYSTATIN 500000 UNITS: 100000 SUSPENSION ORAL at 13:15

## 2025-06-29 RX ADMIN — LEVETIRACETAM 500 MG: 500 TABLET, FILM COATED ORAL at 22:32

## 2025-06-29 RX ADMIN — IPRATROPIUM BROMIDE AND ALBUTEROL SULFATE 3 ML: .5; 3 SOLUTION RESPIRATORY (INHALATION) at 14:17

## 2025-06-29 RX ADMIN — NYSTATIN 500000 UNITS: 100000 SUSPENSION ORAL at 07:13

## 2025-06-29 RX ADMIN — ROPINIROLE 0.5 MG: 0.5 TABLET, FILM COATED ORAL at 22:32

## 2025-06-29 RX ADMIN — NYSTATIN 500000 UNITS: 100000 SUSPENSION ORAL at 22:30

## 2025-06-29 RX ADMIN — FORMOTEROL FUMARATE DIHYDRATE 20 MCG: 20 SOLUTION RESPIRATORY (INHALATION) at 19:00

## 2025-06-29 RX ADMIN — NICOTINE 1 PATCH: 14 PATCH, EXTENDED RELEASE TRANSDERMAL at 08:14

## 2025-06-29 RX ADMIN — NYSTATIN 500000 UNITS: 100000 SUSPENSION ORAL at 17:14

## 2025-06-29 RX ADMIN — ATORVASTATIN CALCIUM 10 MG: 20 TABLET, FILM COATED ORAL at 22:32

## 2025-06-29 RX ADMIN — IPRATROPIUM BROMIDE AND ALBUTEROL SULFATE 3 ML: .5; 3 SOLUTION RESPIRATORY (INHALATION) at 19:00

## 2025-06-29 ASSESSMENT — LIFESTYLE VARIABLES
TREMOR: NO TREMOR
TOTAL SCORE: 2
ORIENTATION AND CLOUDING OF SENSORIUM: ORIENTED AND CAN DO SERIAL ADDITIONS
AGITATION: NORMAL ACTIVITY
PULSE: 100
AUDITORY DISTURBANCES: NOT PRESENT
ORIENTATION AND CLOUDING OF SENSORIUM: ORIENTED AND CAN DO SERIAL ADDITIONS
HEADACHE, FULLNESS IN HEAD: VERY MILD
BLOOD PRESSURE: 100/64
VISUAL DISTURBANCES: NOT PRESENT
ORIENTATION AND CLOUDING OF SENSORIUM: ORIENTED AND CAN DO SERIAL ADDITIONS
VISUAL DISTURBANCES: NOT PRESENT
NAUSEA AND VOMITING: NO NAUSEA AND NO VOMITING
AGITATION: NORMAL ACTIVITY
TREMOR: NO TREMOR
BLOOD PRESSURE: 110/63
ANXIETY: MILDLY ANXIOUS
PULSE: 85
AUDITORY DISTURBANCES: NOT PRESENT
HEADACHE, FULLNESS IN HEAD: VERY MILD
VISUAL DISTURBANCES: NOT PRESENT
AGITATION: NORMAL ACTIVITY
TREMOR: NO TREMOR
AUDITORY DISTURBANCES: NOT PRESENT
PAROXYSMAL SWEATS: NO SWEAT VISIBLE
VISUAL DISTURBANCES: NOT PRESENT
TREMOR: NO TREMOR
VISUAL DISTURBANCES: NOT PRESENT
AUDITORY DISTURBANCES: NOT PRESENT
NAUSEA AND VOMITING: NO NAUSEA AND NO VOMITING
TREMOR: NO TREMOR
PAROXYSMAL SWEATS: NO SWEAT VISIBLE
ANXIETY: MILDLY ANXIOUS
HEADACHE, FULLNESS IN HEAD: NOT PRESENT
PAROXYSMAL SWEATS: NO SWEAT VISIBLE
NAUSEA AND VOMITING: NO NAUSEA AND NO VOMITING
TOTAL SCORE: 1
ORIENTATION AND CLOUDING OF SENSORIUM: ORIENTED AND CAN DO SERIAL ADDITIONS
NAUSEA AND VOMITING: NO NAUSEA AND NO VOMITING
VISUAL DISTURBANCES: NOT PRESENT
PULSE: 92
TOTAL SCORE: 1
HEADACHE, FULLNESS IN HEAD: NOT PRESENT
PAROXYSMAL SWEATS: NO SWEAT VISIBLE
ANXIETY: NO ANXIETY, AT EASE
ANXIETY: MILDLY ANXIOUS
AGITATION: NORMAL ACTIVITY
PAROXYSMAL SWEATS: NO SWEAT VISIBLE
TREMOR: NO TREMOR
AGITATION: NORMAL ACTIVITY
AUDITORY DISTURBANCES: NOT PRESENT
ORIENTATION AND CLOUDING OF SENSORIUM: ORIENTED AND CAN DO SERIAL ADDITIONS
ORIENTATION AND CLOUDING OF SENSORIUM: ORIENTED AND CAN DO SERIAL ADDITIONS
TREMOR: NO TREMOR
ORIENTATION AND CLOUDING OF SENSORIUM: ORIENTED AND CAN DO SERIAL ADDITIONS
TOTAL SCORE: 0
ANXIETY: NO ANXIETY, AT EASE
VISUAL DISTURBANCES: NOT PRESENT
AGITATION: NORMAL ACTIVITY
TOTAL SCORE: 0
ANXIETY: NO ANXIETY, AT EASE
TOTAL SCORE: 0
TOTAL SCORE: 1
PULSE: 83
HEADACHE, FULLNESS IN HEAD: NOT PRESENT
ANXIETY: MILDLY ANXIOUS
PAROXYSMAL SWEATS: NO SWEAT VISIBLE
NAUSEA AND VOMITING: NO NAUSEA AND NO VOMITING
BLOOD PRESSURE: 106/70
TOTAL SCORE: 2
AUDITORY DISTURBANCES: NOT PRESENT
TREMOR: NO TREMOR
HEADACHE, FULLNESS IN HEAD: NOT PRESENT
NAUSEA AND VOMITING: NO NAUSEA AND NO VOMITING
ORIENTATION AND CLOUDING OF SENSORIUM: ORIENTED AND CAN DO SERIAL ADDITIONS
AGITATION: NORMAL ACTIVITY
PAROXYSMAL SWEATS: NO SWEAT VISIBLE
PAROXYSMAL SWEATS: NO SWEAT VISIBLE
AGITATION: NORMAL ACTIVITY
NAUSEA AND VOMITING: NO NAUSEA AND NO VOMITING
AUDITORY DISTURBANCES: NOT PRESENT
HEADACHE, FULLNESS IN HEAD: NOT PRESENT
NAUSEA AND VOMITING: NO NAUSEA AND NO VOMITING
HEADACHE, FULLNESS IN HEAD: NOT PRESENT
ANXIETY: MILDLY ANXIOUS
AUDITORY DISTURBANCES: NOT PRESENT
VISUAL DISTURBANCES: NOT PRESENT

## 2025-06-29 ASSESSMENT — PAIN - FUNCTIONAL ASSESSMENT
PAIN_FUNCTIONAL_ASSESSMENT: 0-10
PAIN_FUNCTIONAL_ASSESSMENT: 0-10

## 2025-06-29 ASSESSMENT — COGNITIVE AND FUNCTIONAL STATUS - GENERAL
TOILETING: A LITTLE
DAILY ACTIVITIY SCORE: 22
MOVING TO AND FROM BED TO CHAIR: A LITTLE
PERSONAL GROOMING: A LITTLE
CLIMB 3 TO 5 STEPS WITH RAILING: A LITTLE
WALKING IN HOSPITAL ROOM: A LITTLE
STANDING UP FROM CHAIR USING ARMS: A LITTLE
MOBILITY SCORE: 20

## 2025-06-29 ASSESSMENT — PAIN SCALES - GENERAL
PAINLEVEL_OUTOF10: 3
PAINLEVEL_OUTOF10: 1
PAINLEVEL_OUTOF10: 0 - NO PAIN

## 2025-06-29 ASSESSMENT — ACTIVITIES OF DAILY LIVING (ADL): LACK_OF_TRANSPORTATION: NO

## 2025-06-29 ASSESSMENT — PAIN DESCRIPTION - DESCRIPTORS: DESCRIPTORS: ACHING

## 2025-06-29 ASSESSMENT — PAIN DESCRIPTION - LOCATION: LOCATION: HEAD

## 2025-06-29 NOTE — CARE PLAN
The patient's goals for the shift include      The clinical goals for the shift include Patient will be free from fall/injury during the shift.      Problem: Pain - Adult  Goal: Verbalizes/displays adequate comfort level or baseline comfort level  Outcome: Progressing     Problem: Safety - Adult  Goal: Free from fall injury  Outcome: Progressing     Problem: Discharge Planning  Goal: Discharge to home or other facility with appropriate resources  Outcome: Progressing     Problem: Chronic Conditions and Co-morbidities  Goal: Patient's chronic conditions and co-morbidity symptoms are monitored and maintained or improved  Outcome: Progressing     Problem: Skin  Goal: Participates in plan/prevention/treatment measures  Flowsheets (Taken 6/29/2025 0025)  Participates in plan/prevention/treatment measures: Elevate heels  Goal: Prevent/manage excess moisture  Flowsheets (Taken 6/29/2025 0025)  Prevent/manage excess moisture: Monitor for/manage infection if present  Goal: Promote/optimize nutrition  Flowsheets (Taken 6/29/2025 0025)  Promote/optimize nutrition:   Offer water/supplements/favorite foods   Monitor/record intake including meals

## 2025-06-29 NOTE — PROGRESS NOTES
06/29/25 1348   Discharge Planning   Living Arrangements Children   Support Systems Children;Family members   Assistance Needed independent with ADL's prior to admission   Type of Residence Private residence   Home or Post Acute Services In home services   Expected Discharge Disposition Home H   Does the patient need discharge transport arranged? Yes   RoundTrip coordination needed? Yes   Financial Resource Strain   How hard is it for you to pay for the very basics like food, housing, medical care, and heating? Somewhat  (requesting resources for food and utilities)   Housing Stability   In the last 12 months, was there a time when you were not able to pay the mortgage or rent on time? N   In the past 12 months, how many times have you moved where you were living? 0   At any time in the past 12 months, were you homeless or living in a shelter (including now)? N   Transportation Needs   In the past 12 months, has lack of transportation kept you from medical appointments or from getting medications? no   In the past 12 months, has lack of transportation kept you from meetings, work, or from getting things needed for daily living? No       Plan per Medical/Surgical team: Patient is a transfer from the ICU. He was admitted for seizures, hyponatremia, and n/v. Patient is placed on a continuous EEG monitoring.    Demographics/Insurance: verified.  Living Environment: Patient lives with his son. He stated he was independent prior to admission.    Home Care: denies  PCP: Dr. Sigifredo Sinha  Pharmacy: [facility] but would like to switch to [facility]  DME: denies  Falls: denies  Dialysis: denies  Social Work Needs: Patient stated he is experiencing weakness, requesting a PT/OT evaluation. Also requesting resource for food and utilities, will consult the CHW.  Transportation at discharge: will possibly need transportation assistance    Potential Barriers: none  Discharge Disposition: home vs HHC  ADOD:  1-3  days    Randa Zheng RN, BSN  Transitional Care Coordinator

## 2025-06-29 NOTE — PROGRESS NOTES
Medical Group Progress Note  ASSESSMENT & PLAN:   Servando Leigh is a 60 y.o. male who was admitted to the MICU on 6/27/25 with hyponatremia (initial Na of 118) and seizures in setting of known seizure disorder. Hyponatremia was presumed to be from hypovolemic etiology given patient endorsed several days of vomiting and diarrhea prior to presentation. Patient received 3 L of NS (2 L in ED and 1 L in MICU). Keppra level was WNL and EEG was ordered with final read pending. On repeat RFP, sodium was 126. Patient had normal blood pressure and 1L nasal canula prior to transfer to floor.     Unsteady gait  - PT/OT consulted, recommendations pending  - Fall precautions    Hyponatremia 2/2 hypovolemia: improved  GREGORIA, prerenal: resolved  Chronic diarrhea  - Encourage PO inake  - Tigan for nausea with prolonged qt    Seizure hx:   - continue home meds. EEG did not reveal seizure activity    VTE Prophylaxis: Lovenox 40 mg    Disposition: ADOD 1-2 days    Level of MDM:  Moderate   Risk: Moderate   Data Reviewed and/or Analyzed:   Included: Prior external notes from at least 1 unique source, Results, including laboratory findings and imaging reports, listed above, Orders and notes from all consultants involved, and Notes for this encounter.  I personally reviewed the tests referenced above.    The patient/family had opportunity to ask questions. All questions were answered to the best of my ability.    Jaylen Gonzalez, DO  Hospital Medicine    SUBJECTIVE     Patient is still having nausea and diarrhea. He also feels lightheaded when attempting to stand up and walk.    OBJECTIVE:     Last Recorded Vitals:  Vitals:    06/29/25 0812 06/29/25 0845 06/29/25 1000 06/29/25 1200   BP: 101/67  106/70 100/64   Pulse: 83  85 83   Resp: 15      Temp: 37.1 °C (98.8 °F)      TempSrc:       SpO2: 100% 99%     Weight:       Height:         Last I/O:  I/O last 3 completed shifts:  In: 3762.3 (71.5 mL/kg) [P.O.:1230; I.V.:45 (0.9 mL/kg); IV  Piggyback:2487.3]  Out: 1500 (28.5 mL/kg) [Urine:1500 (0.8 mL/kg/hr)]  Weight: 52.6 kg     Constitutional:       Appearance: Normal appearance. Resting comfortably in bed  HENT:      Head: Normocephalic.   Cardiovascular:      Rate and Rhythm: Normal rate and regular rhythm.   Pulmonary:      Effort: Pulmonary effort is normal.      Breath sounds: Normal breath sounds.   Abdominal:      General: Abdomen is flat. Bowel sounds are normal.      Palpations: Abdomen is soft.   Musculoskeletal:         General: Normal range of motion.   Skin:     General: Skin is warm and dry.   Neurological:      General: No focal deficit present.      Mental Status: He is alert and oriented to person, place, and time.   Psychiatric:         Mood and Affect: Mood normal    Inpatient Medications:  Scheduled Medications[1]PRN Medications  PRN Medications[2]  Continuous Medications:  Continuous Medications[3]  LABS AND IMAGING:     Labs:  Results from last 7 days   Lab Units 06/28/25  0347 06/27/25  1746   WBC AUTO x10*3/uL 10.3 13.0*   RBC AUTO x10*6/uL 3.47* 3.98*   HEMOGLOBIN g/dL 10.0* 11.9*   HEMATOCRIT % 29.4* 32.5*   MCV fL 85 82   MCH pg 28.8 29.9   MCHC g/dL 34.0 36.6*   RDW % 14.9* 14.5   PLATELETS AUTO x10*3/uL 115* 142*     Results from last 7 days   Lab Units 06/29/25  1213 06/28/25  0347 06/27/25  2313 06/27/25  2031 06/27/25  1746   SODIUM mmol/L 135* 126* 122*   < > 120*   POTASSIUM mmol/L 3.8 3.4* 3.2*   < > 2.6*   CHLORIDE mmol/L 97* 88* 81*   < > 71*   CO2 mmol/L 30 31 31   < > 35*   BUN mg/dL 4* 11 12   < > 16   CREATININE mg/dL 0.98 1.34* 1.51*   < > 2.02*   GLUCOSE mg/dL 74 110* 92   < > 150*   PROTEIN TOTAL g/dL  --   --   --   --  7.5   CALCIUM mg/dL 8.4* 7.4* 7.8*   < > 7.8*   BILIRUBIN TOTAL mg/dL  --   --   --   --  0.9   ALK PHOS U/L  --   --   --   --  96   AST U/L  --   --   --   --  60*   ALT U/L  --   --   --   --  22    < > = values in this interval not displayed.     Results from last 7 days   Lab Units  06/29/25  1213 06/27/25  2313 06/27/25  1746   MAGNESIUM mg/dL 2.01 2.39 1.60     Results from last 7 days   Lab Units 06/27/25  2031 06/27/25  1746   [facility] ng/L 3 3     Imaging:  CT chest abdomen pelvis w IV contrast  Narrative: Interpreted By:  Bishnu Geronimo,  and Kash Luevano   STUDY:  CT CHEST ABDOMEN PELVIS W IV CONTRAST;  6/28/2025 9:04 am      INDICATION:  Signs/Symptoms:abd pain w guarding, hx of intraabdominal, perianal,  and groin infections.      COMPARISON:  CT chest abdomen pelvis 04/15/2025. MR of the pelvis 08/05/2020.      ACCESSION NUMBER(S):  [id number]      ORDERING CLINICIAN:  ZACKARY PARKINSON      TECHNIQUE:  CT of the chest, abdomen, and pelvis was performed.  Contiguous axial  images were obtained at 3 mm slice thickness through the chest,  abdomen and pelvis. Coronal and sagittal reconstructions at 3 mm  slice thickness were performed. 80 ML of Omnipaque 350 was  administered intravenously without immediate complication.      FINDINGS:  CHEST:      LUNG/PLEURA/LARGE AIRWAYS:  Redemonstration of mild-to-moderate centrilobular and paraseptal  emphysematous changes predominantly involving the bilateral lung  apices. There is again more central chronic bronchiectatic  changes/scarring bilaterally predominantly involving the dependent  aspects of the lungs in the posterior right upper lobe, bilateral  bases, left upper lobe perihilar region.      There is new patchy nodular consolidations with surrounding  tree-in-bud ground-glass opacities in the left lower lobe with  minimal mild visualized in the left lingular region compared to the  recent exam from 04/15/2025.      No new suspicious pulmonary nodules or masses with a stable 3 mm  pulmonary nodule in the right lower lobe (series 204, image 184).      VESSELS:  Aorta and pulmonary arteries are normal caliber.  No atherosclerotic  changes of the aorta are identified.  Moderate coronary artery  calcifications are present.       HEART:  The heart is normal in size.  There is no pericardial effusion.      MEDIASTINUM AND IGOR:  No mediastinal hilar or axillary lymphadenopathy per CT size criteria  however there are few prominent paratracheal lymph nodes. There is  mild wall thickening of the distal esophagus to the level of the  gastroesophageal junction. No evidence of hiatal hernia.      CHEST WALL AND LOWER NECK:  The soft tissues of the chest wall demonstrate no gross abnormality.  The visualized thyroid gland appears within normal limits.      ABDOMEN:      LIVER:  The liver is normal in size and demonstrates diffusely decreased  attenuation suggesting steatosis. Stable appearance of subcentimeter  cystic lesions throughout the hepatic parenchyma, largest measuring  up to 0.9 cm in the right hepatic lobe (series 201, image 120) with  similar lesion of the left hepatic lobe measuring 0.8 cm (series 201,  image 93) favored to reflect benign hepatic cysts.      BILE DUCTS:  The intrahepatic and extrahepatic ducts are not dilated.      GALLBLADDER:  The gallbladder is nondistended and without evidence of radiopaque  stones.      PANCREAS:  The pancreas appears unremarkable without evidence of suspicious  masses or peripancreatic edema. Previously visualized prominence of  the main pancreatic duct is less conspicuous on current examination,  previously measuring 4 mm in currently measuring 3 mm maximal  diameter.      SPLEEN:  The spleen is normal in size without focal lesions.      ADRENAL GLANDS:  Bilateral adrenal glands appear normal.      KIDNEYS AND URETERS:  The kidneys are normal in size and enhance symmetrically.  No  hydroureteronephrosis or nephroureterolithiasis is identified.      PELVIS:      BLADDER:  The urinary bladder appears mildly distended however there is no  evidence of abnormal wall thickening or perivesical stranding.      REPRODUCTIVE ORGANS:  The prostate is not enlarged.      BOWEL:  The stomach is unremarkable.   The small and large bowel are normal in  caliber and demonstrate no wall thickening.  Normal appendix.          VESSELS:  There is no aneurysmal dilatation of the abdominal aorta. The IVC  appears normal.      PERITONEUM/RETROPERITONEUM/LYMPH NODES:  There is no free or loculated fluid collection, no free  intraperitoneal air. The retroperitoneum appears normal.  No enlarged  mesenteric lymph nodes.      BONE AND SOFT TISSUE:  No new suspicious osseous lesions are identified. Degenerative  discogenic disease is noted in the lower thoracic and lumbar spine.  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 There is superficial epidermal thickening of the skin in the  perineal region which extends of the level of the rectum compatible  with sequelae of previously visualized fistulous tract present on MR  pelvis from 08/05/2020. Overall there is no evidence of discrete rim  enhancing fluid collections within this region or along the abnormal  soft tissue thickening on current examination.      Impression: 1. New patchy nodular consolidations with surrounding tree-in-bud  ground-glass opacities in the left lower lobe/lingular region likely  reflecting infectious/inflammatory process such as aspiration  pneumonitis/pneumonia. These findings are again superimposed on  sequela of chronic scarring and emphysema throughout the lungs as  above.  2. Perineal and subcutaneous scarring/skin thickening without  evidence of obvious organizing abscess or atypical fluid collections  in this region. Findings likely represent sequela to previously  existing site of perianal fistulous tract seen on the prior MR pelvis  from 08/05/2020.  3. No evidence of acute process within the abdomen or pelvis with  incidental findings as above.      I personally reviewed the images/study and I agree with the findings  as stated by resident Bassem CHAUDHRY  Kash. This study was interpreted  at [facility], [place], [place].      MACRO:  None      Signed by: Bishnu Pascualrosaline Geronimo 6/28/2025 12:32 PM  Dictation workstation:   [id number]  CT head wo IV contrast, CT cervical spine wo IV contrast  Narrative: Interpreted By:  Chevy Mullins and Ritchie Brandon   STUDY:  CT HEAD WO IV CONTRAST; CT CERVICAL SPINE WO IV CONTRAST;  6/28/2025  9:04 am      INDICATION:  Signs/Symptoms:unwitness fall with alcohol use disorder      COMPARISON:  CT head and cervical spine 03/30/2024      ACCESSION NUMBER(S):  [id number]; [id number]      ORDERING CLINICIAN:  ZACKARY PARKINSON      TECHNIQUE:  Axial noncontrast CT images of head with coronal and sagittal  reconstructed images. Axial noncontrast CT images of the cervical  spine with coronal and sagittal reconstructed images.      FINDINGS:  CT HEAD:      BRAIN PARENCHYMA:  No acute intraparenchymal hemorrhage or  parenchymal evidence of acute large territory ischemic infarct. No  mass-effect. Gray-white matter distinction is preserved.      VENTRICLES and EXTRA-AXIAL SPACES:  No acute extra-axial or  intraventricular hemorrhage. No effacement of cerebral sulci.  Ventricles and sulci are age-concordant.      PARANASAL SINUSES/MASTOIDS:  No hemorrhage or air-fluid levels within  the visualized paranasal sinuses. The mastoids are well aerated.      CALVARIUM/ORBITS:  No skull fracture.  The orbits and globes are  intact to the extent visualized.      EXTRACRANIAL SOFT TISSUES: No discernible hematoma.          CT CERVICAL SPINE:      PREVERTEBRAL SOFT TISSUES: Within normal limits.      CRANIOCERVICAL JUNCTION: Intact.      ALIGNMENT:  No traumatic malalignment or traumatic facet widening.      VERTEBRAE: Postsurgical changes of prior anterior fusion involving  C3-4 and additional ACDF spanning C4-6 without evidence of  perihardware lucency. No acute perihardware fracture or additional  fracture  throughout the visualized portions of the cervical spine.      SPINAL CANAL/INTERVERTEBRAL DISCS:   Varying degrees of degenerative  disc disease, with severe sclerotic endplate changes and osteophyte  formation present at C2-3. There are posterior disc osteophyte  complexes throughout the cervical spine which contribute to mild  spinal canal stenosis at C2-3 and C6-7. There is no evidence of  high-grade spinal canal stenosis.      NEURAL FORAMINA:  Multilevel uncovertebral joint and facet  arthropathy notably contribute to mild left-sided neural foraminal  stenosis at C4-5 and mild bilateral stenosis at C6-7.      OTHER: None.      Impression: CT HEAD:  1. No acute intracranial abnormality or calvarial fracture.          CT CERVICAL SPINE:  1. No acute fracture or traumatic malalignment of the cervical spine.  2. Anterior fusion of C3-4 and ACDF spanning C4-6 without evidence of  acute hardware complication.  3. Degenerative changes of the cervical spine as above.          I personally reviewed the images/study and I agree with the findings  as stated by resident Bassem Hewitt. This study was interpreted  at [place], [place].      MACRO:  None.      Signed by: Chevy Mullins 6/28/2025 10:20 AM  Dictation workstation:   [id number]  ECG 12 lead  Normal sinus rhythm  Prolonged QT  Abnormal ECG  When compared with ECG of 15-APR-2025 19:29,  Nonspecific T wave abnormality now evident in Anterior leads  QT has lengthened    See ED provider note for full interpretation and clinical correlation  Confirmed by Tory Crawford ([id number]) on 6/28/2025 2:02:17 AM             [1] atorvastatin, 10 mg, oral, Nightly  cholestyramine, 4 g, oral, BID  enoxaparin, 30 mg, subcutaneous, q24h  folic acid, 1 mg, oral, Daily  formoterol, 20 mcg, nebulization, BID  insulin lispro, 0-5 Units, subcutaneous, q4h  ipratropium-albuteroL, 3 mL, nebulization, TID  levETIRAcetam, 500 mg, oral,  BID  montelukast, 10 mg, oral, Nightly  multivitamin with minerals, 1 tablet, oral, Daily  nicotine, 1 patch, transdermal, Daily  nystatin, 5 mL, oral, 4x daily  pantoprazole, 40 mg, oral, Daily before breakfast  rOPINIRole, 0.5 mg, oral, BID  sodium chloride, 4 mL, nebulization, TID  tamsulosin, 0.4 mg, oral, Daily  [START ON 7/1/2025] thiamine, 100 mg, oral, Daily  tiotropium, 2 puff, inhalation, Daily  [2] PRN medications: acetaminophen, albuterol, ammonium lactate, dextrose, dextrose, diazePAM, glucagon, glucagon, hydrocortisone, phenoL, traMADol, trimethobenzamide  [3]

## 2025-06-29 NOTE — CARE PLAN
The patient's goals for the shift include      The clinical goals for the shift include Patient will be free from fall/injury during the shift.

## 2025-06-30 VITALS
TEMPERATURE: 98.6 F | OXYGEN SATURATION: 91 % | WEIGHT: 115.96 LBS | DIASTOLIC BLOOD PRESSURE: 67 MMHG | RESPIRATION RATE: 18 BRPM | HEART RATE: 104 BPM | SYSTOLIC BLOOD PRESSURE: 100 MMHG | BODY MASS INDEX: 17.57 KG/M2 | HEIGHT: 68 IN

## 2025-06-30 LAB
ALBUMIN SERPL BCP-MCNC: 3 G/DL (ref 3.4–5)
ANION GAP SERPL CALC-SCNC: 8 MMOL/L (ref 10–20)
BUN SERPL-MCNC: 6 MG/DL (ref 6–23)
CALCIUM SERPL-MCNC: 8.2 MG/DL (ref 8.6–10.6)
CHLORIDE SERPL-SCNC: 100 MMOL/L (ref 98–107)
CO2 SERPL-SCNC: 28 MMOL/L (ref 21–32)
CREAT SERPL-MCNC: 0.93 MG/DL (ref 0.5–1.3)
EGFRCR SERPLBLD CKD-EPI 2021: >90 ML/MIN/1.73M*2
ERYTHROCYTE [DISTWIDTH] IN BLOOD BY AUTOMATED COUNT: 16 % (ref 11.5–14.5)
GLUCOSE BLD MANUAL STRIP-MCNC: 103 MG/DL (ref 74–99)
GLUCOSE BLD MANUAL STRIP-MCNC: 116 MG/DL (ref 74–99)
GLUCOSE BLD MANUAL STRIP-MCNC: 129 MG/DL (ref 74–99)
GLUCOSE BLD MANUAL STRIP-MCNC: 150 MG/DL (ref 74–99)
GLUCOSE BLD MANUAL STRIP-MCNC: 81 MG/DL (ref 74–99)
GLUCOSE SERPL-MCNC: 73 MG/DL (ref 74–99)
HCT VFR BLD AUTO: 28.1 % (ref 41–52)
HGB BLD-MCNC: 9.4 G/DL (ref 13.5–17.5)
HOLD SPECIMEN: NORMAL
LABORATORY REPORT: NORMAL
MAGNESIUM SERPL-MCNC: 1.9 MG/DL (ref 1.6–2.4)
MCH RBC QN AUTO: 30.1 PG (ref 26–34)
MCHC RBC AUTO-ENTMCNC: 33.5 G/DL (ref 32–36)
MCV RBC AUTO: 90 FL (ref 80–100)
NRBC BLD-RTO: 0 /100 WBCS (ref 0–0)
PETH INTERPRETATION: NORMAL
PHOSPHATE SERPL-MCNC: 2.8 MG/DL (ref 2.5–4.9)
PLATELET # BLD AUTO: 141 X10*3/UL (ref 150–450)
PLPETH BLD-MCNC: 1185 NG/ML
POPETH BLD-MCNC: >2000 NG/ML
POTASSIUM SERPL-SCNC: 4 MMOL/L (ref 3.5–5.3)
RBC # BLD AUTO: 3.12 X10*6/UL (ref 4.5–5.9)
SODIUM SERPL-SCNC: 132 MMOL/L (ref 136–145)
WBC # BLD AUTO: 10.1 X10*3/UL (ref 4.4–11.3)

## 2025-06-30 PROCEDURE — 82947 ASSAY GLUCOSE BLOOD QUANT: CPT

## 2025-06-30 PROCEDURE — 80069 RENAL FUNCTION PANEL: CPT | Performed by: STUDENT IN AN ORGANIZED HEALTH CARE EDUCATION/TRAINING PROGRAM

## 2025-06-30 PROCEDURE — 2500000001 HC RX 250 WO HCPCS SELF ADMINISTERED DRUGS (ALT 637 FOR MEDICARE OP)

## 2025-06-30 PROCEDURE — 97165 OT EVAL LOW COMPLEX 30 MIN: CPT | Mod: GO

## 2025-06-30 PROCEDURE — 99232 SBSQ HOSP IP/OBS MODERATE 35: CPT | Performed by: STUDENT IN AN ORGANIZED HEALTH CARE EDUCATION/TRAINING PROGRAM

## 2025-06-30 PROCEDURE — 2500000002 HC RX 250 W HCPCS SELF ADMINISTERED DRUGS (ALT 637 FOR MEDICARE OP, ALT 636 FOR OP/ED): Performed by: STUDENT IN AN ORGANIZED HEALTH CARE EDUCATION/TRAINING PROGRAM

## 2025-06-30 PROCEDURE — 94640 AIRWAY INHALATION TREATMENT: CPT

## 2025-06-30 PROCEDURE — 85027 COMPLETE CBC AUTOMATED: CPT | Performed by: STUDENT IN AN ORGANIZED HEALTH CARE EDUCATION/TRAINING PROGRAM

## 2025-06-30 PROCEDURE — 2500000005 HC RX 250 GENERAL PHARMACY W/O HCPCS

## 2025-06-30 PROCEDURE — 2500000002 HC RX 250 W HCPCS SELF ADMINISTERED DRUGS (ALT 637 FOR MEDICARE OP, ALT 636 FOR OP/ED)

## 2025-06-30 PROCEDURE — 2500000001 HC RX 250 WO HCPCS SELF ADMINISTERED DRUGS (ALT 637 FOR MEDICARE OP): Performed by: STUDENT IN AN ORGANIZED HEALTH CARE EDUCATION/TRAINING PROGRAM

## 2025-06-30 PROCEDURE — 2500000004 HC RX 250 GENERAL PHARMACY W/ HCPCS (ALT 636 FOR OP/ED)

## 2025-06-30 PROCEDURE — 36415 COLL VENOUS BLD VENIPUNCTURE: CPT | Performed by: STUDENT IN AN ORGANIZED HEALTH CARE EDUCATION/TRAINING PROGRAM

## 2025-06-30 PROCEDURE — 97161 PT EVAL LOW COMPLEX 20 MIN: CPT | Mod: GP

## 2025-06-30 PROCEDURE — 1100000001 HC PRIVATE ROOM DAILY

## 2025-06-30 PROCEDURE — S4991 NICOTINE PATCH NONLEGEND: HCPCS

## 2025-06-30 PROCEDURE — 83735 ASSAY OF MAGNESIUM: CPT | Performed by: STUDENT IN AN ORGANIZED HEALTH CARE EDUCATION/TRAINING PROGRAM

## 2025-06-30 RX ORDER — LOPERAMIDE HYDROCHLORIDE 2 MG/1
2 CAPSULE ORAL 4 TIMES DAILY PRN
Status: DISCONTINUED | OUTPATIENT
Start: 2025-06-30 | End: 2025-07-01

## 2025-06-30 RX ADMIN — NYSTATIN 500000 UNITS: 100000 SUSPENSION ORAL at 12:07

## 2025-06-30 RX ADMIN — ROPINIROLE 0.5 MG: 0.5 TABLET, FILM COATED ORAL at 08:44

## 2025-06-30 RX ADMIN — CHOLESTYRAMINE POWDER FOR SUSPENSION 4 G: 4 POWDER, FOR SUSPENSION ORAL at 08:44

## 2025-06-30 RX ADMIN — NYSTATIN 500000 UNITS: 100000 SUSPENSION ORAL at 07:13

## 2025-06-30 RX ADMIN — FORMOTEROL FUMARATE DIHYDRATE 20 MCG: 20 SOLUTION RESPIRATORY (INHALATION) at 20:33

## 2025-06-30 RX ADMIN — IPRATROPIUM BROMIDE AND ALBUTEROL SULFATE 3 ML: .5; 3 SOLUTION RESPIRATORY (INHALATION) at 20:34

## 2025-06-30 RX ADMIN — MONTELUKAST 10 MG: 10 TABLET, FILM COATED ORAL at 20:53

## 2025-06-30 RX ADMIN — IPRATROPIUM BROMIDE AND ALBUTEROL SULFATE 3 ML: .5; 3 SOLUTION RESPIRATORY (INHALATION) at 08:45

## 2025-06-30 RX ADMIN — SODIUM CHLORIDE SOLN NEBU 3% 4 ML: 3 NEBU SOLN at 14:06

## 2025-06-30 RX ADMIN — ACETAMINOPHEN 650 MG: 325 TABLET ORAL at 00:45

## 2025-06-30 RX ADMIN — FORMOTEROL FUMARATE DIHYDRATE 20 MCG: 20 SOLUTION RESPIRATORY (INHALATION) at 08:46

## 2025-06-30 RX ADMIN — LEVETIRACETAM 500 MG: 500 TABLET, FILM COATED ORAL at 08:44

## 2025-06-30 RX ADMIN — TIOTROPIUM BROMIDE INHALATION SPRAY 2 PUFF: 3.12 SPRAY, METERED RESPIRATORY (INHALATION) at 12:07

## 2025-06-30 RX ADMIN — SODIUM CHLORIDE SOLN NEBU 3% 4 ML: 3 NEBU SOLN at 20:33

## 2025-06-30 RX ADMIN — DIAZEPAM 10 MG: 5 TABLET ORAL at 14:05

## 2025-06-30 RX ADMIN — SODIUM CHLORIDE SOLN NEBU 3% 4 ML: 3 NEBU SOLN at 08:45

## 2025-06-30 RX ADMIN — ACETAMINOPHEN 650 MG: 325 TABLET ORAL at 19:19

## 2025-06-30 RX ADMIN — TAMSULOSIN HYDROCHLORIDE 0.4 MG: 0.4 CAPSULE ORAL at 08:44

## 2025-06-30 RX ADMIN — ATORVASTATIN CALCIUM 10 MG: 20 TABLET, FILM COATED ORAL at 20:53

## 2025-06-30 RX ADMIN — Medication 1 TABLET: at 08:44

## 2025-06-30 RX ADMIN — NYSTATIN 500000 UNITS: 100000 SUSPENSION ORAL at 20:53

## 2025-06-30 RX ADMIN — IPRATROPIUM BROMIDE AND ALBUTEROL SULFATE 3 ML: .5; 3 SOLUTION RESPIRATORY (INHALATION) at 14:06

## 2025-06-30 RX ADMIN — NICOTINE 1 PATCH: 14 PATCH, EXTENDED RELEASE TRANSDERMAL at 08:44

## 2025-06-30 RX ADMIN — LEVETIRACETAM 500 MG: 500 TABLET, FILM COATED ORAL at 20:53

## 2025-06-30 RX ADMIN — CHOLESTYRAMINE POWDER FOR SUSPENSION 4 G: 4 POWDER, FOR SUSPENSION ORAL at 19:42

## 2025-06-30 RX ADMIN — PANTOPRAZOLE SODIUM 40 MG: 40 TABLET, DELAYED RELEASE ORAL at 07:13

## 2025-06-30 RX ADMIN — LOPERAMIDE HYDROCHLORIDE 2 MG: 2 CAPSULE ORAL at 00:45

## 2025-06-30 RX ADMIN — FOLIC ACID 1 MG: 1 TABLET ORAL at 08:44

## 2025-06-30 RX ADMIN — ENOXAPARIN SODIUM 30 MG: 100 INJECTION SUBCUTANEOUS at 22:42

## 2025-06-30 RX ADMIN — ROPINIROLE 0.5 MG: 0.5 TABLET, FILM COATED ORAL at 20:53

## 2025-06-30 ASSESSMENT — LIFESTYLE VARIABLES
ORIENTATION AND CLOUDING OF SENSORIUM: ORIENTED AND CAN DO SERIAL ADDITIONS
TOTAL SCORE: 5
AGITATION: NORMAL ACTIVITY
NAUSEA AND VOMITING: NO NAUSEA AND NO VOMITING
ANXIETY: NO ANXIETY, AT EASE
TOTAL SCORE: 6
NAUSEA AND VOMITING: NO NAUSEA AND NO VOMITING
TREMOR: 2
TOTAL SCORE: 1
PAROXYSMAL SWEATS: NO SWEAT VISIBLE
AUDITORY DISTURBANCES: NOT PRESENT
ANXIETY: 2
VISUAL DISTURBANCES: NOT PRESENT
ORIENTATION AND CLOUDING OF SENSORIUM: ORIENTED AND CAN DO SERIAL ADDITIONS
ORIENTATION AND CLOUDING OF SENSORIUM: ORIENTED AND CAN DO SERIAL ADDITIONS
AGITATION: NORMAL ACTIVITY
AGITATION: NORMAL ACTIVITY
PAROXYSMAL SWEATS: NO SWEAT VISIBLE
TREMOR: NOT VISIBLE, BUT CAN BE FELT FINGERTIP TO FINGERTIP
HEADACHE, FULLNESS IN HEAD: MODERATE
ANXIETY: NO ANXIETY, AT EASE
ORIENTATION AND CLOUDING OF SENSORIUM: ORIENTED AND CAN DO SERIAL ADDITIONS
NAUSEA AND VOMITING: NO NAUSEA AND NO VOMITING
HEADACHE, FULLNESS IN HEAD: NOT PRESENT
TACTILE DISTURBANCES: VERY MILD ITCHING, PINS AND NEEDLES, BURNING OR NUMBNESS
TREMOR: NOT VISIBLE, BUT CAN BE FELT FINGERTIP TO FINGERTIP
PULSE: 96
NAUSEA AND VOMITING: NO NAUSEA AND NO VOMITING
PAROXYSMAL SWEATS: NO SWEAT VISIBLE
NAUSEA AND VOMITING: NO NAUSEA AND NO VOMITING
AUDITORY DISTURBANCES: NOT PRESENT
VISUAL DISTURBANCES: NOT PRESENT
AGITATION: NORMAL ACTIVITY
ANXIETY: NO ANXIETY, AT EASE
AUDITORY DISTURBANCES: NOT PRESENT
HEADACHE, FULLNESS IN HEAD: VERY MILD
PAROXYSMAL SWEATS: NO SWEAT VISIBLE
AUDITORY DISTURBANCES: NOT PRESENT
ANXIETY: NO ANXIETY, AT EASE
VISUAL DISTURBANCES: NOT PRESENT
VISUAL DISTURBANCES: NOT PRESENT
PAROXYSMAL SWEATS: NO SWEAT VISIBLE
VISUAL DISTURBANCES: NOT PRESENT
TOTAL SCORE: 1
HEADACHE, FULLNESS IN HEAD: NOT PRESENT
PAROXYSMAL SWEATS: BARELY PERCEPTIBLE SWEATING, PALMS MOIST
TOTAL SCORE: 0
ORIENTATION AND CLOUDING OF SENSORIUM: ORIENTED AND CAN DO SERIAL ADDITIONS
TREMOR: 2
AGITATION: NORMAL ACTIVITY
ANXIETY: NO ANXIETY, AT EASE
AUDITORY DISTURBANCES: NOT PRESENT
AGITATION: NORMAL ACTIVITY
TREMOR: 2
ORIENTATION AND CLOUDING OF SENSORIUM: ORIENTED AND CAN DO SERIAL ADDITIONS
TOTAL SCORE: 3
VISUAL DISTURBANCES: NOT PRESENT
NAUSEA AND VOMITING: NO NAUSEA AND NO VOMITING
HEADACHE, FULLNESS IN HEAD: NOT PRESENT
HEADACHE, FULLNESS IN HEAD: NOT PRESENT
AUDITORY DISTURBANCES: NOT PRESENT
TREMOR: NO TREMOR

## 2025-06-30 ASSESSMENT — COGNITIVE AND FUNCTIONAL STATUS - GENERAL
HELP NEEDED FOR BATHING: A LITTLE
TOILETING: A LITTLE
TOILETING: A LITTLE
DAILY ACTIVITIY SCORE: 19
DRESSING REGULAR LOWER BODY CLOTHING: A LITTLE
CLIMB 3 TO 5 STEPS WITH RAILING: TOTAL
WALKING IN HOSPITAL ROOM: A LITTLE
STANDING UP FROM CHAIR USING ARMS: A LITTLE
DAILY ACTIVITIY SCORE: 19
STANDING UP FROM CHAIR USING ARMS: A LITTLE
DRESSING REGULAR UPPER BODY CLOTHING: A LITTLE
PERSONAL GROOMING: A LITTLE
MOVING TO AND FROM BED TO CHAIR: A LITTLE
CLIMB 3 TO 5 STEPS WITH RAILING: A LITTLE
TURNING FROM BACK TO SIDE WHILE IN FLAT BAD: A LITTLE
MOVING TO AND FROM BED TO CHAIR: A LITTLE
MOVING FROM LYING ON BACK TO SITTING ON SIDE OF FLAT BED WITH BEDRAILS: A LITTLE
WALKING IN HOSPITAL ROOM: A LITTLE
MOBILITY SCORE: 16
HELP NEEDED FOR BATHING: A LITTLE
DRESSING REGULAR LOWER BODY CLOTHING: A LITTLE
PERSONAL GROOMING: A LITTLE
TURNING FROM BACK TO SIDE WHILE IN FLAT BAD: A LITTLE
DRESSING REGULAR UPPER BODY CLOTHING: A LITTLE
MOBILITY SCORE: 18
MOVING FROM LYING ON BACK TO SITTING ON SIDE OF FLAT BED WITH BEDRAILS: A LITTLE

## 2025-06-30 ASSESSMENT — PAIN - FUNCTIONAL ASSESSMENT
PAIN_FUNCTIONAL_ASSESSMENT: 0-10

## 2025-06-30 ASSESSMENT — PAIN SCALES - GENERAL
PAINLEVEL_OUTOF10: 8
PAINLEVEL_OUTOF10: 4
PAINLEVEL_OUTOF10: 4
PAINLEVEL_OUTOF10: 8
PAINLEVEL_OUTOF10: 7
PAINLEVEL_OUTOF10: 7
PAINLEVEL_OUTOF10: 8
PAINLEVEL_OUTOF10: 4

## 2025-06-30 ASSESSMENT — PAIN DESCRIPTION - DESCRIPTORS
DESCRIPTORS: SORE
DESCRIPTORS: SORE

## 2025-06-30 ASSESSMENT — ACTIVITIES OF DAILY LIVING (ADL)
BATHING_ASSISTANCE: MINIMAL
ADL_ASSISTANCE: INDEPENDENT

## 2025-06-30 ASSESSMENT — PAIN DESCRIPTION - LOCATION: LOCATION: BUTTOCKS

## 2025-06-30 ASSESSMENT — PAIN DESCRIPTION - ORIENTATION: ORIENTATION: RIGHT;LEFT

## 2025-06-30 NOTE — PROGRESS NOTES
Occupational Therapy    Evaluation    Patient Name: Servando Leigh  MRN: [id number]  Department: [facility]  Room: [address] [place] [address]  Today's Date: 6/30/2025  Time Calculation  Start Time: 1337  Stop Time: 1355  Time Calculation (min): 18 min        Assessment:  Prognosis: Good  Evaluation/Treatment Tolerance: Patient limited by fatigue  Medical Staff Made Aware: Yes  End of Session Communication: Bedside nurse  End of Session Patient Position: Bed, 3 rail up, Alarm off, not on at start of session  OT Assessment Results: Decreased ADL status, Decreased endurance, Decreased functional mobility  Prognosis: Good  Barriers to Discharge: Decreased caregiver support  Evaluation/Treatment Tolerance: Patient limited by fatigue  Medical Staff Made Aware: Yes  Strengths: Attitude of self  Barriers to Participation: Comorbidities    Plan:  Treatment Interventions: ADL retraining, Functional transfer training, Endurance training, UE strengthening/ROM  OT Frequency: 2 times per week  OT Discharge Recommendations: Low intensity level of continued care  Equipment Recommended upon Discharge:  (Tub transfer bench, bedside commode)  OT Recommended Transfer Status: Assist of 1  OT - OK to Discharge: Yes (OT eval complete, refer to discharge recommendations provded)  Treatment Interventions: ADL retraining, Functional transfer training, Endurance training, UE strengthening/ROM    Subjective   Current Problem:  1. Hyponatremia        2. Seizure (Multi)        3. Alcohol use disorder        4. Nausea, vomiting and diarrhea        5. Dehydration          OT Visit Info:  OT Received On: 06/30/25  General:  General  Reason for Referral: nausea/vomiting x3 days, uanble to take medications. reports having 3 seizures  Past Medical History Relevant to Rehab: COPD, ETOH abuse, brochiectasis, seizures, chronic perianal and scrotal abscess  Family/Caregiver Present: No  Co-Treatment: PT  Co-Treatment Reason: facilitate safe functional mobility; pt with  reduced activity tolerance 2/2 nausea  Prior to Session Communication: Bedside nurse  Patient Position Received: Bed, 3 rail up, Alarm off, not on at start of session  General Comment: Pt supine upon arrival, agreeable to participate.    Precautions:  Hearing/Visual Limitations: hearing WFL, wears glasses (reporting blurry vision)  Medical Precautions: Fall precautions, Seizure precautions    Vitals: EOB at end of session 115/82 BP, 102 HR, 96 O2          Pain:  Pain Assessment  Pain Assessment: 0-10  0-10 (Numeric) Pain Score: 8  Pain Type: Acute pain  Pain Location: Back    Objective   Cognition:  Overall Cognitive Status: Within Functional Limits  Orientation Level: Oriented X4  Attention: Within Functional Limits  Memory: Within Funtional Limits  Problem Solving: Exceptions to WFL  Safety/Judgement: Exceptions to WFL  Insight: Mild  Impulsive: Mildly  Task Initiation: WFL  Flexibility of Thought: Within functional limits  Planning: Reduced planning skills  Organization: Mildly disorganized  Processing Speed: Within funtional limits           Home Living:  Type of Home: House  Lives With: Dependent children (6 yo son)  Home Layout: One level  Home Access: Level entry  Bathroom Shower/Tub: Tub/shower unit  Bathroom Toilet: Standard  Bathroom Equipment: Shower chair with back, Grab bars in shower (pt reporting grab bar installed wrong, has difficulty getting in and out of shower)    Prior Function:  Level of [place]: Independent with homemaking with ambulation, Independent with ADLs and functional transfers  Receives Help From:  (pt reporting does not have assistance from family)  ADL Assistance: Independent  Homemaking Assistance: Independent  Ambulatory Assistance: Independent  Vocational: On disability  Hand Dominance: Right  Prior Function Comments: +drives, +falls (4) recently; pt reports he has home health aide servies set up to do evaluation on 7/5       ADL:  Eating Assistance: Independent  Grooming  Assistance: Stand by  Bathing Assistance: Minimal  UE Dressing Assistance: Stand by  LE Dressing Assistance: Stand by (socks)  Toileting Assistance with Device: Stand by (anticipated)    Activity Tolerance:  Endurance: Tolerates 10 - 20 min exercise with multiple rests    Bed Mobility/Transfers: Bed Mobility  Bed Mobility: Yes  Bed Mobility 1  Bed Mobility 1: Supine to sitting, Sitting to supine  Level of Assistance 1: Close supervision  Bed Mobility Comments 1: HOB slightly elevated    Transfers  Transfer: Yes  Transfer 1  Transfer From 1: Bed to, Stand to  Transfer to 1: Stand, Bed  Technique 1: Sit to stand, Stand to sit  Transfer Device 1: Walker  Transfer Level of Assistance 1: Contact guard    Functional Mobility:  Functional Mobility  Functional Mobility Performed: Yes  Functional Mobility 1  Surface 1: Level tile  Device 1: Rolling walker  Assistance 1: Contact guard  Comments 1: min household distances    Sitting Balance:  Static Sitting Balance  Static Sitting-Balance Support: Feet supported  Static Sitting-Level of Assistance: Independent  Dynamic Sitting Balance  Dynamic Sitting-Balance Support: Feet supported  Dynamic Sitting-Level of Assistance: Close supervision    Standing Balance:  Static Standing Balance  Static Standing-Balance Support: Bilateral upper extremity supported (FWW)  Static Standing-Level of Assistance: Close supervision  Dynamic Standing Balance  Dynamic Standing-Balance Support: Bilateral upper extremity supported (FWW)  Dynamic Standing-Level of Assistance: Contact guard      Vision:Vision - Basic Assessment  Current Vision: Wears glasses all the time    Sensation:  Light Touch:  (+n/t hands and feet)    Strength:  Strength Comments: R shoulder 2+/5, distal joints grossly 4/5; LUE grossly 4/5    Perception:  Inattention/Neglect: Appears intact  Initiation: Appears intact  Motor Planning: Appears intact  Perseveration: Not present    Coordination:  Movements are Fluid and  Coordinated: No     Hand Function:  Gross Grasp: Functional  Coordination: Functional    Extremities: RUE   RUE : Exceptions to WFL (R shoulder flexion grossly 0-60, distal joints WFL) and LUE   LUE: Within Functional Limits    Outcome Measures:[facility] Daily Activity  Putting on and taking off regular lower body clothing: A little  Bathing (including washing, rinsing, drying): A little  Putting on and taking off regular upper body clothing: A little  Toileting, which includes using toilet, bedpan or urinal: A little  Taking care of personal grooming such as brushing teeth: A little  Eating Meals: None  Daily Activity - Total Score: 19        Education Documentation  Body Mechanics, taught by Rima William OT at 6/30/2025  3:55 PM.  Learner: Patient  Readiness: Acceptance  Method: Explanation  Response: Needs Reinforcement    Precautions, taught by Rima William OT at 6/30/2025  3:55 PM.  Learner: Patient  Readiness: Acceptance  Method: Explanation  Response: Needs Reinforcement    ADL Training, taught by Rima William OT at 6/30/2025  3:55 PM.  Learner: Patient  Readiness: Acceptance  Method: Explanation  Response: Needs Reinforcement    Education Comments  No comments found.           Goals:  Encounter Problems       Encounter Problems (Active)       ADLs       Patient with complete upper body dressing with modified independent level of assistance donning shirt w/out fasteners. (Progressing)       Start:  06/30/25    Expected End:  07/14/25            Patient with complete lower body dressing with modified independent level of assistance donning pants without a zipper/fasteners, socks, and shoes with PRN adaptive equipment and LRD. (Progressing)       Start:  06/30/25    Expected End:  07/14/25            Patient will complete toileting including hygiene clothing management/hygiene with modified independent level of assistance and prn adaptive equipment/LRD. (Progressing)       Start:  06/30/25    Expected End:   07/14/25               BALANCE       Pt will maintain dynamic standing balance during ADL task with modified independent level of assistance and LRD in order to demonstrate decreased risk of falling and improved postural control. (Progressing)       Start:  06/30/25    Expected End:  07/14/25               EXERCISE/STRENGTHENING       Patient will be educated on BUE HEP for increased ADL performance. (Progressing)       Start:  06/30/25    Expected End:  07/14/25               MOBILITY       Patient will perform Functional mobility max Household distances with modified independent level of assistance and least restrictive device in order to improve safety and functional mobility. (Progressing)       Start:  06/30/25    Expected End:  07/14/25               TRANSFERS       Patient will complete functional transfer to/from toilet and chair with least restrictive device with modified independent level of assistance. (Progressing)       Start:  06/30/25    Expected End:  07/14/25

## 2025-06-30 NOTE — CARE PLAN
Problem: Pain  Goal: Takes deep breaths with improved pain control throughout the shift  Outcome: Progressing  Goal: Turns in bed with improved pain control throughout the shift  Outcome: Progressing  Goal: Walks with improved pain control throughout the shift  Outcome: Progressing  Goal: Performs ADL's with improved pain control throughout shift  Outcome: Progressing   The patient's goals for the shift include      The clinical goals for the shift include Patient will be free from fall/injury the whole shift.    Over the shift, the patient did make progress toward the following goals.
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
## 2025-06-30 NOTE — PROGRESS NOTES
Medical Group Progress Note  ASSESSMENT & PLAN:   Servando Leigh is a 60 y.o. male who was admitted to the MICU on 6/27/25 with hyponatremia (initial Na of 118) and seizures in setting of known seizure disorder. Hyponatremia was presumed to be from hypovolemic etiology given patient endorsed several days of vomiting and diarrhea prior to presentation. Patient received 3 L of NS (2 L in ED and 1 L in MICU). Keppra level was WNL and EEG was ordered with final read pending. On repeat RFP, sodium was 126. Patient had normal blood pressure and 1L nasal canula prior to transfer to floor.     Unsteady gait  - PT/OT consulted, recommendations low intensity level of care  - Fall precautions    Hyponatremia 2/2 hypovolemia: improved  GREGORIA, prerenal: resolved  Chronic diarrhea  - Encourage PO inake  - Tigan for nausea with prolonged qt    Seizure hx:   - continue home meds. EEG did not reveal seizure activity    VTE Prophylaxis: Lovenox 40 mg    Disposition: Medically ready for discharge home with home care. Anticipate discharge tomorrow once [facility] is set up    Level of MDM:  Moderate   Risk: Moderate   Data Reviewed and/or Analyzed:   Included: Prior external notes from at least 1 unique source, Results, including laboratory findings and imaging reports, listed above, Orders and notes from all consultants involved, and Notes for this encounter.  I personally reviewed the tests referenced above.    The patient/family had opportunity to ask questions. All questions were answered to the best of my ability.    Jaylen Gonzalez, DO  Hospital Medicine    SUBJECTIVE     Patient continues to feel lightheaded when attempting to stand up and walk.    OBJECTIVE:     Last Recorded Vitals:  Vitals:    06/30/25 0820 06/30/25 1213 06/30/25 1336 06/30/25 1602   BP: 104/71 112/74 115/82 102/71   Pulse: 87 92 101 91   Resp:       Temp: 37.2 °C (99 °F) 37.3 °C (99.1 °F)  37.4 °C (99.3 °F)   TempSrc:       SpO2: 93% 94% 96% 96%   Weight:        Height:         Last I/O:  I/O last 3 completed shifts:  In: 1350 (25.7 mL/kg) [P.O.:1350]  Out: - (0 mL/kg)   Weight: 52.6 kg     Constitutional:       Appearance: Normal appearance. Resting comfortably in bed  HENT:      Head: Normocephalic.   Cardiovascular:      Rate and Rhythm: Normal rate and regular rhythm.   Pulmonary:      Effort: Pulmonary effort is normal.      Breath sounds: Normal breath sounds.   Abdominal:      General: Abdomen is flat. Bowel sounds are normal.      Palpations: Abdomen is soft.   Musculoskeletal:         General: Normal range of motion.   Skin:     General: Skin is warm and dry.   Neurological:      General: No focal deficit present.      Mental Status: He is alert and oriented to person, place, and time.   Psychiatric:         Mood and Affect: Mood normal    Inpatient Medications:  Scheduled Medications[1]PRN Medications  PRN Medications[2]  Continuous Medications:  Continuous Medications[3]  LABS AND IMAGING:     Labs:  Results from last 7 days   Lab Units 06/30/25  0520 06/28/25  0347 06/27/25  1746   WBC AUTO x10*3/uL 10.1 10.3 13.0*   RBC AUTO x10*6/uL 3.12* 3.47* 3.98*   HEMOGLOBIN g/dL 9.4* 10.0* 11.9*   HEMATOCRIT % 28.1* 29.4* 32.5*   MCV fL 90 85 82   MCH pg 30.1 28.8 29.9   MCHC g/dL 33.5 34.0 36.6*   RDW % 16.0* 14.9* 14.5   PLATELETS AUTO x10*3/uL 141* 115* 142*     Results from last 7 days   Lab Units 06/30/25  0520 06/29/25  1213 06/28/25  0347 06/27/25 2031 06/27/25  1746   SODIUM mmol/L 132* 135* 126*   < > 120*   POTASSIUM mmol/L 4.0 3.8 3.4*   < > 2.6*   CHLORIDE mmol/L 100 97* 88*   < > 71*   CO2 mmol/L 28 30 31   < > 35*   BUN mg/dL 6 4* 11   < > 16   CREATININE mg/dL 0.93 0.98 1.34*   < > 2.02*   GLUCOSE mg/dL 73* 74 110*   < > 150*   PROTEIN TOTAL g/dL  --   --   --   --  7.5   CALCIUM mg/dL 8.2* 8.4* 7.4*   < > 7.8*   BILIRUBIN TOTAL mg/dL  --   --   --   --  0.9   ALK PHOS U/L  --   --   --   --  96   AST U/L  --   --   --   --  60*   ALT U/L  --   --   --    --  22    < > = values in this interval not displayed.     Results from last 7 days   Lab Units 06/30/25  0520 06/29/25  1213 06/27/25  2313   MAGNESIUM mg/dL 1.90 2.01 2.39     Results from last 7 days   Lab Units 06/27/25  2031 06/27/25  1746   [facility] ng/L 3 3     Imaging:  CT chest abdomen pelvis w IV contrast  Narrative: Interpreted By:  Bishnu Geronimo,  and Kash Luevano   STUDY:  CT CHEST ABDOMEN PELVIS W IV CONTRAST;  6/28/2025 9:04 am      INDICATION:  Signs/Symptoms:abd pain w guarding, hx of intraabdominal, perianal,  and groin infections.      COMPARISON:  CT chest abdomen pelvis 04/15/2025. MR of the pelvis 08/05/2020.      ACCESSION NUMBER(S):  [id number]      ORDERING CLINICIAN:  ZACKARY PARKINSON      TECHNIQUE:  CT of the chest, abdomen, and pelvis was performed.  Contiguous axial  images were obtained at 3 mm slice thickness through the chest,  abdomen and pelvis. Coronal and sagittal reconstructions at 3 mm  slice thickness were performed. 80 ML of Omnipaque 350 was  administered intravenously without immediate complication.      FINDINGS:  CHEST:      LUNG/PLEURA/LARGE AIRWAYS:  Redemonstration of mild-to-moderate centrilobular and paraseptal  emphysematous changes predominantly involving the bilateral lung  apices. There is again more central chronic bronchiectatic  changes/scarring bilaterally predominantly involving the dependent  aspects of the lungs in the posterior right upper lobe, bilateral  bases, left upper lobe perihilar region.      There is new patchy nodular consolidations with surrounding  tree-in-bud ground-glass opacities in the left lower lobe with  minimal mild visualized in the left lingular region compared to the  recent exam from 04/15/2025.      No new suspicious pulmonary nodules or masses with a stable 3 mm  pulmonary nodule in the right lower lobe (series 204, image 184).      VESSELS:  Aorta and pulmonary arteries are normal caliber.  No  atherosclerotic  changes of the aorta are identified.  Moderate coronary artery  calcifications are present.      HEART:  The heart is normal in size.  There is no pericardial effusion.      MEDIASTINUM AND IGOR:  No mediastinal hilar or axillary lymphadenopathy per CT size criteria  however there are few prominent paratracheal lymph nodes. There is  mild wall thickening of the distal esophagus to the level of the  gastroesophageal junction. No evidence of hiatal hernia.      CHEST WALL AND LOWER NECK:  The soft tissues of the chest wall demonstrate no gross abnormality.  The visualized thyroid gland appears within normal limits.      ABDOMEN:      LIVER:  The liver is normal in size and demonstrates diffusely decreased  attenuation suggesting steatosis. Stable appearance of subcentimeter  cystic lesions throughout the hepatic parenchyma, largest measuring  up to 0.9 cm in the right hepatic lobe (series 201, image 120) with  similar lesion of the left hepatic lobe measuring 0.8 cm (series 201,  image 93) favored to reflect benign hepatic cysts.      BILE DUCTS:  The intrahepatic and extrahepatic ducts are not dilated.      GALLBLADDER:  The gallbladder is nondistended and without evidence of radiopaque  stones.      PANCREAS:  The pancreas appears unremarkable without evidence of suspicious  masses or peripancreatic edema. Previously visualized prominence of  the main pancreatic duct is less conspicuous on current examination,  previously measuring 4 mm in currently measuring 3 mm maximal  diameter.      SPLEEN:  The spleen is normal in size without focal lesions.      ADRENAL GLANDS:  Bilateral adrenal glands appear normal.      KIDNEYS AND URETERS:  The kidneys are normal in size and enhance symmetrically.  No  hydroureteronephrosis or nephroureterolithiasis is identified.      PELVIS:      BLADDER:  The urinary bladder appears mildly distended however there is no  evidence of abnormal wall thickening or  perivesical stranding.      REPRODUCTIVE ORGANS:  The prostate is not enlarged.      BOWEL:  The stomach is unremarkable.  The small and large bowel are normal in  caliber and demonstrate no wall thickening.  Normal appendix.          VESSELS:  There is no aneurysmal dilatation of the abdominal aorta. The IVC  appears normal.      PERITONEUM/RETROPERITONEUM/LYMPH NODES:  There is no free or loculated fluid collection, no free  intraperitoneal air. The retroperitoneum appears normal.  No enlarged  mesenteric lymph nodes.      BONE AND SOFT TISSUE:  No new suspicious osseous lesions are identified. Degenerative  discogenic disease is noted in the lower thoracic and lumbar spine.  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 There is superficial epidermal thickening of the skin in the  perineal region which extends of the level of the rectum compatible  with sequelae of previously visualized fistulous tract present on MR  pelvis from 08/05/2020. Overall there is no evidence of discrete rim  enhancing fluid collections within this region or along the abnormal  soft tissue thickening on current examination.      Impression: 1. New patchy nodular consolidations with surrounding tree-in-bud  ground-glass opacities in the left lower lobe/lingular region likely  reflecting infectious/inflammatory process such as aspiration  pneumonitis/pneumonia. These findings are again superimposed on  sequela of chronic scarring and emphysema throughout the lungs as  above.  2. Perineal and subcutaneous scarring/skin thickening without  evidence of obvious organizing abscess or atypical fluid collections  in this region. Findings likely represent sequela to previously  existing site of perianal fistulous tract seen on the prior MR pelvis  from 08/05/2020.  3. No evidence of acute process within the abdomen or pelvis with  incidental  findings as above.      I personally reviewed the images/study and I agree with the findings  as stated by resident Bassem Hewitt. This study was interpreted  at [facility], [place], [place].      MACRO:  None      Signed by: Bishnu Geronimo 6/28/2025 12:32 PM  Dictation workstation:   [id number]  CT head wo IV contrast, CT cervical spine wo IV contrast  Narrative: Interpreted By:  Chevy Mullins and Ritchie Brandon   STUDY:  CT HEAD WO IV CONTRAST; CT CERVICAL SPINE WO IV CONTRAST;  6/28/2025  9:04 am      INDICATION:  Signs/Symptoms:unwitness fall with alcohol use disorder      COMPARISON:  CT head and cervical spine 03/30/2024      ACCESSION NUMBER(S):  [id number]; [id number]      ORDERING CLINICIAN:  ZACKARY PARKINSON      TECHNIQUE:  Axial noncontrast CT images of head with coronal and sagittal  reconstructed images. Axial noncontrast CT images of the cervical  spine with coronal and sagittal reconstructed images.      FINDINGS:  CT HEAD:      BRAIN PARENCHYMA:  No acute intraparenchymal hemorrhage or  parenchymal evidence of acute large territory ischemic infarct. No  mass-effect. Gray-white matter distinction is preserved.      VENTRICLES and EXTRA-AXIAL SPACES:  No acute extra-axial or  intraventricular hemorrhage. No effacement of cerebral sulci.  Ventricles and sulci are age-concordant.      PARANASAL SINUSES/MASTOIDS:  No hemorrhage or air-fluid levels within  the visualized paranasal sinuses. The mastoids are well aerated.      CALVARIUM/ORBITS:  No skull fracture.  The orbits and globes are  intact to the extent visualized.      EXTRACRANIAL SOFT TISSUES: No discernible hematoma.          CT CERVICAL SPINE:      PREVERTEBRAL SOFT TISSUES: Within normal limits.      CRANIOCERVICAL JUNCTION: Intact.      ALIGNMENT:  No traumatic malalignment or traumatic facet widening.      VERTEBRAE: Postsurgical changes of prior anterior fusion involving  C3-4 and  additional ACDF spanning C4-6 without evidence of  perihardware lucency. No acute perihardware fracture or additional  fracture throughout the visualized portions of the cervical spine.      SPINAL CANAL/INTERVERTEBRAL DISCS:   Varying degrees of degenerative  disc disease, with severe sclerotic endplate changes and osteophyte  formation present at C2-3. There are posterior disc osteophyte  complexes throughout the cervical spine which contribute to mild  spinal canal stenosis at C2-3 and C6-7. There is no evidence of  high-grade spinal canal stenosis.      NEURAL FORAMINA:  Multilevel uncovertebral joint and facet  arthropathy notably contribute to mild left-sided neural foraminal  stenosis at C4-5 and mild bilateral stenosis at C6-7.      OTHER: None.      Impression: CT HEAD:  1. No acute intracranial abnormality or calvarial fracture.          CT CERVICAL SPINE:  1. No acute fracture or traumatic malalignment of the cervical spine.  2. Anterior fusion of C3-4 and ACDF spanning C4-6 without evidence of  acute hardware complication.  3. Degenerative changes of the cervical spine as above.          I personally reviewed the images/study and I agree with the findings  as stated by resident Bassem Hewitt. This study was interpreted  at [place], [place].      MACRO:  None.      Signed by: Chevy Mullins 6/28/2025 10:20 AM  Dictation workstation:   [id number]  ECG 12 lead  Normal sinus rhythm  Prolonged QT  Abnormal ECG  When compared with ECG of 15-APR-2025 19:29,  Nonspecific T wave abnormality now evident in Anterior leads  QT has lengthened    See ED provider note for full interpretation and clinical correlation  Confirmed by Tory Crawford ([id number]) on 6/28/2025 2:02:17 AM             [1] atorvastatin, 10 mg, oral, Nightly  cholestyramine, 4 g, oral, BID  enoxaparin, 30 mg, subcutaneous, q24h  folic acid, 1 mg, oral, Daily  formoterol, 20 mcg, nebulization,  BID  insulin lispro, 0-5 Units, subcutaneous, q4h  ipratropium-albuteroL, 3 mL, nebulization, TID  levETIRAcetam, 500 mg, oral, BID  montelukast, 10 mg, oral, Nightly  multivitamin with minerals, 1 tablet, oral, Daily  nicotine, 1 patch, transdermal, Daily  nystatin, 5 mL, oral, 4x daily  pantoprazole, 40 mg, oral, Daily before breakfast  rOPINIRole, 0.5 mg, oral, BID  sodium chloride, 4 mL, nebulization, TID  tamsulosin, 0.4 mg, oral, Daily  [START ON 7/1/2025] thiamine, 100 mg, oral, Daily  tiotropium, 2 puff, inhalation, Daily     [2] PRN medications: acetaminophen, albuterol, ammonium lactate, dextrose, dextrose, diazePAM, glucagon, glucagon, hydrocortisone, loperamide, phenoL, trimethobenzamide  [3]

## 2025-06-30 NOTE — CARE PLAN
Spoke to patient and I will be providing him information for pantries, rental assistance and clothing for his 7 year old. In addition I made a referral request for [employer]. I will email him an provide a hard copy of information in the mail.    Community Resource Name:   Phone Number:   Staff Member:      Discussed the following topics on behalf of the patient:  []  Behavioral Health Assistance     []  Case Management  []   Assistance  []  Digital Equity Assistance  []  Dental Health Assistance  []  Education Assistance  []  Employment Assistance  [x]  Financial Strain Relief Assistance  [x]  Food Insecurity Assistance  []  Healthcare Coverage Assistance  [x]  Housing Stability Assistance  []  IP Violence Relief Assistance  []  Legal Assistance  []  Physical Activity Assistance  []  Social Connection Assistance  []  Stress Relief Assistance   []  Substance Abuse Assistance  []  Transportation Assistance  [x]  Utility Assistance  []  Other: [insert comment here]    Next Steps:         GANGA Stoddard

## 2025-06-30 NOTE — PROGRESS NOTES
06/30/25 1043   Rapid Rounds   Attendance Provider;Care Transitions   Expected Discharge Disposition Home H   Today we still await: Clinical stability   Review at Escalation Rounds No escalation needed       Pending PT/OT evaluation for discharge needs.    Randa Zheng RN, BSN  Transitional Care Coordinator

## 2025-06-30 NOTE — SIGNIFICANT EVENT
Rapid Response Nurse Note: RADAR alert: 7    Pager time:   Arrival time:   Event end time: 0000  Location: [address]  [x] Triage by phone or secure messaging    Rapid response initiated by:  [] Rapid response RN [] Family [] Nursing Supervisor [] Physician   [x] RADAR auto page [] Sepsis auto-page [] RN [] RT   [] NP/PA [] Other:     Primary reason for call:   [] BAT [] New CPAP/BiPAP [] Bleeding [] Change in mental status   [] Chest pain [] Code blue [] FiO2 >/= 50% [] HR </= 40 bpm   [] HR >/= 130 bpm [] Hyperglycemia [] Hypoglycemia [x] RADAR    [] RR </= 8 bpm [] RR >/= 30 bpm [] SBP </= 90 mmHg [] SpO2 < 90%   [] Seizure [] Sepsis [] Shortness of breath  [] Staff concern: see comments     Initial VS and/or RADAR VS: T 38.4 °C; ; RR 18; /67; SPO2 91%.    Providers present at bedside (if applicable): bedside RN    Name of ICU Provider contacted (if applicable): NA    Interventions:  [] None [] ABG/VBG [] Assist w/ICU transfer [] BAT paged    [] Bag mask [] Blood [] Cardioversion [] Code Blue   [] Code blue for intubation [] Code status changed [] Chest x-ray [] EKG   [] IV fluid/bolus [] KUB x-ray [] Labs/cultures [] Medication   [] Nebulizer treatment [] NIPPV (CPAP/BiPAP) [] Oxygen [] Oral airway   [] Peripheral IV [] Palliative care consult [] CT/MRI [] Sepsis protocol    [] Suctioned [x] Other:Heat turned down in room & then re-check VS's     Outcome:  [] Coded and  [] Code blue for intubation [] Coded and transferred to ICU []  on division   [x] Remained on division (no change) [] Remained on division + additional monitoring [] Remained in ED [] Transferred to ED   [] Transferred to ICU [] Transferred to inpatient status [] Transferred for interventions (procedure) [] Transferred to ICU stepdown    [] Transferred to surgery [] Transferred to telemetry [] Sepsis protocol [] STEMI protocol   [] Stroke protocol [x] Bedside nurse instructed to page rapid response for any concerns  or acute change in condition/VS     Additional Comments: Madhuri=9 paged 6/29@2732; reviewed chart & VS near baseline, except for his temp & POX; called & spoke with bedside RN who stated that the pt had just been up moving & that the heat in the room was set very warm (pt's room was very hot; heat turned down); RN to re-check VS's after room has cooled & pt settled in bed; RN without acute concerns @ this time but encouraged them to call with any further issues/concerns

## 2025-06-30 NOTE — NURSING NOTE
Patient had a Temp of 101.F but the thermostat in the room was at 88 degrees, triage called. Temp rechecked after thermostat was turned down. Temp 98.6 F then 99 F. Dr. Graves was notified. Also patient verbalized that he had 3 episodes of diarrhea during the day and 1 episode this evening, asked for Imodium. Dr. Graves was notified and ordered for Imodium.

## 2025-07-01 ENCOUNTER — APPOINTMENT (OUTPATIENT)
Dept: CARDIOLOGY | Facility: HOSPITAL | Age: 60
End: 2025-07-01
Payer: COMMERCIAL

## 2025-07-01 PROBLEM — G40.909 SEIZURE DISORDER (MULTI): Status: ACTIVE | Noted: 2023-09-17

## 2025-07-01 PROBLEM — R11.2 INTRACTABLE NAUSEA AND VOMITING: Status: ACTIVE | Noted: 2025-07-01

## 2025-07-01 PROBLEM — J98.2 EMPHYSEMA, INTERSTITIAL (MULTI): Status: ACTIVE | Noted: 2025-07-01

## 2025-07-01 PROBLEM — F10.90 ALCOHOL USE DISORDER: Status: ACTIVE | Noted: 2023-11-06

## 2025-07-01 LAB
ALBUMIN SERPL BCP-MCNC: 3.1 G/DL (ref 3.4–5)
ANION GAP SERPL CALC-SCNC: 11 MMOL/L (ref 10–20)
BUN SERPL-MCNC: 4 MG/DL (ref 6–23)
CALCIUM SERPL-MCNC: 8.6 MG/DL (ref 8.6–10.6)
CHLORIDE SERPL-SCNC: 104 MMOL/L (ref 98–107)
CO2 SERPL-SCNC: 24 MMOL/L (ref 21–32)
CREAT SERPL-MCNC: 1.02 MG/DL (ref 0.5–1.3)
EGFRCR SERPLBLD CKD-EPI 2021: 85 ML/MIN/1.73M*2
ERYTHROCYTE [DISTWIDTH] IN BLOOD BY AUTOMATED COUNT: 15.9 % (ref 11.5–14.5)
GLUCOSE BLD MANUAL STRIP-MCNC: 112 MG/DL (ref 74–99)
GLUCOSE SERPL-MCNC: 78 MG/DL (ref 74–99)
HCT VFR BLD AUTO: 33.1 % (ref 41–52)
HGB BLD-MCNC: 10.5 G/DL (ref 13.5–17.5)
MAGNESIUM SERPL-MCNC: 1.84 MG/DL (ref 1.6–2.4)
MCH RBC QN AUTO: 29.5 PG (ref 26–34)
MCHC RBC AUTO-ENTMCNC: 31.7 G/DL (ref 32–36)
MCV RBC AUTO: 93 FL (ref 80–100)
NRBC BLD-RTO: 0 /100 WBCS (ref 0–0)
PHOSPHATE SERPL-MCNC: 3.4 MG/DL (ref 2.5–4.9)
PLATELET # BLD AUTO: 219 X10*3/UL (ref 150–450)
POTASSIUM SERPL-SCNC: 4.2 MMOL/L (ref 3.5–5.3)
RBC # BLD AUTO: 3.56 X10*6/UL (ref 4.5–5.9)
SODIUM SERPL-SCNC: 135 MMOL/L (ref 136–145)
T3FREE SERPL-MCNC: 2.2 PG/ML (ref 2.3–4.2)
WBC # BLD AUTO: 8 X10*3/UL (ref 4.4–11.3)

## 2025-07-01 PROCEDURE — 2500000005 HC RX 250 GENERAL PHARMACY W/O HCPCS

## 2025-07-01 PROCEDURE — S4991 NICOTINE PATCH NONLEGEND: HCPCS

## 2025-07-01 PROCEDURE — 94640 AIRWAY INHALATION TREATMENT: CPT

## 2025-07-01 PROCEDURE — 2500000002 HC RX 250 W HCPCS SELF ADMINISTERED DRUGS (ALT 637 FOR MEDICARE OP, ALT 636 FOR OP/ED): Performed by: STUDENT IN AN ORGANIZED HEALTH CARE EDUCATION/TRAINING PROGRAM

## 2025-07-01 PROCEDURE — 94668 MNPJ CHEST WALL SBSQ: CPT

## 2025-07-01 PROCEDURE — 2500000004 HC RX 250 GENERAL PHARMACY W/ HCPCS (ALT 636 FOR OP/ED)

## 2025-07-01 PROCEDURE — 99233 SBSQ HOSP IP/OBS HIGH 50: CPT | Performed by: STUDENT IN AN ORGANIZED HEALTH CARE EDUCATION/TRAINING PROGRAM

## 2025-07-01 PROCEDURE — 93010 ELECTROCARDIOGRAM REPORT: CPT | Performed by: INTERNAL MEDICINE

## 2025-07-01 PROCEDURE — 2500000001 HC RX 250 WO HCPCS SELF ADMINISTERED DRUGS (ALT 637 FOR MEDICARE OP)

## 2025-07-01 PROCEDURE — 36415 COLL VENOUS BLD VENIPUNCTURE: CPT | Performed by: STUDENT IN AN ORGANIZED HEALTH CARE EDUCATION/TRAINING PROGRAM

## 2025-07-01 PROCEDURE — 85027 COMPLETE CBC AUTOMATED: CPT | Performed by: STUDENT IN AN ORGANIZED HEALTH CARE EDUCATION/TRAINING PROGRAM

## 2025-07-01 PROCEDURE — 2500000004 HC RX 250 GENERAL PHARMACY W/ HCPCS (ALT 636 FOR OP/ED): Mod: JW,TB | Performed by: STUDENT IN AN ORGANIZED HEALTH CARE EDUCATION/TRAINING PROGRAM

## 2025-07-01 PROCEDURE — 80069 RENAL FUNCTION PANEL: CPT | Performed by: STUDENT IN AN ORGANIZED HEALTH CARE EDUCATION/TRAINING PROGRAM

## 2025-07-01 PROCEDURE — 83735 ASSAY OF MAGNESIUM: CPT | Performed by: STUDENT IN AN ORGANIZED HEALTH CARE EDUCATION/TRAINING PROGRAM

## 2025-07-01 PROCEDURE — 2500000004 HC RX 250 GENERAL PHARMACY W/ HCPCS (ALT 636 FOR OP/ED): Performed by: STUDENT IN AN ORGANIZED HEALTH CARE EDUCATION/TRAINING PROGRAM

## 2025-07-01 PROCEDURE — 2500000001 HC RX 250 WO HCPCS SELF ADMINISTERED DRUGS (ALT 637 FOR MEDICARE OP): Performed by: STUDENT IN AN ORGANIZED HEALTH CARE EDUCATION/TRAINING PROGRAM

## 2025-07-01 PROCEDURE — 1100000001 HC PRIVATE ROOM DAILY

## 2025-07-01 PROCEDURE — 2500000002 HC RX 250 W HCPCS SELF ADMINISTERED DRUGS (ALT 637 FOR MEDICARE OP, ALT 636 FOR OP/ED)

## 2025-07-01 PROCEDURE — 93005 ELECTROCARDIOGRAM TRACING: CPT

## 2025-07-01 RX ORDER — PANTOPRAZOLE SODIUM 40 MG/1
40 TABLET, DELAYED RELEASE ORAL
Status: DISCONTINUED | OUTPATIENT
Start: 2025-07-01 | End: 2025-07-02 | Stop reason: HOSPADM

## 2025-07-01 RX ORDER — LOPERAMIDE HYDROCHLORIDE 2 MG/1
2 CAPSULE ORAL 3 TIMES DAILY
Status: DISCONTINUED | OUTPATIENT
Start: 2025-07-01 | End: 2025-07-02 | Stop reason: HOSPADM

## 2025-07-01 RX ORDER — ONDANSETRON HYDROCHLORIDE 2 MG/ML
4 INJECTION, SOLUTION INTRAVENOUS EVERY 4 HOURS PRN
Status: DISCONTINUED | OUTPATIENT
Start: 2025-07-01 | End: 2025-07-02 | Stop reason: HOSPADM

## 2025-07-01 RX ORDER — ONDANSETRON 4 MG/1
8 TABLET, FILM COATED ORAL EVERY 6 HOURS PRN
Status: DISCONTINUED | OUTPATIENT
Start: 2025-07-01 | End: 2025-07-02 | Stop reason: HOSPADM

## 2025-07-01 RX ORDER — SODIUM CHLORIDE, SODIUM LACTATE, POTASSIUM CHLORIDE, CALCIUM CHLORIDE 600; 310; 30; 20 MG/100ML; MG/100ML; MG/100ML; MG/100ML
100 INJECTION, SOLUTION INTRAVENOUS CONTINUOUS
Status: ACTIVE | OUTPATIENT
Start: 2025-07-01 | End: 2025-07-02

## 2025-07-01 RX ORDER — KETOROLAC TROMETHAMINE 30 MG/ML
15 INJECTION, SOLUTION INTRAMUSCULAR; INTRAVENOUS EVERY 6 HOURS PRN
Status: DISCONTINUED | OUTPATIENT
Start: 2025-07-01 | End: 2025-07-02 | Stop reason: HOSPADM

## 2025-07-01 RX ORDER — PROCHLORPERAZINE EDISYLATE 5 MG/ML
10 INJECTION INTRAMUSCULAR; INTRAVENOUS EVERY 6 HOURS PRN
Status: DISCONTINUED | OUTPATIENT
Start: 2025-07-01 | End: 2025-07-02 | Stop reason: HOSPADM

## 2025-07-01 RX ORDER — ALUMINUM HYDROXIDE, MAGNESIUM HYDROXIDE, AND SIMETHICONE 1200; 120; 1200 MG/30ML; MG/30ML; MG/30ML
30 SUSPENSION ORAL
Status: DISCONTINUED | OUTPATIENT
Start: 2025-07-01 | End: 2025-07-02 | Stop reason: HOSPADM

## 2025-07-01 RX ORDER — ALUMINUM HYDROXIDE, MAGNESIUM HYDROXIDE, AND SIMETHICONE 1200; 120; 1200 MG/30ML; MG/30ML; MG/30ML
30 SUSPENSION ORAL 4 TIMES DAILY PRN
Status: DISCONTINUED | OUTPATIENT
Start: 2025-07-04 | End: 2025-07-02 | Stop reason: HOSPADM

## 2025-07-01 RX ADMIN — KETOROLAC TROMETHAMINE 15 MG: 30 INJECTION, SOLUTION INTRAMUSCULAR; INTRAVENOUS at 00:56

## 2025-07-01 RX ADMIN — LOPERAMIDE HYDROCHLORIDE 2 MG: 2 CAPSULE ORAL at 15:23

## 2025-07-01 RX ADMIN — LEVETIRACETAM 500 MG: 500 TABLET, FILM COATED ORAL at 20:39

## 2025-07-01 RX ADMIN — Medication 1 TABLET: at 09:14

## 2025-07-01 RX ADMIN — IPRATROPIUM BROMIDE AND ALBUTEROL SULFATE 3 ML: .5; 3 SOLUTION RESPIRATORY (INHALATION) at 07:39

## 2025-07-01 RX ADMIN — SODIUM CHLORIDE, SODIUM LACTATE, POTASSIUM CHLORIDE, AND CALCIUM CHLORIDE 100 ML/HR: .6; .31; .03; .02 INJECTION, SOLUTION INTRAVENOUS at 15:07

## 2025-07-01 RX ADMIN — THIAMINE HCL TAB 100 MG 100 MG: 100 TAB at 09:14

## 2025-07-01 RX ADMIN — ALUMINUM HYDROXIDE, MAGNESIUM HYDROXIDE, AND SIMETHICONE 30 ML: 200; 200; 20 SUSPENSION ORAL at 15:23

## 2025-07-01 RX ADMIN — LOPERAMIDE HYDROCHLORIDE 2 MG: 2 CAPSULE ORAL at 20:39

## 2025-07-01 RX ADMIN — TIOTROPIUM BROMIDE INHALATION SPRAY 2 PUFF: 3.12 SPRAY, METERED RESPIRATORY (INHALATION) at 07:43

## 2025-07-01 RX ADMIN — CHOLESTYRAMINE POWDER FOR SUSPENSION 4 G: 4 POWDER, FOR SUSPENSION ORAL at 11:33

## 2025-07-01 RX ADMIN — NYSTATIN 500000 UNITS: 100000 SUSPENSION ORAL at 18:46

## 2025-07-01 RX ADMIN — ALUMINUM HYDROXIDE, MAGNESIUM HYDROXIDE, AND SIMETHICONE 30 ML: 200; 200; 20 SUSPENSION ORAL at 18:51

## 2025-07-01 RX ADMIN — IPRATROPIUM BROMIDE AND ALBUTEROL SULFATE 3 ML: .5; 3 SOLUTION RESPIRATORY (INHALATION) at 20:03

## 2025-07-01 RX ADMIN — ONDANSETRON HYDROCHLORIDE 8 MG: 4 TABLET, FILM COATED ORAL at 15:08

## 2025-07-01 RX ADMIN — ROPINIROLE 0.5 MG: 0.5 TABLET, FILM COATED ORAL at 09:14

## 2025-07-01 RX ADMIN — NYSTATIN 500000 UNITS: 100000 SUSPENSION ORAL at 20:39

## 2025-07-01 RX ADMIN — NYSTATIN 500000 UNITS: 100000 SUSPENSION ORAL at 06:12

## 2025-07-01 RX ADMIN — SODIUM CHLORIDE SOLN NEBU 3% 4 ML: 3 NEBU SOLN at 20:04

## 2025-07-01 RX ADMIN — IPRATROPIUM BROMIDE AND ALBUTEROL SULFATE 3 ML: .5; 3 SOLUTION RESPIRATORY (INHALATION) at 14:16

## 2025-07-01 RX ADMIN — NYSTATIN 500000 UNITS: 100000 SUSPENSION ORAL at 13:42

## 2025-07-01 RX ADMIN — NICOTINE 1 PATCH: 14 PATCH, EXTENDED RELEASE TRANSDERMAL at 09:30

## 2025-07-01 RX ADMIN — ROPINIROLE 0.5 MG: 0.5 TABLET, FILM COATED ORAL at 20:39

## 2025-07-01 RX ADMIN — MONTELUKAST 10 MG: 10 TABLET, FILM COATED ORAL at 20:39

## 2025-07-01 RX ADMIN — CHOLESTYRAMINE POWDER FOR SUSPENSION 4 G: 4 POWDER, FOR SUSPENSION ORAL at 18:46

## 2025-07-01 RX ADMIN — LEVETIRACETAM 500 MG: 500 TABLET, FILM COATED ORAL at 09:14

## 2025-07-01 RX ADMIN — PANTOPRAZOLE SODIUM 40 MG: 40 TABLET, DELAYED RELEASE ORAL at 06:12

## 2025-07-01 RX ADMIN — PANTOPRAZOLE SODIUM 40 MG: 40 TABLET, DELAYED RELEASE ORAL at 18:47

## 2025-07-01 RX ADMIN — FOLIC ACID 1 MG: 1 TABLET ORAL at 09:14

## 2025-07-01 RX ADMIN — TAMSULOSIN HYDROCHLORIDE 0.4 MG: 0.4 CAPSULE ORAL at 09:13

## 2025-07-01 RX ADMIN — FORMOTEROL FUMARATE DIHYDRATE 20 MCG: 20 SOLUTION RESPIRATORY (INHALATION) at 07:39

## 2025-07-01 RX ADMIN — SODIUM CHLORIDE SOLN NEBU 3% 4 ML: 3 NEBU SOLN at 14:16

## 2025-07-01 RX ADMIN — FORMOTEROL FUMARATE DIHYDRATE 20 MCG: 20 SOLUTION RESPIRATORY (INHALATION) at 20:01

## 2025-07-01 RX ADMIN — ATORVASTATIN CALCIUM 10 MG: 20 TABLET, FILM COATED ORAL at 20:39

## 2025-07-01 RX ADMIN — ENOXAPARIN SODIUM 30 MG: 100 INJECTION SUBCUTANEOUS at 23:06

## 2025-07-01 RX ADMIN — SODIUM CHLORIDE SOLN NEBU 3% 4 ML: 3 NEBU SOLN at 07:40

## 2025-07-01 ASSESSMENT — COGNITIVE AND FUNCTIONAL STATUS - GENERAL
WALKING IN HOSPITAL ROOM: A LITTLE
MOVING TO AND FROM BED TO CHAIR: A LITTLE
PERSONAL GROOMING: A LITTLE
HELP NEEDED FOR BATHING: A LITTLE
MOBILITY SCORE: 20
STANDING UP FROM CHAIR USING ARMS: A LITTLE
TOILETING: A LITTLE
CLIMB 3 TO 5 STEPS WITH RAILING: A LITTLE
DAILY ACTIVITIY SCORE: 21

## 2025-07-01 ASSESSMENT — LIFESTYLE VARIABLES
TREMOR: NO TREMOR
AGITATION: NORMAL ACTIVITY
NAUSEA AND VOMITING: CONSTANT NAUSEA, FREQUENT DRY HEAVES AND VOMITING
AGITATION: NORMAL ACTIVITY
AUDITORY DISTURBANCES: NOT PRESENT
ORIENTATION AND CLOUDING OF SENSORIUM: ORIENTED AND CAN DO SERIAL ADDITIONS
PAROXYSMAL SWEATS: BARELY PERCEPTIBLE SWEATING, PALMS MOIST
TREMOR: NOT VISIBLE, BUT CAN BE FELT FINGERTIP TO FINGERTIP
HEADACHE, FULLNESS IN HEAD: MILD
VISUAL DISTURBANCES: NOT PRESENT
PAROXYSMAL SWEATS: NO SWEAT VISIBLE
AGITATION: NORMAL ACTIVITY
ANXIETY: NO ANXIETY, AT EASE
VISUAL DISTURBANCES: NOT PRESENT
NAUSEA AND VOMITING: MILD NAUSEA WITH NO VOMITING
VISUAL DISTURBANCES: NOT PRESENT
ORIENTATION AND CLOUDING OF SENSORIUM: ORIENTED AND CAN DO SERIAL ADDITIONS
TOTAL SCORE: 2
ANXIETY: NO ANXIETY, AT EASE
TREMOR: NOT VISIBLE, BUT CAN BE FELT FINGERTIP TO FINGERTIP
AGITATION: NORMAL ACTIVITY
ORIENTATION AND CLOUDING OF SENSORIUM: ORIENTED AND CAN DO SERIAL ADDITIONS
HEADACHE, FULLNESS IN HEAD: NOT PRESENT
NAUSEA AND VOMITING: CONSTANT NAUSEA, FREQUENT DRY HEAVES AND VOMITING
VISUAL DISTURBANCES: NOT PRESENT
NAUSEA AND VOMITING: MILD NAUSEA WITH NO VOMITING
PAROXYSMAL SWEATS: NO SWEAT VISIBLE
PAROXYSMAL SWEATS: 3
AGITATION: NORMAL ACTIVITY
AGITATION: NORMAL ACTIVITY
ANXIETY: NO ANXIETY, AT EASE
HEADACHE, FULLNESS IN HEAD: VERY MILD
AGITATION: NORMAL ACTIVITY
ANXIETY: NO ANXIETY, AT EASE
PAROXYSMAL SWEATS: NO SWEAT VISIBLE
TOTAL SCORE: 10
TOTAL SCORE: 10
ANXIETY: NO ANXIETY, AT EASE
PULSE: 102
NAUSEA AND VOMITING: NO NAUSEA AND NO VOMITING
BLOOD PRESSURE: 109/58
HEADACHE, FULLNESS IN HEAD: MILD
AUDITORY DISTURBANCES: NOT PRESENT
VISUAL DISTURBANCES: NOT PRESENT
ANXIETY: NO ANXIETY, AT EASE
NAUSEA AND VOMITING: CONSTANT NAUSEA, FREQUENT DRY HEAVES AND VOMITING
TREMOR: NOT VISIBLE, BUT CAN BE FELT FINGERTIP TO FINGERTIP
ORIENTATION AND CLOUDING OF SENSORIUM: ORIENTED AND CAN DO SERIAL ADDITIONS
TOTAL SCORE: 1
NAUSEA AND VOMITING: MILD NAUSEA WITH NO VOMITING
ORIENTATION AND CLOUDING OF SENSORIUM: ORIENTED AND CAN DO SERIAL ADDITIONS
AUDITORY DISTURBANCES: NOT PRESENT
TREMOR: 2
HEADACHE, FULLNESS IN HEAD: NOT PRESENT
PAROXYSMAL SWEATS: NO SWEAT VISIBLE
TOTAL SCORE: 4
AGITATION: NORMAL ACTIVITY
PULSE: 102
AUDITORY DISTURBANCES: NOT PRESENT
VISUAL DISTURBANCES: NOT PRESENT
HEADACHE, FULLNESS IN HEAD: NOT PRESENT
TOTAL SCORE: 6
PAROXYSMAL SWEATS: NO SWEAT VISIBLE
BLOOD PRESSURE: 117/76
ORIENTATION AND CLOUDING OF SENSORIUM: ORIENTED AND CAN DO SERIAL ADDITIONS
ORIENTATION AND CLOUDING OF SENSORIUM: ORIENTED AND CAN DO SERIAL ADDITIONS
TREMOR: 2
ORIENTATION AND CLOUDING OF SENSORIUM: ORIENTED AND CAN DO SERIAL ADDITIONS
AUDITORY DISTURBANCES: NOT PRESENT
AUDITORY DISTURBANCES: NOT PRESENT
NAUSEA AND VOMITING: MILD NAUSEA WITH NO VOMITING
TREMOR: NOT VISIBLE, BUT CAN BE FELT FINGERTIP TO FINGERTIP
ANXIETY: NO ANXIETY, AT EASE
PAROXYSMAL SWEATS: NO SWEAT VISIBLE
ANXIETY: NO ANXIETY, AT EASE
TOTAL SCORE: 2
AUDITORY DISTURBANCES: NOT PRESENT
HEADACHE, FULLNESS IN HEAD: NOT PRESENT
AUDITORY DISTURBANCES: NOT PRESENT
VISUAL DISTURBANCES: NOT PRESENT
HEADACHE, FULLNESS IN HEAD: NOT PRESENT
TREMOR: NOT VISIBLE, BUT CAN BE FELT FINGERTIP TO FINGERTIP
VISUAL DISTURBANCES: NOT PRESENT
TOTAL SCORE: 8

## 2025-07-01 ASSESSMENT — PAIN SCALES - GENERAL
PAINLEVEL_OUTOF10: 3
PAINLEVEL_OUTOF10: 0 - NO PAIN
PAINLEVEL_OUTOF10: 0 - NO PAIN

## 2025-07-01 ASSESSMENT — PAIN - FUNCTIONAL ASSESSMENT
PAIN_FUNCTIONAL_ASSESSMENT: 0-10

## 2025-07-01 NOTE — ASSESSMENT & PLAN NOTE
Severe based on imaging and clinical exam  Pt states he is at baseline - my exam is impressive wheezing and ronchi   -formoterol (Perforomist) 20 mcg/2 mL nebulizer solution 20 mcg  20 mcg, nebu, BID  -ipratropium-albuteroL (Duo-Neb) 0.5-2.5 mg/3 mL nebulizer solution 3 mL 3 mL, nebu, TID  -montelukast (Singulair) tablet 10 mg, 10 mg, oral, Nightly  -sodium chloride 3 % nebulizer solution 4 mL, 4 mL, nebu, TID  -CT images reviewed and independently interpreted.  Agree there is concern for aspiration pneumonia versus possible infectious process.  Patient without fever or white count as of now.  Will continue to monitor off antibiotics

## 2025-07-01 NOTE — CARE PLAN
The patient's goals for the shift include      The clinical goals for the shift include Patient will tolerate solid food by the end of shift      Problem: Safety - Adult  Goal: Free from fall injury  Outcome: Progressing     Problem: Discharge Planning  Goal: Discharge to home or other facility with appropriate resources  Outcome: Progressing     Problem: Chronic Conditions and Co-morbidities  Goal: Patient's chronic conditions and co-morbidity symptoms are monitored and maintained or improved  Outcome: Progressing

## 2025-07-01 NOTE — ASSESSMENT & PLAN NOTE
Acute on chronic   Unable to keep any food down today. Had emesis episode while I was in room. Ongoing weight loss and continued emesis   Last EGD 4/16/25 grossly unremarkable 3cm sliding HH   ?EtOH gastritis   -increase PPI to BID   -schedule maalox QID and reassess   -if no improvement, re-consult GI  -prolonged Qtc, recheck EKG

## 2025-07-01 NOTE — PROGRESS NOTES
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 Patient attempted to quit alcohol, last drink 6/24, was drinking daily (3 25oz beers per day). Patient reports having 4 seizures in the last 2 days prior to presenting to the emergency department. Stay c/b ongoing emesis and intability to keep food down        CHIEF COMPLAINT: Intractable nausea and vomiting    Assessment & Plan  Intractable nausea and vomiting  Acute on chronic   Unable to keep any food down today. Had emesis episode while I was in room. Ongoing weight loss and continued emesis   Last EGD 4/16/25 grossly unremarkable 3cm sliding HH   ?EtOH gastritis   -increase PPI to BID   -schedule maalox QID and reassess   -if no improvement, re-consult GI  -prolonged Qtc, recheck EKG   Alcohol use disorder  ? Etoh gastritis   Last drink allegedly 6/24   -CIWA   -vitamins when can tolerate (no MV bag here)  Hyponatremia  Na: 120>118>>126>>>135  -ctn to monitor. Start IVF given not keeping food down   Seizure disorder (Multi)  LevETIRAcetam (Keppra) tablet 500 mg    500 mg, oral, BID     Functional diarrhea  Fecal elastase reviewed ~400 is wnl   Unclear etiology. Chronic. Needs OP workup   C.diff neg in past   -try imodium 2mg TID and reassess     I spent 55 minutes in professional medical decision making, face-to-face examination and counseling, care coordination, chart review, discussions with consultants, and care  planning    Emphysema, interstitial (Multi)  Severe based on imaging and clinical exam  Pt states he is at baseline - my exam is impressive wheezing and ronchi   -formoterol (Perforomist) 20 mcg/2 mL nebulizer solution 20 mcg  20 mcg, nebu, BID  -ipratropium-albuteroL (Duo-Neb) 0.5-2.5 mg/3 mL nebulizer solution 3 mL 3 mL, nebu, TID  -montelukast (Singulair) tablet 10 mg, 10 mg, oral, Nightly  -sodium chloride 3 % nebulizer solution 4 mL, 4 mL, nebu, TID  -CT images reviewed and independently interpreted.  Agree there is concern for aspiration pneumonia versus possible infectious process.  Patient without fever or white count as of now.  Will continue to monitor off antibiotics      I reviewed:    All new and relevant labs and imaging   New EKGs  Consultant notes   Vitals Signs   Nursing notes                OBESE?: No     DISPOSITION:  Medical Necessity Statement: Patient is not medically ready for discharge due to the following: unable to keep food or water down. Ongoing emesis   Plan of care discussed with:           Subjective   SUBJECTIVE:  Pt seen and examined.   y    Patient denies CP, SOB, fevers, chills, nausea, or emesis.         Objective      PHYSICAL EXAM: Patient Vitals for the past 24 hrs:   BP Temp Temp src Pulse Resp SpO2 Weight   07/01/25 1416 -- -- -- -- -- 97 % --   07/01/25 1205 111/76 36.9 °C (98.4 °F) Temporal 94 18 97 % --   07/01/25 0826 108/75 37 °C (98.6 °F) Temporal 95 16 96 % --   07/01/25 0740 -- -- -- -- -- 99 % --   07/01/25 0600 -- -- -- -- -- -- 52.8 kg (116 lb 6.5 oz)   07/01/25 0400 118/72 36.8 °C (98.2 °F) Temporal 77 20 99 % --   06/30/25 2321 113/79 37.1 °C (98.8 °F) Temporal 94 18 95 % --   06/30/25 1946 110/75 37.5 °C (99.5 °F) Temporal 95 18 96 % --   06/30/25 1602 102/71 37.4 °C (99.3 °F) -- 91 -- 96 % --      Physical Exam       MEDICATIONS:   Scheduled medications  Scheduled Medications[1]  Continuous medications  Continuous Medications[2]  PRN medications  PRN  "Medications[3]       DATA:      Diagnostic tests reviewed for today's visit:     CBC:   Results from last 72 hours   Lab Units 07/01/25  0625   WBC AUTO x10*3/uL 8.0   HEMATOCRIT % 33.1*   PLATELETS AUTO x10*3/uL 219   MCV fL 93     Coags:     CMP:   Results from last 72 hours   Lab Units 07/01/25  0625   SODIUM mmol/L 135*   POTASSIUM mmol/L 4.2   CO2 mmol/L 24   BUN mg/dL 4*   MAGNESIUM mg/dL 1.84   ALBUMIN g/dL 3.1*      Cardiac Enzymes: No lab exists for component: \"TROP\" .lab  Liver Function, Amylase, Lipase:       No lab exists for component: \"TPROT\", \"ALB\", \"TBILI\"   MG/PHOS: [id number](mg,p)@   Renal Panel:    Results from last 72 hours   Lab Units 07/01/25  0625   ALBUMIN g/dL 3.1*   BUN mg/dL 4*   POTASSIUM mmol/L 4.2   CO2 mmol/L 24   SODIUM mmol/L 135*      Heme:        No lab exists for component: \"RETICP\", \"ABSRETIC\", \"LD\", \"THALIA\", \"FE\", \"TRANSFERSAT\"   No components found for: \"UALBCR\"      Medication and Non-Pharmacologic VTE Prophylaxis/Anticoagulants    The patient has received anticoagulants recently:  Heparin is ordered or has been given within the last 24 hours OR  Lovenox has been given in the last 24 hours OR  An anticoagulant other than Heparin or Lovenox is on the home med list OR  An anticoagulant other than Heparin or Lovenox has been given within the last 5 days  Last Anticoag Admin            enoxaparin (Lovenox) syringe 30 mg    Given 30 mg at 06/30/25 2242    Frequency: Every 24 hours         There are additional administrations since 06/28/25 1447 that are not shown.    No unadministered anticoagulant orders found.                   SIGNATURE: Irena Blackwell [facility] Medicine    PAGER/CONTACT #: Epic Chat      Disclaimer: Portions of this note may have been generated using Dragon voice recognition software. Reasonable efforts were made to correct any dictation errors that resulted due to the programming of this software but some may still be present.     Portions of this note " including HPI, ROS, impression/plan, and examination may have been copied forward from yesterday to July 1, 2025 as to provide important historical information essential in contributing to medical decision making. Documentation has been reviewed and edited as necessary to support clinical decision making for today's visit and to reflect my own independent evaluation of this patient.       The time of this note does not reflect the time I saw the patient but the time that this note was written          [1] alum-mag hydroxide-simeth, 30 mL, oral, With meals & nightly  atorvastatin, 10 mg, oral, Nightly  cholestyramine, 4 g, oral, BID  enoxaparin, 30 mg, subcutaneous, q24h  folic acid, 1 mg, oral, Daily  formoterol, 20 mcg, nebulization, BID  insulin lispro, 0-5 Units, subcutaneous, q4h  ipratropium-albuteroL, 3 mL, nebulization, TID  levETIRAcetam, 500 mg, oral, BID  montelukast, 10 mg, oral, Nightly  multivitamin with minerals, 1 tablet, oral, Daily  nicotine, 1 patch, transdermal, Daily  nystatin, 5 mL, oral, 4x daily  pantoprazole, 40 mg, oral, BID AC  rOPINIRole, 0.5 mg, oral, BID  sodium chloride, 4 mL, nebulization, TID  tamsulosin, 0.4 mg, oral, Daily  thiamine, 100 mg, oral, Daily  tiotropium, 2 puff, inhalation, Daily    [2]    [3] PRN medications: acetaminophen, albuterol, alum-mag hydroxide-simeth **FOLLOWED BY** [START ON 7/4/2025] alum-mag hydroxide-simeth, ammonium lactate, dextrose, dextrose, diazePAM, glucagon, glucagon, hydrocortisone, ketorolac, loperamide, phenoL, trimethobenzamide

## 2025-07-01 NOTE — HOSPITAL COURSE
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 Patient attempted to quit alcohol, last drink 6/24, was drinking daily (3 25oz beers per day). Patient reports having 4 seizures in the last 2 days prior to presenting to the emergency department. Stay c/b ongoing emesis and intability to keep food down.  On 7/2/25 patient was able to keep some food down.  Not ideal amount but the patient was requesting to be discharged as he did not feel like he wanted to remain in the hospital.  We discussed risks and benefits.  I voiced my concern about alcohol related activities as well as alcoholic gastritis that is not a formal diagnosis but highly suspicious given his symptoms.  He was willing to stay for most of the day but at 4:30 PM was insisting on discharge.  Discussion with RN revealed that the patient was becoming agitated and not wanting to be here.  I discussed with him a referral to gastroenterology for further workup of his GI symptoms as well as close follow-up with PCP he will also be referred to addiction medicine.     I spent 45 minutes in professional medical decision making, face-to-face examination and counseling, care coordination, chart review, discussions with consultants, and care planning

## 2025-07-01 NOTE — ASSESSMENT & PLAN NOTE
Fecal elastase reviewed ~400 is wnl   Unclear etiology. Chronic. Needs OP workup   C.diff neg in past   -try imodium 2mg TID and reassess     I spent 55 minutes in professional medical decision making, face-to-face examination and counseling, care coordination, chart review, discussions with consultants, and care planning

## 2025-07-01 NOTE — ASSESSMENT & PLAN NOTE
? Etoh gastritis   Last drink allegedly 6/24   -CIWA   -vitamins when can tolerate (no MV bag here)

## 2025-07-01 NOTE — CARE PLAN
The patient's goals for the shift include  patient will have no withdraw symptoms     The clinical goals for the shift include patient will remain safe during the shift    Over the shift, the patient did not make progress toward the following goals. Barriers to progression include patient still at time with elevated ciwa. Recommendations to address these barriers include monitor and treat  Problem: Pain - Adult  Goal: Verbalizes/displays adequate comfort level or baseline comfort level  Outcome: Progressing     Problem: Safety - Adult  Goal: Free from fall injury  Outcome: Progressing     Problem: Discharge Planning  Goal: Discharge to home or other facility with appropriate resources  Outcome: Progressing   .

## 2025-07-02 ENCOUNTER — PHARMACY VISIT (OUTPATIENT)
Dept: PHARMACY | Facility: CLINIC | Age: 60
End: 2025-07-02
Payer: COMMERCIAL

## 2025-07-02 VITALS
HEIGHT: 68 IN | BODY MASS INDEX: 17.64 KG/M2 | WEIGHT: 116.4 LBS | SYSTOLIC BLOOD PRESSURE: 109 MMHG | TEMPERATURE: 99.5 F | RESPIRATION RATE: 23 BRPM | OXYGEN SATURATION: 97 % | HEART RATE: 104 BPM | DIASTOLIC BLOOD PRESSURE: 72 MMHG

## 2025-07-02 LAB
ALBUMIN SERPL BCP-MCNC: 3.1 G/DL (ref 3.4–5)
ANION GAP SERPL CALC-SCNC: 9 MMOL/L (ref 10–20)
BACTERIA BLD CULT: NORMAL
BACTERIA BLD CULT: NORMAL
BUN SERPL-MCNC: 3 MG/DL (ref 6–23)
CALCIUM SERPL-MCNC: 8.9 MG/DL (ref 8.6–10.6)
CHLORIDE SERPL-SCNC: 107 MMOL/L (ref 98–107)
CO2 SERPL-SCNC: 24 MMOL/L (ref 21–32)
CREAT SERPL-MCNC: 0.95 MG/DL (ref 0.5–1.3)
EGFRCR SERPLBLD CKD-EPI 2021: >90 ML/MIN/1.73M*2
ERYTHROCYTE [DISTWIDTH] IN BLOOD BY AUTOMATED COUNT: 16.4 % (ref 11.5–14.5)
GLUCOSE SERPL-MCNC: 86 MG/DL (ref 74–99)
HCT VFR BLD AUTO: 30.4 % (ref 41–52)
HGB BLD-MCNC: 9.7 G/DL (ref 13.5–17.5)
MAGNESIUM SERPL-MCNC: 1.66 MG/DL (ref 1.6–2.4)
MCH RBC QN AUTO: 29.2 PG (ref 26–34)
MCHC RBC AUTO-ENTMCNC: 31.9 G/DL (ref 32–36)
MCV RBC AUTO: 92 FL (ref 80–100)
NRBC BLD-RTO: 0 /100 WBCS (ref 0–0)
PHOSPHATE SERPL-MCNC: 2.8 MG/DL (ref 2.5–4.9)
PLATELET # BLD AUTO: 268 X10*3/UL (ref 150–450)
POTASSIUM SERPL-SCNC: 5.1 MMOL/L (ref 3.5–5.3)
RBC # BLD AUTO: 3.32 X10*6/UL (ref 4.5–5.9)
SODIUM SERPL-SCNC: 135 MMOL/L (ref 136–145)
WBC # BLD AUTO: 7 X10*3/UL (ref 4.4–11.3)

## 2025-07-02 PROCEDURE — 99238 HOSP IP/OBS DSCHRG MGMT 30/<: CPT | Performed by: STUDENT IN AN ORGANIZED HEALTH CARE EDUCATION/TRAINING PROGRAM

## 2025-07-02 PROCEDURE — 94668 MNPJ CHEST WALL SBSQ: CPT

## 2025-07-02 PROCEDURE — 2500000001 HC RX 250 WO HCPCS SELF ADMINISTERED DRUGS (ALT 637 FOR MEDICARE OP): Performed by: STUDENT IN AN ORGANIZED HEALTH CARE EDUCATION/TRAINING PROGRAM

## 2025-07-02 PROCEDURE — 85027 COMPLETE CBC AUTOMATED: CPT | Performed by: STUDENT IN AN ORGANIZED HEALTH CARE EDUCATION/TRAINING PROGRAM

## 2025-07-02 PROCEDURE — 94669 MECHANICAL CHEST WALL OSCILL: CPT

## 2025-07-02 PROCEDURE — S4991 NICOTINE PATCH NONLEGEND: HCPCS

## 2025-07-02 PROCEDURE — 2500000002 HC RX 250 W HCPCS SELF ADMINISTERED DRUGS (ALT 637 FOR MEDICARE OP, ALT 636 FOR OP/ED)

## 2025-07-02 PROCEDURE — 36415 COLL VENOUS BLD VENIPUNCTURE: CPT | Performed by: STUDENT IN AN ORGANIZED HEALTH CARE EDUCATION/TRAINING PROGRAM

## 2025-07-02 PROCEDURE — RXMED WILLOW AMBULATORY MEDICATION CHARGE

## 2025-07-02 PROCEDURE — 2500000001 HC RX 250 WO HCPCS SELF ADMINISTERED DRUGS (ALT 637 FOR MEDICARE OP)

## 2025-07-02 PROCEDURE — 94640 AIRWAY INHALATION TREATMENT: CPT

## 2025-07-02 PROCEDURE — 2500000005 HC RX 250 GENERAL PHARMACY W/O HCPCS

## 2025-07-02 PROCEDURE — 80069 RENAL FUNCTION PANEL: CPT | Performed by: STUDENT IN AN ORGANIZED HEALTH CARE EDUCATION/TRAINING PROGRAM

## 2025-07-02 PROCEDURE — 2500000002 HC RX 250 W HCPCS SELF ADMINISTERED DRUGS (ALT 637 FOR MEDICARE OP, ALT 636 FOR OP/ED): Performed by: STUDENT IN AN ORGANIZED HEALTH CARE EDUCATION/TRAINING PROGRAM

## 2025-07-02 PROCEDURE — 83735 ASSAY OF MAGNESIUM: CPT | Performed by: STUDENT IN AN ORGANIZED HEALTH CARE EDUCATION/TRAINING PROGRAM

## 2025-07-02 RX ORDER — ALUMINUM HYDROXIDE, MAGNESIUM HYDROXIDE, AND SIMETHICONE 1200; 120; 1200 MG/30ML; MG/30ML; MG/30ML
SUSPENSION ORAL
Qty: 355 ML | Refills: 0 | Status: SHIPPED | OUTPATIENT
Start: 2025-07-02 | End: 2025-07-15

## 2025-07-02 RX ORDER — LOPERAMIDE HYDROCHLORIDE 2 MG/1
4 CAPSULE ORAL 4 TIMES DAILY PRN
Qty: 90 CAPSULE | Refills: 0 | Status: SHIPPED | OUTPATIENT
Start: 2025-07-02 | End: 2025-07-16 | Stop reason: SDUPTHER

## 2025-07-02 RX ORDER — PANTOPRAZOLE SODIUM 40 MG/1
TABLET, DELAYED RELEASE ORAL
Qty: 44 TABLET | Refills: 0 | Status: SHIPPED | OUTPATIENT
Start: 2025-07-02 | End: 2025-07-16 | Stop reason: SDUPTHER

## 2025-07-02 RX ORDER — ONDANSETRON 8 MG/1
8 TABLET, FILM COATED ORAL EVERY 8 HOURS PRN
Qty: 20 TABLET | Refills: 0 | Status: SHIPPED | OUTPATIENT
Start: 2025-07-02 | End: 2025-08-01

## 2025-07-02 RX ADMIN — LOPERAMIDE HYDROCHLORIDE 2 MG: 2 CAPSULE ORAL at 09:01

## 2025-07-02 RX ADMIN — PANTOPRAZOLE SODIUM 40 MG: 40 TABLET, DELAYED RELEASE ORAL at 17:20

## 2025-07-02 RX ADMIN — NYSTATIN 500000 UNITS: 100000 SUSPENSION ORAL at 14:38

## 2025-07-02 RX ADMIN — LOPERAMIDE HYDROCHLORIDE 2 MG: 2 CAPSULE ORAL at 14:38

## 2025-07-02 RX ADMIN — ROPINIROLE 0.5 MG: 0.5 TABLET, FILM COATED ORAL at 09:01

## 2025-07-02 RX ADMIN — FORMOTEROL FUMARATE DIHYDRATE 20 MCG: 20 SOLUTION RESPIRATORY (INHALATION) at 09:04

## 2025-07-02 RX ADMIN — NYSTATIN 500000 UNITS: 100000 SUSPENSION ORAL at 06:13

## 2025-07-02 RX ADMIN — SODIUM CHLORIDE SOLN NEBU 3% 4 ML: 3 NEBU SOLN at 09:04

## 2025-07-02 RX ADMIN — NICOTINE 1 PATCH: 14 PATCH, EXTENDED RELEASE TRANSDERMAL at 09:01

## 2025-07-02 RX ADMIN — IPRATROPIUM BROMIDE AND ALBUTEROL SULFATE 3 ML: .5; 3 SOLUTION RESPIRATORY (INHALATION) at 09:04

## 2025-07-02 RX ADMIN — DIAZEPAM 10 MG: 5 TABLET ORAL at 15:52

## 2025-07-02 RX ADMIN — SODIUM CHLORIDE SOLN NEBU 3% 4 ML: 3 NEBU SOLN at 14:50

## 2025-07-02 RX ADMIN — Medication 1 TABLET: at 11:14

## 2025-07-02 RX ADMIN — TAMSULOSIN HYDROCHLORIDE 0.4 MG: 0.4 CAPSULE ORAL at 09:01

## 2025-07-02 RX ADMIN — IPRATROPIUM BROMIDE AND ALBUTEROL SULFATE 3 ML: .5; 3 SOLUTION RESPIRATORY (INHALATION) at 14:49

## 2025-07-02 RX ADMIN — NYSTATIN 500000 UNITS: 100000 SUSPENSION ORAL at 17:21

## 2025-07-02 RX ADMIN — ALUMINUM HYDROXIDE, MAGNESIUM HYDROXIDE, AND SIMETHICONE 30 ML: 200; 200; 20 SUSPENSION ORAL at 17:21

## 2025-07-02 RX ADMIN — LEVETIRACETAM 500 MG: 500 TABLET, FILM COATED ORAL at 09:01

## 2025-07-02 RX ADMIN — THIAMINE HCL TAB 100 MG 100 MG: 100 TAB at 09:01

## 2025-07-02 RX ADMIN — PANTOPRAZOLE SODIUM 40 MG: 40 TABLET, DELAYED RELEASE ORAL at 06:13

## 2025-07-02 RX ADMIN — TIOTROPIUM BROMIDE INHALATION SPRAY 2 PUFF: 3.12 SPRAY, METERED RESPIRATORY (INHALATION) at 09:07

## 2025-07-02 RX ADMIN — ALUMINUM HYDROXIDE, MAGNESIUM HYDROXIDE, AND SIMETHICONE 30 ML: 200; 200; 20 SUSPENSION ORAL at 09:01

## 2025-07-02 RX ADMIN — ALUMINUM HYDROXIDE, MAGNESIUM HYDROXIDE, AND SIMETHICONE 30 ML: 200; 200; 20 SUSPENSION ORAL at 11:14

## 2025-07-02 RX ADMIN — ACETAMINOPHEN 650 MG: 325 TABLET ORAL at 11:14

## 2025-07-02 RX ADMIN — FOLIC ACID 1 MG: 1 TABLET ORAL at 09:01

## 2025-07-02 ASSESSMENT — LIFESTYLE VARIABLES
TREMOR: NO TREMOR
AGITATION: NORMAL ACTIVITY
BLOOD PRESSURE: 99/54
AUDITORY DISTURBANCES: NOT PRESENT
HEADACHE, FULLNESS IN HEAD: NOT PRESENT
NAUSEA AND VOMITING: NO NAUSEA AND NO VOMITING
VISUAL DISTURBANCES: NOT PRESENT
TOTAL SCORE: 0
ANXIETY: NO ANXIETY, AT EASE
AGITATION: NORMAL ACTIVITY
AUDITORY DISTURBANCES: NOT PRESENT
PAROXYSMAL SWEATS: BARELY PERCEPTIBLE SWEATING, PALMS MOIST
VISUAL DISTURBANCES: NOT PRESENT
TOTAL SCORE: 5
HEADACHE, FULLNESS IN HEAD: MILD
VISUAL DISTURBANCES: NOT PRESENT
TOTAL SCORE: 3
PULSE: 98
HEADACHE, FULLNESS IN HEAD: NOT PRESENT
AUDITORY DISTURBANCES: NOT PRESENT
ANXIETY: NO ANXIETY, AT EASE
PAROXYSMAL SWEATS: NO SWEAT VISIBLE
VISUAL DISTURBANCES: NOT PRESENT
PULSE: 92
ANXIETY: MILDLY ANXIOUS
AUDITORY DISTURBANCES: NOT PRESENT
TREMOR: NO TREMOR
AGITATION: NORMAL ACTIVITY
ORIENTATION AND CLOUDING OF SENSORIUM: ORIENTED AND CAN DO SERIAL ADDITIONS
ORIENTATION AND CLOUDING OF SENSORIUM: ORIENTED AND CAN DO SERIAL ADDITIONS
ANXIETY: NO ANXIETY, AT EASE
ORIENTATION AND CLOUDING OF SENSORIUM: ORIENTED AND CAN DO SERIAL ADDITIONS
HEADACHE, FULLNESS IN HEAD: NOT PRESENT
ORIENTATION AND CLOUDING OF SENSORIUM: ORIENTED AND CAN DO SERIAL ADDITIONS
TOTAL SCORE: 0
NAUSEA AND VOMITING: NO NAUSEA AND NO VOMITING
HEADACHE, FULLNESS IN HEAD: NOT PRESENT
PAROXYSMAL SWEATS: NO SWEAT VISIBLE
NAUSEA AND VOMITING: NO NAUSEA AND NO VOMITING
NAUSEA AND VOMITING: MILD NAUSEA WITH NO VOMITING
TOTAL SCORE: 0
ANXIETY: NO ANXIETY, AT EASE
TREMOR: NOT VISIBLE, BUT CAN BE FELT FINGERTIP TO FINGERTIP
TOTAL SCORE: 0
TOTAL SCORE: 0
TREMOR: NO TREMOR
AGITATION: NORMAL ACTIVITY
TREMOR: 2
PULSE: 89
VISUAL DISTURBANCES: NOT PRESENT
ORIENTATION AND CLOUDING OF SENSORIUM: ORIENTED AND CAN DO SERIAL ADDITIONS
AGITATION: NORMAL ACTIVITY
PAROXYSMAL SWEATS: NO SWEAT VISIBLE
VISUAL DISTURBANCES: NOT PRESENT
TREMOR: NO TREMOR
ANXIETY: NO ANXIETY, AT EASE
PAROXYSMAL SWEATS: NO SWEAT VISIBLE
ORIENTATION AND CLOUDING OF SENSORIUM: ORIENTED AND CAN DO SERIAL ADDITIONS
VISUAL DISTURBANCES: NOT PRESENT
AUDITORY DISTURBANCES: NOT PRESENT
AUDITORY DISTURBANCES: NOT PRESENT
PAROXYSMAL SWEATS: NO SWEAT VISIBLE
ANXIETY: NO ANXIETY, AT EASE
TREMOR: NO TREMOR
HEADACHE, FULLNESS IN HEAD: NOT PRESENT
BLOOD PRESSURE: 102/50
TOTAL SCORE: 7
NAUSEA AND VOMITING: NO NAUSEA AND NO VOMITING
NAUSEA AND VOMITING: NO NAUSEA AND NO VOMITING
AGITATION: NORMAL ACTIVITY
AGITATION: NORMAL ACTIVITY
NAUSEA AND VOMITING: NO NAUSEA AND NO VOMITING
PAROXYSMAL SWEATS: BARELY PERCEPTIBLE SWEATING, PALMS MOIST
HEADACHE, FULLNESS IN HEAD: NOT PRESENT
VISUAL DISTURBANCES: NOT PRESENT
PULSE: 104
AGITATION: NORMAL ACTIVITY
ANXIETY: MILDLY ANXIOUS
TREMOR: NOT VISIBLE, BUT CAN BE FELT FINGERTIP TO FINGERTIP
PAROXYSMAL SWEATS: NO SWEAT VISIBLE
HEADACHE, FULLNESS IN HEAD: MODERATELY SEVERE
AUDITORY DISTURBANCES: NOT PRESENT
BLOOD PRESSURE: 100/65
NAUSEA AND VOMITING: NO NAUSEA AND NO VOMITING
AUDITORY DISTURBANCES: NOT PRESENT

## 2025-07-02 ASSESSMENT — PAIN SCALES - GENERAL: PAINLEVEL_OUTOF10: 0 - NO PAIN

## 2025-07-02 ASSESSMENT — COGNITIVE AND FUNCTIONAL STATUS - GENERAL
CLIMB 3 TO 5 STEPS WITH RAILING: A LITTLE
MOVING TO AND FROM BED TO CHAIR: A LITTLE
HELP NEEDED FOR BATHING: A LITTLE
PERSONAL GROOMING: A LITTLE
MOBILITY SCORE: 20
DAILY ACTIVITIY SCORE: 21
STANDING UP FROM CHAIR USING ARMS: A LITTLE
WALKING IN HOSPITAL ROOM: A LITTLE
TOILETING: A LITTLE

## 2025-07-02 NOTE — DISCHARGE INSTRUCTIONS
PLEASE make sure to follow up. For any [facility] appointment please call [phone].

## 2025-07-02 NOTE — PROGRESS NOTES
07/02/25 1229   Rapid Rounds   Attendance Provider;Care Transitions   Expected Discharge Disposition Home H   Today we still await: Clinical stability   Review at Escalation Rounds No escalation needed       Per MD, if patient tolerates his diet then he will discharge to home today. Will continue to follow for updates.    Randa Zheng RN, BSN  Transitional Care Coordinator

## 2025-07-02 NOTE — CONSULTS
"Nutrition Initial Assessment:   Nutrition Assessment    Reason for Assessment: Provider consult order (Reason for consult? Answer: Nutrition assessment/recommendation)    Patient is a 60 y.o. male presenting with nausea and vomiting for 2 to 3 days, inability to keep any medications down including antiseizure medications. Patient attempted to quit alcohol, last drink 6/24, was drinking daily (3 25oz beers per day). Patient reports having 4 seizures in the last 2 days prior to presenting to the emergency department. 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  He reports hx of poor appetite, poor intake, N/V and diarrhea.  He is working on increasing his intake and wants to go home to take care of his son.  He is sick of fruit, describes himself as a picky eater, chews then spits meat out d/t swallowing difficulty, dislikes peanut butter and yogurt but will eat grilled cheese and mashed potatoes for example.  He likes to cook.  Drinks Ensure in the am and pm but not consistently.  Patient voiced plan to stop drinking and smoking MJ in order to take care of his son and feel better.  Food Allergy: Shellfish (coconut)       Anthropometrics:  Height: 172.7 cm (5' 7.99\")   Weight: 52.8 kg (116 lb 6.5 oz)   BMI (Calculated): 17.7  IBW/kg (Dietitian Calculated): 70 kg  Percent of IBW: 75 %                      Weight History:   Wt Readings from Last 15 Encounters:   07/01/25 52.8 kg (116 lb 6.5 oz)   04/22/25 49.4 kg (109 lb)   04/15/25 49.9 kg (110 lb)   03/07/25 51.3 kg (113 lb)   02/05/25 55.2 kg (121 lb 12.8 oz)   07/31/24 54 kg (119 lb)   06/11/24 57.7 kg (127 lb 3.2 oz)   04/29/24 54.4 kg (120 lb)   04/17/24 54.4 kg (120 lb) "   03/30/24 54.9 kg (121 lb)   03/18/24 54.9 kg (121 lb)   02/08/24 53 kg (116 lb 14.4 oz)   02/06/24 52.2 kg (115 lb)   01/22/24 50.3 kg (111 lb)   11/06/23 53.6 kg (118 lb 3.2 oz)       Weight Change %:  Weight History / % Weight Change: 7/1/25) 52.8kg, 4/15/25) 49.9kg, 1/22/24) 50.3kg    Nutrition Focused Physical Exam Findings:    Subcutaneous Fat Loss:   Orbital Fat Pads: Mild-Moderate (slight dark circles and slight hollowing)  Buccal Fat Pads: Mild-Moderate (flat cheeks, minimal bounce)  Triceps: Mild-Moderate (less than ample fat tissue)  Ribs: Defer  Muscle Wasting:  Temporalis: Severe (hollowed scooping depression)  Pectoralis (Clavicular Region): Mild-Moderate (some protrusion of clavicle)  Deltoid/Trapezius: Mild-Moderate (slight protrusion of acromion process)  Interosseous: Mild-Moderate (slightly depressed area between thumb and forefinger)  Trapezius/Infraspinatus/Supraspinatus (Scapular Region): Defer  Quadriceps: Mild-Moderate (mild depression on inner and outer thigh)  Gastrocnemius: Mild-Moderate (not well developed muscle)  Edema:     Physical Findings:  Digestive System Findings: Anorexia, Diarrhea, Loose stool, Vomiting  Mouth Findings: Dysphagia (Patient reports he does better with soft foods, sick of fruit, chews meats for juice then spits out)    Nutrition Significant Labs:  CBC Trend:   Results from last 7 days   Lab Units 07/02/25  0620 07/01/25  0625 06/30/25  0520 06/28/25  0347   WBC AUTO x10*3/uL 7.0 8.0 10.1 10.3   RBC AUTO x10*6/uL 3.32* 3.56* 3.12* 3.47*   HEMOGLOBIN g/dL 9.7* 10.5* 9.4* 10.0*   HEMATOCRIT % 30.4* 33.1* 28.1* 29.4*   MCV fL 92 93 90 85   PLATELETS AUTO x10*3/uL 268 219 141* 115*    , Renal Lab Trend:   Results from last 7 days   Lab Units 07/02/25  0620 07/01/25  0625 06/30/25  0520 06/29/25  1213   POTASSIUM mmol/L 5.1 4.2 4.0 3.8   PHOSPHORUS mg/dL 2.8 3.4 2.8 1.9*   SODIUM mmol/L 135* 135* 132* 135*   MAGNESIUM mg/dL 1.66 1.84 1.90 2.01   EGFR mL/min/1.73m*2 >90  85 >90 89   BUN mg/dL 3* 4* 6 4*   CREATININE mg/dL 0.95 1.02 0.93 0.98    , Vit D:   Lab Results   Component Value Date    VITD25 22 (L) 04/18/2025        Nutrition Specific Medications:  Scheduled medications  Scheduled Medications[1]  Continuous medications  Continuous Medications[2]  PRN medications  PRN Medications[3]      I/O:   Last BM Date: 06/29/25; Stool Appearance: Loose (07/02/25 0400)    Dietary Orders (From admission, onward)       Start     Ordered    06/29/25 1558  Adult diet Regular  Diet effective now        Question:  Diet type  Answer:  Regular    06/29/25 1557    06/27/25 2228  May Participate in Room Service  ( ROOM SERVICE MAY PARTICIPATE)  Once        Question:  .  Answer:  Yes    06/27/25 2227                     Estimated Needs:   Total Energy Estimated Needs in 24 hours (kCal): 2000 kCal  Method for Estimating Needs: 28kcal/kg/IBW  Total Protein Estimated Needs in 24 Hours (g): 85 g  Method for Estimating 24 Hour Protein Needs: 1.2g/kg/IBW              Nutrition Diagnosis   Malnutrition Diagnosis  Patient has Malnutrition Diagnosis: Yes  Diagnosis Status: New  Malnutrition Diagnosis: Severe malnutrition related to chronic disease or condition  Related to: suspect related to alcohol use disorder  As Evidenced by: <75% estimated energy requirement > 1 month, chronic low body weight (75%IBW/BMI <18) and areas of mild to severe muscle wasting            Nutrition Interventions/Recommendations   Nutrition prescription for oral nutrition    Nutrition Recommendations:  Individualized Nutrition Prescription Provided for :     1) Consider SLP evaluation per patient report of dysphagia.      2) Ensure plus TID as tolerated.  Modify oral nutritional supplements as indicated.      3) Continue daily Multivitamin with minerals, thiamine and folic acid in setting AUD.    Nutrition Interventions/Goals:   Meals and Snacks: General healthful diet  Medical Food Supplement: Commercial beverage medical food  supplement therapy  Goal: Ensure plus TID (8oz = 350kcal and 13g protein)      Education Documentation  Nutrition Related Education, taught by Rubina Austin RDN, LD at 7/2/2025 12:01 PM.  Learner: Patient  Readiness: Acceptance  Method: Explanation  Response: Verbalizes Understanding  Comment: Discussed small frequent meals (examples provided) as tolerated along with Ensure shakes to eventually promote gradual weight gain.  Encouraged abstinence from alcohol to promote health and well being.              Nutrition Monitoring and Evaluation   Intake / Amount of food: Consumes at least 50% or more of meals/snacks/supplements                   Goal Status: New goal(s) identified    Time Spent (min): 90 minutes              [1] alum-mag hydroxide-simeth, 30 mL, oral, With meals & nightly  atorvastatin, 10 mg, oral, Nightly  cholestyramine, 4 g, oral, BID  enoxaparin, 30 mg, subcutaneous, q24h  folic acid, 1 mg, oral, Daily  formoterol, 20 mcg, nebulization, BID  [Held by provider] insulin lispro, 0-5 Units, subcutaneous, q4h  ipratropium-albuteroL, 3 mL, nebulization, TID  levETIRAcetam, 500 mg, oral, BID  loperamide, 2 mg, oral, TID  montelukast, 10 mg, oral, Nightly  multivitamin with minerals, 1 tablet, oral, Daily  nicotine, 1 patch, transdermal, Daily  nystatin, 5 mL, oral, 4x daily  pantoprazole, 40 mg, oral, BID AC  rOPINIRole, 0.5 mg, oral, BID  sodium chloride, 4 mL, nebulization, TID  tamsulosin, 0.4 mg, oral, Daily  thiamine, 100 mg, oral, Daily  tiotropium, 2 puff, inhalation, Daily     [2] lactated Ringer's, 100 mL/hr, Last Rate: 100 mL/hr (07/01/25 1507)     [3] PRN medications: acetaminophen, albuterol, alum-mag hydroxide-simeth **FOLLOWED BY** [START ON 7/4/2025] alum-mag hydroxide-simeth, ammonium lactate, dextrose, dextrose, diazePAM, glucagon, glucagon, hydrocortisone, ketorolac, ondansetron **OR** ondansetron, phenoL, prochlorperazine, trimethobenzamide

## 2025-07-02 NOTE — CARE PLAN
The patient's goals for the shift include      The clinical goals for the shift include Pt will remain safe and free from injury during this shift    Over the shift, the patient did not make progress toward the following goals. Barriers to progression include   Problem: Safety - Adult  Goal: Free from fall injury  Outcome: Progressing     Problem: Chronic Conditions and Co-morbidities  Goal: Patient's chronic conditions and co-morbidity symptoms are monitored and maintained or improved  Outcome: Progressing     Problem: Nutrition  Goal: Nutrient intake appropriate for maintaining nutritional needs  Outcome: Progressing

## 2025-07-02 NOTE — CARE PLAN
The patient's goals for the shift include      The clinical goals for the shift include Pt will remain safe and free from injury during this shift    Over the shift, the patient did not make progress toward the following goals. Barriers to progression include   Problem: Safety - Adult  Goal: Free from fall injury  Outcome: Progressing     Problem: Skin  Goal: Decreased wound size/increased tissue granulation at next dressing change  Outcome: Progressing  Goal: Participates in plan/prevention/treatment measures  Outcome: Progressing  Goal: Prevent/manage excess moisture  Outcome: Progressing  Goal: Prevent/minimize sheer/friction injuries  Outcome: Progressing  Goal: Promote/optimize nutrition  Outcome: Progressing  Goal: Promote skin healing  Outcome: Progressing

## 2025-07-02 NOTE — DISCHARGE SUMMARY
Discharge Diagnosis  Intractable nausea and vomiting    Malnutrition Diagnosis Status: New  Malnutrition Diagnosis: Severe malnutrition related to chronic disease or condition  Related to: suspect related to alcohol use disorder  As Evidenced by: <75% estimated energy requirement > 1 month, chronic low body weight (75%IBW/BMI <18) and areas of mild to severe muscle wasting  I agree with the dietitian's malnutrition diagnosis.        Issues Requiring Follow-Up  Addiction etoh  GI gastritis etoh related   PCP MMP     Discharge Meds     Medication List      START taking these medications     alum-mag hydroxide-simeth 200-200-20 mg/5 mL oral suspension; Commonly   known as: Mylanta; Take 30 mL by mouth 4 times a day with meals for 3   days, THEN 30 mL 4 times a day as needed for indigestion or heartburn for   up to 10 days.; Start taking on: July 2, 2025   ondansetron 8 mg tablet; Commonly known as: Zofran; Take 1 tablet (8 mg)   by mouth every 8 hours if needed for nausea or vomiting.     CHANGE how you take these medications     loperamide 2 mg capsule; Commonly known as: Imodium; Take 2 capsules (4   mg) by mouth 4 times a day as needed for diarrhea.; What changed: how much   to take, how to take this, when to take this, reasons to take this   pantoprazole 40 mg EC tablet; Commonly known as: ProtoNix; Take 1 tablet   (40 mg) by mouth 2 times a day for 7 days, THEN 1 tablet (40 mg) once   daily in the morning. Take before meals. Do not crush, chew, or split.;   Start taking on: July 2, 2025; What changed: See the new instructions.     CONTINUE taking these medications     Acne Medication 10 % lotion lotion; Generic drug: benzoyl peroxide;   Apply 1 Application topically once daily at bedtime.   albuterol 90 mcg/actuation inhaler; Commonly known as: Ventolin HFA;   Inhale 2 puffs every 4 hours if needed for wheezing or shortness of   breath.   ammonium lactate 12 % cream; Commonly known as: Amlactin   atorvastatin 10  mg tablet; Commonly known as: Lipitor; Take 1 tablet (10   mg) by mouth once daily at bedtime.   Bevespi Aerosphere 9-4.8 mcg HFA aerosol inhaler; Generic drug:   glycopyrrolate-formoteroL; Inhale 2 puffs 2 times a day.   cholestyramine 4 gram packet; Commonly known as: Questran; Take 1 packet   (4 g) by mouth 2 times daily (morning and late afternoon). Take 3hrs   before or after any meds. Take twice daily as needed for diarrhea   diclofenac sodium 1 % gel; Commonly known as: Voltaren; APPLY FOUR AND   ONE-HALF INCHES (4 GRAMS) TOPICALLY FOUR TIMES A DAY AS NEEDED FOR JOINT   PAIN   Ensure Original 0.04-1.05 gram-kcal/mL liquid; Generic drug: food   supplemt, lactose-reduced; Take 3 bottles by mouth once daily. 2-3 cans   EPINEPHrine 0.3 mg/0.3 mL injection syringe; Commonly known as: Epipen;   Inject 0.3 mL (0.3 mg) into the muscle 1 time if needed for anaphylaxis   for up to 1 dose. Inject into upper leg. Call 911 after use.   gabapentin 800 mg tablet; Commonly known as: Neurontin; Take 1 tablet   (800 mg) by mouth 3 times a day.   hydrocortisone 2.5 % cream; Apply topically 2 times a day as needed for   irritation or rash.   ipratropium-albuteroL 0.5-2.5 mg/3 mL nebulizer solution; Commonly known   as: Duo-Neb; Inhale 3 mL by nebulization every 6 hours while awake.   levETIRAcetam 500 mg tablet; Commonly known as: Keppra; Take 1 tablet   (500 mg) by mouth 2 times a day.   mirtazapine 15 mg tablet; Commonly known as: Remeron; TAKE ONE TABLET   (15 MG) BY MOUTH DAILY AT 9PM AT BEDTIME   mometasone 0.1 % ointment; Commonly known as: Elocon; Apply topically   once daily.   montelukast 10 mg tablet; Commonly known as: Singulair; Take 1 tablet   (10 mg) by mouth once daily at bedtime.   multivitamin with minerals tablet; Take 1 tablet by mouth once daily.   nicotine 14 mg/24 hr patch; Commonly known as: Nicoderm CQ; Place 1   patch over 24 hours on the skin once daily.   OLANZapine 5 mg tablet; Commonly known as:  ZyPREXA; Take 1 tablet (5 mg)   by mouth once daily at bedtime.   rOPINIRole 0.5 mg tablet; Commonly known as: Requip; Take 1 tablet (0.5   mg) by mouth 3 times a day.   sodium chloride 3 % nebulizer solution; Take 4 mL by nebulization 3   times a day.   tamsulosin 0.4 mg 24 hr capsule; Commonly known as: Flomax; Take 1   capsule (0.4 mg) by mouth once daily.   thiamine 100 mg tablet; Commonly known as: Vitamin B-1; Take 1 tablet   (100 mg) by mouth once daily.     STOP taking these medications     nystatin 100,000 unit/mL suspension; Commonly known as: Mycostatin       Test Results Pending At Discharge  Pending Labs       No current pending labs.            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 Patient attempted to quit alcohol, last drink 6/24, was drinking daily (3 25oz beers per day). Patient reports having 4 seizures in the last 2 days prior to presenting to the emergency department. Stay c/b ongoing emesis and intability to keep food down.  On 7/2/25 patient was able to keep some food down.  Not ideal amount but the patient was requesting to be discharged as he did not feel like he wanted to remain in the hospital.  We discussed risks and benefits.  I voiced my concern about alcohol related activities as well as alcoholic gastritis that is not a formal diagnosis but highly suspicious given his symptoms.  He was willing to stay for most of the day but at 4:30 PM was insisting on discharge.  Discussion with RN revealed that the patient was becoming agitated and not wanting to be here.  I discussed with him a referral to gastroenterology for further workup of his GI symptoms as well as close follow-up with PCP he will also be referred to  addiction medicine.     I spent 45 minutes in professional medical decision making, face-to-face examination and counseling, care coordination, chart review, discussions with consultants, and care planning          Pertinent Physical Exam At Time of Discharge  Physical Exam  Vitals and nursing note reviewed.   Constitutional:       General: He is not in acute distress.     Appearance: Normal appearance. He is not ill-appearing.   HENT:      Head: Normocephalic and atraumatic.   Pulmonary:      Effort: No respiratory distress.   Abdominal:      Tenderness: There is no guarding.   Skin:     Coloration: Skin is not jaundiced or pale.   Neurological:      Mental Status: He is alert and oriented to person, place, and time.   Psychiatric:         Mood and Affect: Mood normal.         Outpatient Follow-Up  Future Appointments   Date Time Provider Department Center   7/14/2025  3:00 PM RYAN Baca-CNP [id number] [facility]   8/4/2025 11:00 AM Jesus Manuel Choe MD [id number] [facility]   8/13/2025  2:40 PM Sigifredo Sinha MD [id number] None   9/3/2025 11:30 AM Federico Rosas MD [id number] [facility]         Irena Blackwell DO

## 2025-07-04 LAB
ATRIAL RATE: 99 BPM
P AXIS: 80 DEGREES
P OFFSET: 197 MS
P ONSET: 153 MS
PR INTERVAL: 134 MS
Q ONSET: 220 MS
QRS COUNT: 16 BEATS
QRS DURATION: 78 MS
QT INTERVAL: 358 MS
QTC CALCULATION(BAZETT): 459 MS
QTC FREDERICIA: 423 MS
R AXIS: 78 DEGREES
T AXIS: 78 DEGREES
T OFFSET: 399 MS
VENTRICULAR RATE: 99 BPM

## 2025-07-10 DIAGNOSIS — J45.909 ASTHMA, UNSPECIFIED ASTHMA SEVERITY, UNSPECIFIED WHETHER COMPLICATED, UNSPECIFIED WHETHER PERSISTENT (HHS-HCC): ICD-10-CM

## 2025-07-10 DIAGNOSIS — J44.1 COPD EXACERBATION (MULTI): Primary | ICD-10-CM

## 2025-07-14 ENCOUNTER — APPOINTMENT (OUTPATIENT)
Dept: GASTROENTEROLOGY | Facility: HOSPITAL | Age: 60
End: 2025-07-14
Payer: COMMERCIAL

## 2025-07-14 NOTE — PROGRESS NOTES
Servando Leigh is a 60 y.o. male with past medical history of GERD who presents today for abdominal pain. He was admitted in June for malnutrition r/t alcohol use disorder.     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 Patient attempted to quit alcohol, last drink 6/24, was drinking daily (3 25oz beers per day). Patient reports having 4 seizures in the last 2 days prior to presenting to the emergency department. Stay c/b ongoing emesis and intability to keep food down. On 7/2/25 patient was able to keep some food down. Not ideal amount but the patient was requesting to be discharged as he did not feel like he wanted to remain in the hospital. We discussed risks and benefits. I voiced my concern about alcohol related activities as well as alcoholic gastritis that is not a formal diagnosis but highly suspicious given his symptoms.   No prior EGD or colonoscopy.    Social history: -    Family history: Denies family history of colon cancer or other GI disorders or malignancy.     Medical History[1]  Surgical History[2]  Current Medications[3]  Allergies[4]  Social History     Socioeconomic History    Marital status:      Spouse name: Not on file    Number of children: Not on file    Years of education: Not on file    Highest education level: Not on file   Occupational History    Not on file   Tobacco Use    Smoking status: Every Day     Current packs/day: 0.25     Average packs/day: 0.3 packs/day for 24.5 years (6.1 ttl pk-yrs)     Types: Cigarettes     Start date: 2001    Smokeless tobacco: Never   Vaping Use    Vaping status: Never Used   Substance and Sexual Activity    Alcohol use: Yes    Drug use: Yes     Types: Marijuana    Sexual activity: Yes   Other Topics Concern    Not on file   Social  History Narrative    Not on file     Social Drivers of Health     Financial Resource Strain: Medium Risk (6/29/2025)    Overall Financial Resource Strain (CARDIA)     Difficulty of Paying Living Expenses: Somewhat hard   Food Insecurity: Food Insecurity Present (6/28/2025)    Hunger Vital Sign     Worried About Running Out of Food in the Last Year: Sometimes true     Ran Out of Food in the Last Year: Never true   Transportation Needs: No Transportation Needs (6/29/2025)    PRAPARE - Transportation     Lack of Transportation (Medical): No     Lack of Transportation (Non-Medical): No   Physical Activity: Not on File (8/19/2019)    Received from [employer]    Physical Activity     Physical Activity: 0   Stress: Not on File (8/19/2019)    Received from [employer]    Stress     Stress: 0   Social Connections: Feeling Socially Integrated (5/2/2025)    OASIS : Social Isolation     Frequency of experiencing loneliness or isolation: Rarely   Intimate Partner Violence: Not At Risk (6/28/2025)    Humiliation, Afraid, Rape, and Kick questionnaire     Fear of Current or Ex-Partner: No     Emotionally Abused: No     Physically Abused: No     Sexually Abused: No   Housing Stability: Low Risk  (6/29/2025)    Housing Stability Vital Sign     Unable to Pay for Housing in the Last Year: No     Number of Times Moved in the Last Year: 0     Homeless in the Last Year: No     Family History[5]    Review of Systems  Review of Systems negative except as noted in HPI.    Objective     There were no vitals taken for this visit.     Physical Exam  Constitutional:  No acute distress. Normal appearance. Not ill-appearing.  HENT:  Head normocephalic and atraumatic. Conjunctivae normal.  Cardiovascular:  Normal rate. Regular rhythm.  Pulmonary:  Pulmonary effort normal. No respiratory distress. Breath sounds clear.  Abdominal:  Abdomen is flat and soft. There is no distension. No tenderness or guarding.  Skin: Dry.  Neurological:  Alert and  oriented.  Psychiatric:  Mood and affect normal.    Assessment/Plan     60 y.o. male with history of *** who presents today for clinic visit for *** history of ***.    Recommendations  1.  2. 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 stopped his depakote. no script. new md per patient    Unsteady gait when walking     Vertigo    [2]   Past Surgical History:  Procedure Laterality Date    CERVICAL FUSION      FOOT      NECK SURGERY  01/25/2017    Neck Surgery    OTHER SURGICAL HISTORY  07/13/2020    Perirectal abscess incision and drainage    OTHER SURGICAL HISTORY  01/07/2020    Shoulder replacement    TOTAL SHOULDER ARTHROPLASTY Right     right   [3]   Current Outpatient Medications   Medication Sig Dispense Refill    albuterol (Ventolin HFA) 90 mcg/actuation inhaler Inhale 2 puffs every 4 hours if needed for wheezing or shortness of breath. 18 g 11    alum-mag hydroxide-simeth (Mylanta) 200-200-20 mg/5 mL oral suspension Take 30 mL by mouth 4 times a day with meals for 3 days, THEN 30 mL 4 times a day as needed for indigestion or heartburn  for up to 10 days. 355 mL 0    ammonium lactate (Amlactin) 12 % cream Apply 1 application twice a day by topical route.      atorvastatin (Lipitor) 10 mg tablet Take 1 tablet (10 mg) by mouth once daily at bedtime. 90 tablet 3    benzoyl peroxide (Acne Medication) 10 % lotion lotion Apply 1 Application topically once daily at bedtime. 29.5 mL 3    Bevespi Aerosphere 9-4.8 mcg HFA aerosol inhaler Inhale 2 puffs 2 times a day. 32.1 g 3    cholestyramine (Questran) 4 gram packet Take 1 packet (4 g) by mouth 2 times daily (morning and late afternoon). Take 3hrs before or after any meds. Take twice daily as needed for diarrhea 60 packet 3    diclofenac sodium (Voltaren) 1 % gel APPLY FOUR AND ONE-HALF INCHES (4 GRAMS) TOPICALLY FOUR TIMES A DAY AS NEEDED FOR JOINT PAIN 100 g 5    EPINEPHrine 0.3 mg/0.3 mL injection syringe Inject 0.3 mL (0.3 mg) into the muscle 1 time if needed for anaphylaxis for up to 1 dose. Inject into upper leg. Call 911 after use. 1 each 0    food supplemt, lactose-reduced (Ensure Original) 0.04-1.05 gram-kcal/mL liquid Take 3 bottles by mouth once daily. 2-3 cans 88764 mL 3    gabapentin (Neurontin) 800 mg tablet Take 1 tablet (800 mg) by mouth 3 times a day. (Patient taking differently: Take 1 tablet (800 mg) by mouth 2 times a day.) 90 tablet 0    hydrocortisone 2.5 % cream Apply topically 2 times a day as needed for irritation or rash. 30 g 11    ipratropium-albuteroL (Duo-Neb) 0.5-2.5 mg/3 mL nebulizer solution Inhale 3 mL by nebulization every 6 hours while awake. 90 mL 3    levETIRAcetam (Keppra) 500 mg tablet Take 1 tablet (500 mg) by mouth 2 times a day. 60 tablet 11    loperamide (Imodium) 2 mg capsule Take 2 capsules (4 mg) by mouth 4 times a day as needed for diarrhea. 90 capsule 0    mirtazapine (Remeron) 15 mg tablet TAKE ONE TABLET (15 MG) BY MOUTH DAILY AT 9PM AT BEDTIME 90 tablet 3    mometasone (Elocon) 0.1 % ointment Apply topically once daily. 15 g 1    montelukast (Singulair)  10 mg tablet Take 1 tablet (10 mg) by mouth once daily at bedtime. 30 tablet 3    multivitamin with minerals tablet Take 1 tablet by mouth once daily. 30 tablet 3    nicotine (Nicoderm CQ) 14 mg/24 hr patch Place 1 patch over 24 hours on the skin once daily. 30 patch 0    OLANZapine (ZyPREXA) 5 mg tablet Take 1 tablet (5 mg) by mouth once daily at bedtime. 90 tablet 3    ondansetron (Zofran) 8 mg tablet Take 1 tablet (8 mg) by mouth every 8 hours if needed for nausea or vomiting. 20 tablet 0    pantoprazole (ProtoNix) 40 mg EC tablet Take 1 tablet (40 mg) by mouth 2 times a day for 7 days, THEN 1 tablet (40 mg) once daily in the morning. Take before meals. Do not crush, chew, or split. 44 tablet 0    rOPINIRole (Requip) 0.5 mg tablet Take 1 tablet (0.5 mg) by mouth 3 times a day. (Patient taking differently: Take 1 tablet (0.5 mg) by mouth 2 times a day.) 90 tablet 3    sodium chloride 3 % nebulizer solution Take 4 mL by nebulization 3 times a day. 187 mL 2    tamsulosin (Flomax) 0.4 mg 24 hr capsule Take 1 capsule (0.4 mg) by mouth once daily. 90 capsule 3    thiamine (Vitamin B-1) 100 mg tablet Take 1 tablet (100 mg) by mouth once daily. 30 tablet 11     No current facility-administered medications for this visit.   [4]   Allergies  Allergen Reactions    Coconut Swelling    Shellfish Containing Products Anaphylaxis, Unknown and Swelling    Shellfish Derived Anaphylaxis, Unknown and Other     Other Reaction(s): Unknown    Other Other    Penicillin G Swelling    Cat Dander Other     itching    House Dust Other     Watery eyes.   [5]   Family History  Problem Relation Name Age of Onset    Diabetes Son      Glaucoma Other Grandfather

## 2025-07-15 NOTE — DOCUMENTATION CLARIFICATION NOTE
"    PATIENT:               MIKAL MIRANDA  ACCT #:                  [id number]  MRN:                       [id number]  :                       1965  ADMIT DATE:       2025 5:27 PM  DISCH DATE:        2025 6:51 PM  RESPONDING PROVIDER #:        [id number]          PROVIDER RESPONSE TEXT:    thrombocytopenia is clinically significant and required treatment/monitoring    CDI QUERY TEXT:    Clarification    Instruction:    Based on your assessment of the patient and the clinical information, please provide the requested documentation by clicking on the appropriate radio button and enter any additional information if prompted.    Question: Is there a diagnosis indicative of the lab values or image study    When answering this query, please exercise your independent professional judgment. The fact that a question is being asked, does not imply that any particular answer is desired or expected.    The patient's clinical indicators include:  Clinical Information: Admit with hyponatremia and nausea and vomiting in setting of ETOH wd    Clinical Indicators:    H&P states \"Patient endorses seizure, likely in the setting of hyponatremia and alcohol withdrawal rather than Keppra level as Keppra level was therapeutic.\"    Platelet trend from admit, [phone]    Treatment: lab monitoring, IVF    Risk Factors: low platelets  Options provided:  -- thrombocytopenia is clinically significant and required treatment/monitoring  -- low platelets not requiring treatment or evaluation  -- Other - I will add my own diagnosis  -- Refer to Clinical Documentation Reviewer    Query created by: Juan Manuel Decker on 2025 9:30 AM      Electronically signed by:  SHAWNA GANDHI DO 7/15/2025 7:17 AM          "

## 2025-07-16 DIAGNOSIS — J69.0 ASPIRATION PNEUMONITIS (MULTI): ICD-10-CM

## 2025-07-16 DIAGNOSIS — J44.1 COPD EXACERBATION (MULTI): ICD-10-CM

## 2025-07-16 DIAGNOSIS — F10.90 ALCOHOL USE DISORDER: ICD-10-CM

## 2025-07-16 DIAGNOSIS — F41.9 ANXIETY: ICD-10-CM

## 2025-07-16 DIAGNOSIS — R07.89 CHEST TIGHTNESS: ICD-10-CM

## 2025-07-16 DIAGNOSIS — R33.9 RETENTION OF URINE: ICD-10-CM

## 2025-07-16 DIAGNOSIS — J47.9 BRONCHIECTASIS WITHOUT COMPLICATION (MULTI): ICD-10-CM

## 2025-07-16 DIAGNOSIS — J44.9 COPD WITHOUT EXACERBATION (MULTI): ICD-10-CM

## 2025-07-16 DIAGNOSIS — J45.909 ASTHMA, UNSPECIFIED ASTHMA SEVERITY, UNSPECIFIED WHETHER COMPLICATED, UNSPECIFIED WHETHER PERSISTENT (HHS-HCC): ICD-10-CM

## 2025-07-16 DIAGNOSIS — R11.10 REGURGITATION OF FOOD: ICD-10-CM

## 2025-07-16 DIAGNOSIS — R41.0 CONFUSION WITH NON-FOCAL NEURO EXAM: Chronic | ICD-10-CM

## 2025-07-16 DIAGNOSIS — M54.2 CHRONIC NECK PAIN: ICD-10-CM

## 2025-07-16 DIAGNOSIS — K59.1 FUNCTIONAL DIARRHEA: ICD-10-CM

## 2025-07-16 DIAGNOSIS — G89.29 CHRONIC NECK PAIN: ICD-10-CM

## 2025-07-16 DIAGNOSIS — F51.01 PRIMARY INSOMNIA: ICD-10-CM

## 2025-07-16 PROCEDURE — RXMED WILLOW AMBULATORY MEDICATION CHARGE

## 2025-07-16 RX ORDER — GLYCOPYRROLATE AND FORMOTEROL FUMARATE 9; 4.8 UG/1; UG/1
2 AEROSOL, METERED RESPIRATORY (INHALATION)
Qty: 32.1 G | Refills: 3 | Status: SHIPPED | OUTPATIENT
Start: 2025-07-16

## 2025-07-16 RX ORDER — ATORVASTATIN CALCIUM 10 MG/1
10 TABLET, FILM COATED ORAL NIGHTLY
Qty: 90 TABLET | Refills: 3 | Status: SHIPPED | OUTPATIENT
Start: 2025-07-16

## 2025-07-16 RX ORDER — ALBUTEROL SULFATE 90 UG/1
2 INHALANT RESPIRATORY (INHALATION) EVERY 4 HOURS PRN
Qty: 18 G | Refills: 11 | Status: SHIPPED | OUTPATIENT
Start: 2025-07-16

## 2025-07-16 RX ORDER — IPRATROPIUM BROMIDE AND ALBUTEROL SULFATE 2.5; .5 MG/3ML; MG/3ML
3 SOLUTION RESPIRATORY (INHALATION)
Qty: 90 ML | Refills: 3 | Status: SHIPPED | OUTPATIENT
Start: 2025-07-16 | End: 2025-08-25

## 2025-07-16 RX ORDER — MIRTAZAPINE 15 MG/1
TABLET, FILM COATED ORAL
Qty: 90 TABLET | Refills: 3 | Status: SHIPPED | OUTPATIENT
Start: 2025-07-16

## 2025-07-16 RX ORDER — OLANZAPINE 5 MG/1
5 TABLET, FILM COATED ORAL NIGHTLY
Qty: 90 TABLET | Refills: 3 | Status: SHIPPED | OUTPATIENT
Start: 2025-07-16

## 2025-07-16 RX ORDER — PANTOPRAZOLE SODIUM 40 MG/1
TABLET, DELAYED RELEASE ORAL
Qty: 44 TABLET | Refills: 0 | Status: SHIPPED | OUTPATIENT
Start: 2025-07-16 | End: 2025-08-22

## 2025-07-16 RX ORDER — GABAPENTIN 800 MG/1
800 TABLET ORAL 3 TIMES DAILY
Qty: 90 TABLET | Refills: 0 | Status: SHIPPED | OUTPATIENT
Start: 2025-07-16

## 2025-07-16 RX ORDER — LEVETIRACETAM 500 MG/1
500 TABLET ORAL 2 TIMES DAILY
Qty: 60 TABLET | Refills: 11 | Status: SHIPPED | OUTPATIENT
Start: 2025-07-16

## 2025-07-16 RX ORDER — LANOLIN ALCOHOL/MO/W.PET/CERES
100 CREAM (GRAM) TOPICAL DAILY
Qty: 30 TABLET | Refills: 11 | Status: SHIPPED | OUTPATIENT
Start: 2025-07-16 | End: 2026-07-16

## 2025-07-16 RX ORDER — ROPINIROLE 0.5 MG/1
0.5 TABLET, FILM COATED ORAL 3 TIMES DAILY
Qty: 90 TABLET | Refills: 3 | Status: SHIPPED | OUTPATIENT
Start: 2025-07-16

## 2025-07-16 RX ORDER — MONTELUKAST SODIUM 10 MG/1
10 TABLET ORAL NIGHTLY
Qty: 30 TABLET | Refills: 3 | Status: SHIPPED | OUTPATIENT
Start: 2025-07-16

## 2025-07-16 RX ORDER — LOPERAMIDE HYDROCHLORIDE 2 MG/1
4 CAPSULE ORAL 4 TIMES DAILY PRN
Qty: 90 CAPSULE | Refills: 0 | Status: SHIPPED | OUTPATIENT
Start: 2025-07-16

## 2025-07-16 RX ORDER — TAMSULOSIN HYDROCHLORIDE 0.4 MG/1
0.4 CAPSULE ORAL DAILY
Qty: 90 CAPSULE | Refills: 3 | Status: SHIPPED | OUTPATIENT
Start: 2025-07-16

## 2025-07-22 ENCOUNTER — OFFICE VISIT (OUTPATIENT)
Facility: HOSPITAL | Age: 60
End: 2025-07-22
Payer: COMMERCIAL

## 2025-07-22 VITALS
OXYGEN SATURATION: 98 % | DIASTOLIC BLOOD PRESSURE: 67 MMHG | HEART RATE: 89 BPM | WEIGHT: 115.8 LBS | RESPIRATION RATE: 18 BRPM | BODY MASS INDEX: 17.55 KG/M2 | TEMPERATURE: 97.6 F | HEIGHT: 68 IN | SYSTOLIC BLOOD PRESSURE: 97 MMHG

## 2025-07-22 DIAGNOSIS — J47.9 BRONCHIECTASIS WITHOUT COMPLICATION (MULTI): ICD-10-CM

## 2025-07-22 DIAGNOSIS — J44.1 COPD EXACERBATION (MULTI): ICD-10-CM

## 2025-07-22 DIAGNOSIS — J44.9 COPD WITHOUT EXACERBATION (MULTI): ICD-10-CM

## 2025-07-22 DIAGNOSIS — J45.40 MODERATE PERSISTENT ASTHMA WITHOUT COMPLICATION (HHS-HCC): ICD-10-CM

## 2025-07-22 DIAGNOSIS — J69.0 ASPIRATION PNEUMONITIS (MULTI): ICD-10-CM

## 2025-07-22 DIAGNOSIS — F10.90 ALCOHOL USE DISORDER: ICD-10-CM

## 2025-07-22 PROCEDURE — 99214 OFFICE O/P EST MOD 30 MIN: CPT | Performed by: STUDENT IN AN ORGANIZED HEALTH CARE EDUCATION/TRAINING PROGRAM

## 2025-07-22 PROCEDURE — 3008F BODY MASS INDEX DOCD: CPT | Performed by: STUDENT IN AN ORGANIZED HEALTH CARE EDUCATION/TRAINING PROGRAM

## 2025-07-22 RX ORDER — IPRATROPIUM BROMIDE AND ALBUTEROL SULFATE 2.5; .5 MG/3ML; MG/3ML
3 SOLUTION RESPIRATORY (INHALATION) ONCE
Status: SHIPPED | OUTPATIENT
Start: 2025-07-22

## 2025-07-22 RX ORDER — LANOLIN ALCOHOL/MO/W.PET/CERES
100 CREAM (GRAM) TOPICAL DAILY
Qty: 30 TABLET | Refills: 11 | Status: SHIPPED | OUTPATIENT
Start: 2025-07-22 | End: 2026-07-22

## 2025-07-22 RX ORDER — IPRATROPIUM BROMIDE AND ALBUTEROL SULFATE 2.5; .5 MG/3ML; MG/3ML
3 SOLUTION RESPIRATORY (INHALATION)
Qty: 90 ML | Refills: 3 | Status: SHIPPED | OUTPATIENT
Start: 2025-07-22 | End: 2025-10-30

## 2025-07-22 RX ORDER — MONTELUKAST SODIUM 10 MG/1
10 TABLET ORAL NIGHTLY
Qty: 30 TABLET | Refills: 3 | Status: SHIPPED | OUTPATIENT
Start: 2025-07-22

## 2025-07-22 RX ORDER — ALBUTEROL SULFATE 90 UG/1
2 INHALANT RESPIRATORY (INHALATION) EVERY 4 HOURS PRN
Qty: 18 G | Refills: 11 | Status: SHIPPED | OUTPATIENT
Start: 2025-07-22

## 2025-07-22 RX ORDER — GLYCOPYRROLATE AND FORMOTEROL FUMARATE 9; 4.8 UG/1; UG/1
2 AEROSOL, METERED RESPIRATORY (INHALATION)
Qty: 32.1 G | Refills: 3 | Status: SHIPPED | OUTPATIENT
Start: 2025-07-22

## 2025-07-22 RX ORDER — LEVETIRACETAM 500 MG/1
500 TABLET ORAL 2 TIMES DAILY
Qty: 60 TABLET | Refills: 11 | Status: SHIPPED | OUTPATIENT
Start: 2025-07-22

## 2025-07-22 ASSESSMENT — PAIN SCALES - GENERAL: PAINLEVEL_OUTOF10: 0-NO PAIN

## 2025-07-22 ASSESSMENT — ENCOUNTER SYMPTOMS
DIAPHORESIS: 1
NAUSEA: 1
DEPRESSION: 0
DIARRHEA: 1
HALLUCINATIONS: 1
PALPITATIONS: 0
AGITATION: 1
LOSS OF SENSATION IN FEET: 0
OCCASIONAL FEELINGS OF UNSTEADINESS: 0
SHORTNESS OF BREATH: 1

## 2025-07-22 NOTE — PROGRESS NOTES
"  Subjective   Patient ID: Servando Leigh is a 60 y.o. male who presents for Hospital Follow-up.     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 Over the last week, pt endorses that he has been experiencing worsening nausea, lightheadedness, dizziness, irritation, diarrhea, and shortness of breath. Pt was hospitalized on 6/27 for severe intractable nausea and vomiting. His symptoms resolved somewhat on 7/2, at which time the patient insisted upon discharge and became agitated. He was discharged with a referral to gastroenterology and a referral to addiction medicine.     Since his discharge, pt has not been able to take any of his medications due to financial difficulties. He now has a copay on his insurance, which he is unable to meet.     He denies having any seizures since his hospital discharge.     Denies any bloody stool, but notes that his stool has been \"getting darker\" over the last month.     Pt notes that he last drank alcohol 24 hours prior to today's visit. Reports seeing \"shadows\" out of the corner of his eyes. When asked how much he has been drinking, he became agitated, and would not answer further questions. Previously noted to drink 5-6 cans of beer per day.    Pt has not been able to access his breathing treatments, and endorses significant shortness of breath today. As nursing staff was preparing in-office breathing treatment, patient left against medical advice.        Review of Systems   Constitutional:  Positive for diaphoresis.   Respiratory:  Positive for shortness of breath.    Cardiovascular:  Negative for chest pain and palpitations.   Gastrointestinal:  Positive for diarrhea and nausea.   Psychiatric/Behavioral:  Positive for agitation and hallucinations.        Objective   BP 97/67 (BP Location: Right arm, Patient Position: Sitting, BP " "Cuff Size: Adult)   Pulse 89   Temp 36.4 °C (97.6 °F) (Temporal)   Resp 18   Ht 1.727 m (5' 8\")   Wt 52.5 kg (115 lb 12.8 oz)   SpO2 98%   BMI 17.61 kg/m²     Physical Exam  Vitals reviewed.   Constitutional:       Appearance: He is underweight. He is ill-appearing.      Comments: BMI 17.61, underweight   HENT:      Head: Normocephalic and atraumatic.      Nose: Nose normal.      Mouth/Throat:      Mouth: Mucous membranes are moist.      Pharynx: Oropharynx is clear.     Eyes:      Extraocular Movements: Extraocular movements intact.      Conjunctiva/sclera: Conjunctivae normal.      Pupils: Pupils are equal, round, and reactive to light.       Cardiovascular:      Rate and Rhythm: Normal rate.      Pulses: Normal pulses.      Heart sounds: Normal heart sounds. No murmur heard.     No friction rub. No gallop.   Pulmonary:      Effort: Pulmonary effort is normal.      Breath sounds: Rhonchi present.      Comments: Coarse rhonchi auscultated in all lung fields anteriorly and posteriorly  Abdominal:      General: Abdomen is flat. Bowel sounds are normal.      Palpations: Abdomen is soft.     Musculoskeletal:      Cervical back: Normal range of motion and neck supple.     Skin:     General: Skin is warm and dry.      Capillary Refill: Capillary refill takes less than 2 seconds.     Neurological:      General: No focal deficit present.      Mental Status: He is alert.     Psychiatric:         Attention and Perception: He perceives visual hallucinations.         Behavior: Behavior is agitated and actively hallucinating.         Assessment/Plan   Problem List Items Addressed This Visit           ICD-10-CM    Asthma J45.909    Relevant Medications    montelukast (Singulair) 10 mg tablet    ipratropium-albuteroL (Duo-Neb) 0.5-2.5 mg/3 mL nebulizer solution 3 mL    Bronchiectasis J47.9    Relevant Medications    ipratropium-albuteroL (Duo-Neb) 0.5-2.5 mg/3 mL nebulizer solution    Bevespi Aerosphere 9-4.8 mcg HFA " aerosol inhaler    COPD exacerbation (Multi) J44.1    Relevant Medications    ipratropium-albuteroL (Duo-Neb) 0.5-2.5 mg/3 mL nebulizer solution    Bevespi Aerosphere 9-4.8 mcg HFA aerosol inhaler    albuterol (Ventolin HFA) 90 mcg/actuation inhaler    montelukast (Singulair) 10 mg tablet    ipratropium-albuteroL (Duo-Neb) 0.5-2.5 mg/3 mL nebulizer solution 3 mL    Aspiration pneumonitis (Multi) J69.0    Relevant Medications    levETIRAcetam (Keppra) 500 mg tablet    albuterol (Ventolin HFA) 90 mcg/actuation inhaler    Alcohol use disorder F10.90    Relevant Medications    thiamine (Vitamin B-1) 100 mg tablet     Other Visit Diagnoses         Codes      COPD without exacerbation (Multi)     J44.9    Relevant Medications    ipratropium-albuteroL (Duo-Neb) 0.5-2.5 mg/3 mL nebulizer solution    Bevespi Aerosphere 9-4.8 mcg HFA aerosol inhaler    albuterol (Ventolin HFA) 90 mcg/actuation inhaler    montelukast (Singulair) 10 mg tablet    ipratropium-albuteroL (Duo-Neb) 0.5-2.5 mg/3 mL nebulizer solution 3 mL               OVERALL PLAN:  [ ] For COPD, bronchiectasis, and asthma, attempted to give breathing treatment in office, but patient left before being able to do this.   -Refilled Duo-Neb, Bevespi, albuterol, and montelukast   -Will discuss further at follow-up visits    [ ] For alcohol use disorder  -Refilled thiamine  -Will encourage pt to follow with addiction medicine  -Will continue to encourage cessation at future visits    [ ] For financial insecurity, will need to discuss case with a  to see how best to navigate this patient's difficult social situation.    Weight loss:  -BMI at last visit: 18.52.   -BMI today: 17.61    Note: This documentaion was entered into the electronic medical record using [employer] medical dictation software, and was not necessarily edited thereafter. Please excuse any errors of spelling or grammar.    Jhonatan Ceballos - MS3    I was present with the medical student who  participated in the documentation of this note.  I have personally seen and examined the patient and performed the medical decision-making components. I have reviewed the medical student documentation and/or resident documentation and verified the findings in the note as written with additions or exceptions as stated in the body of the note.    Sigifredo Sinha MD

## 2025-08-01 ENCOUNTER — PHARMACY VISIT (OUTPATIENT)
Dept: PHARMACY | Facility: CLINIC | Age: 60
End: 2025-08-01
Payer: COMMERCIAL

## 2025-08-01 PROCEDURE — RXMED WILLOW AMBULATORY MEDICATION CHARGE

## 2025-08-03 PROCEDURE — RXMED WILLOW AMBULATORY MEDICATION CHARGE

## 2025-08-04 ENCOUNTER — PHARMACY VISIT (OUTPATIENT)
Dept: PHARMACY | Facility: CLINIC | Age: 60
End: 2025-08-04
Payer: COMMERCIAL

## 2025-08-13 ENCOUNTER — OFFICE VISIT (OUTPATIENT)
Facility: HOSPITAL | Age: 60
End: 2025-08-13
Payer: COMMERCIAL

## 2025-08-13 VITALS
HEIGHT: 68 IN | DIASTOLIC BLOOD PRESSURE: 76 MMHG | BODY MASS INDEX: 16.88 KG/M2 | OXYGEN SATURATION: 99 % | SYSTOLIC BLOOD PRESSURE: 108 MMHG | HEART RATE: 86 BPM | RESPIRATION RATE: 18 BRPM | WEIGHT: 111.4 LBS | TEMPERATURE: 98.5 F

## 2025-08-13 DIAGNOSIS — I95.1 ORTHOSTASIS: Primary | ICD-10-CM

## 2025-08-13 DIAGNOSIS — G40.909 SEIZURE DISORDER (MULTI): ICD-10-CM

## 2025-08-13 PROCEDURE — 99212 OFFICE O/P EST SF 10 MIN: CPT

## 2025-08-13 PROCEDURE — 99214 OFFICE O/P EST MOD 30 MIN: CPT | Performed by: STUDENT IN AN ORGANIZED HEALTH CARE EDUCATION/TRAINING PROGRAM

## 2025-08-13 PROCEDURE — 3008F BODY MASS INDEX DOCD: CPT | Performed by: STUDENT IN AN ORGANIZED HEALTH CARE EDUCATION/TRAINING PROGRAM

## 2025-08-13 ASSESSMENT — PAIN SCALES - GENERAL: PAINLEVEL_OUTOF10: 0-NO PAIN

## 2025-08-16 LAB — LEVETIRACETAM SERPL-MCNC: 24.1 MCG/ML

## 2025-08-21 ENCOUNTER — HOSPITAL ENCOUNTER (OUTPATIENT)
Dept: CARDIOLOGY | Facility: HOSPITAL | Age: 60
Discharge: HOME | End: 2025-08-21
Payer: COMMERCIAL

## 2025-08-21 DIAGNOSIS — I95.1 ORTHOSTASIS: ICD-10-CM

## 2025-08-21 PROCEDURE — 93306 TTE W/DOPPLER COMPLETE: CPT

## 2025-08-25 LAB
AORTIC VALVE PEAK VELOCITY: 0.95 M/S
AV PEAK GRADIENT: 4 MMHG
AVA (PEAK VEL): 2.87 CM2
EJECTION FRACTION APICAL 4 CHAMBER: 61.4
EJECTION FRACTION: 63 %
LEFT ATRIUM VOLUME AREA LENGTH INDEX BSA: 14.8 ML/M2
LEFT VENTRICLE INTERNAL DIMENSION DIASTOLE: 4.9 CM (ref 3.5–6)
LEFT VENTRICULAR OUTFLOW TRACT DIAMETER: 2 CM
MITRAL VALVE E/A RATIO: 1.01
RIGHT VENTRICLE FREE WALL PEAK S': 8.38 CM/S
TRICUSPID ANNULAR PLANE SYSTOLIC EXCURSION: 1.8 CM

## 2025-09-03 ENCOUNTER — OFFICE VISIT (OUTPATIENT)
Dept: NEUROLOGY | Facility: HOSPITAL | Age: 60
End: 2025-09-03
Payer: COMMERCIAL

## 2025-09-03 VITALS
RESPIRATION RATE: 18 BRPM | BODY MASS INDEX: 16.52 KG/M2 | HEART RATE: 87 BPM | SYSTOLIC BLOOD PRESSURE: 111 MMHG | WEIGHT: 109 LBS | DIASTOLIC BLOOD PRESSURE: 79 MMHG | HEIGHT: 68 IN | TEMPERATURE: 97.7 F

## 2025-09-03 DIAGNOSIS — M47.12 CERVICAL SPONDYLOSIS WITH MYELOPATHY: Primary | ICD-10-CM

## 2025-09-03 DIAGNOSIS — R20.2 PARESTHESIAS: ICD-10-CM

## 2025-09-03 DIAGNOSIS — R73.9 HYPERGLYCEMIA: ICD-10-CM

## 2025-09-03 DIAGNOSIS — G62.9 SMALL FIBER NEUROPATHY: ICD-10-CM

## 2025-09-03 PROCEDURE — 99215 OFFICE O/P EST HI 40 MIN: CPT | Performed by: PSYCHIATRY & NEUROLOGY

## 2025-09-03 PROCEDURE — 99212 OFFICE O/P EST SF 10 MIN: CPT

## 2025-09-03 PROCEDURE — 3008F BODY MASS INDEX DOCD: CPT | Performed by: PSYCHIATRY & NEUROLOGY

## 2025-09-03 ASSESSMENT — PAIN SCALES - GENERAL: PAINLEVEL_OUTOF10: 7

## (undated) DEVICE — Device

## (undated) DEVICE — MANIFOLD, 4 PORT NEPTUNE STANDARD

## (undated) DEVICE — GOWN, SURGICAL, SMARTGOWN, XLARGE, STERILE

## (undated) DEVICE — DRESSING, ABDOMINAL PAD, CURITY, 7.5 X 8 IN

## (undated) DEVICE — TIP, SUCTION, YANKAUER, FLEXIBLE

## (undated) DEVICE — BRIEF, CURITY, XLARGE, MESH